# Patient Record
Sex: FEMALE | Race: BLACK OR AFRICAN AMERICAN | Employment: FULL TIME | URBAN - METROPOLITAN AREA
[De-identification: names, ages, dates, MRNs, and addresses within clinical notes are randomized per-mention and may not be internally consistent; named-entity substitution may affect disease eponyms.]

---

## 2017-01-03 ENCOUNTER — APPOINTMENT (OUTPATIENT)
Dept: MRI IMAGING | Age: 42
DRG: 059 | End: 2017-01-03
Attending: PSYCHIATRY & NEUROLOGY
Payer: MEDICARE

## 2017-01-03 ENCOUNTER — HOME HEALTH ADMISSION (OUTPATIENT)
Dept: HOME HEALTH SERVICES | Facility: HOME HEALTH | Age: 42
End: 2017-01-03
Payer: MEDICARE

## 2017-01-03 PROCEDURE — 72156 MRI NECK SPINE W/O & W/DYE: CPT

## 2017-01-05 ENCOUNTER — HOME CARE VISIT (OUTPATIENT)
Dept: HOME HEALTH SERVICES | Facility: HOME HEALTH | Age: 42
End: 2017-01-05

## 2017-01-06 ENCOUNTER — HOME CARE VISIT (OUTPATIENT)
Dept: SCHEDULING | Facility: HOME HEALTH | Age: 42
End: 2017-01-06
Payer: MEDICARE

## 2017-01-06 PROCEDURE — 400013 HH SOC

## 2017-01-06 PROCEDURE — G0151 HHCP-SERV OF PT,EA 15 MIN: HCPCS

## 2017-01-06 PROCEDURE — 3331090002 HH PPS REVENUE DEBIT

## 2017-01-06 PROCEDURE — 3331090001 HH PPS REVENUE CREDIT

## 2017-01-07 VITALS
DIASTOLIC BLOOD PRESSURE: 108 MMHG | OXYGEN SATURATION: 98 % | HEART RATE: 96 BPM | TEMPERATURE: 97.4 F | SYSTOLIC BLOOD PRESSURE: 164 MMHG

## 2017-01-07 PROCEDURE — 3331090001 HH PPS REVENUE CREDIT

## 2017-01-07 PROCEDURE — 3331090002 HH PPS REVENUE DEBIT

## 2017-01-08 PROCEDURE — 3331090001 HH PPS REVENUE CREDIT

## 2017-01-08 PROCEDURE — 3331090002 HH PPS REVENUE DEBIT

## 2017-01-09 PROCEDURE — 3331090002 HH PPS REVENUE DEBIT

## 2017-01-09 PROCEDURE — 3331090001 HH PPS REVENUE CREDIT

## 2017-01-10 ENCOUNTER — HOME CARE VISIT (OUTPATIENT)
Dept: HOME HEALTH SERVICES | Facility: HOME HEALTH | Age: 42
End: 2017-01-10
Payer: MEDICARE

## 2017-01-10 ENCOUNTER — HOME CARE VISIT (OUTPATIENT)
Dept: SCHEDULING | Facility: HOME HEALTH | Age: 42
End: 2017-01-10
Payer: MEDICARE

## 2017-01-10 PROCEDURE — 3331090002 HH PPS REVENUE DEBIT

## 2017-01-10 PROCEDURE — 3331090001 HH PPS REVENUE CREDIT

## 2017-01-10 PROCEDURE — G0157 HHC PT ASSISTANT EA 15: HCPCS

## 2017-01-11 ENCOUNTER — HOME CARE VISIT (OUTPATIENT)
Dept: SCHEDULING | Facility: HOME HEALTH | Age: 42
End: 2017-01-11
Payer: MEDICARE

## 2017-01-11 VITALS — HEART RATE: 113 BPM | SYSTOLIC BLOOD PRESSURE: 150 MMHG | DIASTOLIC BLOOD PRESSURE: 108 MMHG | OXYGEN SATURATION: 97 %

## 2017-01-11 VITALS — HEART RATE: 116 BPM | SYSTOLIC BLOOD PRESSURE: 148 MMHG | OXYGEN SATURATION: 98 % | DIASTOLIC BLOOD PRESSURE: 88 MMHG

## 2017-01-11 PROCEDURE — 3331090001 HH PPS REVENUE CREDIT

## 2017-01-11 PROCEDURE — 3331090002 HH PPS REVENUE DEBIT

## 2017-01-11 PROCEDURE — G0157 HHC PT ASSISTANT EA 15: HCPCS

## 2017-01-12 ENCOUNTER — HOME CARE VISIT (OUTPATIENT)
Dept: SCHEDULING | Facility: HOME HEALTH | Age: 42
End: 2017-01-12
Payer: MEDICARE

## 2017-01-12 VITALS — SYSTOLIC BLOOD PRESSURE: 148 MMHG | HEART RATE: 99 BPM | DIASTOLIC BLOOD PRESSURE: 110 MMHG | OXYGEN SATURATION: 98 %

## 2017-01-12 PROCEDURE — 3331090002 HH PPS REVENUE DEBIT

## 2017-01-12 PROCEDURE — G0157 HHC PT ASSISTANT EA 15: HCPCS

## 2017-01-12 PROCEDURE — 3331090001 HH PPS REVENUE CREDIT

## 2017-01-13 ENCOUNTER — HOSPITAL ENCOUNTER (EMERGENCY)
Age: 42
Discharge: HOME | End: 2017-01-13
Attending: EMERGENCY MEDICINE
Payer: MEDICARE

## 2017-01-13 VITALS
SYSTOLIC BLOOD PRESSURE: 136 MMHG | RESPIRATION RATE: 20 BRPM | OXYGEN SATURATION: 99 % | TEMPERATURE: 97.8 F | DIASTOLIC BLOOD PRESSURE: 101 MMHG | HEART RATE: 94 BPM

## 2017-01-13 DIAGNOSIS — R56.9 PSEUDOSEIZURES (HCC): ICD-10-CM

## 2017-01-13 DIAGNOSIS — R25.2 MUSCLE CRAMPS: Primary | ICD-10-CM

## 2017-01-13 LAB
ALBUMIN SERPL BCP-MCNC: 3.8 G/DL (ref 3.4–5)
ALBUMIN/GLOB SERPL: 1 {RATIO} (ref 0.8–1.7)
ALP SERPL-CCNC: 76 U/L (ref 45–117)
ALT SERPL-CCNC: 34 U/L (ref 13–56)
ANION GAP BLD CALC-SCNC: 10 MMOL/L (ref 3–18)
AST SERPL W P-5'-P-CCNC: 10 U/L (ref 15–37)
BASOPHILS # BLD AUTO: 0 K/UL (ref 0–0.06)
BASOPHILS # BLD: 0 % (ref 0–2)
BILIRUB DIRECT SERPL-MCNC: 0.1 MG/DL (ref 0–0.2)
BILIRUB SERPL-MCNC: 0.5 MG/DL (ref 0.2–1)
BUN SERPL-MCNC: 11 MG/DL (ref 7–18)
BUN/CREAT SERPL: 14 (ref 12–20)
CALCIUM SERPL-MCNC: 9.2 MG/DL (ref 8.5–10.1)
CHLORIDE SERPL-SCNC: 100 MMOL/L (ref 100–108)
CO2 SERPL-SCNC: 28 MMOL/L (ref 21–32)
CREAT SERPL-MCNC: 0.81 MG/DL (ref 0.6–1.3)
DIFFERENTIAL METHOD BLD: ABNORMAL
EOSINOPHIL # BLD: 0.1 K/UL (ref 0–0.4)
EOSINOPHIL NFR BLD: 1 % (ref 0–5)
ERYTHROCYTE [DISTWIDTH] IN BLOOD BY AUTOMATED COUNT: 13.1 % (ref 11.6–14.5)
GLOBULIN SER CALC-MCNC: 3.8 G/DL (ref 2–4)
GLUCOSE SERPL-MCNC: 133 MG/DL (ref 74–99)
HCT VFR BLD AUTO: 39.8 % (ref 35–45)
HGB BLD-MCNC: 13.7 G/DL (ref 12–16)
LACTATE BLD-SCNC: 1.6 MMOL/L (ref 0.4–2)
LYMPHOCYTES # BLD AUTO: 34 % (ref 21–52)
LYMPHOCYTES # BLD: 3.1 K/UL (ref 0.9–3.6)
MAGNESIUM SERPL-MCNC: 2 MG/DL (ref 1.8–2.4)
MCH RBC QN AUTO: 28.5 PG (ref 24–34)
MCHC RBC AUTO-ENTMCNC: 34.4 G/DL (ref 31–37)
MCV RBC AUTO: 82.7 FL (ref 74–97)
MONOCYTES # BLD: 0.6 K/UL (ref 0.05–1.2)
MONOCYTES NFR BLD AUTO: 7 % (ref 3–10)
NEUTS SEG # BLD: 5.1 K/UL (ref 1.8–8)
NEUTS SEG NFR BLD AUTO: 58 % (ref 40–73)
PLATELET # BLD AUTO: 475 K/UL (ref 135–420)
PMV BLD AUTO: 9.3 FL (ref 9.2–11.8)
POTASSIUM SERPL-SCNC: 4.2 MMOL/L (ref 3.5–5.5)
PROT SERPL-MCNC: 7.6 G/DL (ref 6.4–8.2)
RBC # BLD AUTO: 4.81 M/UL (ref 4.2–5.3)
SODIUM SERPL-SCNC: 138 MMOL/L (ref 136–145)
WBC # BLD AUTO: 8.9 K/UL (ref 4.6–13.2)

## 2017-01-13 PROCEDURE — 3331090002 HH PPS REVENUE DEBIT

## 2017-01-13 PROCEDURE — 80076 HEPATIC FUNCTION PANEL: CPT | Performed by: PHYSICIAN ASSISTANT

## 2017-01-13 PROCEDURE — 80048 BASIC METABOLIC PNL TOTAL CA: CPT | Performed by: PHYSICIAN ASSISTANT

## 2017-01-13 PROCEDURE — 83605 ASSAY OF LACTIC ACID: CPT

## 2017-01-13 PROCEDURE — 74011250636 HC RX REV CODE- 250/636: Performed by: PHYSICIAN ASSISTANT

## 2017-01-13 PROCEDURE — 83735 ASSAY OF MAGNESIUM: CPT | Performed by: PHYSICIAN ASSISTANT

## 2017-01-13 PROCEDURE — 96361 HYDRATE IV INFUSION ADD-ON: CPT

## 2017-01-13 PROCEDURE — 96374 THER/PROPH/DIAG INJ IV PUSH: CPT

## 2017-01-13 PROCEDURE — 85025 COMPLETE CBC W/AUTO DIFF WBC: CPT | Performed by: PHYSICIAN ASSISTANT

## 2017-01-13 PROCEDURE — 99284 EMERGENCY DEPT VISIT MOD MDM: CPT

## 2017-01-13 PROCEDURE — 3331090001 HH PPS REVENUE CREDIT

## 2017-01-13 PROCEDURE — 96375 TX/PRO/DX INJ NEW DRUG ADDON: CPT

## 2017-01-13 PROCEDURE — 77030011943

## 2017-01-13 RX ORDER — DIAZEPAM 10 MG/2ML
2 INJECTION INTRAMUSCULAR
Status: COMPLETED | OUTPATIENT
Start: 2017-01-13 | End: 2017-01-13

## 2017-01-13 RX ORDER — GLATIRAMER 40 MG/ML
40 INJECTION, SOLUTION SUBCUTANEOUS
COMMUNITY

## 2017-01-13 RX ORDER — METHOCARBAMOL 500 MG/1
500 TABLET, FILM COATED ORAL 4 TIMES DAILY
Qty: 10 TAB | Refills: 0 | Status: SHIPPED | OUTPATIENT
Start: 2017-01-13 | End: 2017-02-04 | Stop reason: CLARIF

## 2017-01-13 RX ORDER — MORPHINE SULFATE 4 MG/ML
4 INJECTION, SOLUTION INTRAMUSCULAR; INTRAVENOUS
Status: COMPLETED | OUTPATIENT
Start: 2017-01-13 | End: 2017-01-13

## 2017-01-13 RX ORDER — METOPROLOL TARTRATE 25 MG/1
25 TABLET, FILM COATED ORAL 3 TIMES DAILY
COMMUNITY
End: 2017-02-04 | Stop reason: CLARIF

## 2017-01-13 RX ORDER — LANOLIN ALCOHOL/MO/W.PET/CERES
1000 CREAM (GRAM) TOPICAL DAILY
COMMUNITY
End: 2018-04-09

## 2017-01-13 RX ADMIN — Medication 4 MG: at 11:01

## 2017-01-13 RX ADMIN — SODIUM CHLORIDE 1000 ML: 900 INJECTION, SOLUTION INTRAVENOUS at 11:53

## 2017-01-13 RX ADMIN — DIAZEPAM 2 MG: 5 INJECTION, SOLUTION INTRAMUSCULAR; INTRAVENOUS at 11:00

## 2017-01-13 NOTE — ED PROVIDER NOTES
HPI Comments: Grisel Thompson is a 39 y.o. female with a pertinent history of MS, migraines, depression, HLD, HTN, OA, seizures who presents via EMS to the emergency department for evaluation of bilateral leg tingling/cramping/weakness/pain since 2am this morning. States that all symptoms feel like her MS flares, but this time it feels more severe. Has a mild HA currently. Denies attempting any treatments pta. Recent admission here for same, during which time she was found to be hyponatremic. Had a MRI which did not demonstrate any new demyelinating lesions. Received 3 days of IV 1g solumedrol with subjective improvement in leg cramping/weakness/tingling. Was started on outpatient magnesium oxide. PCP started her on metoprolol for HTN. Pt denies any fevers or chills, dizziness or light headedness, ENT issues, CP or discomfort, SOB, cough, n/v/d/c, abd pain, back pain, diaphoresis, melena/hematochezia, dysuria, hematuria, frequency, or rash. Patient has no other complaints at this time. PCP: Mehnaz Valdez MD         Past Medical History:   Diagnosis Date    Arthritis     Arthropathy, unspecified, site unspecified     Depression     Hypercholesteremia     Hypertension      history of htn    Incontinence of urine     Insomnia     Migraine     MS (multiple sclerosis) ([address]     Neurogenic bladder     Other ill-defined conditions(799.89)      multiple Sclerosis    Seizures ([address]      6/2013    Wears glasses        Past Surgical History:   Procedure Laterality Date    Hx other surgical      Hx hysterectomy      Hx orthopaedic       1/2005 11/2008         Family History:   Problem Relation Age of Onset    Heart Disease Father     Diabetes Father     Diabetes Sister     Other Other        Social History     Social History    Marital status:      Spouse name: N/A    Number of children: N/A    Years of education: N/A     Occupational History    Not on file.      Social History Main Topics    Smoking status: Never Smoker    Smokeless tobacco: Never Used    Alcohol use No    Drug use: No    Sexual activity: Not on file      Comment: Hysterectomy     Other Topics Concern    Not on file     Social History Narrative         ALLERGIES: Oxycontin [oxycodone]    Review of Systems   Constitutional: Negative for chills and fever. HENT: Negative for congestion, rhinorrhea and sore throat. Respiratory: Negative for cough and shortness of breath. Cardiovascular: Negative for chest pain and leg swelling. Gastrointestinal: Negative for abdominal pain, blood in stool, constipation, diarrhea, nausea and vomiting. Genitourinary: Negative for dysuria, frequency and hematuria. Musculoskeletal: Positive for gait problem and myalgias. Negative for back pain. Skin: Negative for rash and wound. Neurological: Positive for weakness and headaches. Negative for dizziness and numbness. Vitals:    01/13/17 1011   BP: (!) 136/101   Pulse: 94   Resp: 20   Temp: 97.8 °F (36.6 °C)   SpO2: 99%            Physical Exam   Constitutional: She is oriented to person, place, and time. She appears well-developed and well-nourished. No distress. Pt is obese,    HENT:   Head: Normocephalic and atraumatic. Nose: Nose normal.   Mouth/Throat: Oropharynx is clear and moist. No oropharyngeal exudate. Eyes: Conjunctivae and EOM are normal. Right eye exhibits no discharge. Left eye exhibits no discharge. Neck: Normal range of motion. Neck supple. No thyromegaly present. Cardiovascular: Normal rate and intact distal pulses. Pulmonary/Chest: Effort normal and breath sounds normal. No respiratory distress. She has no wheezes. She has no rales. She exhibits no tenderness. Abdominal: Soft. Bowel sounds are normal. She exhibits no distension. There is no tenderness. Musculoskeletal: She exhibits tenderness. Bilateral feet TTP. No lower extremity edema. Intact distal pulses and sensation. Voluntarily moving BUE and BLE with 4/5 equal bilateral strength and full ROM. Lymphadenopathy:     She has no cervical adenopathy. Neurological: She is alert and oriented to person, place, and time. Skin: Skin is warm and dry. No rash noted. She is not diaphoretic. Psychiatric: She has a normal mood and affect. Her behavior is normal.   Nursing note and vitals reviewed. 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 Cuong Moreno - teleneurology)    Risk of Complications, Morbidity, and/or Mortality  Presenting problems: moderate  Diagnostic procedures: moderate  Management options: moderate    Patient Progress  Patient progress: improved    ED Course       Procedures      -------------------------------------------------------------------------------------------------------------------  Orders:  Orders Placed This Encounter    CBC WITH AUTOMATED DIFF     Standing Status:   Standing     Number of Occurrences:   1    HEPATIC FUNCTION PANEL     Standing Status:   Standing     Number of Occurrences:   1    METABOLIC PANEL, BASIC     Standing Status:   Standing     Number of Occurrences:   1    MAGNESIUM     Standing Status:   Standing     Number of Occurrences:   1    POC LACTIC ACID     Standing Status:   Standing     Number of Occurrences:   1    IP CONSULT TO NEUROLOGY     Standing Status:   Standing     Number of Occurrences:   1     Order Specific Question:   Reason for Consult:      Answer:   BLE cramping/tingling/pain consistent with prior MS exacerbations. Also, \"seizure-like\" activity noted here in ED     Order Specific Question:   Did you call or speak to the consulting provider? Answer:   No     Order Specific Question:   Consult To     Answer:   Claudean Najjar, PA-C     Order Specific Question:   Schedule When? Answer:   TODAY    POC LACTIC ACID     Standing Status:   Standing     Number of Occurrences:   1    SALINE LOCK IV ONE TIME STAT     Standing Status:   Standing     Number of Occurrences:   1    metoprolol tartrate (LOPRESSOR) 25 mg tablet     Sig: Take 25 mg by mouth three (3) times daily.  glatiramer (COPAXONE) 40 mg/mL injection     Si mg by SubCUTAneous route every Monday, Wednesday, Friday.  cyanocobalamin 1,000 mcg tablet     Sig: Take 1,000 mcg by mouth daily.  morphine injection 4 mg    diazePAM (VALIUM) injection 2 mg    sodium chloride 0.9 % bolus infusion 1,000 mL    methocarbamol (ROBAXIN) 500 mg tablet     Sig: Take 1 Tab by mouth four (4) times daily. Dispense:  10 Tab     Refill:  0        Lab Results:   Recent Results (from the past 12 hour(s))   CBC WITH AUTOMATED DIFF    Collection Time: 17 10:53 AM   Result Value Ref Range    WBC 8.9 4.6 - 13.2 K/uL    RBC 4.81 4.20 - 5.30 M/uL    HGB 13.7 12.0 - 16.0 g/dL    HCT 39.8 35.0 - 45.0 %    MCV 82.7 74.0 - 97.0 FL    MCH 28.5 24.0 - 34.0 PG    MCHC 34.4 31.0 - 37.0 g/dL    RDW 13.1 11.6 - 14.5 %    PLATELET 451 (H) 528 - 420 K/uL    MPV 9.3 9.2 - 11.8 FL    NEUTROPHILS 58 40 - 73 %    LYMPHOCYTES 34 21 - 52 %    MONOCYTES 7 3 - 10 %    EOSINOPHILS 1 0 - 5 %    BASOPHILS 0 0 - 2 %    ABS. NEUTROPHILS 5.1 1.8 - 8.0 K/UL    ABS. LYMPHOCYTES 3.1 0.9 - 3.6 K/UL    ABS. MONOCYTES 0.6 0.05 - 1.2 K/UL    ABS. EOSINOPHILS 0.1 0.0 - 0.4 K/UL    ABS.  BASOPHILS 0.0 0.0 - 0.06 K/UL    DF AUTOMATED     HEPATIC FUNCTION PANEL    Collection Time: 17 10:53 AM   Result Value Ref Range    Protein, total 7.6 6.4 - 8.2 g/dL    Albumin 3.8 3.4 - 5.0 g/dL Globulin 3.8 2.0 - 4.0 g/dL    A-G Ratio 1.0 0.8 - 1.7      Bilirubin, total 0.5 0.2 - 1.0 MG/DL    Bilirubin, direct 0.1 0.0 - 0.2 MG/DL    Alk. phosphatase 76 45 - 117 U/L    AST 10 (L) 15 - 37 U/L    ALT 34 13 - 56 U/L   METABOLIC PANEL, BASIC    Collection Time: 01/13/17 10:53 AM   Result Value Ref Range    Sodium 138 136 - 145 mmol/L    Potassium 4.2 3.5 - 5.5 mmol/L    Chloride 100 100 - 108 mmol/L    CO2 28 21 - 32 mmol/L    Anion gap 10 3.0 - 18 mmol/L    Glucose 133 (H) 74 - 99 mg/dL    BUN 11 7.0 - 18 MG/DL    Creatinine 0.81 0.6 - 1.3 MG/DL    BUN/Creatinine ratio 14 12 - 20      GFR est AA >60 >60 ml/min/1.73m2    GFR est non-AA >60 >60 ml/min/1.73m2    Calcium 9.2 8.5 - 10.1 MG/DL   MAGNESIUM    Collection Time: 01/13/17 10:53 AM   Result Value Ref Range    Magnesium 2.0 1.8 - 2.4 mg/dL   POC LACTIC ACID    Collection Time: 01/13/17 11:09 AM   Result Value Ref Range    Lactic Acid (POC) 1.6 0.4 - 2.0 mmol/L       Progress Notes:  10:47 AM:  Bee Harris PA-C was at the pt's bedside, assessed the pt and answered the pt's questions regarding treatment. 1058 AM:  Hayden Welsh [facility], has notified me that pt may have just had a seizure. Checked on patient. No seizure activity or post-ictal state noted. Pt speaking and responding to commands. Pt has already received morphine and valium. Will continue to monitor. Bee Harris PA-C    1104 AM:  Pt is reportedly having a seizure again per [facility]. Checked on patient. She appears to be having tonic-clonic like jerking of BUE and BLE and eyes noted to be rolling back. However, exhibits control/awareness of her extremities and self-preservation when her arm is lifted directly above her head and released. She voluntarily held her hand out in front of her face and then set it down next to her side. No post-ictal state noted. Pt once again verbal and responding to commands. Activity is not consistent with seizures. Discussed with Dr. Vibha Higgins.   Will continue to monitor. Blair Cummings PA-C    1110 AM:  Lactic acid wnl. Blair Cummings PA-C    10:47 AM: Labs wnl. Discussed case with neurology, Dr. Karen Lang. He will be in to evaluate patient shortly. Blair Cummings PA-C    7246 AM:  Per discussion with Dr. Rodney Ortiz, his exam is not consistent with seizures or MS flare. Especially given recent normal MRI brain and MRI cspine, and recent treatment with steroids. Labs today are unremarkable. No additional work-up or admission is warranted at this time. Will DC home with robaxin for muscle cramps and PCP follow-up.  -------------------------------------------------------------------------------------------------------------------    Disposition:  Diagnosis:   1. Muscle cramps    2. Pseudoseizures ([address]    3. Elevated blood pressure        Disposition: DC Home    Follow-up Information     Follow up With Details Comments Contact Info    Ruthie Ahumada MD Go on 1/17/2017 at [address] EMERGENCY DEPT Go to As needed, If symptoms worsen [address] [place] [address]  [phone]          Patient's Medications   Start Taking    METHOCARBAMOL (ROBAXIN) 500 MG TABLET    Take 1 Tab by mouth four (4) times daily. Continue Taking    ACETAMINOPHEN (TYLENOL) 500 MG TABLET    Take 500 mg by mouth every six (6) hours as needed for Pain. CLOTRIMAZOLE (LOTRIMIN) 1 % TOPICAL CREAM    Apply  to affected area two (2) times a day. CYANOCOBALAMIN 1,000 MCG TABLET    Take 1,000 mcg by mouth daily. DULOXETINE (CYMBALTA) 30 MG CAPSULE    Take 3 Caps by mouth daily. Indications: CHRONIC MUSCULOSKELETAL PAIN, MAJOR DEPRESSIVE DISORDER    FERROUS SULFATE 325 MG (65 MG IRON) TABLET    Take 1 Tab by mouth two (2) times a day. GABAPENTIN (NEURONTIN) 300 MG CAPSULE    Take 300 mg by mouth nightly. GLATIRAMER (COPAXONE) 40 MG/ML INJECTION    40 mg by SubCUTAneous route every Monday, Wednesday, Friday. HYDROCORTISONE (HYCORT) 1 % OINTMENT    Apply  to affected area two (2) times a day. use thin layer    HYDROXYZINE (ATARAX) 25 MG TABLET    Take 1-2 tablets by mouth every 6 hours as needed. HYDROXYZINE PAMOATE (VISTARIL) 50 MG CAPSULE    Take 50 mg by mouth two (2) times a day. MAGNESIUM OXIDE (MAG-OX) 400 MG TABLET    Take 1 Tab by mouth daily as needed. Indications: for cramping    METOPROLOL TARTRATE (LOPRESSOR) 25 MG TABLET    Take 25 mg by mouth three (3) times daily. PROMETHAZINE (PHENERGAN) 25 MG TABLET    Take 25 mg by mouth every six (6) hours as needed for Nausea. QUETIAPINE (SEROQUEL) 200 MG TABLET    Take 200 mg by mouth daily. at bedtime    SUMATRIPTAN (IMITREX) 100 MG TABLET    Take 100 mg by mouth once as needed for Migraine.    These Medications have changed    No medications on file   Stop Taking    No medications on file

## 2017-01-13 NOTE — DISCHARGE INSTRUCTIONS
High Blood Pressure: Care Instructions  Your Care Instructions  If your blood pressure is usually above 140/90, you have high blood pressure, or hypertension. That means the top number is 140 or higher or the bottom number is 90 or higher, or both. Despite what a lot of people think, high blood pressure usually doesn't cause headaches or make you feel dizzy or lightheaded. It usually has no symptoms. But it does increase your risk for heart attack, stroke, and kidney or eye damage. The higher your blood pressure, the more your risk increases. Your doctor will give you a goal for your blood pressure. Your goal will be based on your health and your age. An example of a goal is to keep your blood pressure below 140/90. Lifestyle changes, such as eating healthy and being active, are always important to help lower blood pressure. You might also take medicine to reach your blood pressure goal.  Follow-up care is a key part of your treatment and safety. Be sure to make and go to all appointments, and call your doctor if you are having problems. It's also a good idea to know your test results and keep a list of the medicines you take. How can you care for yourself at home? Medical treatment  · If you stop taking your medicine, your blood pressure will go back up. You may take one or more types of medicine to lower your blood pressure. Be safe with medicines. Take your medicine exactly as prescribed. Call your doctor if you think you are having a problem with your medicine. · Talk to your doctor before you start taking aspirin every day. Aspirin can help certain people lower their risk of a heart attack or stroke. But taking aspirin isn't right for everyone, because it can cause serious bleeding. · See your doctor regularly. You may need to see the doctor more often at first or until your blood pressure comes down.   · If you are taking blood pressure medicine, talk to your doctor before you take decongestants or anti-inflammatory medicine, such as ibuprofen. Some of these medicines can raise blood pressure. · Learn how to check your blood pressure at home. Lifestyle changes  · Stay at a healthy weight. This is especially important if you put on weight around the waist. Losing even 10 pounds can help you lower your blood pressure. · If your doctor recommends it, get more exercise. Walking is a good choice. Bit by bit, increase the amount you walk every day. Try for at least 30 minutes on most days of the week. You also may want to swim, bike, or do other activities. · Avoid or limit alcohol. Talk to your doctor about whether you can drink any alcohol. · Try to limit how much sodium you eat to less than 2,300 milligrams (mg) a day. Your doctor may ask you to try to eat less than 1,500 mg a day. · Eat plenty of fruits (such as bananas and oranges), vegetables, legumes, whole grains, and low-fat dairy products. · Lower the amount of saturated fat in your diet. Saturated fat is found in animal products such as milk, cheese, and meat. Limiting these foods may help you lose weight and also lower your risk for heart disease. · Do not smoke. Smoking increases your risk for heart attack and stroke. If you need help quitting, talk to your doctor about stop-smoking programs and medicines. These can increase your chances of quitting for good. When should you call for help? Call 911 anytime you think you may need emergency care. This may mean having symptoms that suggest that your blood pressure is causing a serious heart or blood vessel problem. Your blood pressure may be over 180/110. For example, call 911 if:  · You have symptoms of a heart attack. These may include:  ¨ Chest pain or pressure, or a strange feeling in the chest.  ¨ Sweating. ¨ Shortness of breath. ¨ Nausea or vomiting. ¨ Pain, pressure, or a strange feeling in the back, neck, jaw, or upper belly or in one or both shoulders or arms.   ¨ Lightheadedness or sudden weakness. ¨ A fast or irregular heartbeat. · You have symptoms of a stroke. These may include:  ¨ Sudden numbness, tingling, weakness, or loss of movement in your face, arm, or leg, especially on only one side of your body. ¨ Sudden vision changes. ¨ Sudden trouble speaking. ¨ Sudden confusion or trouble understanding simple statements. ¨ Sudden problems with walking or balance. ¨ A sudden, severe headache that is different from past headaches. · You have severe back or belly pain. Do not wait until your blood pressure comes down on its own. Get help right away. Call your doctor now or seek immediate care if:  · Your blood pressure is much higher than normal (such as 180/110 or higher), but you don't have symptoms. · You think high blood pressure is causing symptoms, such as:  ¨ Severe headache. ¨ Blurry vision. Watch closely for changes in your health, and be sure to contact your doctor if:  · Your blood pressure measures 140/90 or higher at least 2 times. That means the top number is 140 or higher or the bottom number is 90 or higher, or both. · You think you may be having side effects from your blood pressure medicine. · Your blood pressure is usually normal, but it goes above normal at least 2 times. Where can you learn more? Go to http://annel-sadie.info/. Enter V452 in the search box to learn more about \"High Blood Pressure: Care Instructions. \"  Current as of: August 8, 2016  Content Version: 11.1  © 8259-1744 [employer]. Care instructions adapted under license by [employer] ([employer]) (which disclaims liability or warranty for this information). If you have questions about a medical condition or this instruction, always ask your healthcare professional. [place] [address] any warranty or liability for your use of this information.          DASH Diet: Care Instructions  Your Care Instructions  The DASH diet is an eating plan that can help lower your blood pressure. DASH stands for Dietary Approaches to Stop Hypertension. Hypertension is high blood pressure. The DASH diet focuses on eating foods that are high in calcium, potassium, and magnesium. These nutrients can lower blood pressure. The foods that are highest in these nutrients are fruits, vegetables, low-fat dairy products, nuts, seeds, and legumes. But taking calcium, potassium, and magnesium supplements instead of eating foods that are high in those nutrients does not have the same effect. The DASH diet also includes whole grains, fish, and poultry. The DASH diet is one of several lifestyle changes your doctor may recommend to lower your high blood pressure. Your doctor may also want you to decrease the amount of sodium in your diet. Lowering sodium while following the DASH diet can lower blood pressure even further than just the DASH diet alone. Follow-up care is a key part of your treatment and safety. Be sure to make and go to all appointments, and call your doctor if you are having problems. It's also a good idea to know your test results and keep a list of the medicines you take. How can you care for yourself at home? Following the DASH diet  · Eat 4 to 5 servings of fruit each day. A serving is 1 medium-sized piece of fruit, ½ cup chopped or canned fruit, 1/4 cup dried fruit, or 4 ounces (½ cup) of fruit juice. Choose fruit more often than fruit juice. · Eat 4 to 5 servings of vegetables each day. A serving is 1 cup of lettuce or raw leafy vegetables, ½ cup of chopped or cooked vegetables, or 4 ounces (½ cup) of vegetable juice. Choose vegetables more often than vegetable juice. · Get 2 to 3 servings of low-fat and fat-free dairy each day. A serving is 8 ounces of milk, 1 cup of yogurt, or 1 ½ ounces of cheese. · Eat 6 to 8 servings of grains each day.  A serving is 1 slice of bread, 1 ounce of dry cereal, or ½ cup of cooked rice, pasta, or cooked cereal. Try to choose whole-grain products as much as possible. · Limit lean meat, poultry, and fish to 2 servings each day. A serving is 3 ounces, about the size of a deck of cards. · Eat 4 to 5 servings of nuts, seeds, and legumes (cooked dried beans, lentils, and split peas) each week. A serving is 1/3 cup of nuts, 2 tablespoons of seeds, or ½ cup of cooked beans or peas. · Limit fats and oils to 2 to 3 servings each day. A serving is 1 teaspoon of vegetable oil or 2 tablespoons of salad dressing. · Limit sweets and added sugars to 5 servings or less a week. A serving is 1 tablespoon jelly or jam, ½ cup sorbet, or 1 cup of lemonade. · Eat less than 2,300 milligrams (mg) of sodium a day. If you limit your sodium to 1,500 mg a day, you can lower your blood pressure even more. Tips for success  · Start small. Do not try to make dramatic changes to your diet all at once. You might feel that you are missing out on your favorite foods and then be more likely to not follow the plan. Make small changes, and stick with them. Once those changes become habit, add a few more changes. · Try some of the following:  ¨ Make it a goal to eat a fruit or vegetable at every meal and at snacks. This will make it easy to get the recommended amount of fruits and vegetables each day. ¨ Try yogurt topped with fruit and nuts for a snack or healthy dessert. ¨ Add lettuce, tomato, cucumber, and onion to sandwiches. ¨ Combine a ready-made pizza crust with low-fat mozzarella cheese and lots of vegetable toppings. Try using tomatoes, squash, spinach, broccoli, carrots, cauliflower, and onions. ¨ Have a variety of cut-up vegetables with a low-fat dip as an appetizer instead of chips and dip. ¨ Sprinkle sunflower seeds or chopped almonds over salads. Or try adding chopped walnuts or almonds to cooked vegetables. ¨ Try some vegetarian meals using beans and peas. Add garbanzo or kidney beans to salads.  Make burritos and tacos with mashed borrego beans or black beans. Where can you learn more? Go to http://annel-sadie.info/. Enter J265 in the search box to learn more about \"DASH Diet: Care Instructions. \"  Current as of: March 23, 2016  Content Version: 11.1  © 0644-0227 [employer]. Care instructions adapted under license by [employer] (which disclaims liability or warranty for this information). If you have questions about a medical condition or this instruction, always ask your healthcare professional. [address] any warranty or liability for your use of this information.

## 2017-01-13 NOTE — PROGRESS NOTES
Pt noted to have 4 ED visits within the last 6 months. Pt was last seen by her PCP on 1/11/17.  Pt has an appt on 1/17/17 @ [address] Garnett RN, BSN, [place]  ED Outcomes Manager  Thursdays & Fridays   [phone] (phone)  [phone] (pager)

## 2017-01-13 NOTE — ED TRIAGE NOTES
Pt started having an MS crisis this morning. Pt has cramps on both legs, twitching as well. Pt slid to the floor at home. No injuries reported.

## 2017-01-13 NOTE — ED NOTES
I have reviewed discharge instructions with the patient. The patient verbalized understanding. Patient armband removed and shredded. [facility].

## 2017-01-14 PROCEDURE — 3331090001 HH PPS REVENUE CREDIT

## 2017-01-14 PROCEDURE — 3331090002 HH PPS REVENUE DEBIT

## 2017-01-15 PROCEDURE — 3331090001 HH PPS REVENUE CREDIT

## 2017-01-15 PROCEDURE — 3331090002 HH PPS REVENUE DEBIT

## 2017-01-16 ENCOUNTER — HOME CARE VISIT (OUTPATIENT)
Dept: SCHEDULING | Facility: HOME HEALTH | Age: 42
End: 2017-01-16
Payer: MEDICARE

## 2017-01-16 PROCEDURE — 3331090001 HH PPS REVENUE CREDIT

## 2017-01-16 PROCEDURE — 3331090002 HH PPS REVENUE DEBIT

## 2017-01-16 PROCEDURE — G0157 HHC PT ASSISTANT EA 15: HCPCS

## 2017-01-17 VITALS — HEART RATE: 93 BPM | DIASTOLIC BLOOD PRESSURE: 97 MMHG | OXYGEN SATURATION: 97 % | SYSTOLIC BLOOD PRESSURE: 142 MMHG

## 2017-01-17 PROCEDURE — 3331090002 HH PPS REVENUE DEBIT

## 2017-01-17 PROCEDURE — 3331090001 HH PPS REVENUE CREDIT

## 2017-01-18 ENCOUNTER — HOME CARE VISIT (OUTPATIENT)
Dept: SCHEDULING | Facility: HOME HEALTH | Age: 42
End: 2017-01-18
Payer: MEDICARE

## 2017-01-18 VITALS — SYSTOLIC BLOOD PRESSURE: 118 MMHG | DIASTOLIC BLOOD PRESSURE: 82 MMHG | OXYGEN SATURATION: 98 % | HEART RATE: 92 BPM

## 2017-01-18 PROCEDURE — 3331090002 HH PPS REVENUE DEBIT

## 2017-01-18 PROCEDURE — 3331090001 HH PPS REVENUE CREDIT

## 2017-01-18 PROCEDURE — G0157 HHC PT ASSISTANT EA 15: HCPCS

## 2017-01-19 ENCOUNTER — HOME CARE VISIT (OUTPATIENT)
Dept: SCHEDULING | Facility: HOME HEALTH | Age: 42
End: 2017-01-19
Payer: MEDICARE

## 2017-01-19 VITALS — SYSTOLIC BLOOD PRESSURE: 158 MMHG | HEART RATE: 92 BPM | OXYGEN SATURATION: 98 % | DIASTOLIC BLOOD PRESSURE: 94 MMHG

## 2017-01-19 PROCEDURE — 3331090001 HH PPS REVENUE CREDIT

## 2017-01-19 PROCEDURE — 3331090002 HH PPS REVENUE DEBIT

## 2017-01-19 PROCEDURE — G0151 HHCP-SERV OF PT,EA 15 MIN: HCPCS

## 2017-01-20 PROCEDURE — 3331090001 HH PPS REVENUE CREDIT

## 2017-01-20 PROCEDURE — 3331090002 HH PPS REVENUE DEBIT

## 2017-01-21 PROCEDURE — 3331090001 HH PPS REVENUE CREDIT

## 2017-01-21 PROCEDURE — 3331090002 HH PPS REVENUE DEBIT

## 2017-01-22 PROCEDURE — 3331090002 HH PPS REVENUE DEBIT

## 2017-01-22 PROCEDURE — 3331090001 HH PPS REVENUE CREDIT

## 2017-01-23 ENCOUNTER — HOME CARE VISIT (OUTPATIENT)
Dept: SCHEDULING | Facility: HOME HEALTH | Age: 42
End: 2017-01-23
Payer: MEDICARE

## 2017-01-23 PROCEDURE — 3331090002 HH PPS REVENUE DEBIT

## 2017-01-23 PROCEDURE — G0151 HHCP-SERV OF PT,EA 15 MIN: HCPCS

## 2017-01-23 PROCEDURE — 3331090001 HH PPS REVENUE CREDIT

## 2017-01-24 VITALS — DIASTOLIC BLOOD PRESSURE: 90 MMHG | SYSTOLIC BLOOD PRESSURE: 146 MMHG | OXYGEN SATURATION: 98 % | HEART RATE: 104 BPM

## 2017-01-24 PROCEDURE — 3331090002 HH PPS REVENUE DEBIT

## 2017-01-24 PROCEDURE — 3331090001 HH PPS REVENUE CREDIT

## 2017-01-25 PROCEDURE — 3331090002 HH PPS REVENUE DEBIT

## 2017-01-25 PROCEDURE — 3331090001 HH PPS REVENUE CREDIT

## 2017-01-26 ENCOUNTER — HOME CARE VISIT (OUTPATIENT)
Dept: SCHEDULING | Facility: HOME HEALTH | Age: 42
End: 2017-01-26
Payer: MEDICARE

## 2017-01-26 PROCEDURE — 3331090002 HH PPS REVENUE DEBIT

## 2017-01-26 PROCEDURE — 3331090001 HH PPS REVENUE CREDIT

## 2017-01-26 PROCEDURE — G0157 HHC PT ASSISTANT EA 15: HCPCS

## 2017-01-27 VITALS — HEART RATE: 86 BPM | OXYGEN SATURATION: 98 % | SYSTOLIC BLOOD PRESSURE: 142 MMHG | DIASTOLIC BLOOD PRESSURE: 104 MMHG

## 2017-01-27 PROCEDURE — 3331090002 HH PPS REVENUE DEBIT

## 2017-01-27 PROCEDURE — 3331090001 HH PPS REVENUE CREDIT

## 2017-01-28 PROCEDURE — 3331090001 HH PPS REVENUE CREDIT

## 2017-01-28 PROCEDURE — 3331090002 HH PPS REVENUE DEBIT

## 2017-01-29 PROCEDURE — 3331090001 HH PPS REVENUE CREDIT

## 2017-01-29 PROCEDURE — 3331090002 HH PPS REVENUE DEBIT

## 2017-01-30 PROCEDURE — 3331090001 HH PPS REVENUE CREDIT

## 2017-01-30 PROCEDURE — 3331090002 HH PPS REVENUE DEBIT

## 2017-01-31 ENCOUNTER — HOME CARE VISIT (OUTPATIENT)
Dept: SCHEDULING | Facility: HOME HEALTH | Age: 42
End: 2017-01-31
Payer: MEDICARE

## 2017-01-31 VITALS — SYSTOLIC BLOOD PRESSURE: 154 MMHG | HEART RATE: 108 BPM | DIASTOLIC BLOOD PRESSURE: 108 MMHG | OXYGEN SATURATION: 95 %

## 2017-01-31 PROCEDURE — 3331090001 HH PPS REVENUE CREDIT

## 2017-01-31 PROCEDURE — 3331090002 HH PPS REVENUE DEBIT

## 2017-01-31 PROCEDURE — G0157 HHC PT ASSISTANT EA 15: HCPCS

## 2017-02-01 ENCOUNTER — HOME CARE VISIT (OUTPATIENT)
Dept: SCHEDULING | Facility: HOME HEALTH | Age: 42
End: 2017-02-01
Payer: MEDICARE

## 2017-02-01 ENCOUNTER — HOME CARE VISIT (OUTPATIENT)
Dept: HOME HEALTH SERVICES | Facility: HOME HEALTH | Age: 42
End: 2017-02-01
Payer: MEDICARE

## 2017-02-01 VITALS
HEART RATE: 88 BPM | OXYGEN SATURATION: 98 % | DIASTOLIC BLOOD PRESSURE: 116 MMHG | TEMPERATURE: 97.8 F | SYSTOLIC BLOOD PRESSURE: 182 MMHG

## 2017-02-01 PROCEDURE — 3331090003 HH PPS REVENUE ADJ

## 2017-02-01 PROCEDURE — G0151 HHCP-SERV OF PT,EA 15 MIN: HCPCS

## 2017-02-01 PROCEDURE — 3331090001 HH PPS REVENUE CREDIT

## 2017-02-01 PROCEDURE — 3331090002 HH PPS REVENUE DEBIT

## 2017-02-02 PROCEDURE — 3331090001 HH PPS REVENUE CREDIT

## 2017-02-02 PROCEDURE — 3331090002 HH PPS REVENUE DEBIT

## 2017-02-04 ENCOUNTER — HOSPITAL ENCOUNTER (EMERGENCY)
Age: 42
Discharge: HOME | End: 2017-02-04
Attending: EMERGENCY MEDICINE
Payer: MEDICARE

## 2017-02-04 VITALS
DIASTOLIC BLOOD PRESSURE: 82 MMHG | WEIGHT: 260 LBS | HEART RATE: 93 BPM | HEIGHT: 63 IN | SYSTOLIC BLOOD PRESSURE: 133 MMHG | OXYGEN SATURATION: 100 % | RESPIRATION RATE: 20 BRPM | BODY MASS INDEX: 46.07 KG/M2 | TEMPERATURE: 97.6 F

## 2017-02-04 DIAGNOSIS — G43.909 MIGRAINE WITHOUT STATUS MIGRAINOSUS, NOT INTRACTABLE, UNSPECIFIED MIGRAINE TYPE: Primary | ICD-10-CM

## 2017-02-04 DIAGNOSIS — I10 CHRONIC HYPERTENSION: ICD-10-CM

## 2017-02-04 PROCEDURE — 96375 TX/PRO/DX INJ NEW DRUG ADDON: CPT

## 2017-02-04 PROCEDURE — 96374 THER/PROPH/DIAG INJ IV PUSH: CPT

## 2017-02-04 PROCEDURE — 99283 EMERGENCY DEPT VISIT LOW MDM: CPT

## 2017-02-04 PROCEDURE — 74011250636 HC RX REV CODE- 250/636: Performed by: NURSE PRACTITIONER

## 2017-02-04 RX ORDER — DIPHENHYDRAMINE HYDROCHLORIDE 50 MG/ML
50 INJECTION, SOLUTION INTRAMUSCULAR; INTRAVENOUS ONCE
Status: COMPLETED | OUTPATIENT
Start: 2017-02-04 | End: 2017-02-04

## 2017-02-04 RX ORDER — DEXAMETHASONE SODIUM PHOSPHATE 4 MG/ML
10 INJECTION, SOLUTION INTRA-ARTICULAR; INTRALESIONAL; INTRAMUSCULAR; INTRAVENOUS; SOFT TISSUE
Status: COMPLETED | OUTPATIENT
Start: 2017-02-04 | End: 2017-02-04

## 2017-02-04 RX ORDER — PROCHLORPERAZINE EDISYLATE 5 MG/ML
10 INJECTION INTRAMUSCULAR; INTRAVENOUS
Status: COMPLETED | OUTPATIENT
Start: 2017-02-04 | End: 2017-02-04

## 2017-02-04 RX ADMIN — PROCHLORPERAZINE EDISYLATE 10 MG: 5 INJECTION INTRAMUSCULAR; INTRAVENOUS at 20:07

## 2017-02-04 RX ADMIN — DIPHENHYDRAMINE HYDROCHLORIDE 50 MG: 50 INJECTION, SOLUTION INTRAMUSCULAR; INTRAVENOUS at 20:06

## 2017-02-04 RX ADMIN — DEXAMETHASONE SODIUM PHOSPHATE 10 MG: 4 INJECTION, SOLUTION INTRAMUSCULAR; INTRAVENOUS at 20:07

## 2017-02-04 NOTE — ED PROVIDER NOTES
HPI Comments: 6:38 PM Ned Becker is a 39 y.o. female presenting to the ED with a migraine that begin this morning. She is also c/o nausea associated with her headache. Pt denies any falls/injuries, weakness, numbness, fever or visual changes. The states that this is similar to her recurrent migraines. The patient describes her pain as 10/10. She states that she did take her migraine medication and a nap, but has not gotten relief. There are no other concerns at this time. The history is provided by the patient. Past Medical History:   Diagnosis Date    Arthritis     Arthropathy, unspecified, site unspecified     Depression     Hypercholesteremia     Hypertension      history of htn    Incontinence of urine     Insomnia     Migraine     MS (multiple sclerosis) ([address]     Neurogenic bladder     Other ill-defined conditions(799.89)      multiple Sclerosis    Seizures ([facility] [address]      6/2013    Wears glasses        Past Surgical History:   Procedure Laterality Date    Hx other surgical      Hx hysterectomy      Hx orthopaedic       1/2005 11/2008         Family History:   Problem Relation Age of Onset    Heart Disease Father     Diabetes Father     Diabetes Sister     Other Other        Social History     Social History    Marital status:      Spouse name: N/A    Number of children: N/A    Years of education: N/A     Occupational History    Not on file. Social History Main Topics    Smoking status: Never Smoker    Smokeless tobacco: Never Used    Alcohol use No    Drug use: No    Sexual activity: Not on file      Comment: Hysterectomy     Other Topics Concern    Not on file     Social History Narrative         ALLERGIES: Oxycontin [oxycodone]    Review of Systems   Constitutional: Negative for chills and fever. HENT: Negative for congestion, ear pain, rhinorrhea, sinus pressure, trouble swallowing and voice change. Eyes: Negative for pain and visual disturbance. Respiratory: Negative for cough, chest tightness, shortness of breath, wheezing and stridor. Cardiovascular: Negative for chest pain and palpitations. Gastrointestinal: Positive for nausea. Negative for abdominal pain, diarrhea and vomiting. Genitourinary: Negative. Negative for difficulty urinating and dysuria. Musculoskeletal: Negative for neck pain and neck stiffness. Skin: Negative for rash. Neurological: Positive for headaches. Negative for dizziness, tremors, seizures, syncope, facial asymmetry, speech difficulty, weakness, light-headedness and numbness. Hematological: Does not bruise/bleed easily. All other systems reviewed and are negative. There were no vitals filed for this visit. Physical Exam   Constitutional: She is oriented to person, place, and time. She appears well-developed and well-nourished. No distress. HENT:   Head: Normocephalic and atraumatic. Right Ear: External ear normal.   Left Ear: External ear normal.   Nose: Nose normal.   Mouth/Throat: Oropharynx is clear and moist. No oropharyngeal exudate. Eyes: Conjunctivae and EOM are normal. Pupils are equal, round, and reactive to light. No scleral icterus. Neck: Normal range of motion. Neck supple. Cardiovascular: Normal rate, regular rhythm and normal heart sounds. Pulmonary/Chest: Effort normal and breath sounds normal. No respiratory distress. She has no wheezes. She has no rales. She exhibits no tenderness. Abdominal: Soft. There is no tenderness. Musculoskeletal: Normal range of motion. Neurological: She is alert and oriented to person, place, and time. She displays normal reflexes. No cranial nerve deficit. She exhibits normal muscle tone. Coordination normal.   Skin: Skin is warm and dry. No rash noted. Psychiatric: She has a normal mood and affect. Her behavior is normal.   Nursing note and vitals reviewed.        MDM  Number of Diagnoses or Management Options  Chronic hypertension: Migraine without status migrainosus, not intractable, unspecified migraine type:   Diagnosis management comments: Pt states that this is her baseline blood pressure and that her PCP just changed her medication to amlodipine 5 mg. Pt states that she is to f/u with him at the end of this month. I informed her of risks of end organ damage with prolonged elevation and that she is to f/u with PCP this week for recheck and possible medications adjustment. Pt verbalized understanding and agreed to plan. 8:18 PM  Pt states that her migraine is relieved after treatment. HR recheck is 93 and BP significantly improved to 384/61    Risk of Complications, Morbidity, and/or Mortality  Presenting problems: moderate  Diagnostic procedures: moderate  Management options: moderate    Patient Progress  Patient progress: improved    ED Course       Procedures                       Diagnosis:   1. Migraine without status migrainosus, not intractable, unspecified migraine type    2. Chronic hypertension          Disposition: home      Follow-up Information     Follow up With Details Comments Contact Info    Shalini Dominguez MD In 3 days  [address] [place] [address] EMERGENCY DEPT  If symptoms worsen [address]  [phone]          Patient's Medications   Start Taking    No medications on file   Continue Taking    CYANOCOBALAMIN 1,000 MCG TABLET    Take 1,000 mcg by mouth daily. DULOXETINE (CYMBALTA) 30 MG CAPSULE    Take 3 Caps by mouth daily. Indications: CHRONIC MUSCULOSKELETAL PAIN, MAJOR DEPRESSIVE DISORDER    GABAPENTIN (NEURONTIN) 300 MG CAPSULE    Take 300 mg by mouth nightly. GLATIRAMER (COPAXONE) 40 MG/ML INJECTION    40 mg by SubCUTAneous route every Monday, Wednesday, Friday. QUETIAPINE (SEROQUEL) 200 MG TABLET    Take 300 mg by mouth daily.  at bedtime   These Medications have changed    No medications on file   Stop Taking    No medications on file

## 2017-02-05 NOTE — DISCHARGE INSTRUCTIONS
High Blood Pressure: Care Instructions  Your Care Instructions  If your blood pressure is usually above 140/90, you have high blood pressure, or hypertension. That means the top number is 140 or higher or the bottom number is 90 or higher, or both. Despite what a lot of people think, high blood pressure usually doesn't cause headaches or make you feel dizzy or lightheaded. It usually has no symptoms. But it does increase your risk for heart attack, stroke, and kidney or eye damage. The higher your blood pressure, the more your risk increases. Your doctor will give you a goal for your blood pressure. Your goal will be based on your health and your age. An example of a goal is to keep your blood pressure below 140/90. Lifestyle changes, such as eating healthy and being active, are always important to help lower blood pressure. You might also take medicine to reach your blood pressure goal.  Follow-up care is a key part of your treatment and safety. Be sure to make and go to all appointments, and call your doctor if you are having problems. It's also a good idea to know your test results and keep a list of the medicines you take. How can you care for yourself at home? Medical treatment  · If you stop taking your medicine, your blood pressure will go back up. You may take one or more types of medicine to lower your blood pressure. Be safe with medicines. Take your medicine exactly as prescribed. Call your doctor if you think you are having a problem with your medicine. · Talk to your doctor before you start taking aspirin every day. Aspirin can help certain people lower their risk of a heart attack or stroke. But taking aspirin isn't right for everyone, because it can cause serious bleeding. · See your doctor regularly. You may need to see the doctor more often at first or until your blood pressure comes down.   · If you are taking blood pressure medicine, talk to your doctor before you take decongestants or anti-inflammatory medicine, such as ibuprofen. Some of these medicines can raise blood pressure. · Learn how to check your blood pressure at home. Lifestyle changes  · Stay at a healthy weight. This is especially important if you put on weight around the waist. Losing even 10 pounds can help you lower your blood pressure. · If your doctor recommends it, get more exercise. Walking is a good choice. Bit by bit, increase the amount you walk every day. Try for at least 30 minutes on most days of the week. You also may want to swim, bike, or do other activities. · Avoid or limit alcohol. Talk to your doctor about whether you can drink any alcohol. · Try to limit how much sodium you eat to less than 2,300 milligrams (mg) a day. Your doctor may ask you to try to eat less than 1,500 mg a day. · Eat plenty of fruits (such as bananas and oranges), vegetables, legumes, whole grains, and low-fat dairy products. · Lower the amount of saturated fat in your diet. Saturated fat is found in animal products such as milk, cheese, and meat. Limiting these foods may help you lose weight and also lower your risk for heart disease. · Do not smoke. Smoking increases your risk for heart attack and stroke. If you need help quitting, talk to your doctor about stop-smoking programs and medicines. These can increase your chances of quitting for good. When should you call for help? Call 911 anytime you think you may need emergency care. This may mean having symptoms that suggest that your blood pressure is causing a serious heart or blood vessel problem. Your blood pressure may be over 180/110. For example, call 911 if:  · You have symptoms of a heart attack. These may include:  ¨ Chest pain or pressure, or a strange feeling in the chest.  ¨ Sweating. ¨ Shortness of breath. ¨ Nausea or vomiting. ¨ Pain, pressure, or a strange feeling in the back, neck, jaw, or upper belly or in one or both shoulders or arms.   ¨ Lightheadedness or sudden weakness. ¨ A fast or irregular heartbeat. · You have symptoms of a stroke. These may include:  ¨ Sudden numbness, tingling, weakness, or loss of movement in your face, arm, or leg, especially on only one side of your body. ¨ Sudden vision changes. ¨ Sudden trouble speaking. ¨ Sudden confusion or trouble understanding simple statements. ¨ Sudden problems with walking or balance. ¨ A sudden, severe headache that is different from past headaches. · You have severe back or belly pain. Do not wait until your blood pressure comes down on its own. Get help right away. Call your doctor now or seek immediate care if:  · Your blood pressure is much higher than normal (such as 180/110 or higher), but you don't have symptoms. · You think high blood pressure is causing symptoms, such as:  ¨ Severe headache. ¨ Blurry vision. Watch closely for changes in your health, and be sure to contact your doctor if:  · Your blood pressure measures 140/90 or higher at least 2 times. That means the top number is 140 or higher or the bottom number is 90 or higher, or both. · You think you may be having side effects from your blood pressure medicine. · Your blood pressure is usually normal, but it goes above normal at least 2 times. Where can you learn more? Go to http://annel-sadie.info/. Enter K129 in the search box to learn more about \"High Blood Pressure: Care Instructions. \"  Current as of: August 8, 2016  Content Version: 11.1  © 9086-8099 [employer]. Care instructions adapted under license by [employer] (which disclaims liability or warranty for this information). If you have questions about a medical condition or this instruction, always ask your healthcare professional. [place] [address] any warranty or liability for your use of this information.        Migraine Headache: Care Instructions  Your Care Instructions  Migraines are painful, throbbing headaches that often start on one side of the head. They may cause nausea and vomiting and make you sensitive to light, sound, or smell. Without treatment, migraines can last from 4 hours to a few days. Medicines can help prevent migraines or stop them after they have started. Your doctor can help you find which ones work best for you. Follow-up care is a key part of your treatment and safety. Be sure to make and go to all appointments, and call your doctor if you are having problems. It's also a good idea to know your test results and keep a list of the medicines you take. How can you care for yourself at home? · Do not drive if you have taken a prescription pain medicine. · Rest in a quiet, dark room until your headache is gone. Close your eyes, and try to relax or go to sleep. Don't watch TV or read. · Put a cold, moist cloth or cold pack on the painful area for 10 to 20 minutes at a time. Put a thin cloth between the cold pack and your skin. · Use a warm, moist towel or a heating pad set on low to relax tight shoulder and neck muscles. · Have someone gently massage your neck and shoulders. · Take your medicines exactly as prescribed. Call your doctor if you think you are having a problem with your medicine. You will get more details on the specific medicines your doctor prescribes. · Be careful not to take pain medicine more often than the instructions allow. You could get worse or more frequent headaches when the medicine wears off. To prevent migraines  · Keep a headache diary so you can figure out what triggers your headaches. Avoiding triggers may help you prevent headaches. Record when each headache began, how long it lasted, and what the pain was like. (Was it throbbing, aching, stabbing, or dull?) Write down any other symptoms you had with the headache, such as nausea, flashing lights or dark spots, or sensitivity to bright light or loud noise. Note if the headache occurred near your period. List anything that might have triggered the headache. Triggers may include certain foods (chocolate, cheese, wine) or odors, smoke, bright light, stress, or lack of sleep. · If your doctor has prescribed medicine for your migraines, take it as directed. You may have medicine that you take only when you get a migraine and medicine that you take all the time to help prevent migraines. ¨ If your doctor has prescribed medicine for when you get a headache, take it at the first sign of a migraine, unless your doctor has given you other instructions. ¨ If your doctor has prescribed medicine to prevent migraines, take it exactly as prescribed. Call your doctor if you think you are having a problem with your medicine. · Find healthy ways to deal with stress. Migraines are most common during or right after stressful times. Take time to relax before and after you do something that has caused a migraine in the past.  · Try to keep your muscles relaxed by keeping good posture. Check your jaw, face, neck, and shoulder muscles for tension. Try to relax them. When you sit at a desk, change positions often. And make sure to stretch for 30 seconds each hour. · Get plenty of sleep and exercise. · Eat meals on a regular schedule. Avoid foods and drinks that often trigger migraines. These include chocolate, alcohol (especially red wine and port), aspartame, monosodium glutamate (MSG), and some additives found in foods (such as hot dogs, damon, cold cuts, aged cheeses, and pickled foods). · Limit caffeine. Don't drink too much coffee, tea, or soda. But don't quit caffeine suddenly. That can also give you migraines. · Do not smoke or allow others to smoke around you. If you need help quitting, talk to your doctor about stop-smoking programs and medicines. These can increase your chances of quitting for good.   · If you are taking birth control pills or hormone therapy, talk to your doctor about whether they are triggering your migraines. When should you call for help? Call 911 anytime you think you may need emergency care. For example, call if:  · You have signs of a stroke. These may include:  ¨ Sudden numbness, paralysis, or weakness in your face, arm, or leg, especially on only one side of your body. ¨ Sudden vision changes. ¨ Sudden trouble speaking. ¨ Sudden confusion or trouble understanding simple statements. ¨ Sudden problems with walking or balance. ¨ A sudden, severe headache that is different from past headaches. Call your doctor now or seek immediate medical care if:  · You have new or worse nausea and vomiting. · You have a new or higher fever. · Your headache gets much worse. Watch closely for changes in your health, and be sure to contact your doctor if:  · You are not getting better after 2 days (48 hours). Where can you learn more? Go to http://annel-sadie.info/. Enter R087 in the search box to learn more about \"Migraine Headache: Care Instructions. \"  Current as of: February 19, 2016  Content Version: 11.1  © 0338-3151 [employer]. Care instructions adapted under license by [employer] (which disclaims liability or warranty for this information). If you have questions about a medical condition or this instruction, always ask your healthcare professional. [address] any warranty or liability for your use of this information. [facility] Activation    Thank you for requesting access to [facility]. Please follow the instructions below to securely access and download your online medical record. [facility] allows you to send messages to your doctor, view your test results, renew your prescriptions, schedule appointments, and more. How Do I Sign Up? 1. In your internet browser, go to [email or web address]  2. Click on the First Time User? Click Here link in the Sign In box. You will be redirect to the New Member Sign Up page.   3. Enter your [employer] Access Code exactly as it appears below. You will not need to use this code after youve completed the sign-up process. If you do not sign up before the expiration date, you must request a new code. [facility] Access Code: [email or web address]  Expires: 4/3/2017  1:33 PM (This is the date your [employer] access code will )    4. Enter the last four digits of your Social Security Number (xxxx) and Date of Birth (mm/dd/yyyy) as indicated and click Submit. You will be taken to the next sign-up page. 5. Create a FindY[facility] ID. This will be your [employer] login ID and cannot be changed, so think of one that is secure and easy to remember. 6. Create a FindY[employer] password. You can change your password at any time. 7. Enter your Password Reset Question and Answer. This can be used at a later time if you forget your password. 8. Enter your e-mail address. You will receive e-mail notification when new information is available in [address]. 9. Click Sign Up. You can now view and download portions of your medical record. 10. Click the Download Summary menu link to download a portable copy of your medical information. Additional Information    If you have questions, please visit the Frequently Asked Questions section of the [employer] website at [email or web address] Remember, [employer] is NOT to be used for urgent needs. For medical emergencies, dial 911. I have reviewed discharge instructions with the patient. The patient verbalized understanding.

## 2017-03-03 ENCOUNTER — HOSPITAL ENCOUNTER (EMERGENCY)
Age: 42
Discharge: HOME | End: 2017-03-03
Attending: EMERGENCY MEDICINE
Payer: MEDICARE

## 2017-03-03 VITALS
HEART RATE: 82 BPM | OXYGEN SATURATION: 99 % | RESPIRATION RATE: 16 BRPM | SYSTOLIC BLOOD PRESSURE: 144 MMHG | HEIGHT: 63 IN | BODY MASS INDEX: 46.07 KG/M2 | DIASTOLIC BLOOD PRESSURE: 92 MMHG | WEIGHT: 260 LBS | TEMPERATURE: 97.3 F

## 2017-03-03 DIAGNOSIS — T80.1XXA IV INFILTRATION, INITIAL ENCOUNTER: Primary | ICD-10-CM

## 2017-03-03 PROCEDURE — 99284 EMERGENCY DEPT VISIT MOD MDM: CPT

## 2017-03-03 RX ORDER — METHYLPREDNISOLONE SODIUM SUCCINATE 1 G/16ML
1000 INJECTION, POWDER, LYOPHILIZED, FOR SOLUTION INTRAMUSCULAR; INTRAVENOUS DAILY
COMMUNITY
End: 2017-07-25

## 2017-03-04 NOTE — ED NOTES
Patient discharged home, A&Ox4, no apparent distress.  Patient verbalizes understanding of discharge instructions

## 2017-03-04 NOTE — ED NOTES
IV patient came in with removed and replaced. Patient discharged with IV in place for removal by home health.

## 2017-03-04 NOTE — ED PROVIDER NOTES
HPI Comments: 35yo F with h/o MS, HTN, seizures presents to ED due to infiltrated IV. She is due to receive 3 days of solumedrol 1000mg IV. Today her IV became infiltrated. She is here for a new IV. Home health performs her infusions. She was seen by neurologist 2 days ago with symptoms of left sided weakness, numbness, and left foot tremor. Her speech has become slurred. This has been reviewed by her neurologist.  She last had imaging in January. No other complaints. Patient is a 43 y.o. female presenting with other event. Other   Pertinent negatives include no chest pain, no abdominal pain and no shortness of breath. Past Medical History:   Diagnosis Date    Arthritis     Arthropathy, unspecified, site unspecified     Depression     Hypercholesteremia     Hypertension     history of htn    Incontinence of urine     Insomnia     Migraine     MS (multiple sclerosis) ([address]     Neurogenic bladder     Other ill-defined conditions(799.89)     multiple Sclerosis    Seizures ([address]     6/2013    Wears glasses        Past Surgical History:   Procedure Laterality Date    HX HYSTERECTOMY      HX ORTHOPAEDIC      1/2005 11/2008    HX OTHER SURGICAL           Family History:   Problem Relation Age of Onset    Heart Disease Father     Diabetes Father     Diabetes Sister     Other Other        Social History     Social History    Marital status:      Spouse name: N/A    Number of children: N/A    Years of education: N/A     Occupational History    Not on file. Social History Main Topics    Smoking status: Never Smoker    Smokeless tobacco: Never Used    Alcohol use No    Drug use: No    Sexual activity: Not on file      Comment: Hysterectomy     Other Topics Concern    Not on file     Social History Narrative         ALLERGIES: Oxycontin [oxycodone]    Review of Systems   Constitutional: Negative for fever. HENT: Negative for facial swelling.     Eyes: Negative for visual disturbance. Respiratory: Negative for shortness of breath. Cardiovascular: Negative for chest pain. Gastrointestinal: Negative for abdominal pain. Genitourinary: Negative for dysuria. Musculoskeletal: Negative for neck pain. Skin: Negative for rash. Neurological: Positive for tremors, weakness and numbness. Negative for dizziness. Psychiatric/Behavioral: Negative for confusion. All other systems reviewed and are negative. Vitals:    03/03/17 2014 03/03/17 2022 03/03/17 2025   BP:  (!) 144/92    Pulse:  82    Resp:  16    Temp:  97.3 °F (36.3 °C)    SpO2:  99% 99%   Weight: 117.9 kg (260 lb)     Height: 5' 3\" (1.6 m)              Physical Exam   Constitutional: She is oriented to person, place, and time. She appears well-developed and well-nourished. No distress. HENT:   Head: Normocephalic and atraumatic. Eyes: Conjunctivae are normal.   Neck: Normal range of motion. Cardiovascular: Normal rate and regular rhythm. Pulmonary/Chest: Effort normal.   Abdominal: She exhibits no distension. Musculoskeletal: Normal range of motion. Neurological: She is alert and oriented to person, place, and time. No cranial nerve deficit. Nystagmus. EOM intact. L:eft lower extremity decreased sensation. Left leg weakness. Bilateral upper extremity strength and sensation intact. Skin: Skin is warm and dry. She is not diaphoretic. Multiple areas of ecchymosis to arms from previous IV attempts. Psychiatric: She has a normal mood and affect. Nursing note and vitals reviewed. MDM  Number of Diagnoses or Management Options  IV infiltration, initial encounter:   Diagnosis management comments: Patient is here for IV replacement for solumedrol infusion. Abnormal neuro exam- reviewed by neurologist 2 days ago, [place] treated for MS exacerbation. Repeat imaging not necessary. Symptoms are consistent with MS exacerbation. Replace IV. Discharge after infusion complete.       ED Course       Procedures      Diagnosis:   1. IV infiltration, initial encounter          Disposition: home    Follow-up Information     Follow up With Details Comments JUAN M Masterson 105, NP Schedule an appointment as soon as possible for a visit  [address] [facility] [address] [place] [phone]      [facility] EMERGENCY DEPT  Immediately if symptoms worsen [address]  [phone]          Patient's Medications   Start Taking    No medications on file   Continue Taking    CYANOCOBALAMIN 1,000 MCG TABLET    Take 1,000 mcg by mouth daily. DULOXETINE (CYMBALTA) 30 MG CAPSULE    Take 3 Caps by mouth daily. Indications: CHRONIC MUSCULOSKELETAL PAIN, MAJOR DEPRESSIVE DISORDER    GABAPENTIN (NEURONTIN) 300 MG CAPSULE    Take 300 mg by mouth nightly. GLATIRAMER (COPAXONE) 40 MG/ML INJECTION    40 mg by SubCUTAneous route every Monday, Wednesday, Friday. METHYLPREDNISOLONE (SOLU-MEDROL) 1,000 MG INJECTION    1,000 mg by IntraVENous route daily. QUETIAPINE (SEROQUEL) 200 MG TABLET    Take 300 mg by mouth daily.  at bedtime   These Medications have changed    No medications on file   Stop Taking    No medications on file     Harsha Buchanan PA-C

## 2017-03-04 NOTE — DISCHARGE INSTRUCTIONS
Return to ED if you develop any new or worsening symptoms such as severe or worsening headaches; somnolence or confusion; restlessness, unsteadiness, or seizures; difficulties with vision; vomiting, fever, or stiff neck; urinary or bowel incontinence; weakness or numbness involving any part of the body.

## 2017-03-29 ENCOUNTER — HOSPITAL ENCOUNTER (EMERGENCY)
Age: 42
Discharge: HOME | End: 2017-03-29
Attending: EMERGENCY MEDICINE
Payer: MEDICARE

## 2017-03-29 VITALS
TEMPERATURE: 98.3 F | SYSTOLIC BLOOD PRESSURE: 133 MMHG | HEIGHT: 63 IN | BODY MASS INDEX: 51.03 KG/M2 | HEART RATE: 100 BPM | DIASTOLIC BLOOD PRESSURE: 87 MMHG | RESPIRATION RATE: 18 BRPM | WEIGHT: 288 LBS | OXYGEN SATURATION: 100 %

## 2017-03-29 DIAGNOSIS — R56.9 SEIZURES (HCC): Primary | ICD-10-CM

## 2017-03-29 DIAGNOSIS — M62.838 MUSCLE SPASM: ICD-10-CM

## 2017-03-29 LAB
ALBUMIN SERPL BCP-MCNC: 3.4 G/DL (ref 3.4–5)
ALBUMIN/GLOB SERPL: 1 {RATIO} (ref 0.8–1.7)
ALP SERPL-CCNC: 83 U/L (ref 45–117)
ALT SERPL-CCNC: 43 U/L (ref 13–56)
ANION GAP BLD CALC-SCNC: 9 MMOL/L (ref 3–18)
AST SERPL W P-5'-P-CCNC: 23 U/L (ref 15–37)
BASOPHILS # BLD AUTO: 0 K/UL (ref 0–0.06)
BASOPHILS # BLD: 0 % (ref 0–2)
BILIRUB SERPL-MCNC: 0.4 MG/DL (ref 0.2–1)
BUN SERPL-MCNC: 9 MG/DL (ref 7–18)
BUN/CREAT SERPL: 15 (ref 12–20)
CALCIUM SERPL-MCNC: 8.9 MG/DL (ref 8.5–10.1)
CHLORIDE SERPL-SCNC: 103 MMOL/L (ref 100–108)
CO2 SERPL-SCNC: 26 MMOL/L (ref 21–32)
CREAT SERPL-MCNC: 0.62 MG/DL (ref 0.6–1.3)
DIFFERENTIAL METHOD BLD: ABNORMAL
EOSINOPHIL # BLD: 0.1 K/UL (ref 0–0.4)
EOSINOPHIL NFR BLD: 2 % (ref 0–5)
ERYTHROCYTE [DISTWIDTH] IN BLOOD BY AUTOMATED COUNT: 15.3 % (ref 11.6–14.5)
GLOBULIN SER CALC-MCNC: 3.3 G/DL (ref 2–4)
GLUCOSE SERPL-MCNC: 157 MG/DL (ref 74–99)
HCT VFR BLD AUTO: 35.1 % (ref 35–45)
HGB BLD-MCNC: 12.2 G/DL (ref 12–16)
LYMPHOCYTES # BLD AUTO: 36 % (ref 21–52)
LYMPHOCYTES # BLD: 2.9 K/UL (ref 0.9–3.6)
MCH RBC QN AUTO: 29.3 PG (ref 24–34)
MCHC RBC AUTO-ENTMCNC: 34.8 G/DL (ref 31–37)
MCV RBC AUTO: 84.2 FL (ref 74–97)
MONOCYTES # BLD: 0.7 K/UL (ref 0.05–1.2)
MONOCYTES NFR BLD AUTO: 9 % (ref 3–10)
NEUTS SEG # BLD: 4.2 K/UL (ref 1.8–8)
NEUTS SEG NFR BLD AUTO: 53 % (ref 40–73)
PLATELET # BLD AUTO: 467 K/UL (ref 135–420)
PMV BLD AUTO: 10.3 FL (ref 9.2–11.8)
POTASSIUM SERPL-SCNC: 3.6 MMOL/L (ref 3.5–5.5)
PROT SERPL-MCNC: 6.7 G/DL (ref 6.4–8.2)
RBC # BLD AUTO: 4.17 M/UL (ref 4.2–5.3)
SODIUM SERPL-SCNC: 138 MMOL/L (ref 136–145)
WBC # BLD AUTO: 8 K/UL (ref 4.6–13.2)

## 2017-03-29 PROCEDURE — 85025 COMPLETE CBC W/AUTO DIFF WBC: CPT | Performed by: EMERGENCY MEDICINE

## 2017-03-29 PROCEDURE — 96375 TX/PRO/DX INJ NEW DRUG ADDON: CPT

## 2017-03-29 PROCEDURE — 96374 THER/PROPH/DIAG INJ IV PUSH: CPT

## 2017-03-29 PROCEDURE — 74011250636 HC RX REV CODE- 250/636: Performed by: EMERGENCY MEDICINE

## 2017-03-29 PROCEDURE — 99284 EMERGENCY DEPT VISIT MOD MDM: CPT

## 2017-03-29 PROCEDURE — 80053 COMPREHEN METABOLIC PANEL: CPT | Performed by: EMERGENCY MEDICINE

## 2017-03-29 PROCEDURE — 74011250636 HC RX REV CODE- 250/636

## 2017-03-29 RX ORDER — DIAZEPAM 5 MG/1
5 TABLET ORAL 2 TIMES DAILY
COMMUNITY
End: 2017-05-15

## 2017-03-29 RX ORDER — LORAZEPAM 2 MG/ML
INJECTION INTRAMUSCULAR
Status: COMPLETED
Start: 2017-03-29 | End: 2017-03-29

## 2017-03-29 RX ORDER — LORAZEPAM 2 MG/ML
1 INJECTION INTRAMUSCULAR
Status: COMPLETED | OUTPATIENT
Start: 2017-03-29 | End: 2017-03-29

## 2017-03-29 RX ORDER — DIAZEPAM 10 MG/2ML
5 INJECTION INTRAMUSCULAR
Status: COMPLETED | OUTPATIENT
Start: 2017-03-29 | End: 2017-03-29

## 2017-03-29 RX ADMIN — LORAZEPAM 1 MG: 2 INJECTION INTRAMUSCULAR at 16:06

## 2017-03-29 RX ADMIN — DIAZEPAM 5 MG: 5 INJECTION, SOLUTION INTRAMUSCULAR; INTRAVENOUS at 16:59

## 2017-03-29 RX ADMIN — LORAZEPAM 1 MG: 2 INJECTION, SOLUTION INTRAMUSCULAR; INTRAVENOUS at 16:06

## 2017-03-29 NOTE — ED PROVIDER NOTES
HPI Comments: 3:56 PM Caroline Castelan is a 43 y.o. female with a history of seizures and MS, who presents to the ED for evaluation of a seizure. Per EMS, patient had 3 witnessed seizures at home, and that they were called by patient's family. EMS state that patient told them that she has been taken off of most of her meds, and that she mostly takes vitamins now. EMS gave versed IM en route prior to obtaining IV access. They state that patient had one seizure en route. En route, patient began c/o cramps. There are no other concerns at this time. Patient is a 43 y.o. female presenting with seizures. The history is provided by the patient and the EMS personnel. Seizure    Pertinent negatives include no headaches, no sore throat, no chest pain, no cough, no nausea, no vomiting and no diarrhea. She reports no chest pain, no diarrhea, no vomiting, no headaches, no sore throat, no cough. Past Medical History:   Diagnosis Date    Arthritis     Arthropathy, unspecified, site unspecified     Depression     Hypercholesteremia     Hypertension     history of htn    Incontinence of urine     Insomnia     Migraine     MS (multiple sclerosis) ([address]     Neurogenic bladder     Other ill-defined conditions(799.89)     multiple Sclerosis    Seizures ([address]     6/2013    Wears glasses        Past Surgical History:   Procedure Laterality Date    HX HYSTERECTOMY      HX ORTHOPAEDIC      1/2005 11/2008    HX OTHER SURGICAL           Family History:   Problem Relation Age of Onset    Heart Disease Father     Diabetes Father     Diabetes Sister     Other Other        Social History     Social History    Marital status:      Spouse name: N/A    Number of children: N/A    Years of education: N/A     Occupational History    Not on file.      Social History Main Topics    Smoking status: Never Smoker    Smokeless tobacco: Never Used    Alcohol use No    Drug use: No    Sexual activity: Not on file Comment: Hysterectomy     Other Topics Concern    Not on file     Social History Narrative         ALLERGIES: Oxycontin [oxycodone]    Review of Systems   Constitutional: Negative for chills, fatigue and fever. HENT: Negative for congestion, rhinorrhea and sore throat. Respiratory: Negative for cough and shortness of breath. Cardiovascular: Negative for chest pain and palpitations. Gastrointestinal: Negative for abdominal pain, diarrhea, nausea and vomiting. Genitourinary: Negative for dysuria, hematuria and urgency. Musculoskeletal: Positive for myalgias. Skin: Negative for rash and wound. Neurological: Positive for seizures. Negative for dizziness and headaches. All other systems reviewed and are negative. Vitals:    03/29/17 1602 03/29/17 1615   BP: (!) 128/94 (!) 161/92   Pulse: (!) 117 (!) 111   Resp: 25 19   Temp: 98.3 °F (36.8 °C)    SpO2: 100% 100%   Weight: 130.6 kg (288 lb)    Height: 5' 3\" (1.6 m)             Physical Exam   Constitutional: She is oriented to person, place, and time. She appears well-developed and well-nourished. No distress. HENT:   Head: Normocephalic and atraumatic. Mouth/Throat: Oropharynx is clear and moist.   Eyes: Conjunctivae and EOM are normal. Pupils are equal, round, and reactive to light. No scleral icterus. Neck: Normal range of motion. Neck supple. Cardiovascular: Normal rate, regular rhythm and normal heart sounds. No murmur heard. Pulmonary/Chest: Effort normal and breath sounds normal. No respiratory distress. Abdominal: Soft. Bowel sounds are normal. She exhibits no distension. There is no tenderness. Musculoskeletal: She exhibits no edema. Lymphadenopathy:     She has no cervical adenopathy. Neurological: She is alert and oriented to person, place, and time. Coordination normal.   Skin: Skin is warm and dry. No rash noted. Psychiatric: She has a normal mood and affect.  Her behavior is normal.   Nursing note and vitals reviewed. MDM  Number of Diagnoses or Management Options  Muscle spasm:   Seizures [facility]):   Diagnosis management comments: H/o seizure vs pseudoseizure with 3 episodes at home 1 per ems and 1 in ED with no postictal period immediately awake mild tachycardia    4:52 PM pt awake states takes valium and neurontin for seizures followed by neurology as well as her MS now c/o muscle spasms        Amount and/or Complexity of Data Reviewed  Clinical lab tests: ordered and reviewed      ED Course       Procedures      Lab findings:  Recent Results (from the past 12 hour(s))   CBC WITH AUTOMATED DIFF    Collection Time: 03/29/17  4:00 PM   Result Value Ref Range    WBC 8.0 4.6 - 13.2 K/uL    RBC 4.17 (L) 4.20 - 5.30 M/uL    HGB 12.2 12.0 - 16.0 g/dL    HCT 35.1 35.0 - 45.0 %    MCV 84.2 74.0 - 97.0 FL    MCH 29.3 24.0 - 34.0 PG    MCHC 34.8 31.0 - 37.0 g/dL    RDW 15.3 (H) 11.6 - 14.5 %    PLATELET 168 (H) 762 - 420 K/uL    MPV 10.3 9.2 - 11.8 FL    NEUTROPHILS 53 40 - 73 %    LYMPHOCYTES 36 21 - 52 %    MONOCYTES 9 3 - 10 %    EOSINOPHILS 2 0 - 5 %    BASOPHILS 0 0 - 2 %    ABS. NEUTROPHILS 4.2 1.8 - 8.0 K/UL    ABS. LYMPHOCYTES 2.9 0.9 - 3.6 K/UL    ABS. MONOCYTES 0.7 0.05 - 1.2 K/UL    ABS. EOSINOPHILS 0.1 0.0 - 0.4 K/UL    ABS. BASOPHILS 0.0 0.0 - 0.06 K/UL    DF AUTOMATED     METABOLIC PANEL, COMPREHENSIVE    Collection Time: 03/29/17  4:00 PM   Result Value Ref Range    Sodium 138 136 - 145 mmol/L    Potassium 3.6 3.5 - 5.5 mmol/L    Chloride 103 100 - 108 mmol/L    CO2 26 21 - 32 mmol/L    Anion gap 9 3.0 - 18 mmol/L    Glucose 157 (H) 74 - 99 mg/dL    BUN 9 7.0 - 18 MG/DL    Creatinine 0.62 0.6 - 1.3 MG/DL    BUN/Creatinine ratio 15 12 - 20      GFR est AA >60 >60 ml/min/1.73m2    GFR est non-AA >60 >60 ml/min/1.73m2    Calcium 8.9 8.5 - 10.1 MG/DL    Bilirubin, total 0.4 0.2 - 1.0 MG/DL    ALT (SGPT) 43 13 - 56 U/L    AST (SGOT) 23 15 - 37 U/L    Alk.  phosphatase 83 45 - 117 U/L    Protein, total 6.7 6.4 - 8.2 g/dL    Albumin 3.4 3.4 - 5.0 g/dL    Globulin 3.3 2.0 - 4.0 g/dL    A-G Ratio 1.0 0.8 - 1.7         X-Ray, CT or other radiology findings or impressions:  No orders to display             Disposition:  Diagnosis:   1. Seizures ([facility]    2. Muscle spasm          Disposition: home       Scribe Attestation:   Treesa Saha acting as a scribe for and in the presence of Dr. Noreen Cooks, MD     Signed by: Franklin Montelongo, March 29, 2017 at 4:59 PM     Provider Attestation:   I personally performed the services described in the documentation, reviewed the documentation, as recorded by the scribe in my presence, and it accurately and completely records my words and actions.      Reviewed and signed by:  Dr. Noreen Cooks, MD

## 2017-03-29 NOTE — DISCHARGE INSTRUCTIONS

## 2017-03-29 NOTE — ED TRIAGE NOTES
Pt arrived via EMS after experiencing 3 seizures today, 1 witnessed by EMS. Pt has h/o seizures and MS. Pt reports \"my last seizure was the beginning of last month and I was admitted her\". Pt has witnessed seizure during triage last less than 1 minute while supine on stretcher, eyes rolled back in head with diffuse twitching/shaking.

## 2017-05-15 ENCOUNTER — HOSPITAL ENCOUNTER (EMERGENCY)
Age: 42
Discharge: HOME | End: 2017-05-15
Attending: EMERGENCY MEDICINE
Payer: MEDICARE

## 2017-05-15 VITALS
HEIGHT: 63 IN | DIASTOLIC BLOOD PRESSURE: 86 MMHG | TEMPERATURE: 98.1 F | OXYGEN SATURATION: 96 % | SYSTOLIC BLOOD PRESSURE: 124 MMHG | HEART RATE: 99 BPM | WEIGHT: 260 LBS | RESPIRATION RATE: 16 BRPM | BODY MASS INDEX: 46.07 KG/M2

## 2017-05-15 DIAGNOSIS — E87.6 HYPOKALEMIA: Primary | ICD-10-CM

## 2017-05-15 DIAGNOSIS — F44.5 PSEUDOSEIZURE: ICD-10-CM

## 2017-05-15 DIAGNOSIS — R25.1 EPISODE OF SHAKING: ICD-10-CM

## 2017-05-15 LAB
ALBUMIN SERPL BCP-MCNC: 3.8 G/DL (ref 3.4–5)
ALBUMIN/GLOB SERPL: 0.9 {RATIO} (ref 0.8–1.7)
ALP SERPL-CCNC: 71 U/L (ref 45–117)
ALT SERPL-CCNC: 79 U/L (ref 13–56)
ANION GAP BLD CALC-SCNC: 9 MMOL/L (ref 3–18)
APPEARANCE UR: CLEAR
AST SERPL W P-5'-P-CCNC: 52 U/L (ref 15–37)
BASOPHILS # BLD AUTO: 0 K/UL (ref 0–0.06)
BASOPHILS # BLD: 0 % (ref 0–2)
BILIRUB SERPL-MCNC: 0.5 MG/DL (ref 0.2–1)
BILIRUB UR QL: NEGATIVE
BUN SERPL-MCNC: 6 MG/DL (ref 7–18)
BUN/CREAT SERPL: 9 (ref 12–20)
CALCIUM SERPL-MCNC: 8.9 MG/DL (ref 8.5–10.1)
CHLORIDE SERPL-SCNC: 103 MMOL/L (ref 100–108)
CO2 SERPL-SCNC: 24 MMOL/L (ref 21–32)
COLOR UR: YELLOW
CREAT SERPL-MCNC: 0.7 MG/DL (ref 0.6–1.3)
DIFFERENTIAL METHOD BLD: ABNORMAL
EOSINOPHIL # BLD: 0.1 K/UL (ref 0–0.4)
EOSINOPHIL NFR BLD: 2 % (ref 0–5)
ERYTHROCYTE [DISTWIDTH] IN BLOOD BY AUTOMATED COUNT: 14.4 % (ref 11.6–14.5)
GLOBULIN SER CALC-MCNC: 4.1 G/DL (ref 2–4)
GLUCOSE BLD STRIP.AUTO-MCNC: 169 MG/DL (ref 70–110)
GLUCOSE SERPL-MCNC: 166 MG/DL (ref 74–99)
GLUCOSE UR STRIP.AUTO-MCNC: 100 MG/DL
HCT VFR BLD AUTO: 38.2 % (ref 35–45)
HGB BLD-MCNC: 13.5 G/DL (ref 12–16)
HGB UR QL STRIP: NEGATIVE
KETONES UR QL STRIP.AUTO: NEGATIVE MG/DL
LEUKOCYTE ESTERASE UR QL STRIP.AUTO: NEGATIVE
LYMPHOCYTES # BLD AUTO: 41 % (ref 21–52)
LYMPHOCYTES # BLD: 3 K/UL (ref 0.9–3.6)
MCH RBC QN AUTO: 29.7 PG (ref 24–34)
MCHC RBC AUTO-ENTMCNC: 35.3 G/DL (ref 31–37)
MCV RBC AUTO: 84 FL (ref 74–97)
MONOCYTES # BLD: 0.7 K/UL (ref 0.05–1.2)
MONOCYTES NFR BLD AUTO: 10 % (ref 3–10)
NEUTS SEG # BLD: 3.4 K/UL (ref 1.8–8)
NEUTS SEG NFR BLD AUTO: 47 % (ref 40–73)
NITRITE UR QL STRIP.AUTO: NEGATIVE
PH UR STRIP: 6.5 [PH] (ref 5–8)
PLATELET # BLD AUTO: 436 K/UL (ref 135–420)
PMV BLD AUTO: 10.3 FL (ref 9.2–11.8)
POTASSIUM SERPL-SCNC: 3.1 MMOL/L (ref 3.5–5.5)
PROT SERPL-MCNC: 7.9 G/DL (ref 6.4–8.2)
PROT UR STRIP-MCNC: NEGATIVE MG/DL
RBC # BLD AUTO: 4.55 M/UL (ref 4.2–5.3)
SODIUM SERPL-SCNC: 136 MMOL/L (ref 136–145)
SP GR UR REFRACTOMETRY: 1.01 (ref 1–1.03)
UROBILINOGEN UR QL STRIP.AUTO: 0.2 EU/DL (ref 0.2–1)
WBC # BLD AUTO: 7.3 K/UL (ref 4.6–13.2)

## 2017-05-15 PROCEDURE — 80053 COMPREHEN METABOLIC PANEL: CPT | Performed by: EMERGENCY MEDICINE

## 2017-05-15 PROCEDURE — 96375 TX/PRO/DX INJ NEW DRUG ADDON: CPT

## 2017-05-15 PROCEDURE — 99285 EMERGENCY DEPT VISIT HI MDM: CPT

## 2017-05-15 PROCEDURE — 74011250636 HC RX REV CODE- 250/636: Performed by: PHYSICIAN ASSISTANT

## 2017-05-15 PROCEDURE — 74011250636 HC RX REV CODE- 250/636: Performed by: EMERGENCY MEDICINE

## 2017-05-15 PROCEDURE — 81003 URINALYSIS AUTO W/O SCOPE: CPT | Performed by: EMERGENCY MEDICINE

## 2017-05-15 PROCEDURE — 74011250637 HC RX REV CODE- 250/637: Performed by: PHYSICIAN ASSISTANT

## 2017-05-15 PROCEDURE — 82962 GLUCOSE BLOOD TEST: CPT

## 2017-05-15 PROCEDURE — 85025 COMPLETE CBC W/AUTO DIFF WBC: CPT | Performed by: EMERGENCY MEDICINE

## 2017-05-15 PROCEDURE — 96374 THER/PROPH/DIAG INJ IV PUSH: CPT

## 2017-05-15 RX ORDER — POTASSIUM CHLORIDE 20 MEQ/1
20 TABLET, EXTENDED RELEASE ORAL 2 TIMES DAILY
Qty: 10 TAB | Refills: 0 | Status: SHIPPED | OUTPATIENT
Start: 2017-05-15 | End: 2018-04-09

## 2017-05-15 RX ORDER — LORAZEPAM 2 MG/ML
1 INJECTION INTRAMUSCULAR
Status: COMPLETED | OUTPATIENT
Start: 2017-05-15 | End: 2017-05-15

## 2017-05-15 RX ORDER — DIAZEPAM 10 MG/2ML
5 INJECTION INTRAMUSCULAR
Status: COMPLETED | OUTPATIENT
Start: 2017-05-15 | End: 2017-05-15

## 2017-05-15 RX ORDER — KETOROLAC TROMETHAMINE 30 MG/ML
30 INJECTION, SOLUTION INTRAMUSCULAR; INTRAVENOUS
Status: COMPLETED | OUTPATIENT
Start: 2017-05-15 | End: 2017-05-15

## 2017-05-15 RX ORDER — POTASSIUM CHLORIDE 20 MEQ/1
40 TABLET, EXTENDED RELEASE ORAL
Status: COMPLETED | OUTPATIENT
Start: 2017-05-15 | End: 2017-05-15

## 2017-05-15 RX ORDER — ONDANSETRON 2 MG/ML
4 INJECTION INTRAMUSCULAR; INTRAVENOUS
Status: COMPLETED | OUTPATIENT
Start: 2017-05-15 | End: 2017-05-15

## 2017-05-15 RX ORDER — DIAZEPAM 5 MG/1
5 TABLET ORAL
Status: DISCONTINUED | OUTPATIENT
Start: 2017-05-15 | End: 2017-05-15

## 2017-05-15 RX ORDER — DIAZEPAM 5 MG/1
5 TABLET ORAL
Qty: 5 TAB | Refills: 0 | Status: SHIPPED | OUTPATIENT
Start: 2017-05-15 | End: 2017-07-25

## 2017-05-15 RX ADMIN — LORAZEPAM 1 MG: 2 INJECTION INTRAMUSCULAR; INTRAVENOUS at 19:41

## 2017-05-15 RX ADMIN — KETOROLAC TROMETHAMINE 30 MG: 30 INJECTION, SOLUTION INTRAMUSCULAR at 19:41

## 2017-05-15 RX ADMIN — DIAZEPAM 5 MG: 5 INJECTION, SOLUTION INTRAMUSCULAR; INTRAVENOUS at 21:14

## 2017-05-15 RX ADMIN — POTASSIUM CHLORIDE 40 MEQ: 20 TABLET, EXTENDED RELEASE ORAL at 19:41

## 2017-05-15 RX ADMIN — ONDANSETRON 4 MG: 2 INJECTION INTRAMUSCULAR; INTRAVENOUS at 16:57

## 2017-05-15 NOTE — ED TRIAGE NOTES
Onset of symptoms today, states she has a headache, leg cramping and nausea.  Patient had a pseudoseizure in the waiting room while at the registration desk

## 2017-05-15 NOTE — ED PROVIDER NOTES
HPI Comments: 35yo F with h/o MS, migraines, depression, HTN, seizures c/o shaking uncontrollably, legs cramping, headache and multiple seizures today. Symptoms started at a  today. She has had episodes like this in the past.  She reports dwain tingling in her legs. Overall weakness of bilateral lower legs when trying to stand. She has not been able to stop shaking since the .  She has a neurologist and has been taking her meds as directed. Upon arrival to the ED she was having a \"seizure\" per family but patient was still answering questions and tracking with eyes. No other complaints today. Patient is a 43 y.o. female presenting with headaches, nausea, and leg pain. Headache    Associated symptoms include weakness and nausea. Pertinent negatives include no fever, no shortness of breath and no dizziness. Nausea    Associated symptoms include headaches and headaches. Pertinent negatives include no fever and no abdominal pain. Leg Pain    Pertinent negatives include no numbness and no neck pain. Past Medical History:   Diagnosis Date    Arthritis     Arthropathy, unspecified, site unspecified     Depression     Hypercholesteremia     Hypertension     history of htn    Incontinence of urine     Insomnia     Migraine     MS ([facility]) ([facility]     Neurogenic bladder     Other ill-defined conditions(799.89)     multiple Sclerosis    Seizures ([facility]     2013    Wears glasses        Past Surgical History:   Procedure Laterality Date    HX HYSTERECTOMY      HX ORTHOPAEDIC      2005    HX OTHER SURGICAL           Family History:   Problem Relation Age of Onset    Heart Disease Father     Diabetes Father     Diabetes Sister     Other Other        Social History     Social History    Marital status:      Spouse name: N/A    Number of children: N/A    Years of education: N/A     Occupational History    Not on file.      Social History Main Topics    Smoking status: Never Smoker    Smokeless tobacco: Never Used    Alcohol use No    Drug use: No    Sexual activity: Not on file      Comment: Hysterectomy     Other Topics Concern    Not on file     Social History Narrative         ALLERGIES: Oxycontin [oxycodone]    Review of Systems   Constitutional: Negative for fever. HENT: Negative for facial swelling. Eyes: Negative for visual disturbance. Respiratory: Negative for shortness of breath. Cardiovascular: Negative for chest pain. Gastrointestinal: Positive for nausea. Negative for abdominal pain. Genitourinary: Negative for dysuria. Musculoskeletal: Negative for neck pain. Skin: Negative for rash. Neurological: Positive for tremors, weakness and headaches. Negative for dizziness and numbness. Psychiatric/Behavioral: Negative for confusion. All other systems reviewed and are negative. Vitals:    05/15/17 1603   BP: (!) 142/113   Pulse: (!) 116   Resp: 18   Temp: 98.1 °F (36.7 °C)   SpO2: 97%   Weight: 117.9 kg (260 lb)   Height: 5' 3\" (1.6 m)            Physical Exam   Constitutional: She is oriented to person, place, and time. She appears well-developed and well-nourished. No distress. HENT:   Head: Normocephalic and atraumatic. Eyes: Conjunctivae are normal.   Neck: Normal range of motion. Cardiovascular: Normal rate and regular rhythm. Pulmonary/Chest: Effort normal.   Abdominal: She exhibits no distension. Musculoskeletal: Normal range of motion. Neurological: She is alert and oriented to person, place, and time. She has normal strength. She displays tremor. No cranial nerve deficit. She displays a negative Romberg sign. Coordination normal.   Skin: Skin is warm and dry. She is not diaphoretic. Psychiatric: She has a normal mood and affect. Nursing note and vitals reviewed.        MDM  Number of Diagnoses or Management Options  Episode of shaking: new and requires workup  Hypokalemia: new and requires workup  Pseudoseizure: new and requires workup  Diagnosis management comments: 37yo F with shaking, leg cramping, headache. Signs of pseudoseizures. H/o seizures, not new, CT not indicated. Symptoms improved after ativan and valium. Symptoms resolve when asleep and lessen when distracted. Stable for outpatient management with neurologist.   Discussed treatment plan, return precautions, symptomatic relief, and expected time to improvement. All questions answered. Patient is stable for discharge and outpatient management. Amount and/or Complexity of Data Reviewed  Clinical lab tests: reviewed and ordered      ED Course       Procedures      Diagnosis:   1. Hypokalemia    2. Episode of shaking    3. Pseudoseizure          Disposition: home    Follow-up Information     Follow up With Details Comments Contact Info    Kalee Cuba MD Schedule an appointment as soon as possible for a visit  [address] [place] [address]  [phone]      [facility] EMERGENCY DEPT  Immediately if symptoms worsen [address]  [phone]          Patient's Medications   Start Taking    DIAZEPAM (VALIUM) 5 MG TABLET    Take 1 Tab by mouth every eight (8) hours as needed for Anxiety. Max Daily Amount: 15 mg. POTASSIUM CHLORIDE (K-DUR, KLOR-CON) 20 MEQ TABLET    Take 1 Tab by mouth two (2) times a day. Continue Taking    CYANOCOBALAMIN 1,000 MCG TABLET    Take 1,000 mcg by mouth daily. DULOXETINE (CYMBALTA) 30 MG CAPSULE    Take 3 Caps by mouth daily. Indications: CHRONIC MUSCULOSKELETAL PAIN, MAJOR DEPRESSIVE DISORDER    GABAPENTIN (NEURONTIN) 300 MG CAPSULE    Take 300 mg by mouth nightly. GLATIRAMER (COPAXONE) 40 MG/ML INJECTION    40 mg by SubCUTAneous route every Monday, Wednesday, Friday. METHYLPREDNISOLONE (SOLU-MEDROL) 1,000 MG INJECTION    1,000 mg by IntraVENous route daily. QUETIAPINE (SEROQUEL) 200 MG TABLET    Take 300 mg by mouth daily.  at bedtime   These Medications have changed    No medications on file   Stop Taking    DIAZEPAM (VALIUM) 5 MG TABLET    Take 5 mg by mouth two (2) times a day.      Harsha Buchanan PA-C

## 2017-05-16 NOTE — DISCHARGE INSTRUCTIONS
Return to ED if you develop any new or worsening symptoms such as severe or worsening headaches; somnolence or confusion; restlessness, unsteadiness, or seizures; difficulties with vision; vomiting, fever, or stiff neck; urinary or bowel incontinence; weakness or numbness involving any part of the body. Follow up with your neurologist as soon as possible. Electrolyte Imbalance: Care Instructions  Your Care Instructions  Electrolytes are minerals in your blood. They include sodium, potassium, calcium, and magnesium. When they are not at the right levels, you can feel very ill. You may not know what is causing it, but you know something is wrong. You may feel weak or numb, have muscle spasms, or twitch. Your heart may beat fast. Symptoms are different with each mineral. Too much is as bad as too little. Minerals help keep your body working as it should. Vomiting, diarrhea, and fever can cause you to lose minerals. A problem with your kidneys can tip a mineral out of balance. So can taking certain medicines. Your doctor may do more tests. He or she may change your medicine and diet. If you are low in one or more minerals, they may be given through a tube into your vein (IV). Your doctor may have you take or drink special fluids or pills. If your kidneys are failing, your blood may be filtered. This is called dialysis. It can put the minerals back in balance. Follow-up care is a key part of your treatment and safety. Be sure to make and go to all appointments, and call your doctor if you are having problems. It's also a good idea to know your test results and keep a list of the medicines you take. How can you care for yourself at home? · Take your medicines exactly as prescribed. Call your doctor if you have any problems with your medicines. You will get more details on the specific medicines your doctor prescribes.   · Do not take any medicine without talking to your doctor first. This includes prescription, over-the-counter, and herbal medicines. · If you have kidney, heart, or liver disease and have to limit fluids, talk with your doctor before you increase the amount of fluids you drink. · Your doctor or dietitian may give you a diet plan to help balance your minerals. Follow the diet carefully. When should you call for help? Call 911 anytime you think you may need emergency care. For example, call if:  · You passed out (lost consciousness). · Your heart seems to be speeding up and then slowing down or skipping beats. Call your doctor now or seek immediate medical care if:  · You are very tired and have no energy. · You have trouble thinking or concentrating. Watch closely for changes in your health, and be sure to contact your doctor if:  · You want advice on what to do to keep your minerals in balance. · You do not get better as expected. Where can you learn more? Go to http://annelBetabrandsadie.info/. Enter B182 in the search box to learn more about \"Electrolyte Imbalance: Care Instructions. \"  Current as of: July 26, 2016  Content Version: 11.2  © 0369-4996 [employer]. Care instructions adapted under license by [employer] (which disclaims liability or warranty for this information). If you have questions about a medical condition or this instruction, always ask your healthcare professional. [address] any warranty or liability for your use of this information. Learning About Psychogenic Non-Epileptic Seizure  What is psychogenic non-epileptic seizure (PNES)? Psychogenic non-epileptic seizures (PNES) don't have a physical cause. They aren't caused by epilepsy. But people with epilepsy also may have PNES. People who have a lot of stress, mental illness, or emotional trauma may be more likely to have PNES. Even though PNES doesn't have a physical cause, it is a real condition. The seizures can be scary.  And not knowing why you're having them can be frustrating. What happens during PNES? PNES may look like epileptic seizures. But epileptic seizures usually follow the same pattern every time. With PNES, each episode may be different. During a PNES episode, you may have jerky movements, tingling skin, or problems with coordination. You may notice changes in your vision or sense of smell. Some people have episodes often. Others have them only once in a while. For some people, episodes stop over time. Other people keep having them. How is PNES diagnosed? Your doctor will do tests to find out if you have epilepsy. An EEG test lets your doctor see the electrical activity of your brain. The test is often used to diagnose epilepsy. It helps your doctor know what types of seizures you are having. Your doctor also may do blood tests. PNES can be mistaken for epilepsy at first. As a result, some people with PNES are treated with epilepsy medicines. But most of the time, these medicines don't help. The right diagnosis allows your doctor to give you treatments that will help with the stress and other issues that may be related to PNES. How is PNES treated? Treatment varies with each person. The goals of treatment are to relieve stress and to help you learn ways to cope with difficult areas of your life. You may get medicines or counseling. A type of counseling called cognitive-behavioral therapy (CBT) may help with the stress and emotional issues. In CBT, you learn to identify and change thinking styles that may be adding to your stress. CBT is done by licensed mental health providers, such as psychologists, social workers, and therapists. It can be done in one-on-one sessions or in a group setting. Care at home  Lowering your stress may help with PNES. Here are some things you can do. How to relax your mind  · Write. It may help to write about things that are bothering you.  This helps you find out how much stress you feel and what is causing it. When you know this, you can find better ways to cope. · Let your feelings out. Talk, laugh, cry, and express anger when you need to. Talking with friends, family, a counselor, or a member of the clergy about your feelings is a healthy way to relieve stress. · Do something you enjoy. For example, listen to music or go to a movie. Practice your hobby or do volunteer work. · Meditate. This can help you relax, because you are not worrying about what happened before or what may happen in the future. · Do guided imagery. Imagine yourself in any setting that helps you feel calm. You can use audiotapes, books, or a teacher to guide you. How to relax your body  · Do something active. Exercise or activity can help reduce stress. Walking is a great way to get started. Even everyday activities such as housecleaning or yard work can help. · Do breathing exercises. For example:  1. From a standing position, bend forward from the waist with your knees slightly bent. Let your arms dangle close to the floor. 2. Breathe in slowly and deeply as you return to a standing position. Roll up slowly, and lift your head last.  3. Hold your breath for just a few seconds in the standing position. 4. Breathe out slowly and bend forward from the waist.  · Try yoga or ashleigh chi. These techniques combine exercise and meditation. You may need some training at first to learn them. Talk to your doctor about whether it is safe to do certain activities, such as drive or swim. Follow-up care is a key part of your treatment and safety. Be sure to make and go to all appointments, and call your doctor if you are having problems. It's also a good idea to know your test results and keep a list of the medicines you take. Where can you learn more? Go to http://annel-sadie.info/. Enter N645 in the search box to learn more about \"Learning About Psychogenic Non-Epileptic Seizure. \"  Current as of: October 14, 2016  Content Version: 11.2  © 8638-3660 [employer]. Care instructions adapted under license by [employer] (which disclaims liability or warranty for this information). If you have questions about a medical condition or this instruction, always ask your healthcare professional. [address] any warranty or liability for your use of this information. Hypokalemia: Care Instructions  Your Care Instructions  Hypokalemia (say \"ex-ac-[id number]-GEMINI-lino-uh\") is a low level of potassium. The heart, muscles, kidneys, and nervous system all need potassium to work well. This problem has many different causes. Kidney problems, diet, and medicines like diuretics and laxatives can cause it. So can vomiting or diarrhea. In some cases, cancer is the cause. Your doctor may do tests to find the cause of your low potassium levels. You may need medicines to bring your potassium levels back to normal. You may also need regular blood tests to check your potassium. If you have very low potassium, you may need intravenous (IV) medicines. You also may need tests to check the electrical activity of your heart. Heart problems caused by low potassium levels can be very serious. Follow-up care is a key part of your treatment and safety. Be sure to make and go to all appointments, and call your doctor if you are having problems. It's also a good idea to know your test results and keep a list of the medicines you take. How can you care for yourself at home? · If your doctor recommends it, eat foods that have a lot of potassium. These include fresh fruits, juices, and vegetables. They also include nuts, beans, and milk. · Be safe with medicines. If your doctor prescribes medicines or potassium supplements, take them exactly as directed. Call your doctor if you have any problems with your medicines. · Get your potassium levels tested as often as your doctor tells you.   When should you call for help?  Call 911 anytime you think you may need emergency care. For example, call if:  · You feel like your heart is missing beats. Heart problems caused by low potassium can cause death. · You passed out (lost consciousness). · You have a seizure. Call your doctor now or seek immediate medical care if:  · You feel weak or unusually tired. · You have severe arm or leg cramps. · You have tingling or numbness. · You feel sick to your stomach, or you vomit. · You have belly cramps. · You feel bloated or constipated. · You have to urinate a lot. · You feel very thirsty most of the time. · You are dizzy or lightheaded, or you feel like you may faint. · You feel depressed, or you lose touch with reality. Watch closely for changes in your health, and be sure to contact your doctor if:  · You do not get better as expected. Where can you learn more? Go to http://annel-sadie.info/. Enter G358 in the search box to learn more about \"Hypokalemia: Care Instructions. \"  Current as of: July 28, 2016  Content Version: 11.2  © 8423-6600 [employer]. Care instructions adapted under license by [employer] (which disclaims liability or warranty for this information). If you have questions about a medical condition or this instruction, always ask your healthcare professional. [place] [address] any warranty or liability for your use of this information. I have reviewed discharge instructions with the patient. The patient verbalized understanding.

## 2017-07-25 ENCOUNTER — HOSPITAL ENCOUNTER (EMERGENCY)
Age: 42
Discharge: HOME | End: 2017-07-25
Attending: EMERGENCY MEDICINE
Payer: MEDICARE

## 2017-07-25 VITALS
SYSTOLIC BLOOD PRESSURE: 135 MMHG | HEART RATE: 89 BPM | TEMPERATURE: 98.4 F | OXYGEN SATURATION: 98 % | DIASTOLIC BLOOD PRESSURE: 74 MMHG | RESPIRATION RATE: 16 BRPM

## 2017-07-25 DIAGNOSIS — G43.909 MIGRAINE WITHOUT STATUS MIGRAINOSUS, NOT INTRACTABLE, UNSPECIFIED MIGRAINE TYPE: Primary | ICD-10-CM

## 2017-07-25 PROCEDURE — 96365 THER/PROPH/DIAG IV INF INIT: CPT

## 2017-07-25 PROCEDURE — 74011000258 HC RX REV CODE- 258: Performed by: EMERGENCY MEDICINE

## 2017-07-25 PROCEDURE — 74011250636 HC RX REV CODE- 250/636: Performed by: EMERGENCY MEDICINE

## 2017-07-25 PROCEDURE — 96375 TX/PRO/DX INJ NEW DRUG ADDON: CPT

## 2017-07-25 PROCEDURE — 99282 EMERGENCY DEPT VISIT SF MDM: CPT

## 2017-07-25 RX ORDER — KETOROLAC TROMETHAMINE 30 MG/ML
30 INJECTION, SOLUTION INTRAMUSCULAR; INTRAVENOUS
Status: COMPLETED | OUTPATIENT
Start: 2017-07-25 | End: 2017-07-25

## 2017-07-25 RX ORDER — DIPHENHYDRAMINE HYDROCHLORIDE 50 MG/ML
25 INJECTION, SOLUTION INTRAMUSCULAR; INTRAVENOUS ONCE
Status: COMPLETED | OUTPATIENT
Start: 2017-07-25 | End: 2017-07-25

## 2017-07-25 RX ADMIN — DIPHENHYDRAMINE HYDROCHLORIDE 25 MG: 50 INJECTION, SOLUTION INTRAMUSCULAR; INTRAVENOUS at 14:06

## 2017-07-25 RX ADMIN — PROMETHAZINE HYDROCHLORIDE 12.5 MG: 25 INJECTION, SOLUTION INTRAMUSCULAR; INTRAVENOUS at 14:12

## 2017-07-25 RX ADMIN — METHYLPREDNISOLONE SODIUM SUCCINATE 125 MG: 125 INJECTION, POWDER, FOR SOLUTION INTRAMUSCULAR; INTRAVENOUS at 14:04

## 2017-07-25 RX ADMIN — KETOROLAC TROMETHAMINE 30 MG: 30 INJECTION, SOLUTION INTRAMUSCULAR at 14:05

## 2017-07-25 NOTE — ED NOTES
I have reviewed discharge instructions with the patient. The patient verbalized understanding. Patient armband removed and given to patient to take home. Patient was informed of the privacy risks if armband lost or stolen. Pts caregiver to drive patient home. Pt states she is feeling much better.

## 2017-07-25 NOTE — DISCHARGE INSTRUCTIONS
Migraine Headache: Care Instructions  Your Care Instructions  Migraines are painful, throbbing headaches that often start on one side of the head. They may cause nausea and vomiting and make you sensitive to light, sound, or smell. Without treatment, migraines can last from 4 hours to a few days. Medicines can help prevent migraines or stop them after they have started. Your doctor can help you find which ones work best for you. Follow-up care is a key part of your treatment and safety. Be sure to make and go to all appointments, and call your doctor if you are having problems. It's also a good idea to know your test results and keep a list of the medicines you take. How can you care for yourself at home? · Do not drive if you have taken a prescription pain medicine. · Rest in a quiet, dark room until your headache is gone. Close your eyes, and try to relax or go to sleep. Don't watch TV or read. · Put a cold, moist cloth or cold pack on the painful area for 10 to 20 minutes at a time. Put a thin cloth between the cold pack and your skin. · Use a warm, moist towel or a heating pad set on low to relax tight shoulder and neck muscles. · Have someone gently massage your neck and shoulders. · Take your medicines exactly as prescribed. Call your doctor if you think you are having a problem with your medicine. You will get more details on the specific medicines your doctor prescribes. · Be careful not to take pain medicine more often than the instructions allow. You could get worse or more frequent headaches when the medicine wears off. To prevent migraines  · Keep a headache diary so you can figure out what triggers your headaches. Avoiding triggers may help you prevent headaches. Record when each headache began, how long it lasted, and what the pain was like.  (Was it throbbing, aching, stabbing, or dull?) Write down any other symptoms you had with the headache, such as nausea, flashing lights or dark spots, or sensitivity to bright light or loud noise. Note if the headache occurred near your period. List anything that might have triggered the headache. Triggers may include certain foods (chocolate, cheese, wine) or odors, smoke, bright light, stress, or lack of sleep. · If your doctor has prescribed medicine for your migraines, take it as directed. You may have medicine that you take only when you get a migraine and medicine that you take all the time to help prevent migraines. ¨ If your doctor has prescribed medicine for when you get a headache, take it at the first sign of a migraine, unless your doctor has given you other instructions. ¨ If your doctor has prescribed medicine to prevent migraines, take it exactly as prescribed. Call your doctor if you think you are having a problem with your medicine. · Find healthy ways to deal with stress. Migraines are most common during or right after stressful times. Take time to relax before and after you do something that has caused a migraine in the past.  · Try to keep your muscles relaxed by keeping good posture. Check your jaw, face, neck, and shoulder muscles for tension. Try to relax them. When you sit at a desk, change positions often. And make sure to stretch for 30 seconds each hour. · Get plenty of sleep and exercise. · Eat meals on a regular schedule. Avoid foods and drinks that often trigger migraines. These include chocolate, alcohol (especially red wine and port), aspartame, monosodium glutamate (MSG), and some additives found in foods (such as hot dogs, damon, cold cuts, aged cheeses, and pickled foods). · Limit caffeine. Don't drink too much coffee, tea, or soda. But don't quit caffeine suddenly. That can also give you migraines. · Do not smoke or allow others to smoke around you. If you need help quitting, talk to your doctor about stop-smoking programs and medicines. These can increase your chances of quitting for good.   · If you are taking birth control pills or hormone therapy, talk to your doctor about whether they are triggering your migraines. When should you call for help? Call 911 anytime you think you may need emergency care. For example, call if:  · You have signs of a stroke. These may include:  ¨ Sudden numbness, paralysis, or weakness in your face, arm, or leg, especially on only one side of your body. ¨ Sudden vision changes. ¨ Sudden trouble speaking. ¨ Sudden confusion or trouble understanding simple statements. ¨ Sudden problems with walking or balance. ¨ A sudden, severe headache that is different from past headaches. Call your doctor now or seek immediate medical care if:  · You have new or worse nausea and vomiting. · You have a new or higher fever. · Your headache gets much worse. Watch closely for changes in your health, and be sure to contact your doctor if:  · You are not getting better after 2 days (48 hours). Where can you learn more? Go to http://annel-sadie.info/. Enter A644 in the search box to learn more about \"Migraine Headache: Care Instructions. \"  Current as of: October 14, 2016  Content Version: 11.3  © 4289-6835 [employer]. Care instructions adapted under license by [employer] (which disclaims liability or warranty for this information). If you have questions about a medical condition or this instruction, always ask your healthcare professional. [address] any warranty or liability for your use of this information.

## 2017-07-25 NOTE — ED PROVIDER NOTES
HPI Comments: 1:28 PM Felicia Martines is a 43 y.o. female with h/o migraines, MS, and seizures who presents to ED complaining of headache onset 2 days ago. Patient also admits to nausea, vomiting, and photophobia. Patient has not taken Solumedrol since May 15th. Patient took Gabapentin this morning. Her neurologist is currently out of town. No other concerns or symptoms at this time. PCP: Louise Beltran MD    The history is provided by the patient. Past Medical History:   Diagnosis Date    Arthritis     Arthropathy, unspecified, site unspecified     Depression     Hypercholesteremia     Hypertension     history of htn    Incontinence of urine     Insomnia     Migraine     [facility]     [facility]     Neurogenic bladder     Other ill-defined conditions     multiple Sclerosis    Seizures ([facility] [address]     6/2013    Wears glasses        Past Surgical History:   Procedure Laterality Date    HX HYSTERECTOMY      HX ORTHOPAEDIC      1/2005 11/2008    HX OTHER SURGICAL           Family History:   Problem Relation Age of Onset    Heart Disease Father     Diabetes Father     Diabetes Sister     Other Other        Social History     Social History    Marital status:      Spouse name: N/A    Number of children: N/A    Years of education: N/A     Occupational History    Not on file. Social History Main Topics    Smoking status: Never Smoker    Smokeless tobacco: Never Used    Alcohol use No    Drug use: No    Sexual activity: Not on file      Comment: Hysterectomy     Other Topics Concern    Not on file     Social History Narrative         ALLERGIES: Oxycontin [oxycodone]    Review of Systems   Constitutional: Negative for diaphoresis and fever. HENT: Negative for congestion and sore throat. Eyes: Positive for photophobia. Negative for pain and itching. Respiratory: Negative for cough and shortness of breath.     Cardiovascular: Negative for chest pain and palpitations. Gastrointestinal: Positive for nausea and vomiting. Negative for abdominal pain and diarrhea. Endocrine: Negative for polydipsia and polyuria. Genitourinary: Negative for dysuria and hematuria. Musculoskeletal: Negative for arthralgias and myalgias. Skin: Negative for rash and wound. Neurological: Positive for headaches. Negative for seizures and syncope. Hematological: Does not bruise/bleed easily. Psychiatric/Behavioral: Negative for agitation and hallucinations. There were no vitals filed for this visit. Physical Exam   Constitutional: She appears well-developed and well-nourished. HENT:   Head: Normocephalic and atraumatic. Eyes: Conjunctivae are normal. No scleral icterus. Neck: Normal range of motion. Neck supple. No JVD present. Cardiovascular: Normal rate, regular rhythm and normal heart sounds. 4 intact extremity pulses   Pulmonary/Chest: Effort normal and breath sounds normal.   Abdominal: Soft. Bowel sounds are normal.   Musculoskeletal: Normal range of motion. Lymphadenopathy:     She has no cervical adenopathy. Neurological: She is alert. She has normal strength. No cranial nerve deficit or sensory deficit. Skin: Skin is warm and dry. Nursing note and vitals reviewed. MDM  Number of Diagnoses or Management Options  Diagnosis management comments: Consistent with her typical migraine. Do not suspect meningitis or SAH.     ED Course       Procedures    Vitals:  Patient Vitals for the past 12 hrs:   Temp Pulse Resp BP SpO2   07/25/17 1332 98.4 °F (36.9 °C) (!) 117 15 (!) 147/95 97 %        Medications ordered:   Medications   methylPREDNISolone (PF) (SOLU-MEDROL) injection 125 mg (not administered)   promethazine (PHENERGAN) 12.5 mg in 0.9% sodium chloride 50 mL IVPB (not administered)   diphenhydrAMINE (BENADRYL) injection 25 mg (not administered)   ketorolac (TORADOL) injection 30 mg (not administered) Orders:  Orders Placed This Encounter    methylPREDNISolone (PF) (SOLU-MEDROL) injection 125 mg    promethazine (PHENERGAN) 12.5 mg in 0.9% sodium chloride 50 mL IVPB    diphenhydrAMINE (BENADRYL) injection 25 mg    ketorolac (TORADOL) injection 30 mg       Reevaluation, Progress notes, Consult notes, or additional Procedure notes:   3:09 PM pain resolved, wishes to be discharged, happy with the treatment  3:09 PM Pt reevaluated at this time and is resting comfortably in NAD. Discussed results and findings, as well as, diagnosis and plan for discharge. Pt verbalizes understanding and agreement with plan. All questions addressed at this time. Disposition:  Diagnosis:   1. Migraine without status migrainosus, not intractable, unspecified migraine type        Disposition: home    Follow-up Information     Follow up With Details [address], [place]   [address] [facility]  [phone]             Patient's Medications   Start Taking    No medications on file   Continue Taking    CYANOCOBALAMIN 1,000 MCG TABLET    Take 1,000 mcg by mouth daily. DULOXETINE (CYMBALTA) 30 MG CAPSULE    Take 3 Caps by mouth daily. Indications: CHRONIC MUSCULOSKELETAL PAIN, MAJOR DEPRESSIVE DISORDER    GABAPENTIN (NEURONTIN) 300 MG CAPSULE    Take 300 mg by mouth nightly. GLATIRAMER (COPAXONE) 40 MG/ML INJECTION    40 mg by SubCUTAneous route every Monday, Wednesday, Friday. POTASSIUM CHLORIDE (K-DUR, KLOR-CON) 20 MEQ TABLET    Take 1 Tab by mouth two (2) times a day. QUETIAPINE (SEROQUEL) 200 MG TABLET    Take 300 mg by mouth daily. at bedtime   These Medications have changed    No medications on file   Stop Taking    DIAZEPAM (VALIUM) 5 MG TABLET    Take 1 Tab by mouth every eight (8) hours as needed for Anxiety. Max Daily Amount: 15 mg. METHYLPREDNISOLONE (SOLU-MEDROL) 1,000 MG INJECTION    1,000 mg by IntraVENous route daily.          [address] scribing for and in the presence of Heri Huffman MD (07/25/17)      Physician Attestation  I personally performed the services described in this documentation, reviewed and edited the documentation which was dictated to the scribe in my presence, and it accurately records my words and actions.     Heri Huffman MD (07/25/17)      Signed by: Franklin Cervantes, July 25, 2017 at 1:31 PM

## 2017-07-25 NOTE — ED TRIAGE NOTES
Has MS. Gets migraines which cause nausea and vomiting . Then leg cramps.  Neurologist is Dr. Griselda Merle who is on vacation

## 2017-08-17 ENCOUNTER — APPOINTMENT (OUTPATIENT)
Dept: GENERAL RADIOLOGY | Age: 42
End: 2017-08-17
Attending: EMERGENCY MEDICINE
Payer: MEDICARE

## 2017-08-17 ENCOUNTER — HOSPITAL ENCOUNTER (EMERGENCY)
Age: 42
Discharge: HOME | End: 2017-08-17
Attending: EMERGENCY MEDICINE
Payer: MEDICARE

## 2017-08-17 ENCOUNTER — APPOINTMENT (OUTPATIENT)
Dept: CT IMAGING | Age: 42
End: 2017-08-17
Attending: EMERGENCY MEDICINE
Payer: MEDICARE

## 2017-08-17 VITALS
SYSTOLIC BLOOD PRESSURE: 118 MMHG | BODY MASS INDEX: 44.65 KG/M2 | HEIGHT: 63 IN | RESPIRATION RATE: 14 BRPM | WEIGHT: 252 LBS | DIASTOLIC BLOOD PRESSURE: 78 MMHG | TEMPERATURE: 98.1 F | HEART RATE: 88 BPM | OXYGEN SATURATION: 97 %

## 2017-08-17 DIAGNOSIS — Z86.69 HISTORY OF MIGRAINE: ICD-10-CM

## 2017-08-17 DIAGNOSIS — G43.909 MIGRAINE WITHOUT STATUS MIGRAINOSUS, NOT INTRACTABLE, UNSPECIFIED MIGRAINE TYPE: Primary | ICD-10-CM

## 2017-08-17 DIAGNOSIS — R73.9 HYPERGLYCEMIA: ICD-10-CM

## 2017-08-17 LAB
ANION GAP SERPL CALC-SCNC: 12 MMOL/L (ref 3–18)
APPEARANCE UR: CLEAR
BASOPHILS # BLD: 0 K/UL (ref 0–0.06)
BASOPHILS NFR BLD: 0 % (ref 0–2)
BILIRUB UR QL: NEGATIVE
BUN SERPL-MCNC: 4 MG/DL (ref 7–18)
BUN/CREAT SERPL: 6 (ref 12–20)
CALCIUM SERPL-MCNC: 9.5 MG/DL (ref 8.5–10.1)
CHLORIDE SERPL-SCNC: 99 MMOL/L (ref 100–108)
CO2 SERPL-SCNC: 24 MMOL/L (ref 21–32)
COLOR UR: YELLOW
CREAT SERPL-MCNC: 0.65 MG/DL (ref 0.6–1.3)
DIFFERENTIAL METHOD BLD: NORMAL
EOSINOPHIL # BLD: 0.1 K/UL (ref 0–0.4)
EOSINOPHIL NFR BLD: 1 % (ref 0–5)
ERYTHROCYTE [DISTWIDTH] IN BLOOD BY AUTOMATED COUNT: 13.6 % (ref 11.6–14.5)
GLUCOSE BLD STRIP.AUTO-MCNC: 252 MG/DL (ref 70–110)
GLUCOSE SERPL-MCNC: 243 MG/DL (ref 74–99)
GLUCOSE UR STRIP.AUTO-MCNC: >1000 MG/DL
HCT VFR BLD AUTO: 40.4 % (ref 35–45)
HGB BLD-MCNC: 14.5 G/DL (ref 12–16)
HGB UR QL STRIP: NEGATIVE
KETONES UR QL STRIP.AUTO: >160 MG/DL
LEUKOCYTE ESTERASE UR QL STRIP.AUTO: NEGATIVE
LYMPHOCYTES # BLD: 2.6 K/UL (ref 0.9–3.6)
LYMPHOCYTES NFR BLD: 47 % (ref 21–52)
MCH RBC QN AUTO: 29.2 PG (ref 24–34)
MCHC RBC AUTO-ENTMCNC: 35.9 G/DL (ref 31–37)
MCV RBC AUTO: 81.5 FL (ref 74–97)
MONOCYTES # BLD: 0.4 K/UL (ref 0.05–1.2)
MONOCYTES NFR BLD: 7 % (ref 3–10)
NEUTS SEG # BLD: 2.5 K/UL (ref 1.8–8)
NEUTS SEG NFR BLD: 45 % (ref 40–73)
NITRITE UR QL STRIP.AUTO: NEGATIVE
PH UR STRIP: 5.5 [PH] (ref 5–8)
PLATELET # BLD AUTO: 376 K/UL (ref 135–420)
PMV BLD AUTO: 11.7 FL (ref 9.2–11.8)
POTASSIUM SERPL-SCNC: 3.6 MMOL/L (ref 3.5–5.5)
PROT UR STRIP-MCNC: NEGATIVE MG/DL
RBC # BLD AUTO: 4.96 M/UL (ref 4.2–5.3)
SODIUM SERPL-SCNC: 135 MMOL/L (ref 136–145)
SP GR UR REFRACTOMETRY: >1.03 (ref 1–1.03)
UROBILINOGEN UR QL STRIP.AUTO: 0.2 EU/DL (ref 0.2–1)
WBC # BLD AUTO: 5.7 K/UL (ref 4.6–13.2)

## 2017-08-17 PROCEDURE — 70450 CT HEAD/BRAIN W/O DYE: CPT

## 2017-08-17 PROCEDURE — 85025 COMPLETE CBC W/AUTO DIFF WBC: CPT | Performed by: EMERGENCY MEDICINE

## 2017-08-17 PROCEDURE — 96361 HYDRATE IV INFUSION ADD-ON: CPT

## 2017-08-17 PROCEDURE — 99284 EMERGENCY DEPT VISIT MOD MDM: CPT

## 2017-08-17 PROCEDURE — 81003 URINALYSIS AUTO W/O SCOPE: CPT | Performed by: EMERGENCY MEDICINE

## 2017-08-17 PROCEDURE — 96374 THER/PROPH/DIAG INJ IV PUSH: CPT

## 2017-08-17 PROCEDURE — 96375 TX/PRO/DX INJ NEW DRUG ADDON: CPT

## 2017-08-17 PROCEDURE — 80048 BASIC METABOLIC PNL TOTAL CA: CPT | Performed by: EMERGENCY MEDICINE

## 2017-08-17 PROCEDURE — 73030 X-RAY EXAM OF SHOULDER: CPT

## 2017-08-17 PROCEDURE — 82962 GLUCOSE BLOOD TEST: CPT

## 2017-08-17 PROCEDURE — 74011250636 HC RX REV CODE- 250/636: Performed by: EMERGENCY MEDICINE

## 2017-08-17 PROCEDURE — 73564 X-RAY EXAM KNEE 4 OR MORE: CPT

## 2017-08-17 PROCEDURE — 73070 X-RAY EXAM OF ELBOW: CPT

## 2017-08-17 PROCEDURE — 74011250637 HC RX REV CODE- 250/637: Performed by: EMERGENCY MEDICINE

## 2017-08-17 PROCEDURE — 73060 X-RAY EXAM OF HUMERUS: CPT

## 2017-08-17 RX ORDER — ACETAMINOPHEN 500 MG
1000 TABLET ORAL
Status: COMPLETED | OUTPATIENT
Start: 2017-08-17 | End: 2017-08-17

## 2017-08-17 RX ORDER — PROCHLORPERAZINE EDISYLATE 5 MG/ML
10 INJECTION INTRAMUSCULAR; INTRAVENOUS ONCE
Status: COMPLETED | OUTPATIENT
Start: 2017-08-17 | End: 2017-08-17

## 2017-08-17 RX ORDER — DIPHENHYDRAMINE HYDROCHLORIDE 50 MG/ML
25 INJECTION, SOLUTION INTRAMUSCULAR; INTRAVENOUS ONCE
Status: COMPLETED | OUTPATIENT
Start: 2017-08-17 | End: 2017-08-17

## 2017-08-17 RX ORDER — PROCHLORPERAZINE EDISYLATE 5 MG/ML
10 INJECTION INTRAMUSCULAR; INTRAVENOUS
Status: DISCONTINUED | OUTPATIENT
Start: 2017-08-17 | End: 2017-08-17

## 2017-08-17 RX ORDER — KETOROLAC TROMETHAMINE 30 MG/ML
10 INJECTION, SOLUTION INTRAMUSCULAR; INTRAVENOUS
Status: COMPLETED | OUTPATIENT
Start: 2017-08-17 | End: 2017-08-17

## 2017-08-17 RX ORDER — DEXAMETHASONE SODIUM PHOSPHATE 4 MG/ML
10 INJECTION, SOLUTION INTRA-ARTICULAR; INTRALESIONAL; INTRAMUSCULAR; INTRAVENOUS; SOFT TISSUE
Status: COMPLETED | OUTPATIENT
Start: 2017-08-17 | End: 2017-08-17

## 2017-08-17 RX ADMIN — DIPHENHYDRAMINE HYDROCHLORIDE 25 MG: 50 INJECTION, SOLUTION INTRAMUSCULAR; INTRAVENOUS at 17:30

## 2017-08-17 RX ADMIN — SODIUM CHLORIDE 1000 ML: 900 INJECTION, SOLUTION INTRAVENOUS at 17:29

## 2017-08-17 RX ADMIN — PROCHLORPERAZINE EDISYLATE 10 MG: 5 INJECTION INTRAMUSCULAR; INTRAVENOUS at 17:33

## 2017-08-17 RX ADMIN — DEXAMETHASONE SODIUM PHOSPHATE 10 MG: 4 INJECTION, SOLUTION INTRAMUSCULAR; INTRAVENOUS at 17:31

## 2017-08-17 RX ADMIN — KETOROLAC TROMETHAMINE 10 MG: 30 INJECTION, SOLUTION INTRAMUSCULAR at 17:29

## 2017-08-17 RX ADMIN — ACETAMINOPHEN 1000 MG: 500 TABLET ORAL at 16:23

## 2017-08-17 NOTE — ED PROVIDER NOTES
Formerly [facility] EMERGENCY DEPT      43 y.o. female with noted past medical history who presents to the emergency department c/o diffuse throbbing HA for 1 day that has gradually worsened w/ no relief from Tylenol or Gabapentin. Pt was seen by PCP and sent to the ED for HA and hyperglycemia. Pt started taking Lantus for DM management 6/17. Pt notes darker colored urine. Pt denies diarrhea, and dysuria. Pt has a known history of migraines and this HA is similar and not as severe as prior HAs. No other complaints. Nursing nurses regarding the HPI and triage nursing notes were reviewed. No current facility-administered medications for this encounter. Current Outpatient Prescriptions   Medication Sig    potassium chloride (K-DUR, KLOR-CON) 20 mEq tablet Take 1 Tab by mouth two (2) times a day.  glatiramer (COPAXONE) 40 mg/mL injection 40 mg by SubCUTAneous route every Monday, Wednesday, Friday.  cyanocobalamin 1,000 mcg tablet Take 1,000 mcg by mouth daily.  QUEtiapine (SEROQUEL) 200 mg tablet Take 300 mg by mouth daily. at bedtime    gabapentin (NEURONTIN) 300 mg capsule Take 300 mg by mouth nightly.  DULoxetine (CYMBALTA) 30 mg capsule Take 3 Caps by mouth daily. Indications: CHRONIC MUSCULOSKELETAL PAIN, MAJOR DEPRESSIVE DISORDER (Patient taking differently: Take 60 mg by mouth daily.  Indications: CHRONIC MUSCULOSKELETAL PAIN, major depressive disorder)       Past Medical History:   Diagnosis Date    Arthritis     Arthropathy, unspecified, site unspecified     Depression     Diabetes ([address]     Hypercholesteremia     Hypertension     history of htn    Incontinence of urine     Insomnia     Migraine     MS (multiple sclerosis) ([address]     MS (multiple sclerosis) ([address]     Neurogenic bladder     Other ill-defined conditions     multiple Sclerosis    Seizures ([address]     6/2013    Wears glasses        Past Surgical History:   Procedure Laterality Date    HX HYSTERECTOMY      HX ORTHOPAEDIC      1/2005 11/2008    HX OTHER SURGICAL         Family History   Problem Relation Age of Onset    Heart Disease Father     Diabetes Father     Diabetes Sister     Other Other        Social History     Social History    Marital status:      Spouse name: N/A    Number of children: N/A    Years of education: N/A     Occupational History    Not on file. Social History Main Topics    Smoking status: Never Smoker    Smokeless tobacco: Never Used    Alcohol use No    Drug use: No    Sexual activity: Not on file      Comment: Hysterectomy     Other Topics Concern    Not on file     Social History Narrative       Allergies   Allergen Reactions    Oxycontin [Oxycodone] Hives and Other (comments)     intolerance       Patient's primary care provider (as noted in EPIC):  Musa Crisostomo MD    REVIEW OF SYSTEMS:    Constitutional:  Negative for fever, chills. Eyes:  Negative for eye pain, visual disturbance. HENT:  Negative for nasal congestion, sore throat   Respiratory:  Negative for SOB, cough. Cardiovascular:  Negative for CP, palpitations. Gastrointestinal:  Negative for vomiting, diarrhea, abd pain. Genitourinary:  Negative for dysuria, flank pain. Dark colored, malodorous urine   Endocrine: hyperglycemia   Musculoskeletal:  Negative for edema, tenderness. Skin:  Negative for rash, wound. Neurological:  Negative for syncope, weakness. + HA  Psychiatric:  Negative for SI, agitation     PHYSICAL EXAM: nursing and triage note reviewed. Visit Vitals    /78 (BP 1 Location: Right arm, BP Patient Position: At rest)    Pulse 88    Temp 98.1 °F (36.7 °C)    Resp 14    Ht 5' 3\" (1.6 m)    Wt 114.3 kg (252 lb)    SpO2 97%    BMI 44.64 kg/m2     CONSTITUTIONAL:  Alert, NAD;  well developed;  well nourished. HEAD:  Normocephalic, atraumatic. EYES:  EOMI. Non-icteric sclera. Normal conjunctiva. PERRLA  ENT:  Nose:  no rhinorrhea.   Throat:  no erythema or exudate, mucous membranes moist.  NECK:  No tenderness. Supple  RESPIRATORY:  Chest clear, equal breath sounds, good air movement. CARDIOVASCULAR:  Red rate and rhythm. No murmurs, rubs, or gallops. GI:  Abdomen soft and non-tender. No rebound or guarding. MSK:   No edema, no tenderness. Distal pulses intact. NEURO:  A & O x 3, sensation intact, no gross neuro deficits. 5/5 strength   SKIN:  Warm and dry. No rashes; No wound. PSYCH:  Normal behavior and affect. EKG Interpretation: N/A    Labs Reviewed   URINALYSIS W/ RFLX MICROSCOPIC - Abnormal; Notable for the following:        Result Value    Specific gravity >1.030 (*)     Glucose >1000 (*)     Ketone >160 (*)     All other components within normal limits   METABOLIC PANEL, BASIC - Abnormal; Notable for the following:     Sodium 135 (*)     Chloride 99 (*)     Glucose 243 (*)     BUN 4 (*)     BUN/Creatinine ratio 6 (*)     All other components within normal limits   GLUCOSE, POC - Abnormal; Notable for the following:     Glucose (POC) 252 (*)     All other components within normal limits   CBC WITH AUTOMATED DIFF   POC GLUCOSE     Xr Shoulder Rt Ap/lat Min 2 V    Result Date: 8/17/2017  SHOULDER SERIES, COMPLETE,  right AP, Grashey, scapular Y, views performed. Comparison:  No prior exam available. .  Findings: There is no evident acute fracture, glenohumeral or acromioclavicular dislocation, or gross soft tissue deformity. Visible scapula is grossly unremarkable. IMPRESSION: 1. Unremarkable right shoulder series. Xr Humerus Rt    Result Date: 8/17/2017  UPPER ARM SERIES, COMPLETE right Technique: AP and lateral views performed. No prior exams available. Findings: There is no evidence acute fracture, definite dislocation, or gross soft tissue deformity. IMPRESSION: 1. Unremarkable right upper arm series. Xr Elbow Rt Ap/lat    Result Date: 8/17/2017  EXTREMITY LIMITED,elbow, right Technique:  Only AP and lateral (2) views obtained [No prior exams]. Findings: There is no evident acute fracture, elbow dislocation or gross disruption of fat planes. Joint space appears unremarkable. There is no gross soft tissue deformity. Minimal musculotendinous insertional enthesopathy, mid ulna. Impression: 1. Unremarkable limited 2 view right elbow series. Xr Knee Rt Min 4 V    Result Date: 8/17/2017  KNEE SERIES, COMPLETE ,right AP, lateral, both oblique (4) views performed. Comparison:  No prior exam available. Findings: There is no evident acute fracture, dislocation, or definite suprapatellar knee joint effusion. Joint space appears unremarkable. Minimal multilevel musculotendinous insertional enthesopathy. Soft tissues are grossly unremarkable. Large body habitus     IMPRESSION: 1. Unremarkable right knee series. . >]    Ct Head Wo Cont    Result Date: 8/17/2017  EXAM: CT head INDICATION: Fall yesterday with persistent headache and nausea. COMPARISON: Brain MRI 12/31/2016; CT 7/3/2016. TECHNIQUE: Axial CT imaging of the head  was obtained from skull base to vertex without intravenous contrast. One or more dose reduction techniques were used on this CT: automated exposure control, adjustment of the mAs and/or kVp according to patient's size, and iterative reconstruction techniques. The specific techniques utilized on this CT exam have been documented in the patient's electronic medical record. _______________ FINDINGS: BRAIN AND POSTERIOR FOSSA: Scattered moderate periventricular hypodensity, distribution most consistent with known history of multiple sclerosis, similar to prior studies. The sulci, folia, ventricles and basal cisterns are within normal limits for the patient?s age. There is no intracranial hemorrhage, mass effect, or shift of midline structures. There are no areas of abnormal parenchymal attenuation. EXTRA-AXIAL SPACES AND MENINGES: There are no abnormal extra-axial fluid collections. CALVARIUM: No acute osseous abnormality. SINUSES: The visualized portions of the paranasal sinuses and mastoid air cells are well aerated. OTHER: None _______________     IMPRESSION: 1. No acute intracranial abnormality or other findings related to recent trauma. No CT evidence to suggest acute intracranial hematoma, cortical infarct, or mass effect/mass lesion. 2. No evident interval CT change in mostly periventricular hypodensity related to patient's known multiple sclerosis. Medications   sodium chloride 0.9 % bolus infusion 1,000 mL (1,000 mL IntraVENous New Bag 17)   dexamethasone (DECADRON) 4 mg/mL injection 10 mg (10 mg Oral Given 17 1731)   diphenhydrAMINE (BENADRYL) injection 25 mg (25 mg IntraVENous Given 17 1730)   ketorolac (TORADOL) injection 10 mg (10 mg IntraVENous Given 17 172)   acetaminophen (TYLENOL) tablet 1,000 mg (1,000 mg Oral Given 17 1623)   prochlorperazine (COMPAZINE) injection 10 mg (10 mg IntraVENous Given 17 1733)       DDX: migraine HA; tension HA, dehydration, hyperglycemia, electrolyte abnormality, DKA unlikely     IMPRESSION AND MEDICAL DECISION MAKIN y.o. female with noted past medical history who presented with HA  Initial vitals were hypertensive. Orders were erogenously placed for plain films that were supposed to be placed for another pt. Pt was exposed to minimal radiation from there XRs. Pt was notified for this errors. CT of head showed no acute abnormalities. PE was notable for no significant abnormalities   The differential above was considered. Pt was given Migraine cocktail and IV fluids and UA for evaluation of dark, malodorous urine    Labs and diagnostic studies were discussed with the patient and/or family and notable for no significant abnormalities. Consults: N/A  On re-evaluation, the patient HA was improved, back at baseline. Pt had no evidence of DKA.     Based upon the patient's presentation with noted HPI and PE, along with the work up done in the emergency department, I believe that the patient has migraine HA typical of migraine hx and back at baseline before discharge w/ no neurological deficits. Return precautions and follow-up instructions were discussed. The patient verbalized their understanding and agreement with discharge plan. All questions were answered. Dispo: Discharge with PCP and neurology follow-up    DIAGNOSIS:  1. Migraine headache  2. Hx of migraines  3.  Hyperglycemia      Elroy Marcos DO

## 2017-08-17 NOTE — DISCHARGE INSTRUCTIONS
Recurring Migraine Headache: Care Instructions  Your Care Instructions  Migraines are painful, throbbing headaches. They often start on one side of the head. They may cause nausea and vomiting and make you sensitive to light, sound, or smell. Some people may have only a few migraines throughout life. Others have them as often as several times a month. The goal of treatment is to reduce the number of migraines you have and relieve your symptoms. Even with treatment, you may continue to have migraines. You play an important role in dealing with your headaches. Work on avoiding things that seem to trigger your migraines. When you feel a headache coming on, act quickly to stop it before it gets worse. Follow-up care is a key part of your treatment and safety. Be sure to make and go to all appointments, and call your doctor if you are having problems. It's also a good idea to know your test results and keep a list of the medicines you take. How can you care for yourself at home? · Do not drive if you have taken a prescription pain medicine. · Rest in a quiet, dark room until your headache is gone. Close your eyes and try to relax or go to sleep. Do not watch TV or read. · Put a cold, moist cloth or cold pack on the painful area for 10 to 20 minutes at a time. Put a thin cloth between the cold pack and your skin. · Have someone gently massage your neck and shoulders. · Take your medicines exactly as prescribed. Call your doctor if you think you are having a problem with your medicine. You will get more details on the specific medicines your doctor prescribes. To prevent migraines  · Keep a headache diary so you can figure out what triggers your headaches. Avoiding triggers may help you prevent headaches. Record when each headache began, how long it lasted, and what the pain was like. Use words like throbbing, aching, stabbing, or dull. Write down any other symptoms you had with the headache.  These may include nausea, flashing lights or dark spots, or sensitivity to bright light or loud noise. Note if the headache occurred near your period. List anything that might have triggered the headache. Triggers may include certain foods (chocolate, cheese, wine) or odors, smoke, bright light, stress, or lack of sleep. · If your doctor has prescribed medicine for your migraines, take it as directed. You may have medicine that you take only when you get a migraine and medicine that you take all the time to help prevent migraines. ¨ If your doctor has prescribed medicine for when you get a headache, take it at the first sign of a migraine, unless your doctor has given you other instructions. ¨ If your doctor has prescribed medicine to prevent migraines, take it exactly as prescribed. Call your doctor if you think you are having a problem with your medicine. · Find healthy ways to deal with stress. Migraines are most common during or right after stressful times. Take time to relax before and after you do something that has caused a migraine in the past.  · Try to keep your muscles relaxed by keeping good posture. Check your jaw, face, neck, and shoulder muscles for tension. Try to relax them. When sitting at a desk, change positions often. Stretch for 30 seconds each hour. · Get regular sleep and exercise. · Eat regular meals, and avoid foods and drinks that often trigger migraines. These include chocolate and alcohol, especially red wine and port. Chemicals used in food, such as aspartame and monosodium glutamate (MSG), also can trigger migraines. So can some food additives, such as those found in hot dogs, damon, cold cuts, aged cheeses, and pickled foods. · Limit caffeine by not drinking too much coffee, tea, or soda. Do not quit caffeine suddenly, because that can also give you migraines. · Do not smoke or allow others to smoke around you.  If you need help quitting, talk to your doctor about stop-smoking programs and medicines. These can increase your chances of quitting for good. · If you are taking birth control pills or hormone therapy, talk to your doctor about whether they are triggering your migraines. When should you call for help? Call 911 anytime you think you may need emergency care. For example, call if:  · You have symptoms of a stroke. These may include:  ¨ Sudden numbness, tingling, weakness, or loss of movement in your face, arm, or leg, especially on only one side of your body. ¨ Sudden vision changes. ¨ Sudden trouble speaking. ¨ Sudden confusion or trouble understanding simple statements. ¨ Sudden problems with walking or balance. ¨ A sudden, severe headache that is different from past headaches. Call your doctor now or seek immediate medical care if:  · You develop a fever and a stiff neck. · You have new nausea and vomiting, or you cannot keep down food or liquids. Watch closely for changes in your health, and be sure to contact your doctor if:  · You have a headache that does not get better within 1 or 2 days. · Your headaches get worse or happen more often. Where can you learn more? Go to http://annel-sadie.info/. Enter V975 in the search box to learn more about \"Recurring Migraine Headache: Care Instructions. \"  Current as of: October 14, 2016  Content Version: 11.3  © 7599-6063 [employer]. Care instructions adapted under license by [employer] (which disclaims liability or warranty for this information). If you have questions about a medical condition or this instruction, always ask your healthcare professional. [place] [address] any warranty or liability for your use of this information.

## 2017-08-22 ENCOUNTER — TELEPHONE (OUTPATIENT)
Dept: HEMATOLOGY | Age: 42
End: 2017-08-22

## 2017-09-15 ENCOUNTER — HOSPITAL ENCOUNTER (EMERGENCY)
Age: 42
Discharge: HOME | End: 2017-09-15
Attending: EMERGENCY MEDICINE
Payer: MEDICARE

## 2017-09-15 VITALS
DIASTOLIC BLOOD PRESSURE: 81 MMHG | WEIGHT: 248 LBS | BODY MASS INDEX: 43.94 KG/M2 | HEIGHT: 63 IN | HEART RATE: 104 BPM | TEMPERATURE: 97.3 F | SYSTOLIC BLOOD PRESSURE: 143 MMHG | RESPIRATION RATE: 19 BRPM | OXYGEN SATURATION: 95 %

## 2017-09-15 DIAGNOSIS — M62.838 MUSCLE SPASM: Primary | ICD-10-CM

## 2017-09-15 LAB
ALBUMIN SERPL-MCNC: 3.9 G/DL (ref 3.4–5)
ALBUMIN/GLOB SERPL: 1.1 {RATIO} (ref 0.8–1.7)
ALP SERPL-CCNC: 93 U/L (ref 45–117)
ALT SERPL-CCNC: 74 U/L (ref 13–56)
ANION GAP SERPL CALC-SCNC: 12 MMOL/L (ref 3–18)
AST SERPL-CCNC: 48 U/L (ref 15–37)
BASOPHILS # BLD: 0 K/UL (ref 0–0.06)
BASOPHILS NFR BLD: 0 % (ref 0–2)
BILIRUB SERPL-MCNC: 0.6 MG/DL (ref 0.2–1)
BUN SERPL-MCNC: 9 MG/DL (ref 7–18)
BUN/CREAT SERPL: 14 (ref 12–20)
CALCIUM SERPL-MCNC: 8.8 MG/DL (ref 8.5–10.1)
CHLORIDE SERPL-SCNC: 101 MMOL/L (ref 100–108)
CO2 SERPL-SCNC: 25 MMOL/L (ref 21–32)
CREAT SERPL-MCNC: 0.66 MG/DL (ref 0.6–1.3)
DIFFERENTIAL METHOD BLD: NORMAL
EOSINOPHIL # BLD: 0.2 K/UL (ref 0–0.4)
EOSINOPHIL NFR BLD: 2 % (ref 0–5)
ERYTHROCYTE [DISTWIDTH] IN BLOOD BY AUTOMATED COUNT: 13.8 % (ref 11.6–14.5)
GLOBULIN SER CALC-MCNC: 3.6 G/DL (ref 2–4)
GLUCOSE SERPL-MCNC: 160 MG/DL (ref 74–99)
HCT VFR BLD AUTO: 37.5 % (ref 35–45)
HGB BLD-MCNC: 13.3 G/DL (ref 12–16)
LACTATE BLD-SCNC: 0.9 MMOL/L (ref 0.4–2)
LYMPHOCYTES # BLD: 3 K/UL (ref 0.9–3.6)
LYMPHOCYTES NFR BLD: 40 % (ref 21–52)
MAGNESIUM SERPL-MCNC: 1.9 MG/DL (ref 1.6–2.6)
MCH RBC QN AUTO: 29.3 PG (ref 24–34)
MCHC RBC AUTO-ENTMCNC: 35.5 G/DL (ref 31–37)
MCV RBC AUTO: 82.6 FL (ref 74–97)
MONOCYTES # BLD: 0.4 K/UL (ref 0.05–1.2)
MONOCYTES NFR BLD: 6 % (ref 3–10)
NEUTS SEG # BLD: 3.8 K/UL (ref 1.8–8)
NEUTS SEG NFR BLD: 52 % (ref 40–73)
PLATELET # BLD AUTO: 380 K/UL (ref 135–420)
PMV BLD AUTO: 10.1 FL (ref 9.2–11.8)
POTASSIUM SERPL-SCNC: 3.5 MMOL/L (ref 3.5–5.5)
PROT SERPL-MCNC: 7.5 G/DL (ref 6.4–8.2)
RBC # BLD AUTO: 4.54 M/UL (ref 4.2–5.3)
SODIUM SERPL-SCNC: 138 MMOL/L (ref 136–145)
WBC # BLD AUTO: 7.4 K/UL (ref 4.6–13.2)

## 2017-09-15 PROCEDURE — 99285 EMERGENCY DEPT VISIT HI MDM: CPT

## 2017-09-15 PROCEDURE — 96361 HYDRATE IV INFUSION ADD-ON: CPT

## 2017-09-15 PROCEDURE — 83735 ASSAY OF MAGNESIUM: CPT | Performed by: PHYSICIAN ASSISTANT

## 2017-09-15 PROCEDURE — 80053 COMPREHEN METABOLIC PANEL: CPT | Performed by: PHYSICIAN ASSISTANT

## 2017-09-15 PROCEDURE — 96375 TX/PRO/DX INJ NEW DRUG ADDON: CPT

## 2017-09-15 PROCEDURE — 74011250636 HC RX REV CODE- 250/636

## 2017-09-15 PROCEDURE — 96374 THER/PROPH/DIAG INJ IV PUSH: CPT

## 2017-09-15 PROCEDURE — 83605 ASSAY OF LACTIC ACID: CPT

## 2017-09-15 PROCEDURE — 85025 COMPLETE CBC W/AUTO DIFF WBC: CPT | Performed by: PHYSICIAN ASSISTANT

## 2017-09-15 PROCEDURE — 74011250636 HC RX REV CODE- 250/636: Performed by: PHYSICIAN ASSISTANT

## 2017-09-15 RX ORDER — MORPHINE SULFATE 4 MG/ML
4 INJECTION, SOLUTION INTRAMUSCULAR; INTRAVENOUS
Status: COMPLETED | OUTPATIENT
Start: 2017-09-15 | End: 2017-09-15

## 2017-09-15 RX ORDER — LORAZEPAM 2 MG/ML
INJECTION INTRAMUSCULAR
Status: COMPLETED
Start: 2017-09-15 | End: 2017-09-15

## 2017-09-15 RX ORDER — DIAZEPAM 5 MG/1
5 TABLET ORAL
Qty: 6 TAB | Refills: 0 | Status: SHIPPED | OUTPATIENT
Start: 2017-09-15 | End: 2017-09-18

## 2017-09-15 RX ORDER — KETOROLAC TROMETHAMINE 30 MG/ML
30 INJECTION, SOLUTION INTRAMUSCULAR; INTRAVENOUS
Status: COMPLETED | OUTPATIENT
Start: 2017-09-15 | End: 2017-09-15

## 2017-09-15 RX ORDER — ONDANSETRON 2 MG/ML
4 INJECTION INTRAMUSCULAR; INTRAVENOUS
Status: COMPLETED | OUTPATIENT
Start: 2017-09-15 | End: 2017-09-15

## 2017-09-15 RX ORDER — HYDROCODONE BITARTRATE AND ACETAMINOPHEN 5; 325 MG/1; MG/1
1 TABLET ORAL
Qty: 6 TAB | Refills: 0 | Status: SHIPPED | OUTPATIENT
Start: 2017-09-15 | End: 2017-09-18

## 2017-09-15 RX ORDER — LORAZEPAM 2 MG/ML
2 INJECTION INTRAMUSCULAR ONCE
Status: COMPLETED | OUTPATIENT
Start: 2017-09-15 | End: 2017-09-15

## 2017-09-15 RX ADMIN — LORAZEPAM 2 MG: 2 INJECTION INTRAMUSCULAR; INTRAVENOUS at 12:38

## 2017-09-15 RX ADMIN — METHYLPREDNISOLONE SODIUM SUCCINATE 125 MG: 125 INJECTION, POWDER, FOR SOLUTION INTRAMUSCULAR; INTRAVENOUS at 13:11

## 2017-09-15 RX ADMIN — ONDANSETRON 4 MG: 2 INJECTION INTRAMUSCULAR; INTRAVENOUS at 12:38

## 2017-09-15 RX ADMIN — LORAZEPAM 2 MG: 2 INJECTION INTRAMUSCULAR at 12:38

## 2017-09-15 RX ADMIN — SODIUM CHLORIDE 1000 ML: 900 INJECTION, SOLUTION INTRAVENOUS at 12:14

## 2017-09-15 RX ADMIN — KETOROLAC TROMETHAMINE 30 MG: 30 INJECTION, SOLUTION INTRAMUSCULAR at 14:13

## 2017-09-15 RX ADMIN — Medication 4 MG: at 12:38

## 2017-09-15 NOTE — ED NOTES
Pt still awaiting medical transport home  assisted on/off bedpan  States she does not have keys but can call son to meet her at house who does have keys

## 2017-09-15 NOTE — ED NOTES
Seizure precautions implemented  Seizure pads applied to side rails  Pt on cardiac monitor  Medicated per written order, please see MAR

## 2017-09-15 NOTE — ED PROVIDER NOTES
HPI Comments: 12:35 PM  43 y.o. female with PMH of seizure, MS, DM, and HTN who presents to ED C/O diffuse body muscle spasms and tightness x 2 days. Notes nausea. Denies fever/chills, chest pain, dyspnea, abdominal pain, vomiting, diarrhea, dysuria, seizure-like activity. Ambulates at baseline, sometimes with a walker. Notes she takes Valium for her serizure  Pt denies any other sxs or complaints. Written by Griselda Pineda PA-C      Patient is a 43 y.o. female presenting with muscle spasms. The history is provided by the patient. Spasms    Pertinent negatives include no chest pain, no fever, no numbness, no headaches, no abdominal pain and no weakness. Past Medical History:   Diagnosis Date    Arthritis     Arthropathy, unspecified, site unspecified     Depression     Diabetes ([address]     Hypercholesteremia     Hypertension     history of htn    Incontinence of urine     Insomnia     Migraine     MS (multiple sclerosis) ([address]     MS (multiple sclerosis) ([address]     Neurogenic bladder     Other ill-defined conditions     multiple Sclerosis    Seizures ([address]     6/2013    Wears glasses        Past Surgical History:   Procedure Laterality Date    HX HYSTERECTOMY      HX ORTHOPAEDIC      1/2005 11/2008    HX OTHER SURGICAL           Family History:   Problem Relation Age of Onset    Heart Disease Father     Diabetes Father     Diabetes Sister     Other Other        Social History     Social History    Marital status:      Spouse name: N/A    Number of children: N/A    Years of education: N/A     Occupational History    Not on file.      Social History Main Topics    Smoking status: Never Smoker    Smokeless tobacco: Never Used    Alcohol use No    Drug use: No    Sexual activity: Not on file      Comment: Hysterectomy     Other Topics Concern    Not on file     Social History Narrative         ALLERGIES: Oxycontin [oxycodone]    Review of Systems   Constitutional: Negative for chills and fever. Respiratory: Negative for shortness of breath. Cardiovascular: Negative for chest pain. Gastrointestinal: Positive for nausea. Negative for abdominal pain, constipation, diarrhea and vomiting. Musculoskeletal: Positive for muscle spasms. Skin: Negative for rash. Neurological: Negative for weakness, numbness and headaches. All other systems reviewed and are negative. Vitals:    09/15/17 1256 09/15/17 1257 09/15/17 1258 09/15/17 1259   BP:       Pulse: (!) 104 (!) 104 (!) 105 (!) 107   Resp: 18 18 23 17   Temp:       SpO2: 97% 97% 97% 97%   Weight:       Height:                Physical Exam   Constitutional: She is oriented to person, place, and time. She appears well-developed and well-nourished. No distress. HENT:   Head: Normocephalic and atraumatic. Neck: Normal range of motion. Neck supple. Cardiovascular: Normal rate, regular rhythm and normal heart sounds. Pulmonary/Chest: Effort normal and breath sounds normal.   Abdominal: Soft. She exhibits no distension. There is no tenderness. There is no rebound and no guarding. Musculoskeletal: Normal range of motion. She exhibits no edema. Neurological: She is alert and oriented to person, place, and time. No cranial nerve deficit. Skin: Skin is warm and dry. She is not diaphoretic. Nursing note and vitals reviewed.        [facility]  ED Course       Procedures      RESULTS:    No orders to display       Labs Reviewed   METABOLIC PANEL, COMPREHENSIVE - Abnormal; Notable for the following:        Result Value    Glucose 160 (*)     ALT (SGPT) 74 (*)     AST (SGOT) 48 (*)     All other components within normal limits   CBC WITH AUTOMATED DIFF   MAGNESIUM   POC LACTIC ACID       Recent Results (from the past 12 hour(s))   CBC WITH AUTOMATED DIFF    Collection Time: 09/15/17 12:20 PM   Result Value Ref Range    WBC 7.4 4.6 - 13.2 K/uL    RBC 4.54 4.20 - 5.30 M/uL    HGB 13.3 12.0 - 16.0 g/dL    HCT 37.5 35.0 - 45.0 %    MCV 82.6 74.0 - 97.0 FL    MCH 29.3 24.0 - 34.0 PG    MCHC 35.5 31.0 - 37.0 g/dL    RDW 13.8 11.6 - 14.5 %    PLATELET 293 521 - 812 K/uL    MPV 10.1 9.2 - 11.8 FL    NEUTROPHILS 52 40 - 73 %    LYMPHOCYTES 40 21 - 52 %    MONOCYTES 6 3 - 10 %    EOSINOPHILS 2 0 - 5 %    BASOPHILS 0 0 - 2 %    ABS. NEUTROPHILS 3.8 1.8 - 8.0 K/UL    ABS. LYMPHOCYTES 3.0 0.9 - 3.6 K/UL    ABS. MONOCYTES 0.4 0.05 - 1.2 K/UL    ABS. EOSINOPHILS 0.2 0.0 - 0.4 K/UL    ABS. BASOPHILS 0.0 0.0 - 0.06 K/UL    DF AUTOMATED     METABOLIC PANEL, COMPREHENSIVE    Collection Time: 09/15/17 12:20 PM   Result Value Ref Range    Sodium 138 136 - 145 mmol/L    Potassium 3.5 3.5 - 5.5 mmol/L    Chloride 101 100 - 108 mmol/L    CO2 25 21 - 32 mmol/L    Anion gap 12 3.0 - 18 mmol/L    Glucose 160 (H) 74 - 99 mg/dL    BUN 9 7.0 - 18 MG/DL    Creatinine 0.66 0.6 - 1.3 MG/DL    BUN/Creatinine ratio 14 12 - 20      GFR est AA >60 >60 ml/min/1.73m2    GFR est non-AA >60 >60 ml/min/1.73m2    Calcium 8.8 8.5 - 10.1 MG/DL    Bilirubin, total 0.6 0.2 - 1.0 MG/DL    ALT (SGPT) 74 (H) 13 - 56 U/L    AST (SGOT) 48 (H) 15 - 37 U/L    Alk. phosphatase 93 45 - 117 U/L    Protein, total 7.5 6.4 - 8.2 g/dL    Albumin 3.9 3.4 - 5.0 g/dL    Globulin 3.6 2.0 - 4.0 g/dL    A-G Ratio 1.1 0.8 - 1.7     MAGNESIUM    Collection Time: 09/15/17 12:20 PM   Result Value Ref Range    Magnesium 1.9 1.6 - 2.6 mg/dL   POC LACTIC ACID    Collection Time: 09/15/17 12:56 PM   Result Value Ref Range    Lactic Acid (POC) 0.9 0.4 - 2.0 mmol/L     PROGRESS NOTE:   12:37 PM  Called in to room for seizure-like activity. Pt responsive and follows commands. Likely pseudoseizure. 1:04 PM  Reviewed results with patient. Has follow-up with PMD Monday. Resting comfortably with CNA. Tachycardia improved in ED. IMPRESSION AND MEDICAL DECISION MAKING:  Pt presents to the ED with diffuse muscle spasms and tightness. No signs of seizures, no electrolyte abnormality.  Symptoms improved with medication in ED. Stable for d/c with outpatient follow-up. CONDITION ON DISCHARGE:  stable    DISCHARGE NOTE:  2:17 PM  Gregorio Tyson  results have been reviewed with her. She has been counseled regarding her diagnosis, treatment, and plan. She verbally conveys understanding and agreement of the signs, symptoms, diagnosis, treatment and prognosis and additionally agrees to follow up as discussed. She also agrees with the care-plan and conveys that all of her questions have been answered. I have also provided discharge instructions for her that include: educational information regarding their diagnosis and treatment, and list of reasons why they would want to return to the ED prior to their follow-up appointment, should her condition change. CLINICAL IMPRESSION:    1. Muscle spasm        AFTER VISIT PLAN:    Current Discharge Medication List      START taking these medications    Details   diazePAM (VALIUM) 5 mg tablet Take 1 Tab by mouth every eight (8) hours as needed for Anxiety.  Max Daily Amount: 15 mg.  Qty: 6 Tab, Refills: 0              Follow-up Information     Follow up With Details Comments Contact Info    [facility] EMERGENCY DEPT  If symptoms worsen [address]    Rico Pickens MD In 2 days  [address] [place] [address] Rd  [phone]             Written by Kim Locke PA-C

## 2017-09-15 NOTE — ED NOTES
Pt having seizure like activity  Provider at bedside  Pt responding to commands  Looking at provider and moving self over on bed during tremor like activity  VO for 2 MG Ativan IM D/t lack of PIV access  Once at bedside with medication activity stopped, PIV established  Order changed to PIV  Medication administered through PIV

## 2017-09-15 NOTE — ED NOTES
Insurance company notified [employer] [facility] send a rig once one becomes available\"  Amadou Delgado, sister, was contacted via telephone.  Will ensure pt is met at door by somebody with keys to let pt into house  Please call angel ([phone]) at time pt leaves

## 2017-09-15 NOTE — ED NOTES
istat lactate drawn off existing line and running at bedside  Pt given ice chip per verbal ok provider

## 2017-09-18 ENCOUNTER — APPOINTMENT (OUTPATIENT)
Dept: CT IMAGING | Age: 42
DRG: 059 | End: 2017-09-18
Attending: EMERGENCY MEDICINE
Payer: MEDICARE

## 2017-09-18 ENCOUNTER — HOSPITAL ENCOUNTER (INPATIENT)
Age: 42
LOS: 1 days | Discharge: HOME HEALTH | DRG: 059 | End: 2017-09-21
Attending: EMERGENCY MEDICINE | Admitting: INTERNAL MEDICINE
Payer: MEDICARE

## 2017-09-18 DIAGNOSIS — G35 MULTIPLE SCLEROSIS EXACERBATION (HCC): ICD-10-CM

## 2017-09-18 DIAGNOSIS — R03.0 ELEVATED BLOOD PRESSURE READING: ICD-10-CM

## 2017-09-18 DIAGNOSIS — R51.9 NONINTRACTABLE HEADACHE, UNSPECIFIED CHRONICITY PATTERN, UNSPECIFIED HEADACHE TYPE: Primary | ICD-10-CM

## 2017-09-18 LAB
ANION GAP SERPL CALC-SCNC: 14 MMOL/L (ref 3–18)
APPEARANCE UR: ABNORMAL
BASOPHILS # BLD: 0 K/UL (ref 0–0.06)
BASOPHILS NFR BLD: 0 % (ref 0–2)
BILIRUB UR QL: NEGATIVE
BUN SERPL-MCNC: 9 MG/DL (ref 7–18)
BUN/CREAT SERPL: 15 (ref 12–20)
CALCIUM SERPL-MCNC: 8.6 MG/DL (ref 8.5–10.1)
CHLORIDE SERPL-SCNC: 100 MMOL/L (ref 100–108)
CO2 SERPL-SCNC: 22 MMOL/L (ref 21–32)
COLOR UR: YELLOW
CREAT SERPL-MCNC: 0.6 MG/DL (ref 0.6–1.3)
DIFFERENTIAL METHOD BLD: NORMAL
EOSINOPHIL # BLD: 0.2 K/UL (ref 0–0.4)
EOSINOPHIL NFR BLD: 2 % (ref 0–5)
ERYTHROCYTE [DISTWIDTH] IN BLOOD BY AUTOMATED COUNT: 13.9 % (ref 11.6–14.5)
GLUCOSE SERPL-MCNC: 127 MG/DL (ref 74–99)
GLUCOSE UR STRIP.AUTO-MCNC: >1000 MG/DL
HCT VFR BLD AUTO: 39.1 % (ref 35–45)
HGB BLD-MCNC: 13.9 G/DL (ref 12–16)
HGB UR QL STRIP: NEGATIVE
KETONES UR QL STRIP.AUTO: 40 MG/DL
LEUKOCYTE ESTERASE UR QL STRIP.AUTO: NEGATIVE
LYMPHOCYTES # BLD: 3.3 K/UL (ref 0.9–3.6)
LYMPHOCYTES NFR BLD: 32 % (ref 21–52)
MCH RBC QN AUTO: 29.4 PG (ref 24–34)
MCHC RBC AUTO-ENTMCNC: 35.5 G/DL (ref 31–37)
MCV RBC AUTO: 82.8 FL (ref 74–97)
MONOCYTES # BLD: 0.6 K/UL (ref 0.05–1.2)
MONOCYTES NFR BLD: 6 % (ref 3–10)
NEUTS SEG # BLD: 6.1 K/UL (ref 1.8–8)
NEUTS SEG NFR BLD: 60 % (ref 40–73)
NITRITE UR QL STRIP.AUTO: NEGATIVE
PH UR STRIP: 6.5 [PH] (ref 5–8)
PLATELET # BLD AUTO: 410 K/UL (ref 135–420)
PMV BLD AUTO: 11.2 FL (ref 9.2–11.8)
POTASSIUM SERPL-SCNC: 3.6 MMOL/L (ref 3.5–5.5)
PROT UR STRIP-MCNC: NEGATIVE MG/DL
RBC # BLD AUTO: 4.72 M/UL (ref 4.2–5.3)
SODIUM SERPL-SCNC: 136 MMOL/L (ref 136–145)
SP GR UR REFRACTOMETRY: 1.02 (ref 1–1.03)
UROBILINOGEN UR QL STRIP.AUTO: 1 EU/DL (ref 0.2–1)
WBC # BLD AUTO: 10.2 K/UL (ref 4.6–13.2)

## 2017-09-18 PROCEDURE — 74011250636 HC RX REV CODE- 250/636: Performed by: HOSPITALIST

## 2017-09-18 PROCEDURE — 96375 TX/PRO/DX INJ NEW DRUG ADDON: CPT

## 2017-09-18 PROCEDURE — 65270000029 HC RM PRIVATE

## 2017-09-18 PROCEDURE — 65390000012 HC CONDITION CODE 44 OBSERVATION

## 2017-09-18 PROCEDURE — 74011250637 HC RX REV CODE- 250/637: Performed by: EMERGENCY MEDICINE

## 2017-09-18 PROCEDURE — 80048 BASIC METABOLIC PNL TOTAL CA: CPT | Performed by: NURSE PRACTITIONER

## 2017-09-18 PROCEDURE — 70450 CT HEAD/BRAIN W/O DYE: CPT

## 2017-09-18 PROCEDURE — 81003 URINALYSIS AUTO W/O SCOPE: CPT | Performed by: EMERGENCY MEDICINE

## 2017-09-18 PROCEDURE — 74011250636 HC RX REV CODE- 250/636: Performed by: EMERGENCY MEDICINE

## 2017-09-18 PROCEDURE — 74011000258 HC RX REV CODE- 258: Performed by: EMERGENCY MEDICINE

## 2017-09-18 PROCEDURE — 74011000250 HC RX REV CODE- 250: Performed by: NURSE PRACTITIONER

## 2017-09-18 PROCEDURE — 96365 THER/PROPH/DIAG IV INF INIT: CPT

## 2017-09-18 PROCEDURE — 96374 THER/PROPH/DIAG INJ IV PUSH: CPT

## 2017-09-18 PROCEDURE — 99284 EMERGENCY DEPT VISIT MOD MDM: CPT

## 2017-09-18 PROCEDURE — 74011250636 HC RX REV CODE- 250/636: Performed by: NURSE PRACTITIONER

## 2017-09-18 PROCEDURE — 74011250637 HC RX REV CODE- 250/637: Performed by: HOSPITALIST

## 2017-09-18 PROCEDURE — 85025 COMPLETE CBC W/AUTO DIFF WBC: CPT | Performed by: NURSE PRACTITIONER

## 2017-09-18 RX ORDER — GLATIRAMER ACETATE 20 MG/ML
40 INJECTION, SOLUTION SUBCUTANEOUS
Status: DISCONTINUED | OUTPATIENT
Start: 2017-09-20 | End: 2017-09-21 | Stop reason: HOSPADM

## 2017-09-18 RX ORDER — DULOXETIN HYDROCHLORIDE 60 MG/1
60 CAPSULE, DELAYED RELEASE ORAL DAILY
COMMUNITY

## 2017-09-18 RX ORDER — LANOLIN ALCOHOL/MO/W.PET/CERES
1000 CREAM (GRAM) TOPICAL DAILY
Status: DISCONTINUED | OUTPATIENT
Start: 2017-09-19 | End: 2017-09-21 | Stop reason: HOSPADM

## 2017-09-18 RX ORDER — POTASSIUM CHLORIDE 20 MEQ/1
20 TABLET, EXTENDED RELEASE ORAL 2 TIMES DAILY
Status: DISCONTINUED | OUTPATIENT
Start: 2017-09-19 | End: 2017-09-21 | Stop reason: HOSPADM

## 2017-09-18 RX ORDER — MAGNESIUM SULFATE 100 %
16 CRYSTALS MISCELLANEOUS AS NEEDED
Status: DISCONTINUED | OUTPATIENT
Start: 2017-09-18 | End: 2017-09-21 | Stop reason: HOSPADM

## 2017-09-18 RX ORDER — QUETIAPINE FUMARATE 100 MG/1
300 TABLET, FILM COATED ORAL
Status: DISCONTINUED | OUTPATIENT
Start: 2017-09-18 | End: 2017-09-21 | Stop reason: HOSPADM

## 2017-09-18 RX ORDER — INSULIN LISPRO 100 [IU]/ML
INJECTION, SOLUTION INTRAVENOUS; SUBCUTANEOUS
Status: DISCONTINUED | OUTPATIENT
Start: 2017-09-19 | End: 2017-09-20

## 2017-09-18 RX ORDER — METOCLOPRAMIDE HYDROCHLORIDE 5 MG/ML
10 INJECTION INTRAMUSCULAR; INTRAVENOUS
Status: COMPLETED | OUTPATIENT
Start: 2017-09-18 | End: 2017-09-18

## 2017-09-18 RX ORDER — HEPARIN SODIUM 5000 [USP'U]/ML
5000 INJECTION, SOLUTION INTRAVENOUS; SUBCUTANEOUS EVERY 8 HOURS
Status: DISCONTINUED | OUTPATIENT
Start: 2017-09-18 | End: 2017-09-21 | Stop reason: HOSPADM

## 2017-09-18 RX ORDER — PROCHLORPERAZINE EDISYLATE 5 MG/ML
10 INJECTION INTRAMUSCULAR; INTRAVENOUS
Status: COMPLETED | OUTPATIENT
Start: 2017-09-18 | End: 2017-09-18

## 2017-09-18 RX ORDER — KETOROLAC TROMETHAMINE 30 MG/ML
30 INJECTION, SOLUTION INTRAMUSCULAR; INTRAVENOUS
Status: COMPLETED | OUTPATIENT
Start: 2017-09-18 | End: 2017-09-18

## 2017-09-18 RX ORDER — METFORMIN HYDROCHLORIDE 500 MG/1
500 TABLET ORAL 2 TIMES DAILY WITH MEALS
Status: ON HOLD | COMMUNITY
End: 2017-09-21

## 2017-09-18 RX ORDER — DULOXETIN HYDROCHLORIDE 60 MG/1
60 CAPSULE, DELAYED RELEASE ORAL DAILY
Status: DISCONTINUED | OUTPATIENT
Start: 2017-09-19 | End: 2017-09-21 | Stop reason: HOSPADM

## 2017-09-18 RX ORDER — FAMOTIDINE 10 MG/ML
20 INJECTION INTRAVENOUS
Status: COMPLETED | OUTPATIENT
Start: 2017-09-18 | End: 2017-09-18

## 2017-09-18 RX ORDER — SODIUM CHLORIDE 9 MG/ML
75 INJECTION, SOLUTION INTRAVENOUS CONTINUOUS
Status: DISCONTINUED | OUTPATIENT
Start: 2017-09-18 | End: 2017-09-21 | Stop reason: HOSPADM

## 2017-09-18 RX ORDER — QUETIAPINE FUMARATE 300 MG/1
300 TABLET, FILM COATED ORAL
COMMUNITY

## 2017-09-18 RX ORDER — METFORMIN HYDROCHLORIDE 500 MG/1
500 TABLET ORAL 2 TIMES DAILY WITH MEALS
Status: DISCONTINUED | OUTPATIENT
Start: 2017-09-19 | End: 2017-09-19

## 2017-09-18 RX ORDER — DIPHENHYDRAMINE HYDROCHLORIDE 50 MG/ML
50 INJECTION, SOLUTION INTRAMUSCULAR; INTRAVENOUS ONCE
Status: COMPLETED | OUTPATIENT
Start: 2017-09-18 | End: 2017-09-18

## 2017-09-18 RX ORDER — ACETAMINOPHEN 500 MG
1000 TABLET ORAL
Status: COMPLETED | OUTPATIENT
Start: 2017-09-18 | End: 2017-09-18

## 2017-09-18 RX ORDER — DIPHENHYDRAMINE HYDROCHLORIDE 50 MG/ML
25 INJECTION, SOLUTION INTRAMUSCULAR; INTRAVENOUS
Status: COMPLETED | OUTPATIENT
Start: 2017-09-18 | End: 2017-09-18

## 2017-09-18 RX ORDER — DEXTROSE 50 % IN WATER (D50W) INTRAVENOUS SYRINGE
25-50 AS NEEDED
Status: DISCONTINUED | OUTPATIENT
Start: 2017-09-18 | End: 2017-09-21 | Stop reason: HOSPADM

## 2017-09-18 RX ADMIN — HEPARIN SODIUM 5000 UNITS: 5000 INJECTION, SOLUTION INTRAVENOUS; SUBCUTANEOUS at 23:24

## 2017-09-18 RX ADMIN — KETOROLAC TROMETHAMINE 30 MG: 30 INJECTION, SOLUTION INTRAMUSCULAR; INTRAVENOUS at 17:10

## 2017-09-18 RX ADMIN — SODIUM CHLORIDE 875 MG: 900 INJECTION, SOLUTION INTRAVENOUS at 19:28

## 2017-09-18 RX ADMIN — METOCLOPRAMIDE 10 MG: 5 INJECTION, SOLUTION INTRAMUSCULAR; INTRAVENOUS at 20:11

## 2017-09-18 RX ADMIN — METHYLPREDNISOLONE SODIUM SUCCINATE 60 MG: 125 INJECTION, POWDER, FOR SOLUTION INTRAMUSCULAR; INTRAVENOUS at 23:24

## 2017-09-18 RX ADMIN — ACETAMINOPHEN 1000 MG: 500 TABLET ORAL at 20:12

## 2017-09-18 RX ADMIN — SODIUM CHLORIDE 75 ML/HR: 900 INJECTION, SOLUTION INTRAVENOUS at 23:24

## 2017-09-18 RX ADMIN — QUETIAPINE FUMARATE 300 MG: 100 TABLET, FILM COATED ORAL at 23:24

## 2017-09-18 RX ADMIN — DIPHENHYDRAMINE HYDROCHLORIDE 50 MG: 50 INJECTION, SOLUTION INTRAMUSCULAR; INTRAVENOUS at 20:11

## 2017-09-18 RX ADMIN — FAMOTIDINE 20 MG: 10 INJECTION, SOLUTION INTRAVENOUS at 17:46

## 2017-09-18 RX ADMIN — PROCHLORPERAZINE EDISYLATE 10 MG: 5 INJECTION INTRAMUSCULAR; INTRAVENOUS at 17:48

## 2017-09-18 RX ADMIN — METHYLPREDNISOLONE SODIUM SUCCINATE 125 MG: 125 INJECTION, POWDER, FOR SOLUTION INTRAMUSCULAR; INTRAVENOUS at 17:26

## 2017-09-18 RX ADMIN — DIPHENHYDRAMINE HYDROCHLORIDE 25 MG: 50 INJECTION, SOLUTION INTRAMUSCULAR; INTRAVENOUS at 17:51

## 2017-09-18 NOTE — IP AVS SNAPSHOT
Merle Plasencia 
 
 
 [address] [place] [address] 
[phone] Patient: Corey Alvarez MRN: [id number] CBA:4/9/0242 You are allergic to the following Allergen Reactions Oxycontin (Oxycodone) Hives Other (comments)  
 intolerance Recent Documentation Height Weight Breastfeeding? BMI OB Status Smoking Status 1.6 m 112.5 kg No 43.93 kg/m2 Hysterectomy Never Smoker Emergency Contacts Name Discharge Info Relation Home Work Mobile De Kwame Pastrana CAREGIVER [3] Son [22] [phone] Dax Alatorre  Son [22] Berkley Daniels  Sister [23]   [phone] About your hospitalization You were admitted on:  September 18, 2017 You last received care in the:  [address] [facility] You were discharged on:  September 21, 2017 Unit phone number:  [phone] Why you were hospitalized Your primary diagnosis was:  Multiple Sclerosis Exacerbation (Hcc) Your diagnoses also included: Major Depression, Obesity, Multiple Sclerosis (Hcc) Providers Seen During Your Hospitalizations Provider Role Specialty Primary office phone Jimi Champagne MD Attending Provider Emergency Medicine [phone] Cam Akbar MD Attending Provider Emergency Medicine [phone] Donna Snyder MD Attending Provider Internal Medicine [phone] Ganga Bui MD Attending Provider Internal Medicine [phone] Your Primary Care Physician (PCP) Primary Care Physician Office Phone Office Fax Dakotahkashmir Solis [phone] Follow-up Information Follow up With Details Comments Contact Info Fina Berry MD In 1 week  [facility] [id number] [phone] Isiah English MD In 1 week  [address] [facility] [address] [facility] [id number] [phone] Current Discharge Medication List  
  
 CONTINUE these medications which have CHANGED Dose & Instructions Dispensing Information Comments Morning Noon Evening Bedtime  
 metFORMIN 500 mg tablet Commonly known as:  GLUCOPHAGE What changed:  how much to take Your last dose was: Your next dose is:    
   
   
 Dose:  850 mg Take 1.5 Tabs by mouth two (2) times daily (with meals). Quantity:  60 Tab Refills:  0 CONTINUE these medications which have NOT CHANGED Dose & Instructions Dispensing Information Comments Morning Noon Evening Bedtime COPAXONE 40 mg/mL injection Generic drug:  glatiramer Your last dose was: Your next dose is:    
   
   
 Dose:  40 mg  
40 mg by SubCUTAneous route every Monday, Wednesday, Friday. Refills:  0  
     
   
   
   
  
 cyanocobalamin 1,000 mcg tablet Your last dose was: Your next dose is:    
   
   
 Dose:  1000 mcg Take 1,000 mcg by mouth daily. Refills:  0  
     
   
   
   
  
 CYMBALTA 60 mg capsule Generic drug:  DULoxetine Your last dose was: Your next dose is:    
   
   
 Dose:  60 mg Take 60 mg by mouth daily. Refills:  0  
     
   
   
   
  
 potassium chloride 20 mEq tablet Commonly known as:  K-DUR, KLOR-CON Your last dose was: Your next dose is:    
   
   
 Dose:  20 mEq Take 1 Tab by mouth two (2) times a day. Quantity:  10 Tab Refills:  0 SEROquel 300 mg tablet Generic drug:  QUEtiapine Your last dose was: Your next dose is:    
   
   
 Dose:  300 mg Take 300 mg by mouth nightly. Refills:  0 Where to Get Your Medications Information on where to get these meds will be given to you by the nurse or doctor. ! Ask your nurse or doctor about these medications  
  metFORMIN 500 mg tablet Discharge Instructions Patient armband removed and shredded DISCHARGE SUMMARY from Nurse The following personal items are in your possession at time of discharge: 
 
Dental Appliances: None Visual Aid: Glasses, With patient Home Medications: None Jewelry: Ring (white rings x 2) Clothing: Pants, Shirt, Socks, Undergarments Other Valuables: Cell Phone, Umang Arturo PATIENT INSTRUCTIONS: 
 
 
F-face looks uneven A-arms unable to move or move unevenly S-speech slurred or non-existent T-time-call 911 as soon as signs and symptoms begin-DO NOT go Back to bed or wait to see if you get better-TIME IS BRAIN. Warning Signs of HEART ATTACK Call 911 if you have these symptoms: 
? Chest discomfort. Most heart attacks involve discomfort in the center of the chest that lasts more than a few minutes, or that goes away and comes back. It can feel like uncomfortable pressure, squeezing, fullness, or pain. ? Discomfort in other areas of the upper body. Symptoms can include pain or discomfort in one or both arms, the back, neck, jaw, or stomach. ? Shortness of breath with or without chest discomfort. ? Other signs may include breaking out in a cold sweat, nausea, or lightheadedness. Don't wait more than five minutes to call [address]! Fast action can save your life. Calling 911 is almost always the fastest way to get lifesaving treatment. Emergency Medical Services staff can begin treatment when they arrive  up to an hour sooner than if someone gets to the hospital by car. The discharge information has been reviewed with the patient. The patient verbalized understanding.  
 
Discharge medications reviewed with the patient and appropriate educational materials and side effects teaching were provided. Cluster Headache: Care Instructions Your Care Instructions Cluster headaches are very painful. They happen on one side of the head and often start at night. They can last for 30 minutes to several hours. They usually occur in groups, or clusters, over weeks or months. You may have a stuffy nose and watery eyes during the headaches. The cause of cluster headaches is not known. Medicine may help prevent cluster headaches. You also can try to avoid things that trigger your headaches. Follow-up care is a key part of your treatment and safety. Be sure to make and go to all appointments, and call your doctor if you are having problems. It's also a good idea to know your test results and keep a list of the medicines you take. How can you care for yourself at home? · Watch for new symptoms with a headache. These include fever, weakness or numbness, vision changes, or confusion. They may be signs of a more serious problem. · Be safe with medicines. Take your medicines exactly as prescribed. Call your doctor if you think you are having a problem with your medicine. You will get more details on the specific medicines your doctor prescribes. · If your doctor recommends it, take an over-the-counter pain medicine, such as acetaminophen (Tylenol), ibuprofen (Advil, Motrin), or naproxen (Aleve). Read and follow all instructions on the label. · Do not take two or more pain medicines at the same time unless the doctor told you to. Many pain medicines have acetaminophen, which is Tylenol. Too much acetaminophen (Tylenol) can be harmful. · Carry medicine with you to quickly treat a headache. · Put ice or a cold pack on the area for 10 to 20 minutes at a time. Put a thin cloth between the ice and your skin. · If your doctor prescribed at-home oxygen therapy to stop a cluster headache, follow the directions for using it. To prevent cluster headaches · Keep a headache diary. Avoiding triggers may help you prevent headaches. Write down when a headache begins, how long it lasts, and what might have triggered it. This could include stress, alcohol, or certain foods. · Exercise daily to lower stress. · Limit caffeine by not drinking too much coffee, tea, or soda. But do not quit caffeine suddenly. This can also give you headaches. · Do not smoke or allow others to smoke around you. If you need help quitting, talk to your doctor about stop-smoking programs and medicines. These can increase your chances of quitting for good. · Tell your doctor if your headaches get worse and medicines do not help. You may need to try a different medicine. When should you call for help? Call 911 anytime you think you may need emergency care. For example, call if: 
· You have symptoms of a stroke. These may include: 
¨ Sudden numbness, tingling, weakness, or loss of movement in your face, arm, or leg, especially on only one side of your body. ¨ Sudden vision changes. ¨ Sudden trouble speaking. ¨ Sudden confusion or trouble understanding simple statements. ¨ Sudden problems with walking or balance. ¨ A sudden, severe headache that is different from past headaches. Call your doctor now or seek immediate medical care if: 
· You have a fever with a stiff neck or a severe headache. · You are sensitive to light or feel very sleepy or confused. · You have new nausea and vomiting and you cannot keep down food or liquids. Watch closely for changes in your health, and be sure to contact your doctor if: 
· You have a headache that does not get better within 1 or 2 days. · Your headaches get worse or happen more often. Where can you learn more? Go to http://annel-sadie.info/. Enter Y637 in the search box to learn more about \"Cluster Headache: Care Instructions. \" Current as of: October 14, 2016 Content Version: 11.3 © 0818-4572 Healthwise, Incorporated. Care instructions adapted under license by [employer] (which disclaims liability or warranty for this information). If you have questions about a medical condition or this instruction, always ask your healthcare professional. [address] any warranty or liability for your use of this information. Multiple Sclerosis (MS): Care Instructions Your Care Instructions Multiple sclerosis, also called MS, is a disease that can affect the brain, spinal cord, and nerves to the eyes. MS can cause problems with muscle control and strength, vision, balance, feeling, and thinking. Whatever your symptoms are, taking medicine correctly and following your doctor's advice for home care can help you maintain your quality of life. Follow-up care is a key part of your treatment and safety. Be sure to make and go to all appointments, and call your doctor if you are having problems. It's also a good idea to know your test results and keep a list of the medicines you take. How can you care for yourself at home? General care · Take your medicines exactly as prescribed. Call your doctor if you think you are having a problem with your medicine. · Use a cane, walker, or scooter if your doctor suggests it. · Keep doing your normal activities as much as you can. · If you have problems urinating, press or tap your bladder area to help start urine flow. If you have trouble controlling your urine, plan your fluid intake and activities so that a toilet will be available when you need it. · Spend time with family and friends. Join a support group for people with MS if you want extra help. · Depression is common with this condition. Tell your doctor if you have trouble sleeping, are eating too much or are not hungry, or feel sad or tearful all the time. Depression can be treated with medicine and counseling. Diet and exercise · Eat a balanced diet. · If you have problems swallowing, change how and what you eat: ¨ Try thick drinks, such as milk shakes. They are easier to swallow than other fluids. ¨ Do not eat foods that crumble easily. These can cause choking. ¨ Use a  to prepare food. Soft foods need less chewing. ¨ Eat small meals often so that you do not get tired from eating larger meals. · Get exercise on most days. Work with your doctor to set up a program of walking, swimming, or other exercise that you are able to do. A physical therapist can teach you exercises if you cannot walk but can move your limbs and trunk. Or you can do exercises to help with coordination and balance. You can help improve muscle stiffness by doing exercises while lying in certain positions. When should you call for help? Call your doctor now or seek immediate medical care if: 
· You have a change in symptoms. · You fall or have another injury. · You have symptoms of a urinary infection. For example: ¨ You have blood or pus in your urine. ¨ You have pain in your back just below your rib cage. This is called flank pain. ¨ You have a fever, chills, or body aches. ¨ It hurts to urinate. ¨ You have groin or belly pain. Watch closely for changes in your health, and be sure to contact your doctor if: 
· You want more information about MS or medicines. · You have questions about alternative treatments. Do not use any other treatments without talking to your doctor first. 
Where can you learn more? Go to http://annel-sadie.info/. Enter T877 in the search box to learn more about \"Multiple Sclerosis (MS): Care Instructions. \" Current as of: November 28, 2016 Content Version: 11.3 © 4514-2421 [employer]. Care instructions adapted under license by [employer] (which disclaims liability or warranty for this information).  If you have questions about a medical condition or this instruction, always ask your healthcare professional. [place] [address] any warranty or liability for your use of this information. Discharge Orders None HiringSolved Announcement We are excited to announce that we are making your provider's discharge notes available to you in HiringSolved. You will see these notes when they are completed and signed by the physician that discharged you from your recent hospital stay. If you have any questions or concerns about any information you see in Local Dirtt, please call the Health Information Department where you were seen or reach out to your Primary Care Provider for more information about your plan of care. Introducing [facility]! [facility] introduces HiringSolved patient portal. Now you can access parts of your medical record, email your doctor's office, and request medication refills online. 1. In your internet browser, go to [email or web address] Resourcest 2. Click on the First Time User? Click Here link in the Sign In box. You will see the New Member Sign Up page. 3. Enter your HiringSolved Access Code exactly as it appears below. You will not need to use this code after youve completed the sign-up process. If you do not sign up before the expiration date, you must request a new code. · HiringSolved Access Code: [email or web address] Expires: 11/15/2017  6:24 PM 
 
4. Enter the last four digits of your Social Security Number (xxxx) and Date of Birth (mm/dd/yyyy) as indicated and click Submit. You will be taken to the next sign-up page. 5. Create a Local Dirtt ID. This will be your HiringSolved login ID and cannot be changed, so think of one that is secure and easy to remember. 6. Create a HiringSolved password. You can change your password at any time. 7. Enter your Password Reset Question and Answer. This can be used at a later time if you forget your password. 8. Enter your e-mail address.  You will receive e-mail notification when new information is available in [employer] Japanhart. 9. Click Sign Up. You can now view and download portions of your medical record. 10. Click the Download Summary menu link to download a portable copy of your medical information. If you have questions, please visit the Frequently Asked Questions section of the Happy Cloud website. Remember, Happy Cloud is NOT to be used for urgent needs. For medical emergencies, dial 911. Now available from your iPhone and Android! General Information Please provide this summary of care documentation to your next provider. Patient Signature:  ____________________________________________________________ Date:  ____________________________________________________________  
  
Oz Kang Provider Signature:  ____________________________________________________________ Date:  ____________________________________________________________

## 2017-09-18 NOTE — IP AVS SNAPSHOT
[place] [address] 
[phone] Patient: Cheyenne Andrea MRN: [id number] QCI:0/3/1871 Current Discharge Medication List  
  
CONTINUE these medications which have CHANGED Dose & Instructions Dispensing Information Comments Morning Noon Evening Bedtime  
 metFORMIN 500 mg tablet Commonly known as:  GLUCOPHAGE What changed:  how much to take Your last dose was: Your next dose is:    
   
   
 Dose:  850 mg Take 1.5 Tabs by mouth two (2) times daily (with meals). Quantity:  60 Tab Refills:  0 CONTINUE these medications which have NOT CHANGED Dose & Instructions Dispensing Information Comments Morning Noon Evening Bedtime COPAXONE 40 mg/mL injection Generic drug:  glatiramer Your last dose was: Your next dose is:    
   
   
 Dose:  40 mg  
40 mg by SubCUTAneous route every Monday, Wednesday, Friday. Refills:  0  
     
   
   
   
  
 cyanocobalamin 1,000 mcg tablet Your last dose was: Your next dose is:    
   
   
 Dose:  1000 mcg Take 1,000 mcg by mouth daily. Refills:  0  
     
   
   
   
  
 CYMBALTA 60 mg capsule Generic drug:  DULoxetine Your last dose was: Your next dose is:    
   
   
 Dose:  60 mg Take 60 mg by mouth daily. Refills:  0  
     
   
   
   
  
 potassium chloride 20 mEq tablet Commonly known as:  K-DUR, KLOR-CON Your last dose was: Your next dose is:    
   
   
 Dose:  20 mEq Take 1 Tab by mouth two (2) times a day. Quantity:  10 Tab Refills:  0 SEROquel 300 mg tablet Generic drug:  QUEtiapine Your last dose was: Your next dose is:    
   
   
 Dose:  300 mg Take 300 mg by mouth nightly. Refills:  0 Where to Get Your Medications Information on where to get these meds will be given to you by the nurse or doctor. ! Ask your nurse or doctor about these medications  
  metFORMIN 500 mg tablet

## 2017-09-19 PROBLEM — E66.9 OBESITY: Status: ACTIVE | Noted: 2017-09-19

## 2017-09-19 LAB
ALBUMIN SERPL-MCNC: 3.5 G/DL (ref 3.4–5)
ALBUMIN/GLOB SERPL: 1.1 {RATIO} (ref 0.8–1.7)
ALP SERPL-CCNC: 89 U/L (ref 45–117)
ALT SERPL-CCNC: 77 U/L (ref 13–56)
ANION GAP SERPL CALC-SCNC: 14 MMOL/L (ref 3–18)
AST SERPL-CCNC: 40 U/L (ref 15–37)
BILIRUB SERPL-MCNC: 0.6 MG/DL (ref 0.2–1)
BUN SERPL-MCNC: 12 MG/DL (ref 7–18)
BUN/CREAT SERPL: 20 (ref 12–20)
CALCIUM SERPL-MCNC: 8.2 MG/DL (ref 8.5–10.1)
CHLORIDE SERPL-SCNC: 98 MMOL/L (ref 100–108)
CHOLEST SERPL-MCNC: 220 MG/DL
CO2 SERPL-SCNC: 24 MMOL/L (ref 21–32)
CREAT SERPL-MCNC: 0.6 MG/DL (ref 0.6–1.3)
ERYTHROCYTE [DISTWIDTH] IN BLOOD BY AUTOMATED COUNT: 13.7 % (ref 11.6–14.5)
EST. AVERAGE GLUCOSE BLD GHB EST-MCNC: 223 MG/DL
GLOBULIN SER CALC-MCNC: 3.3 G/DL (ref 2–4)
GLUCOSE BLD STRIP.AUTO-MCNC: 223 MG/DL (ref 70–110)
GLUCOSE BLD STRIP.AUTO-MCNC: 252 MG/DL (ref 70–110)
GLUCOSE BLD STRIP.AUTO-MCNC: 281 MG/DL (ref 70–110)
GLUCOSE BLD STRIP.AUTO-MCNC: 286 MG/DL (ref 70–110)
GLUCOSE SERPL-MCNC: 237 MG/DL (ref 74–99)
HBA1C MFR BLD: 9.4 % (ref 4.2–5.6)
HCT VFR BLD AUTO: 37.2 % (ref 35–45)
HDLC SERPL-MCNC: 56 MG/DL (ref 40–60)
HDLC SERPL: 3.9 {RATIO} (ref 0–5)
HGB BLD-MCNC: 13.1 G/DL (ref 12–16)
LDLC SERPL CALC-MCNC: 151.2 MG/DL (ref 0–100)
LIPID PROFILE,FLP: ABNORMAL
MCH RBC QN AUTO: 29.1 PG (ref 24–34)
MCHC RBC AUTO-ENTMCNC: 35.2 G/DL (ref 31–37)
MCV RBC AUTO: 82.7 FL (ref 74–97)
PLATELET # BLD AUTO: 420 K/UL (ref 135–420)
PMV BLD AUTO: 10.6 FL (ref 9.2–11.8)
POTASSIUM SERPL-SCNC: 3.9 MMOL/L (ref 3.5–5.5)
PROT SERPL-MCNC: 6.8 G/DL (ref 6.4–8.2)
RBC # BLD AUTO: 4.5 M/UL (ref 4.2–5.3)
SODIUM SERPL-SCNC: 136 MMOL/L (ref 136–145)
TRIGL SERPL-MCNC: 64 MG/DL (ref ?–150)
VLDLC SERPL CALC-MCNC: 12.8 MG/DL
WBC # BLD AUTO: 11.8 K/UL (ref 4.6–13.2)

## 2017-09-19 PROCEDURE — 65390000012 HC CONDITION CODE 44 OBSERVATION

## 2017-09-19 PROCEDURE — 74011250637 HC RX REV CODE- 250/637: Performed by: HOSPITALIST

## 2017-09-19 PROCEDURE — 99218 HC RM OBSERVATION: CPT

## 2017-09-19 PROCEDURE — 96374 THER/PROPH/DIAG INJ IV PUSH: CPT

## 2017-09-19 PROCEDURE — 74011636637 HC RX REV CODE- 636/637: Performed by: INTERNAL MEDICINE

## 2017-09-19 PROCEDURE — 80061 LIPID PANEL: CPT | Performed by: HOSPITALIST

## 2017-09-19 PROCEDURE — 36415 COLL VENOUS BLD VENIPUNCTURE: CPT | Performed by: HOSPITALIST

## 2017-09-19 PROCEDURE — 82962 GLUCOSE BLOOD TEST: CPT

## 2017-09-19 PROCEDURE — 74011250636 HC RX REV CODE- 250/636: Performed by: HOSPITALIST

## 2017-09-19 PROCEDURE — 83036 HEMOGLOBIN GLYCOSYLATED A1C: CPT | Performed by: INTERNAL MEDICINE

## 2017-09-19 PROCEDURE — 96361 HYDRATE IV INFUSION ADD-ON: CPT

## 2017-09-19 PROCEDURE — 74011250637 HC RX REV CODE- 250/637: Performed by: INTERNAL MEDICINE

## 2017-09-19 PROCEDURE — 74011636637 HC RX REV CODE- 636/637: Performed by: HOSPITALIST

## 2017-09-19 PROCEDURE — 77030020263 HC SOL INJ SOD CL0.9% LFCR 1000ML

## 2017-09-19 PROCEDURE — 80053 COMPREHEN METABOLIC PANEL: CPT | Performed by: HOSPITALIST

## 2017-09-19 PROCEDURE — 85027 COMPLETE CBC AUTOMATED: CPT | Performed by: HOSPITALIST

## 2017-09-19 RX ORDER — ACETAMINOPHEN 325 MG/1
650 TABLET ORAL
Status: DISCONTINUED | OUTPATIENT
Start: 2017-09-19 | End: 2017-09-21 | Stop reason: HOSPADM

## 2017-09-19 RX ORDER — ACETAMINOPHEN 325 MG/1
650 TABLET ORAL
Status: DISCONTINUED | OUTPATIENT
Start: 2017-09-19 | End: 2017-09-19

## 2017-09-19 RX ADMIN — HEPARIN SODIUM 5000 UNITS: 5000 INJECTION, SOLUTION INTRAVENOUS; SUBCUTANEOUS at 06:47

## 2017-09-19 RX ADMIN — ACETAMINOPHEN 650 MG: 325 TABLET, FILM COATED ORAL at 10:42

## 2017-09-19 RX ADMIN — METHYLPREDNISOLONE SODIUM SUCCINATE 60 MG: 125 INJECTION, POWDER, FOR SOLUTION INTRAMUSCULAR; INTRAVENOUS at 12:45

## 2017-09-19 RX ADMIN — ACETAMINOPHEN 650 MG: 325 TABLET, FILM COATED ORAL at 16:32

## 2017-09-19 RX ADMIN — INSULIN LISPRO 4 UNITS: 100 INJECTION, SOLUTION INTRAVENOUS; SUBCUTANEOUS at 09:20

## 2017-09-19 RX ADMIN — INSULIN DETEMIR 10 UNITS: 100 INJECTION, SOLUTION SUBCUTANEOUS at 22:24

## 2017-09-19 RX ADMIN — POTASSIUM CHLORIDE 20 MEQ: 20 TABLET, EXTENDED RELEASE ORAL at 17:56

## 2017-09-19 RX ADMIN — ACETAMINOPHEN 650 MG: 325 TABLET, FILM COATED ORAL at 06:47

## 2017-09-19 RX ADMIN — INSULIN LISPRO 9 UNITS: 100 INJECTION, SOLUTION INTRAVENOUS; SUBCUTANEOUS at 12:44

## 2017-09-19 RX ADMIN — CYANOCOBALAMIN TAB 1000 MCG 1000 MCG: 1000 TAB at 09:10

## 2017-09-19 RX ADMIN — METHYLPREDNISOLONE SODIUM SUCCINATE 60 MG: 125 INJECTION, POWDER, FOR SOLUTION INTRAMUSCULAR; INTRAVENOUS at 18:12

## 2017-09-19 RX ADMIN — POTASSIUM CHLORIDE 20 MEQ: 20 TABLET, EXTENDED RELEASE ORAL at 09:10

## 2017-09-19 RX ADMIN — ACETAMINOPHEN 650 MG: 325 TABLET, FILM COATED ORAL at 22:30

## 2017-09-19 RX ADMIN — HEPARIN SODIUM 5000 UNITS: 5000 INJECTION, SOLUTION INTRAVENOUS; SUBCUTANEOUS at 22:25

## 2017-09-19 RX ADMIN — SODIUM CHLORIDE 75 ML/HR: 900 INJECTION, SOLUTION INTRAVENOUS at 11:50

## 2017-09-19 RX ADMIN — HEPARIN SODIUM 5000 UNITS: 5000 INJECTION, SOLUTION INTRAVENOUS; SUBCUTANEOUS at 15:41

## 2017-09-19 RX ADMIN — METHYLPREDNISOLONE SODIUM SUCCINATE 60 MG: 125 INJECTION, POWDER, FOR SOLUTION INTRAMUSCULAR; INTRAVENOUS at 05:14

## 2017-09-19 RX ADMIN — QUETIAPINE FUMARATE 300 MG: 100 TABLET, FILM COATED ORAL at 22:25

## 2017-09-19 RX ADMIN — INSULIN LISPRO 9 UNITS: 100 INJECTION, SOLUTION INTRAVENOUS; SUBCUTANEOUS at 18:24

## 2017-09-19 RX ADMIN — DULOXETINE HYDROCHLORIDE 60 MG: 60 CAPSULE, DELAYED RELEASE ORAL at 09:10

## 2017-09-19 RX ADMIN — METFORMIN HYDROCHLORIDE 500 MG: 500 TABLET, FILM COATED ORAL at 09:10

## 2017-09-19 NOTE — PROGRESS NOTES
Patient has been changed from inpatient to observation and a Condition 44 has been completed based on Medicare criteria. The patient / or next of kin will be made aware of this change in status and given a copy of  the [facility] Outpatient Observation Information and Notification letter.

## 2017-09-19 NOTE — ANCILLARY DISCHARGE INSTRUCTIONS
Patient and/or next of kin has been given and has signed the [facility] Outpatient Observation  Notification letter and all questions answered. Copy of this notice given to patient and copy placed on chart. Patient and/or next of kin has been given the Outpatient Observation Information and Notification letter and all questions answered.

## 2017-09-19 NOTE — PROGRESS NOTES
Problem: Falls - Risk of  Goal: *Absence of Falls  Document Jordyn Fall Risk and appropriate interventions in the flowsheet.    Outcome: Progressing Towards Goal  Fall Risk Interventions:              Medication Interventions: Patient to call before getting OOB     Elimination Interventions: Call light in reach, Patient to call for help with toileting needs, Toileting schedule/hourly rounds, Toilet paper/wipes in reach     History of Falls Interventions: Bed/chair exit alarm, Investigate reason for fall, Room close to nurse's station

## 2017-09-19 NOTE — PROGRESS NOTES
Hospitalist Progress Note  Juan Carlos Melo MD  Internal medicine/ Hospitalist    Daily Progress Note: 9/19/2017 2:17 PM      Interval history / Subjective:   Lucy Marshall is a 43 y.o. female who came with c/o Left leg shaking & R leg is spastic - she mentioned that she has a h/o MS & is usually able to walk around & do her ADL's but the day she came to ER,her left leg started shaking & she was unable to walk or bear weight. Also mentioned her R leg is stiff. She has a H/o MS  takes copaxone & is well controlled with her meds. She f/u with neuro - Dr Bryon Meckel in . She was admitted for MS exacerbation and started on solumedrol iv. Today reporting that the shaking has not improved and does not feel any different. Current Facility-Administered Medications   Medication Dose Route Frequency    acetaminophen (TYLENOL) tablet 650 mg  650 mg Oral Q4H PRN    [START ON 9/20/2017] glatiramer (COPAXONE) injection 40 mg  40 mg SubCUTAneous Q MON, WED & FRI    cyanocobalamin tablet 1,000 mcg  1,000 mcg Oral DAILY    potassium chloride (K-DUR, KLOR-CON) SR tablet 20 mEq  20 mEq Oral BID    DULoxetine (CYMBALTA) capsule 60 mg  60 mg Oral DAILY    QUEtiapine (SEROquel) tablet 300 mg  300 mg Oral QHS    metFORMIN (GLUCOPHAGE) tablet 500 mg  500 mg Oral BID WITH MEALS    0.9% sodium chloride infusion  75 mL/hr IntraVENous CONTINUOUS    heparin (porcine) injection 5,000 Units  5,000 Units SubCUTAneous Q8H    methylPREDNISolone (PF) (SOLU-MEDROL) injection 60 mg  60 mg IntraVENous Q6H    insulin lispro (HUMALOG) injection   SubCUTAneous TIDAC    glucose chewable tablet 16 g  16 g Oral PRN    glucagon (GLUCAGEN) injection 1 mg  1 mg IntraMUSCular PRN    dextrose (D50W) injection syrg 12.5-25 g  25-50 mL IntraVENous PRN        Objective:     Visit Vitals    /77 (BP 1 Location: Right arm, BP Patient Position: Supine; At rest)    Pulse (!) 109    Temp 97.9 °F (36.6 °C)    Resp 20    Ht 5' 3\" (1.6 m)    [employer] 109.8 kg (242 lb)    SpO2 96%    Breastfeeding No    BMI 42.87 kg/m2      O2 Device: Room air    Temp (24hrs), Av °F (36.7 °C), Min:97.9 °F (36.6 °C), Max:98 °F (36.7 °C)      701 - 1900  In: 402.5 [P.O.:120; I.V.:282.5]  Out: 1250 [Urine:1250]  1901 -  0700  In: 686.3 [P.O.:120; I.V.:566.3]  Out: 200 [Urine:200]     P/E  Gen: In general, this is a well nourished female in no acute distress  HEENT: at/nc,mouth moist,anicteric. Neck: Supple with midline trachea. CV: RRR without murmur or rub appreciated. Resp: Lung fields bilaterally without wheezes or rhonchi and has good expansion. Abd: Soft, nontender, nondistended. Extrem: Extremities are warm, without cyanosis or clubbing. No pitting pretibial edema. Palpable distal pulses X 4.   Skin: Warm, no visible rashes. Neuro: Patient is alert, oriented, and cooperative. No obvious focal defects. Is unable to bend her knees - R leg spastic & left leg is shaking / tremors     Data Review    Recent Results (from the past 12 hour(s))   METABOLIC PANEL, COMPREHENSIVE    Collection Time: 17  3:45 AM   Result Value Ref Range    Sodium 136 136 - 145 mmol/L    Potassium 3.9 3.5 - 5.5 mmol/L    Chloride 98 (L) 100 - 108 mmol/L    CO2 24 21 - 32 mmol/L    Anion gap 14 3.0 - 18 mmol/L    Glucose 237 (H) 74 - 99 mg/dL    BUN 12 7.0 - 18 MG/DL    Creatinine 0.60 0.6 - 1.3 MG/DL    BUN/Creatinine ratio 20 12 - 20      GFR est AA >60 >60 ml/min/1.73m2    GFR est non-AA >60 >60 ml/min/1.73m2    Calcium 8.2 (L) 8.5 - 10.1 MG/DL    Bilirubin, total 0.6 0.2 - 1.0 MG/DL    ALT (SGPT) 77 (H) 13 - 56 U/L    AST (SGOT) 40 (H) 15 - 37 U/L    Alk.  phosphatase 89 45 - 117 U/L    Protein, total 6.8 6.4 - 8.2 g/dL    Albumin 3.5 3.4 - 5.0 g/dL    Globulin 3.3 2.0 - 4.0 g/dL    A-G Ratio 1.1 0.8 - 1.7     LIPID PANEL    Collection Time: 17  3:45 AM   Result Value Ref Range    LIPID PROFILE          Cholesterol, total 220 (H) <200 MG/DL Triglyceride 64 <150 MG/DL    HDL Cholesterol 56 40 - 60 MG/DL    LDL, calculated 151.2 (H) 0 - 100 MG/DL    VLDL, calculated 12.8 MG/DL    CHOL/HDL Ratio 3.9 0 - 5.0     CBC W/O DIFF    Collection Time: 09/19/17  3:45 AM   Result Value Ref Range    WBC 11.8 4.6 - 13.2 K/uL    RBC 4.50 4. 20 - 5.30 M/uL    HGB 13.1 12.0 - 16.0 g/dL    HCT 37.2 35.0 - 45.0 %    MCV 82.7 74.0 - 97.0 FL    MCH 29.1 24.0 - 34.0 PG    MCHC 35.2 31.0 - 37.0 g/dL    RDW 13.7 11.6 - 14.5 %    PLATELET 049 593 - 131 K/uL    MPV 10.6 9.2 - 11.8 FL   GLUCOSE, POC    Collection Time: 09/19/17  9:08 AM   Result Value Ref Range    Glucose (POC) 223 (H) 70 - 110 mg/dL   GLUCOSE, POC    Collection Time: 09/19/17 11:36 AM   Result Value Ref Range    Glucose (POC) 286 (H) 70 - 110 mg/dL         Assessment/Plan:     Principal Problem:    Multiple sclerosis exacerbation ([facility] [address] (3/16/2016)    Active Problems:    Major depression (1/20/2016)      Obesity (9/19/2017)      Care Plan   1.MS exac - IV Solumedrol, Tele Neuro consulted in ER ,continue copaxone. Neurology consulted. 2.T2DM - levemir 12 units bid , will add SS since she is currently on steroids , will elevate BS   3. HTN - continue home meds.   4.Depression - Continue Seroquel & Cymbalta   DVT Px - Heparin   Full code

## 2017-09-19 NOTE — PROGRESS NOTES
Patient received in bed awake. Patient A&Ox4, denies pain and discomfort. No distress noted. Frequently use items within reach. Bed locked in low position. Call bell within reach and Patient verbalized understanding of use for assistance and needs. [facility] (Pharmacist) called to inform this nurse that Patient's MS medication is not available in formulary and asked if Patient could bring med in home home. Patient made aware of this discussion said that she took the medication for MS yesterday, that the medication is scheduled for Monday, Wednesday and Friday, that she will have someone bring it in today. 08:01- Ava Burgess (Pharmacist) made aware. 2682- Patient c/o frontal HA 10/10, noted Tylenol 650 mg PO prn every 6 hours last documented given at 06:47 am to [facility] Dr. Madeline Donald was called made aware T.O. received to change frequency to prn every 4 hours (RBV). 1000- This nurse to room with prn Tylenol. Alert message showing that based on frequency too soon to give. Called Pharmacist suggested to wait 30 min's before giving, Patient made aware; voiced understanding. See Pain 1 for further actions. Call bell w/in reach. 1456- Patient sitting up in recliner c/o slightly swelling at [facility] IV. IVF was stopped. Unsuccessful attempt x2 at peripheral IV; Patient tolerated well. Called Kd ([facility] supervisor) for assistance said to call PICC nurse who said would send [place]. 0889- Patient had Copaxone 40 mg (6) syringes in prescription box brought to bedside from home. Son and friend at bedside. Copaxone sent to Pharmacy per protocol and to be returned to floor. Call bell w/in reach.

## 2017-09-19 NOTE — ROUTINE PROCESS
Bedside and Verbal shift change report given to Di, RN and [facility], RN (oncoming nurse) by Regina Rao RN, BSN (offgoing nurse). Report given with SBAR, Kardex, Intake/Output, MAR and Recent Results.

## 2017-09-19 NOTE — PROGRESS NOTES
[facility]   Discharge Planning/ Assessment    Reasons for Intervention:  Initial discharge planning interview. Face sheet data reviewed with pt. All information confirmed as correct. Pt with Medicare primary and Medicaid secondary. PCP is Dr. Mehreen Copeland. Pt's emergency contact is her son Antoinette Whitney whom, she has stated, CM can speak to regarding discharge planning. Pt states CM can also speak with her sister Christopher Toussaint: [phone], if needed. Pt lives alone, but has an LPN help with ADL's 7 days per week per pt. LPN is through [address], [address]: [phone]. Pt has wheeled walker at home although she states she does not usually need an assistive device to walk. Pt still works PT. Pt would be amenable to home health or rehab if it is recommended. Pt states she would be willing to go to a [facility] or [facility] facility for acute rehab. No PT/OT order placed at this time. Anticipated discharge plan is home with home health vs ARU.     High Risk Criteria  [] Yes  [x]No   Physician Referral  [] Yes  []No        Date    Nursing Referral  [] Yes  []No        Date    Patient/Family Request  [] Yes  []No        Date       Resources:    Medicare  [x] Yes  []No   Medicaid  [x] Yes  []No   No Resources  [] Yes  []No   Private Insurance  [] Yes  []No    Name/Phone Number    Other  [] Yes  []No        (i.e. Workman's Comp)         Prior Services:    Prior Services  [] Yes  []No   Home Health  [x] Yes  []No   [address]  [] Yes  []No        Number of [address]  [] Yes  []No       Meals on Wheels  [] Yes  []No   Office on Aging  [] Yes  []No   Transportation Services  [] Yes  []No   Nursing Home  [] Yes  []No        Nursing Home Name    [address]  [] Yes  []No        [address] Name    Other       Information Source:      Information obtained from  [x] Patient  [] Parent   [] Guardian  [] Child  [] Spouse   [] Significant Other/Partner   [] Friend      [] EMS    [] Nursing Home Chart          [] Other:   Chart Review  [x] Yes  []No     Family/Support System:    Patient lives with  [x] Alone    [] Spouse   [] Significant Other  [] Children  [] Caretaker   [] Parent  [] Sibling     [] Other       Other Support System:    Is the patient responsible for care of others  [] Yes  [x]No   Information of person caring for patient on  discharge    Managers financial affairs independently  [x] Yes  []No   If no, explain:      Status Prior to Admission:    Mental Status  [x] Awake  [x] Alert  [x] Oriented  [] Quiet/Calm [] Lethargic/Sedated   [] Disoriented  [] Restless/Anxious  [] Combative   Personal Care  [] Dependent  [] [address]  [x] Requires Assistance   Meal Preparation Ability  [] Independent   [x] Standby Assistance   [] Minimal Assistance   [] Moderate Assistance  [] Maximum Assistance     [] Total Assistance   Chores  [] Independent with Chores   [] N/A Nursing Home Resident   [x] Requires Assistance   Bowel/Bladder  [] Continent  [] Catheter  [] Incontinent  [] Ostomy Self-Care    [] Urine Diversion Self-Care  [] Maximum Assistance     [] Total Assistance   Number of Persons needed for assistance    DME at home  [] Karen Garcia Sumi  [] Kari Garcia   [] Commode    [] Bathroom/Grab Bars  [] Hospital Bed  [] Nebulizer  [] Oxygen           [] Raised Toilet Seat  [] Shower Chair  [] Side Rails for Bed   [] Tub Transfer Bench   [x] Margy Shi  [] Akua Ford      [] Other:   Vendor      Treatment Presently Receiving:    Current Treatments  [] Chemotherapy  [] Dialysis  [] Insulin  [] IVAB [] IVF   [] O2  [] PCA   [] PT   [] RT   [] Tube Feedings   [] Wound Care     Psychosocial Evaluation:    Verbalized Knowledge of Disease Process  [] Patient  []Family   Coping with Disease Process  [] Patient  []Family   Requires Further Counseling Coping with Disease Process  [] Patient  []Family     Identified Projected Needs:    Home Health Aid [] Yes  []No   Transportation  [] Yes  []No   Education  [] Yes  []No        Specific Education     Financial Counseling  [] Yes  []No   Inability to Care for Self/Will Require 24 hour care  [] Yes  []No   Pain Management  [] Yes  []No   Home Infusion Therapy  [] Yes  []No   Oxygen Therapy  [] Yes  []No   DME  [] Yes  []No   Long Term Care Placement  [] Yes  []No   Rehab  [x] Yes  []No   Physical Therapy  [x] Yes  []No   Needs Anticipated At This Time  [x] Yes  []No     Intra-Hospital Referral:    [address]  [] Yes  []No     [] Yes  []No   Patient Representative  [] Yes  []No   Staff for Teaching Needs  [] Yes  []No   Specialty Teaching Needs     Diabetic Educator  [] Yes  []No   Referral for Diabetic Educator Needed  [] Yes  []No  If Yes, place order for Nutritionist or Diabetic Consult     Tentative Discharge Plan:    Home with No Services  [] Yes  []No   Home with Home Health Follow-up  [x] Yes  []No        If Yes, specify type    [address]  [] Yes  []No        If Yes, specify type    Meals on Wheels  [] Yes  []No   Office of Aging  [] Yes  []No   NHP  [] Yes  []No   Return to the [address]  [] Yes  []No   Rehab Therapy  [x] Yes  []No   Acute Rehab  [x] Yes  []No   Subacute Rehab  [x] Yes  []No   Private Care  [] Yes  []No   Substance Abuse Referral  [] Yes  []No   Transportation  [] Yes  []No   Chore Service  [] Yes  []No   Inpatient Hospice  [] Yes  []No   OP RT  [] Yes  [] No   OP Hemo  [] Yes  [] No   OP PT  [] Yes  []No   Support Group  [] Yes  []No   Reach to Recovery  [] Yes  []No   OP Oncology Clinic  [] Yes  []No   Clinic Appointment  [] Yes  []No   DME  [] Yes  []No   Comments    Name of D/C Planner or  Given to Patient or [facility], JESSICA Mojica [address] Management  Ph: [phone]  Pager: [phone]   Phone Number         Extension    Date 9/19/17   Time    If you are discharged home, whom do you designate to participate in your discharge plan and receive any information needed?      Enter name of Suma        Phone # of designee [phone]        Address of designee         Updated         Patient refused to designate any           individual

## 2017-09-19 NOTE — PROGRESS NOTES
Patient has designated son to participate in his/her discharge plan and to receive any needed information.      Name: Bart Angulo  Address:  Phone number: [phone]

## 2017-09-19 NOTE — DIABETES MGMT
Diabetes Patient/Family Education Record    Factors That  May Influence Patients Ability  to Learn or  Comply With  Recommendations:    []   Language barrier    []   Cultural needs   []   Motivation    []   Cognitive limitation    []   Physical   [x]   Education    []   Physiological factors   []   Hearing/vision/speaking impairment   []   Sabianist beliefs    [x]   Financial factors-finally has coverage for meds   []  Other:   []  No factors identified at this time.      Person Instructed:   [x]   Patient   [x]   Family   []  Other     Preference for Learning:   [x]   Verbal   []   Written   []  Demonstration     Level of Comprehension & Competence:    [x]  Good                                      [] Fair                                     []  Poor                             [x]  Needs Reinforcement   [x]  Teachback completed    Education Component:   [x]  Medication management, including how to administer insulin (if appropriate) and potential medication interactions    [x]  Nutritional management including the role of carbohydrate intake   [x]  Exercise- limited with MS   [x]  Signs, symptoms, and treatment of hyperglycemia and hypoglycemia   [x] Treatment of hyperglycemia and hypoglycemia   [x]  Importance of blood glucose monitoring and how to obtain a blood glucose meter -has and uses   []  Instruction on use of blood glucose meter   [x]  Discuss the importance of HbA1C monitoring and provide patient with  results   [x]  Sick day guidelines   []  Proper use and disposal of lancets, needles, syringes or insulin pens (if appropriate)   [x]  Potential long-term complications (retinopathy, kidney disease, neuropathy, heart disease, stroke, vascular disease, foot care)   [] Provide emergency contact number and contact number for more information    [x]  Goal:  Patient/family will demonstrate understanding of Diabetes Self Management Skills by: (date) __9/29/17_____  Plan for post-discharge education or self-management support:    [x] Outpatient class schedule provided            [] Patient Declined    [] Scheduled for outpatient classes (date) _______

## 2017-09-19 NOTE — PROGRESS NOTES
conducted an initial consultation and Spiritual Assessment for Charly Alejandre, who is a 43 y.o.,female. Patients Primary Language is: [place]. According to the patients EMR Mormonism Affiliation is: No Presybeterian. 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 Explored issues of estiven, spirituality and/or Buddhist needs while hospitalized. Listened empathically. Provided chaplaincy education. Provided information about Spiritual Care Services. Offered prayer and assurance of continued prayers on patient's behalf. Chart reviewed. The following outcomes were achieved:  Patient shared some information about their medical narrative and spiritual journey/beliefs. Patient processed feeling about current hospitalization. Patient expressed gratitude for the 's visit. Assessment:  Patient did not indicate any spiritual or Buddhist issues which require Spiritual Care Services interventions at this time. Patient does not have any Buddhist/cultural needs that will affect patients preferences in health care. Plan:  Chaplains will continue to follow and will provide pastoral care on an as needed or requested basis.  recommends bedside caregivers page  on duty if patient shows signs of acute spiritual or emotional distress.     [facility]   Staff [facility]   [phone]

## 2017-09-19 NOTE — DIABETES MGMT
NUTRITIONAL ASSESSMENT GLYCEMIC CONTROL/ PLAN OF CARE     Ganga Inman           43 y.o.           9/18/2017                 1. Nonintractable headache, unspecified chronicity pattern, unspecified headache type    2. Multiple sclerosis exacerbation (HCC)    3. Elevated blood pressure reading    DM     INTERVENTIONS/PLAN:   Diabetes Self-Management Education prior to discharge. Recommend 10 units Levemir q 24 hrs. ASSESSMENT:   Nutritional Status:  Pt is overweight related to excess caloric intake as evidenced by 202% ideal weight and BMI= 44.9kg/m2. Pt meets criteria for orbid obesity. Pt with 23 lbs intentional wt loss from 265 lbs (12/16) to 242lbs following DM dx. Altered nutrition-related lab values   RT DX. Diabetes Mellitus Altered nutrition related lab values related to lack of adherence to nutrition and medication recommendations as evidenced by reported A1c of 10% at Dr office 2 weeks ago. Addn:A1c=9.4% equivalent  to ave Blood Glucose of 232 mg/dl for 2-3 months prior to admission    Diabetes Management:   Recent blood glucose:       Lab Results   Component Value Date/Time     (H) 09/19/2017 03:45 AM     (H) 09/18/2017 05:20 PM    GLUCPOC 286 (H) 09/19/2017 11:36 AM    GLUCPOC 223 (H) 09/19/2017 09:08 AM       Within target range (non-ICU: <140; ICU<180): [] Yes   [x]  No  Current Insulin regimen: corrective lispro  Home medication/insulin regimen: 20 units Lantus /24 hrs plus metformin 500mg/d  HbA1c:current pending  6.0% in 12/2016  Adequate glycemic control PTA:  [] Yes  [x] No     SUBJECTIVE/OBJECTIVE:   Information obtained from:Pt and son. Pt concerned that metformin is giving her diarrhea.   Diet: Diabetic    Patient Vitals for the past 100 hrs:   % Diet Eaten   09/19/17 1300 90 %     Medications: [x]                Reviewed     Most Recent POC Glucose: Recent Labs      09/19/17   0345  09/18/17   1720   GLU  237*  127*         Labs:  A1c 9.4 % 9/19/17  Lab Results   Component Value Date/Time    Hemoglobin A1c 5.8 08/24/2015 11:22 AM   Pt reports 10% 2 weeks ago  Lab Results   Component Value Date/Time    Sodium 136 09/19/2017 03:45 AM    Potassium 3.9 09/19/2017 03:45 AM    Chloride 98 09/19/2017 03:45 AM    CO2 24 09/19/2017 03:45 AM    Anion gap 14 09/19/2017 03:45 AM    Glucose 237 09/19/2017 03:45 AM    BUN 12 09/19/2017 03:45 AM    Creatinine 0.60 09/19/2017 03:45 AM    Calcium 8.2 09/19/2017 03:45 AM    Magnesium 1.9 09/15/2017 12:20 PM    Phosphorus 3.5 03/14/2013 02:45 PM    Albumin 3.5 09/19/2017 03:45 AM       Anthropometrics: IBW : 54.4 kg (120 lb), % IBW (Calculated): 201.67 %, BMI (calculated): 42.9  Wt Readings from Last 1 Encounters:   09/18/17 109.8 kg (242 lb)    Ht Readings from Last 1 Encounters:   09/18/17 5' 3\" (1.6 m)       Estimated Nutrition Needs:  1500 Kcals/day, Protein (g): 75 g Fluid (ml): 1800 ml  Based on:   []          Actual BW    [x]         IBW   []            Adjusted BW      Nutrition Diagnoses: Morbid obesity and uncontrolled DM As stated above. Nutrition Interventions: DMSE education  Goal:   Blood glucose will be within target range of  mg/dL by 9/22    Gradual weight loss post discharge 1 lb per week by 9/29. Pt. has lost 14 lbs since dx DM.      Nutrition Monitoring and Evaluation      []     Monitor po intake on meal rounds  [x]     Continue inpatient monitoring and intervention  []     Other:      Nutrition Risk:  []   High     []  Moderate    [x]  Minimal/Uncompromised    Latrice Jesus RD

## 2017-09-19 NOTE — H&P
[facility]  Hospitalist Division      History & Physical    Patient: Abby Barry MRN: [id number]  CSN: [id number]    YOB: 1975  Age: 43 y.o. Sex: female    DOA: 9/18/2017 LOS:  LOS: 1 day        DOA: 9/18/2017        Assessment/Plan     Principal Problem:    Multiple sclerosis exacerbation ([address] (3/16/2016)    Active Problems:    Major depression (1/20/2016)      Obesity (9/19/2017)        Plan:  1. MS exac - IV Solumedrol, Tele Neuro consulted in ER , Neuro consult in Am, continue copaxone   2. T2DM - continue metformin , will add SS since she is currently on steroids , will elevate BS   3. HTN - continue home meds   4. Depression - Continue Seroquel & Cymbalta   DVT Px - Heparin   Full code         HPI:     Abby Barry is a 43 y.o. female who comes today with c/o Left leg shaking & R leg is spastic - she mentions that she has a h/o MS & is usually able to walk around & do her ADL's but today her left leg started shaking & she is unable to walk or bear weight. Also mentions her R leg is stiff. She has a H/o MS  takes copaxone & is well controlled with her meds. She f/u with neuro - Dr Adis Bernal in . Pt evaluated in the ER - noted to have shaking or her L leg , spastic R leg - she cannot bend her knees - says she is usually able to do that.   Tele neuro consulted in the ER - advised 1gm Solumedrol & admission for further eval.       Past Medical History:   Diagnosis Date    Arthritis     Arthropathy, unspecified, site unspecified     Depression     Diabetes ([address]     Hypercholesteremia     Hypertension     history of htn    Incontinence of urine     Insomnia     Migraine     MS (multiple sclerosis) ([address]     MS (multiple sclerosis) ([address]     Neurogenic bladder     Other ill-defined conditions     multiple Sclerosis    Seizures ([address]     6/2013    Wears glasses        Past Surgical History:   Procedure Laterality Date    HX HYSTERECTOMY      HX ORTHOPAEDIC      1/2005 11/2008    HX OTHER SURGICAL         Family History   Problem Relation Age of Onset    Heart Disease Father     Diabetes Father     Diabetes Sister     Other Other        Social History     Social History    Marital status:      Spouse name: N/A    Number of children: N/A    Years of education: N/A     Social History Main Topics    Smoking status: Never Smoker    Smokeless tobacco: Never Used    Alcohol use No    Drug use: No    Sexual activity: Not Asked      Comment: Hysterectomy     Other Topics Concern    None     Social History Narrative       Prior to Admission medications    Medication Sig Start Date End Date Taking? Authorizing Provider   DULoxetine (CYMBALTA) 60 mg capsule Take 60 mg by mouth daily. Yes Padmini Sauceda MD   QUEtiapine (SEROQUEL) 300 mg tablet Take 300 mg by mouth nightly. Yes Padmini Sauceda MD   metFORMIN (GLUCOPHAGE) 500 mg tablet Take 500 mg by mouth two (2) times daily (with meals). Yes Padmini Sauceda MD   potassium chloride (K-DUR, KLOR-CON) 20 mEq tablet Take 1 Tab by mouth two (2) times a day. 5/15/17  Yes Harsha Buchanan PA-C   glatiramer (COPAXONE) 40 mg/mL injection 40 mg by SubCUTAneous route every Monday, Wednesday, Friday. Yes Padmini Sauceda MD   cyanocobalamin 1,000 mcg tablet Take 1,000 mcg by mouth daily. Yes Padmini Sauceda MD       Allergies   Allergen Reactions    Oxycontin [Oxycodone] Hives and Other (comments)     intolerance       Review of Systems  A comprehensive review of systems was negative except for that written in the History of Present Illness. Physical Exam:      Visit Vitals    /84    Pulse 89    Temp 98 °F (36.7 °C)    Resp 18    Ht 5' 3\" (1.6 m)    Wt 109.8 kg (242 lb)    SpO2 98%    Breastfeeding No    BMI 42.87 kg/m2       Physical Exam:    Gen: In general, this is a well nourished female in no acute distress  HEENT: Sclerae nonicteric.  Oral mucous membranes moist. Dentition WNL  Neck: Supple with midline trachea. CV: RRR without murmur or rub appreciated. Resp:Respirations are unlabored without use of accessory muscles. Lung fields bilaterally without wheezes or rhonchi. Abd: Soft, nontender, nondistended. Extrem: Extremities are warm, without cyanosis or clubbing. No pitting pretibial edema. Palpable distal pulses X 4.   Skin: Warm, no visible rashes. Neuro: Patient is alert, oriented, and cooperative. No obvious focal defects. Is unable to bend her knees - R leg spastic & left leg is shaking / tremors     Labs Reviewed:    Recent Results (from the past 24 hour(s))   CBC WITH AUTOMATED DIFF    Collection Time: 09/18/17  5:20 PM   Result Value Ref Range    WBC 10.2 4.6 - 13.2 K/uL    RBC 4.72 4.20 - 5.30 M/uL    HGB 13.9 12.0 - 16.0 g/dL    HCT 39.1 35.0 - 45.0 %    MCV 82.8 74.0 - 97.0 FL    MCH 29.4 24.0 - 34.0 PG    MCHC 35.5 31.0 - 37.0 g/dL    RDW 13.9 11.6 - 14.5 %    PLATELET 952 740 - 695 K/uL    MPV 11.2 9.2 - 11.8 FL    NEUTROPHILS 60 40 - 73 %    LYMPHOCYTES 32 21 - 52 %    MONOCYTES 6 3 - 10 %    EOSINOPHILS 2 0 - 5 %    BASOPHILS 0 0 - 2 %    ABS. NEUTROPHILS 6.1 1.8 - 8.0 K/UL    ABS. LYMPHOCYTES 3.3 0.9 - 3.6 K/UL    ABS. MONOCYTES 0.6 0.05 - 1.2 K/UL    ABS. EOSINOPHILS 0.2 0.0 - 0.4 K/UL    ABS.  BASOPHILS 0.0 0.0 - 0.06 K/UL    DF AUTOMATED     METABOLIC PANEL, BASIC    Collection Time: 09/18/17  5:20 PM   Result Value Ref Range    Sodium 136 136 - 145 mmol/L    Potassium 3.6 3.5 - 5.5 mmol/L    Chloride 100 100 - 108 mmol/L    CO2 22 21 - 32 mmol/L    Anion gap 14 3.0 - 18 mmol/L    Glucose 127 (H) 74 - 99 mg/dL    BUN 9 7.0 - 18 MG/DL    Creatinine 0.60 0.6 - 1.3 MG/DL    BUN/Creatinine ratio 15 12 - 20      GFR est AA >60 >60 ml/min/1.73m2    GFR est non-AA >60 >60 ml/min/1.73m2    Calcium 8.6 8.5 - 10.1 MG/DL   URINALYSIS W/ RFLX MICROSCOPIC    Collection Time: 09/18/17 11:35 PM   Result Value Ref Range    Color YELLOW      Appearance CLOUDY      Specific gravity 1.018 1.005 - 1.030      pH (UA) 6.5 5.0 - 8.0      Protein NEGATIVE  NEG mg/dL    Glucose >1000 (A) NEG mg/dL    Ketone 40 (A) NEG mg/dL    Bilirubin NEGATIVE  NEG      Blood NEGATIVE  NEG      Urobilinogen 1.0 0.2 - 1.0 EU/dL    Nitrites NEGATIVE  NEG      Leukocyte Esterase NEGATIVE  NEG     METABOLIC PANEL, COMPREHENSIVE    Collection Time: 09/19/17  3:45 AM   Result Value Ref Range    Sodium 136 136 - 145 mmol/L    Potassium 3.9 3.5 - 5.5 mmol/L    Chloride 98 (L) 100 - 108 mmol/L    CO2 24 21 - 32 mmol/L    Anion gap 14 3.0 - 18 mmol/L    Glucose 237 (H) 74 - 99 mg/dL    BUN 12 7.0 - 18 MG/DL    Creatinine 0.60 0.6 - 1.3 MG/DL    BUN/Creatinine ratio 20 12 - 20      GFR est AA >60 >60 ml/min/1.73m2    GFR est non-AA >60 >60 ml/min/1.73m2    Calcium 8.2 (L) 8.5 - 10.1 MG/DL    Bilirubin, total 0.6 0.2 - 1.0 MG/DL    ALT (SGPT) 77 (H) 13 - 56 U/L    AST (SGOT) 40 (H) 15 - 37 U/L    Alk. phosphatase 89 45 - 117 U/L    Protein, total 6.8 6.4 - 8.2 g/dL    Albumin 3.5 3.4 - 5.0 g/dL    Globulin 3.3 2.0 - 4.0 g/dL    A-G Ratio 1.1 0.8 - 1.7     LIPID PANEL    Collection Time: 09/19/17  3:45 AM   Result Value Ref Range    LIPID PROFILE          Cholesterol, total 220 (H) <200 MG/DL    Triglyceride 64 <150 MG/DL    HDL Cholesterol 56 40 - 60 MG/DL    LDL, calculated 151.2 (H) 0 - 100 MG/DL    VLDL, calculated 12.8 MG/DL    CHOL/HDL Ratio 3.9 0 - 5.0     CBC W/O DIFF    Collection Time: 09/19/17  3:45 AM   Result Value Ref Range    WBC 11.8 4.6 - 13.2 K/uL    RBC 4.50 4. 20 - 5.30 M/uL    HGB 13.1 12.0 - 16.0 g/dL    HCT 37.2 35.0 - 45.0 %    MCV 82.7 74.0 - 97.0 FL    MCH 29.1 24.0 - 34.0 PG    MCHC 35.2 31.0 - 37.0 g/dL    RDW 13.7 11.6 - 14.5 %    PLATELET 192 598 - 630 K/uL    MPV 10.6 9.2 - 11.8 FL       Imaging Reviewed:    CT Head - Reviewed           Radha Garcia MD  9/19/2017, 8:57 PM

## 2017-09-19 NOTE — PROGRESS NOTES
2205: Admitted from ED,  AOX4, on room air, with regular, nonlabored breathing, non ambulatory but able to body transfer from stretcher to bed; no apparent distress noted; oriented to unit, room, tv, callbell use; admission history and assessment done; IVF NS initiated; noted to have \" shaky\" left leg, \"stiff\" right leg which started prior to coming to the ED; no swallowing/breathing problems noted; needs at bedside    2335: assisted with bedpan use; pericare given; urine specimen sent to ED for UA    0100: quietly sleeping; IVF infusing well; no apparent distress noted; callbell within reach    0200: assisted with bedpan use; had 200 ml of clear yellow urine    0400: quietly sleeping; no apparent distress noted    0647: Tylenol 650 mg given po as requested for generalized pain- see flowsheet    0710: verbalized feeling better         Bedside and Verbal shift change report given to Alex Moyer RN (oncoming nurse) by Jem Moyer RN (offgoing nurse). Report included the following information SBAR, Kardex, Intake/Output and Recent Results.

## 2017-09-19 NOTE — PHYSICIAN ADVISORY
Letter of Status Determination:    Recommend Changing patient status from   INPATIENT to OBSERVATION     Pt Name:  Giana Rojas   MR#  [id number]   [facility]#   [id number]   [address] [place] [address]  2108/01  @ Jessica [address]   Hospitalization date  9/18/2017  4:46 PM   Current Attending Physician  Gideon Rangel MD   Principal diagnosis  Multiple sclerosis exacerbation [facility])      Clinicals  43 y.o. y.o  female hospitalized with MS and possible exacerbation. Started on IV glucocorticoids q6hr. CT without acute change. Awaiting neurology and MRI evaluations.        [place] MCG criteria   Does  NOT apply    STATUS DETERMINATION  On the basis of review of available clinical data, documentation in the chart  It is our recommendation that the patient's status is changed from INPATIENT to OBSERVATION         The final decision of the patient's hospitalization status depends on the attending physician's judgment      Additional comments     Insurance  Payor: Rafal Prajapati / Plan: Bipin Buchanan / Product Type: Medicare /             Dr. Kate Hawk MD, MPH, [facility]  Physician Advisor, Heriberto Arias, [address] [place] [address], [address]  [phone]  [email or web address]

## 2017-09-19 NOTE — PROGRESS NOTES
Problem: Discharge Planning  Goal: *Discharge to safe environment  Outcome: Progressing Towards Goal  Home with home health vs rehab

## 2017-09-20 ENCOUNTER — APPOINTMENT (OUTPATIENT)
Dept: MRI IMAGING | Age: 42
DRG: 059 | End: 2017-09-20
Attending: PSYCHIATRY & NEUROLOGY
Payer: MEDICARE

## 2017-09-20 LAB
ERYTHROCYTE [SEDIMENTATION RATE] IN BLOOD: 14 MM/HR (ref 0–20)
GLUCOSE BLD STRIP.AUTO-MCNC: 181 MG/DL (ref 70–110)
GLUCOSE BLD STRIP.AUTO-MCNC: 221 MG/DL (ref 70–110)
GLUCOSE BLD STRIP.AUTO-MCNC: 230 MG/DL (ref 70–110)
GLUCOSE BLD STRIP.AUTO-MCNC: 236 MG/DL (ref 70–110)
GLUCOSE BLD STRIP.AUTO-MCNC: 243 MG/DL (ref 70–110)
T4 FREE SERPL-MCNC: 1 NG/DL (ref 0.7–1.5)
TSH SERPL DL<=0.05 MIU/L-ACNC: 0.11 UIU/ML (ref 0.36–3.74)

## 2017-09-20 PROCEDURE — 96361 HYDRATE IV INFUSION ADD-ON: CPT

## 2017-09-20 PROCEDURE — 74011250636 HC RX REV CODE- 250/636: Performed by: HOSPITALIST

## 2017-09-20 PROCEDURE — 65270000029 HC RM PRIVATE

## 2017-09-20 PROCEDURE — 72157 MRI CHEST SPINE W/O & W/DYE: CPT

## 2017-09-20 PROCEDURE — 77030020263 HC SOL INJ SOD CL0.9% LFCR 1000ML

## 2017-09-20 PROCEDURE — 74011636637 HC RX REV CODE- 636/637: Performed by: INTERNAL MEDICINE

## 2017-09-20 PROCEDURE — 74011250636 HC RX REV CODE- 250/636: Performed by: INTERNAL MEDICINE

## 2017-09-20 PROCEDURE — 85652 RBC SED RATE AUTOMATED: CPT | Performed by: PSYCHIATRY & NEUROLOGY

## 2017-09-20 PROCEDURE — 70553 MRI BRAIN STEM W/O & W/DYE: CPT

## 2017-09-20 PROCEDURE — 86038 ANTINUCLEAR ANTIBODIES: CPT | Performed by: PSYCHIATRY & NEUROLOGY

## 2017-09-20 PROCEDURE — 96376 TX/PRO/DX INJ SAME DRUG ADON: CPT

## 2017-09-20 PROCEDURE — 82962 GLUCOSE BLOOD TEST: CPT

## 2017-09-20 PROCEDURE — 74011250637 HC RX REV CODE- 250/637: Performed by: HOSPITALIST

## 2017-09-20 PROCEDURE — 84443 ASSAY THYROID STIM HORMONE: CPT | Performed by: PSYCHIATRY & NEUROLOGY

## 2017-09-20 PROCEDURE — A9577 INJ MULTIHANCE: HCPCS | Performed by: INTERNAL MEDICINE

## 2017-09-20 PROCEDURE — 36415 COLL VENOUS BLD VENIPUNCTURE: CPT | Performed by: PSYCHIATRY & NEUROLOGY

## 2017-09-20 PROCEDURE — 74011250637 HC RX REV CODE- 250/637: Performed by: INTERNAL MEDICINE

## 2017-09-20 PROCEDURE — 84439 ASSAY OF FREE THYROXINE: CPT | Performed by: PSYCHIATRY & NEUROLOGY

## 2017-09-20 PROCEDURE — 99218 HC RM OBSERVATION: CPT

## 2017-09-20 PROCEDURE — 72156 MRI NECK SPINE W/O & W/DYE: CPT

## 2017-09-20 RX ORDER — INSULIN LISPRO 100 [IU]/ML
INJECTION, SOLUTION INTRAVENOUS; SUBCUTANEOUS
Status: DISCONTINUED | OUTPATIENT
Start: 2017-09-20 | End: 2017-09-21 | Stop reason: HOSPADM

## 2017-09-20 RX ORDER — HYDRALAZINE HYDROCHLORIDE 20 MG/ML
10 INJECTION INTRAMUSCULAR; INTRAVENOUS
Status: DISCONTINUED | OUTPATIENT
Start: 2017-09-20 | End: 2017-09-21 | Stop reason: HOSPADM

## 2017-09-20 RX ADMIN — INSULIN LISPRO 3 UNITS: 100 INJECTION, SOLUTION INTRAVENOUS; SUBCUTANEOUS at 17:27

## 2017-09-20 RX ADMIN — DULOXETINE HYDROCHLORIDE 60 MG: 60 CAPSULE, DELAYED RELEASE ORAL at 09:23

## 2017-09-20 RX ADMIN — HEPARIN SODIUM 5000 UNITS: 5000 INJECTION, SOLUTION INTRAVENOUS; SUBCUTANEOUS at 14:49

## 2017-09-20 RX ADMIN — ACETAMINOPHEN 650 MG: 325 TABLET, FILM COATED ORAL at 14:53

## 2017-09-20 RX ADMIN — INSULIN DETEMIR 12 UNITS: 100 INJECTION, SOLUTION SUBCUTANEOUS at 13:33

## 2017-09-20 RX ADMIN — INSULIN LISPRO 6 UNITS: 100 INJECTION, SOLUTION INTRAVENOUS; SUBCUTANEOUS at 09:23

## 2017-09-20 RX ADMIN — CYANOCOBALAMIN TAB 1000 MCG 1000 MCG: 1000 TAB at 09:22

## 2017-09-20 RX ADMIN — GLATIRAMER ACETATE 40 MG: 20 INJECTION, SOLUTION SUBCUTANEOUS at 18:05

## 2017-09-20 RX ADMIN — METHYLPREDNISOLONE SODIUM SUCCINATE 60 MG: 125 INJECTION, POWDER, FOR SOLUTION INTRAMUSCULAR; INTRAVENOUS at 00:07

## 2017-09-20 RX ADMIN — INSULIN LISPRO 6 UNITS: 100 INJECTION, SOLUTION INTRAVENOUS; SUBCUTANEOUS at 21:38

## 2017-09-20 RX ADMIN — SODIUM CHLORIDE 75 ML/HR: 900 INJECTION, SOLUTION INTRAVENOUS at 00:43

## 2017-09-20 RX ADMIN — POTASSIUM CHLORIDE 20 MEQ: 20 TABLET, EXTENDED RELEASE ORAL at 09:22

## 2017-09-20 RX ADMIN — GADOBENATE DIMEGLUMINE 20 ML: 529 INJECTION, SOLUTION INTRAVENOUS at 11:41

## 2017-09-20 RX ADMIN — METHYLPREDNISOLONE SODIUM SUCCINATE 60 MG: 125 INJECTION, POWDER, FOR SOLUTION INTRAMUSCULAR; INTRAVENOUS at 06:50

## 2017-09-20 RX ADMIN — INSULIN LISPRO 6 UNITS: 100 INJECTION, SOLUTION INTRAVENOUS; SUBCUTANEOUS at 13:33

## 2017-09-20 RX ADMIN — QUETIAPINE FUMARATE 300 MG: 100 TABLET, FILM COATED ORAL at 21:38

## 2017-09-20 RX ADMIN — HEPARIN SODIUM 5000 UNITS: 5000 INJECTION, SOLUTION INTRAVENOUS; SUBCUTANEOUS at 06:50

## 2017-09-20 RX ADMIN — POTASSIUM CHLORIDE 20 MEQ: 20 TABLET, EXTENDED RELEASE ORAL at 17:26

## 2017-09-20 RX ADMIN — SODIUM CHLORIDE 75 ML/HR: 900 INJECTION, SOLUTION INTRAVENOUS at 18:11

## 2017-09-20 RX ADMIN — INSULIN DETEMIR 12 UNITS: 100 INJECTION, SOLUTION SUBCUTANEOUS at 21:39

## 2017-09-20 RX ADMIN — ACETAMINOPHEN 650 MG: 325 TABLET, FILM COATED ORAL at 21:37

## 2017-09-20 RX ADMIN — METHYLPREDNISOLONE SODIUM SUCCINATE 60 MG: 125 INJECTION, POWDER, FOR SOLUTION INTRAMUSCULAR; INTRAVENOUS at 13:33

## 2017-09-20 NOTE — PROGRESS NOTES
1925: Assumed patient care. [facility] (offgoing nurse). Report included SBAR, Kardex, and MAR. Patient in no signs of distress, reports no pain. Resting in bed, bed in lowest position. Call light and possessions within reach. 0720: Bedside and Verbal shift change report given to JESSICA Chapman (oncoming nurse) by Addison Charles RN (offgoing nurse). Report included the following information SBAR, Kardex and MAR.

## 2017-09-20 NOTE — PHYSICIAN ADVISORY
Letter of Determination: Upgrade from Observation to Inpatient Status    This patient was originally hospitalized as observation status on 9/18/2017 for Multiple Sclerosis exaccerbation. This patient now meets for Inpatient Admission in accordance with CMS regulation Section 43 .3. Specifically, patient's stay is now over Two Midnights and was medically necessary. The patient's stay was medically necessary because receiving IV solumedrol 4X/day for next 5 days. Consistent with CMS guidelines, patient meets for inpatient status. It is our recommendation that this patient's hospitalization status should be upgraded from OBSERVATION to INPATIENT status.      The final decision of the patient's hospitalization status depends on the attending physician's decision.      Dr. Kip Arnold MD, MPH, [facility]  Physician Advisor, Kia Mejia, [address] [place] [address], [address]  [phone]  [email or web address]

## 2017-09-20 NOTE — PROGRESS NOTES
Hospitalist Progress Note  Annabel Moseley MD  Internal medicine/ Hospitalist    Daily Progress Note: 9/20/2017 2:17 PM      Interval history / Subjective:   Rodolfo Sanabria is a 43 y.o. female who came with c/o Left leg shaking & R leg is spastic - she mentioned that she has a h/o MS & is usually able to walk around & do her ADL's but the day she came to ER,her left leg started shaking & she was unable to walk or bear weight. Also mentioned her R leg is stiff. She has a H/o MS  takes copaxone & is well controlled with her meds. She f/u with neuro - Dr Teddy Cordova in . She was admitted for MS exacerbation and started on solumedrol iv. Today reporting that she is feeling better today. Neurology has d/eric the solumedrol.     Current Facility-Administered Medications   Medication Dose Route Frequency    insulin lispro (HUMALOG) injection   SubCUTAneous AC&HS    insulin detemir (LEVEMIR) injection 12 Units  12 Units SubCUTAneous Q12H    acetaminophen (TYLENOL) tablet 650 mg  650 mg Oral Q4H PRN    glatiramer (COPAXONE) injection 40 mg (Patient's Own Medication)  40 mg SubCUTAneous Q MON, WED & FRI    cyanocobalamin tablet 1,000 mcg  1,000 mcg Oral DAILY    potassium chloride (K-DUR, KLOR-CON) SR tablet 20 mEq  20 mEq Oral BID    DULoxetine (CYMBALTA) capsule 60 mg  60 mg Oral DAILY    QUEtiapine (SEROquel) tablet 300 mg  300 mg Oral QHS    0.9% sodium chloride infusion  75 mL/hr IntraVENous CONTINUOUS    heparin (porcine) injection 5,000 Units  5,000 Units SubCUTAneous Q8H    glucose chewable tablet 16 g  16 g Oral PRN    glucagon (GLUCAGEN) injection 1 mg  1 mg IntraMUSCular PRN    dextrose (D50W) injection syrg 12.5-25 g  25-50 mL IntraVENous PRN        Objective:     Visit Vitals    BP (!) 146/92 (BP 1 Location: Left arm, BP Patient Position: At rest)    Pulse 82    Temp 97.7 °F (36.5 °C)    Resp 15    Ht 5' 3\" (1.6 m)    Wt 112.5 kg (248 lb)    SpO2 95%    Breastfeeding No    BMI 43.93 kg/m2 O2 Device: Room air    Temp (24hrs), Av.9 °F (36.6 °C), Min:97.7 °F (36.5 °C), Max:98.1 °F (36.7 °C)      701 - 1900  In: 462 [P.O.:720]  Out: -   1901 -  0700  In: 2381.3 [P.O.:240; I.V.:2141.3]  Out: 1750 [Urine:1750]     P/E  Gen: In general, this is a well nourished female in no acute distress  HEENT: at/nc,mouth moist,anicteric. Neck: Supple with midline trachea. CV: RRR without murmur or rub appreciated. Resp: Lung fields bilaterally without wheezes or rhonchi and has good expansion. Abd: Soft, nontender, nondistended. Extrem: Extremities are warm, without cyanosis or clubbing. No pitting pretibial edema. Palpable distal pulses X 4.   Skin: Warm, no visible rashes. Neuro: Patient is alert, oriented, and cooperative. No obvious focal defects. Data Review    Recent Results (from the past 12 hour(s))   GLUCOSE, POC    Collection Time: 17  6:19 AM   Result Value Ref Range    Glucose (POC) 236 (H) 70 - 110 mg/dL   GLUCOSE, POC    Collection Time: 17  8:03 AM   Result Value Ref Range    Glucose (POC) 230 (H) 70 - 110 mg/dL   GLUCOSE, POC    Collection Time: 17 12:32 PM   Result Value Ref Range    Glucose (POC) 243 (H) 70 - 110 mg/dL   GLUCOSE, POC    Collection Time: 17  4:55 PM   Result Value Ref Range    Glucose (POC) 181 (H) 70 - 110 mg/dL         Assessment/Plan:     Principal Problem:    Multiple sclerosis exacerbation ([facility] (3/16/2016)    Active Problems:    Multiple sclerosis ([facility] (2012)      Major depression (2016)      Obesity (2017)      Care Plan   1.MS exac -Dc IV Solumedrol as per neurology;continue copaxone. Neurology consulted. 2.T2DM - levemir 12 units bid , will add SS since she is currently on steroids , will elevate BS   3. HTN - continue home meds.   4.Depression - Continue Seroquel & Cymbalta   DVT Px - Heparin   Full code

## 2017-09-20 NOTE — PROGRESS NOTES
Neurology Progress Note    Patient ID:  Jono March  [id number]  43 y.o.  1975    Subjective:     Leg Shaking subsided late last night so she got some sleep. Still having leg weakness and walks with a walker today.       Her MRI was consistent with her history of MS but no new lesion since her last MRI in 2016. The cervical and thoracic MRI no cord lesion.  Reviewed her MRI imaging today.           Past Medical History:   Diagnosis Date    Arthritis      Arthropathy, unspecified, site unspecified      Depression      Diabetes ([facility]      Hypercholesteremia      Hypertension       history of htn    Incontinence of urine      Insomnia      Migraine      MS (multiple sclerosis) (HCC)      MS (multiple sclerosis) (HCC)      Neurogenic bladder      Other ill-defined conditions       multiple Sclerosis    Seizures (HCC)       6/2013    Wears glasses                 Past Surgical History:   Procedure Laterality Date    HX HYSTERECTOMY        HX ORTHOPAEDIC         1/2005 11/2008    HX OTHER SURGICAL                   Family History   Problem Relation Age of Onset    Heart Disease Father      Diabetes Father      Diabetes Sister      Other Other            Social History                Social History    Marital status:        Spouse name: N/A    Number of children: N/A    Years of education: N/A             Social History Main Topics    Smoking status: Never Smoker    Smokeless tobacco: Never Used    Alcohol use No    Drug use: No    Sexual activity: Not Asked         Comment: Hysterectomy           Other Topics Concern    None      Social History Narrative             Current Facility-Administered Medications   Medication Dose Route Frequency    insulin lispro (HUMALOG) injection   SubCUTAneous AC&HS    insulin detemir (LEVEMIR) injection 12 Units  12 Units SubCUTAneous Q12H    acetaminophen (TYLENOL) tablet 650 mg  650 mg Oral Q4H PRN    glatiramer (COPAXONE) injection 40 mg (Patient's Own Medication)  40 mg SubCUTAneous Q MON, WED & FRI    cyanocobalamin tablet 1,000 mcg  1,000 mcg Oral DAILY    potassium chloride (K-DUR, KLOR-CON) SR tablet 20 mEq  20 mEq Oral BID    DULoxetine (CYMBALTA) capsule 60 mg  60 mg Oral DAILY    QUEtiapine (SEROquel) tablet 300 mg  300 mg Oral QHS    0.9% sodium chloride infusion  75 mL/hr IntraVENous CONTINUOUS    heparin (porcine) injection 5,000 Units  5,000 Units SubCUTAneous Q8H    glucose chewable tablet 16 g  16 g Oral PRN    glucagon (GLUCAGEN) injection 1 mg  1 mg IntraMUSCular PRN    dextrose (D50W) injection syrg 12.5-25 g  25-50 mL IntraVENous PRN            Objective:     Patient Vitals for the past 8 hrs:   BP Temp Pulse Resp SpO2   09/20/17 1358 (!) 156/96 97.7 °F (36.5 °C) (!) 106 15 96 %   09/20/17 1234 (!) 144/93 97.8 °F (36.6 °C) (!) 110 16 95 %         NEUROLOGICAL EXAM:    Appearance: The patient is well developed, sitting in chair and in no acute distress. Mental Status: Oriented to time, place and person. Cranial Nerves:   Intact visual fields. AURELIA, EOM's full, no nystagmus, no ptosis. Facial sensation is normal.  Facial movement is symmetric. Hearing is normal bilaterally. Palate is midline. Tongue is midline. Motor:  5/5 strength in arms. Weakness in the both legs but able to lift against gravity.     Sensory:   Normal to touch,    Gait:   Needs walker to walk   Tremor:   No tremors noted today   Cerebellar:  FTN intact bilaterally     Lab Review   Recent Results (from the past 24 hour(s))   GLUCOSE, POC    Collection Time: 09/19/17  6:11 PM   Result Value Ref Range    Glucose (POC) 281 (H) 70 - 110 mg/dL   GLUCOSE, POC    Collection Time: 09/19/17 10:32 PM   Result Value Ref Range    Glucose (POC) 252 (H) 70 - 110 mg/dL   GLUCOSE, POC    Collection Time: 09/20/17  6:19 AM   Result Value Ref Range    Glucose (POC) 236 (H) 70 - 110 mg/dL   GLUCOSE, POC    Collection Time: 09/20/17  8:03 AM   Result Value Ref Range    Glucose (POC) 230 (H) 70 - 110 mg/dL   GLUCOSE, POC    Collection Time: 09/20/17 12:32 PM   Result Value Ref Range    Glucose (POC) 243 (H) 70 - 110 mg/dL     Imaging:   MRI Results (most recent):    Results from [place] encounter on 09/18/17   MRI [facility] SPINE W WO CONT   Narrative MRI of the thoracic spine without and with contrast 9/20/2017. History: Multiple sclerosis. Lower extremity weakness and spasticity. Technique: Multiplanar, multisequential images of the thoracic spine were  obtained before and after the administration of 20 cc of IV MultiHance. Comparison: 3/14/2013. Findings:  Mild multilevel degenerative changes are present. However, vertebral  body and disc space heights are fairly well maintained. No compression  fractures are identified. There is no evidence of spondylolisthesis. The spinal canal and foramina are well maintained throughout the thoracic  region. Specifically, there is no evidence of disc protrusion or extruded disc  material.  No intradural or extradural masses are identified. There is no abnormal signal or enhancement identified within the thoracic cord. No significant areas of abnormal enhancement are identified. Trace bilateral pleural effusions are present. Impression Impression:    1. Minimal degenerative changes. 2.  Trace bilateral pleural effusions. 3.  Otherwise, unremarkable evaluation. Specifically, there are no imaging  findings to suggest the sequela of previous or active demyelination in the  thoracic cord. CT Results (most recent): Additional comments:reviewed above        Assessment:     Principal Problem:    Multiple sclerosis exacerbation ([address] (3/16/2016)    Active Problems:    Multiple sclerosis ([address] (6/6/2012)      Major depression (1/20/2016)      Obesity (9/19/2017)        Plan:     1. MS: No acute lesion in the repeat MRIs. So will discontinue IV Solumedrol.  Continue copaxone for prevention. PT for her Leg weakness. TSH to eval her leg weakness  2. DM - not well controlled with metformin. defer to IM team to manage  3. HTN - continue home meds   4.  Depression - on Seroquel & Cymbalta     Keep  DVT  prophylaxis.

## 2017-09-20 NOTE — CONSULTS
Neurology Consult  Note    Patient ID:  Emilee Yu  [id number]  7 y.o.  1975    Subjective:     Shaking and weakness of her legs for 4 days         HPI:      Emilee Yu is a 43 y.o. female who was admitted yesterday because of shaking  And weakness of her legs. It started on last Friday and she noted both legs gave out so she came to ED and received 1 dose of Solumedrol for possible MS exacerbation. After she was discharged to home on Saturday, she developed involuntary sharking of the left leg. The sharking of her left leg persisted over the weekend. On Monday, she noted right leg became spastic, therefore returned to ED. She has had MS for about 20 years and denies significant deficit except for urinary urgency. She takes copaxone for years which has generally controlled her MS. She had no problems walking or performing her ADL's. Currently she had left leg shaking and weakness and she is unable to walk or bear weight. She last saw her neurologist Ms Reema Reyes NP in [place] 1 month ago. Her MRI was done 2 years ago.    Tele neuro was consulted in the ER and  advised 1gm Solumedrol & admission for further eval.              Past Medical History:   Diagnosis Date    Arthritis      Arthropathy, unspecified, site unspecified      Depression      Diabetes ([address]      Hypercholesteremia      Hypertension       history of htn    Incontinence of urine      Insomnia      Migraine      MS (multiple sclerosis) (HCC)      MS (multiple sclerosis) ([address]      Neurogenic bladder      Other ill-defined conditions       multiple Sclerosis    Seizures ([address]       6/2013    Wears glasses                 Past Surgical History:   Procedure Laterality Date    HX HYSTERECTOMY        HX ORTHOPAEDIC         1/2005 11/2008    HX OTHER SURGICAL                   Family History   Problem Relation Age of Onset    Heart Disease Father      Diabetes Father      Diabetes Sister      Other Other          Social History                Social History    Marital status:        Spouse name: N/A    Number of children: N/A    Years of education: N/A             Social History Main Topics    Smoking status: Never Smoker    Smokeless tobacco: Never Used    Alcohol use No    Drug use: No    Sexual activity: Not Asked         Comment: Hysterectomy           Other Topics Concern    None      Social History Narrative             Current Facility-Administered Medications   Medication Dose Route Frequency    acetaminophen (TYLENOL) tablet 650 mg  650 mg Oral Q4H PRN    insulin detemir (LEVEMIR) injection 10 Units  10 Units SubCUTAneous QHS    [START ON 9/20/2017] glatiramer (COPAXONE) injection 40 mg (Patient's Own Medication)  40 mg SubCUTAneous Q MON, WED & FRI    cyanocobalamin tablet 1,000 mcg  1,000 mcg Oral DAILY    potassium chloride (K-DUR, KLOR-CON) SR tablet 20 mEq  20 mEq Oral BID    DULoxetine (CYMBALTA) capsule 60 mg  60 mg Oral DAILY    QUEtiapine (SEROquel) tablet 300 mg  300 mg Oral QHS    0.9% sodium chloride infusion  75 mL/hr IntraVENous CONTINUOUS    heparin (porcine) injection 5,000 Units  5,000 Units SubCUTAneous Q8H    methylPREDNISolone (PF) (SOLU-MEDROL) injection 60 mg  60 mg IntraVENous Q6H    insulin lispro (HUMALOG) injection   SubCUTAneous TIDAC    glucose chewable tablet 16 g  16 g Oral PRN    glucagon (GLUCAGEN) injection 1 mg  1 mg IntraMUSCular PRN    dextrose (D50W) injection syrg 12.5-25 g  25-50 mL IntraVENous PRN            Objective:     Patient Vitals for the past 8 hrs:   BP Temp Pulse Resp SpO2 Height Weight   09/19/17 2200 - - - - - 5' 3\" (1.6 m) 112.5 kg (248 lb)   09/19/17 2158 130/69 97.7 °F (36.5 °C) 97 18 93 % - -   09/19/17 1850 132/83 98.1 °F (36.7 °C) 99 17 95 % - -         NEUROLOGICAL EXAM:    Appearance: The patient is well developed, provides a coherent history and is in no acute distress. Mental Status: Oriented to time, place and person. Mood and affect appropriate. Cranial Nerves:   Intact visual fields. AURELIA, EOM's full, no nystagmus, no ptosis. Facial sensation is normal.  Facial movement is symmetric. Hearing is normal bilaterally. Palate is midline. Tongue is midline. Motor:  5/5 strength in arms. Weakness in the both legs but able to lift against gravity. No fasciculations. Normal bulk and tone. Reflexes:   Deep tendon reflexes 2+/4 symmetrically except for left knee jerk at 3 +. Plantar down going bilaterally. No clonus. Sensory:   Normal to touch, pinprick and vibration. Gait:   gait deferred   Tremor:   Intermittent tremors noted in the both legs, worse on the left. Her tremors appeared to be reduced by distraction. Cerebellar:  FTN intact bilaterally   Neurovascular:  Normal heart sounds and regular rhythm, peripheral pulses intact, and no carotid bruits.          Lab Review   Recent Results (from the past 24 hour(s))   URINALYSIS W/ RFLX MICROSCOPIC    Collection Time: 09/18/17 11:35 PM   Result Value Ref Range    Color YELLOW      Appearance CLOUDY      Specific gravity 1.018 1.005 - 1.030      pH (UA) 6.5 5.0 - 8.0      Protein NEGATIVE  NEG mg/dL    Glucose >1000 (A) NEG mg/dL    Ketone 40 (A) NEG mg/dL    Bilirubin NEGATIVE  NEG      Blood NEGATIVE  NEG      Urobilinogen 1.0 0.2 - 1.0 EU/dL    Nitrites NEGATIVE  NEG      Leukocyte Esterase NEGATIVE  NEG     METABOLIC PANEL, COMPREHENSIVE    Collection Time: 09/19/17  3:45 AM   Result Value Ref Range    Sodium 136 136 - 145 mmol/L    Potassium 3.9 3.5 - 5.5 mmol/L    Chloride 98 (L) 100 - 108 mmol/L    CO2 24 21 - 32 mmol/L    Anion gap 14 3.0 - 18 mmol/L    Glucose 237 (H) 74 - 99 mg/dL    BUN 12 7.0 - 18 MG/DL    Creatinine 0.60 0.6 - 1.3 MG/DL    BUN/Creatinine ratio 20 12 - 20      GFR est AA >60 >60 ml/min/1.73m2    GFR est non-AA >60 >60 ml/min/1.73m2    Calcium 8.2 (L) 8.5 - 10.1 MG/DL    Bilirubin, total 0.6 0.2 - 1.0 MG/DL    ALT (SGPT) 77 (H) 13 - 56 U/L AST (SGOT) 40 (H) 15 - 37 U/L    Alk. phosphatase 89 45 - 117 U/L    Protein, total 6.8 6.4 - 8.2 g/dL    Albumin 3.5 3.4 - 5.0 g/dL    Globulin 3.3 2.0 - 4.0 g/dL    A-G Ratio 1.1 0.8 - 1.7     LIPID PANEL    Collection Time: 09/19/17  3:45 AM   Result Value Ref Range    LIPID PROFILE          Cholesterol, total 220 (H) <200 MG/DL    Triglyceride 64 <150 MG/DL    HDL Cholesterol 56 40 - 60 MG/DL    LDL, calculated 151.2 (H) 0 - 100 MG/DL    VLDL, calculated 12.8 MG/DL    CHOL/HDL Ratio 3.9 0 - 5.0     CBC W/O DIFF    Collection Time: 09/19/17  3:45 AM   Result Value Ref Range    WBC 11.8 4.6 - 13.2 K/uL    RBC 4.50 4. 20 - 5.30 M/uL    HGB 13.1 12.0 - 16.0 g/dL    HCT 37.2 35.0 - 45.0 %    MCV 82.7 74.0 - 97.0 FL    MCH 29.1 24.0 - 34.0 PG    MCHC 35.2 31.0 - 37.0 g/dL    RDW 13.7 11.6 - 14.5 %    PLATELET 305 449 - 651 K/uL    MPV 10.6 9.2 - 11.8 FL   HEMOGLOBIN A1C WITH EAG    Collection Time: 09/19/17  3:45 AM   Result Value Ref Range    Hemoglobin A1c 9.4 (H) 4.2 - 5.6 %    Est. average glucose 223 mg/dL   GLUCOSE, POC    Collection Time: 09/19/17  9:08 AM   Result Value Ref Range    Glucose (POC) 223 (H) 70 - 110 mg/dL   GLUCOSE, POC    Collection Time: 09/19/17 11:36 AM   Result Value Ref Range    Glucose (POC) 286 (H) 70 - 110 mg/dL   GLUCOSE, POC    Collection Time: 09/19/17  6:11 PM   Result Value Ref Range    Glucose (POC) 281 (H) 70 - 110 mg/dL   GLUCOSE, POC    Collection Time: 09/19/17 10:32 PM   Result Value Ref Range    Glucose (POC) 252 (H) 70 - 110 mg/dL     Imaging:   MRI Results (most recent):    Results from Hospital Encounter encounter on 12/31/16   MRI CERV SPINE W WO CONT   Narrative MRI cervical spine     History: paresthesias hands and legs with history of MS    Comparison: MR cervical spine 3/14/2013    Technique: Multiplanar multi sequence MR imaging of the cervical spine was  performed utilizing sagittal T1, T2, STIR, proton density, and axial T2, T1, and  T2-weighted gradient echo sequences. Additional sagittal and axial post contrast  T1 sequences were also obtained after administration of gadolinium. Findings:     Straightening of the normal cervical lordosis is again seen without evidence of  subluxation. Osseous marrow signal is within normal limits. The  cervicomedullary junction is unremarkable. No abnormal signal is seen in the  cervical spinal cord. No abnormal cord enhancement is identified. C2/3 level: No central or foraminal stenosis is seen. C3/4 level: Minimal disc bulge is present. No central or foraminal stenosis is  seen. C4/5 level: Disc bulge with central disc protrusion is present which deforms the  cord without abnormal cord signal. Mild central stenosis is present with  estimated midline AP diameter of thecal sac measuring 9 mm. No foraminal  stenosis is seen. C5/6 level: Disc bulge is present. Ventral cord is contacted and subtly effaced. No abnormal cord signal seen. Borderline central stenosis is present with  estimated midline AP diameter of thecal sac measuring 10 mm. No foraminal  stenosis is seen. C6/7 level: Minimal disc bulge is present. There is no central or foraminal  stenosis. C7/T1 level: There is no central or foraminal stenosis. The visualized intracranial contents are unremarkable. Impression IMPRESSION:    1. No convincing abnormal cord signal or enhancement to suggest demyelinating  lesion. 2. Stable C4-C5 central disc protrusion with mild cord deformity and mild  central stenosis without abnormal cord signal.  3. Stable borderline C5-C6 central stenosis due to disc bulge. 4. Very mild degenerative changes at other levels , as described in details in  Findings section.        CT Results (most recent):    Results from Hospital Encounter encounter on 09/18/17   CT HEAD WO CONT   Narrative CT Head    History: Headache, multiple sclerosis    Comparison: CT head 8/17/2017    Technique: Multiple axial CT images of the head were obtained extending from the  skull base to the vertex. Additional coronal and sagittal reformations were  performed. One or more dose reduction techniques were used on this CT: automated  exposure control, adjustment of the mAs and/or kVp according to patient's size,  and iterative reconstruction techniques. The specific techniques utilized on  this CT exam have been documented in the patient's electronic medical record. Findings:     The ventricles and sulci are normal in their size and configuration. Several  scattered hypodensities are seen within the subcortical and periventricular  white matter bilaterally. These show no definite interval change. There is no  evidence of acute intracranial hemorrhage, mass effect, midline shift, or  herniation. No definite CT evidence of acute cortical infarct is seen. The  gray-white matter differentiation is otherwise within normal limits. The  visualized orbits are unremarkable. The visualized paranasal sinuses and  mastoid air cells are clear. No fracture is seen. Impression IMPRESSION:    1. No acute intracranial hemorrhage, mass effect, midline shift, or herniation. No definite CT evidence of acute cortical infarct is seen. Please note that  noncontrast head CT may be normal in early acute infarct. 2. Stable moderate amount of patchy parenchymal hypodensities in both cerebral  hemispheres, nonspecific but most likely representing patient's known multiple  sclerosis. Preliminary report provided by on-call radiology resident. Additional comments:reviewed above        Assessment:     Principal Problem:    Multiple sclerosis exacerbation ([address] (3/16/2016)    Active Problems:    Multiple sclerosis ([address] (6/6/2012)      Major depression (1/20/2016)      Obesity (9/19/2017)        Plan:     1. MS exacerbation? Keep  IV Solumedrol for 5 days and will repeat MRI of brain and spinal cord. If new lesions in the MRI, may consider oral medication. Continue copaxone for now. 2. DM - not well controlled with metformin , will add SS since she is currently on steroids. 3. HTN - continue home meds   4.  Depression - Continue Seroquel & Cymbalta      DVT  And Gi prophylaxis.              Signed:  Abelino Carrillo MD  9/19/2017  11:04 PM

## 2017-09-20 NOTE — PROGRESS NOTES
1930 Report received from [facility] including sbar,mar,kardex,i and o and results. 2130 resting comfortably  2230 medicated for a headache. MRI screening form completed. 2330 Resting comfortably. 0130 Sleeping  0330 resting  0530 MRI form completed  0730 resting comfortably.  Report given to [facility] including jie,mar,kardex

## 2017-09-20 NOTE — PROGRESS NOTES
Problem: Falls - Risk of  Goal: *Absence of Falls  Document Jordyn Fall Risk and appropriate interventions in the flowsheet.    Outcome: Progressing Towards Goal  Fall Risk Interventions:              Medication Interventions: Teach patient to arise slowly     Elimination Interventions: Call light in reach, Patient to call for help with toileting needs, Toileting schedule/hourly rounds     History of Falls Interventions: Door open when patient unattended

## 2017-09-20 NOTE — PROGRESS NOTES
Problem: Falls - Risk of  Goal: *Absence of Falls  Document Jordyn Fall Risk and appropriate interventions in the flowsheet.    Outcome: Progressing Towards Goal  Fall Risk Interventions:              Medication Interventions: Patient to call before getting OOB, Teach patient to arise slowly     Elimination Interventions: Call light in reach, Patient to call for help with toileting needs     History of Falls Interventions: Door open when patient unattended

## 2017-09-20 NOTE — PROGRESS NOTES
Patient received at  from [facility], A&Ox4, in bed, awake. Pt does not appear to be in distress and denies any pain and/or discomfort. Safety measures take include bed in lowest locked position, call bell within reach, and personal items at bedside within reach. Pt verbalizes understanding of use of call bell and the need to call for assistance to ensure safety. 1925: Bedside and Verbal shift change report given to [id number] and Siena LOPEZ (oncoming nurse) by Viridiana Menon Nurse extrern (offgoing nurse). Report included the following information SBAR, Kardex, Intake/Output, MAR and Recent Results.

## 2017-09-20 NOTE — ROUTINE PROCESS
7683 assumed care of pt after bedside verbal report was given by off going nurse, pt resting in bed awake, normal saline infusing at 75 ml/hr, no acute distress noted, pt denies c/o pain, will monitor     0931 off unit for MRI    1230 pt back from MRI, no distress noted, pt sitting up in the chair    1259 pt sitting up in the chair eating lunch, no acute distress noted, will mnitor     1548 Bedside and Verbal shift change report given to Jordi Smith (oncoming nurse) by JESSICA Cruz (offgoing nurse). Report included the following information SBAR, Kardex and MAR.

## 2017-09-21 ENCOUNTER — HOME HEALTH ADMISSION (OUTPATIENT)
Dept: HOME HEALTH SERVICES | Facility: HOME HEALTH | Age: 42
End: 2017-09-21
Payer: MEDICARE

## 2017-09-21 VITALS
TEMPERATURE: 97.9 F | OXYGEN SATURATION: 94 % | HEART RATE: 82 BPM | DIASTOLIC BLOOD PRESSURE: 83 MMHG | WEIGHT: 248 LBS | BODY MASS INDEX: 43.94 KG/M2 | SYSTOLIC BLOOD PRESSURE: 145 MMHG | HEIGHT: 63 IN | RESPIRATION RATE: 17 BRPM

## 2017-09-21 LAB
ANA TITR SER IF: NEGATIVE {TITER}
ANION GAP SERPL CALC-SCNC: 11 MMOL/L (ref 3–18)
BASOPHILS # BLD: 0 K/UL (ref 0–0.06)
BASOPHILS NFR BLD: 0 % (ref 0–2)
BUN SERPL-MCNC: 13 MG/DL (ref 7–18)
BUN/CREAT SERPL: 25 (ref 12–20)
CALCIUM SERPL-MCNC: 8.2 MG/DL (ref 8.5–10.1)
CHLORIDE SERPL-SCNC: 104 MMOL/L (ref 100–108)
CO2 SERPL-SCNC: 24 MMOL/L (ref 21–32)
CREAT SERPL-MCNC: 0.51 MG/DL (ref 0.6–1.3)
DIFFERENTIAL METHOD BLD: ABNORMAL
EOSINOPHIL # BLD: 0 K/UL (ref 0–0.4)
EOSINOPHIL NFR BLD: 0 % (ref 0–5)
ERYTHROCYTE [DISTWIDTH] IN BLOOD BY AUTOMATED COUNT: 13.9 % (ref 11.6–14.5)
GLUCOSE BLD STRIP.AUTO-MCNC: 135 MG/DL (ref 70–110)
GLUCOSE BLD STRIP.AUTO-MCNC: 152 MG/DL (ref 70–110)
GLUCOSE SERPL-MCNC: 178 MG/DL (ref 74–99)
HCT VFR BLD AUTO: 34.4 % (ref 35–45)
HGB BLD-MCNC: 11.8 G/DL (ref 12–16)
LYMPHOCYTES # BLD: 2.7 K/UL (ref 0.9–3.6)
LYMPHOCYTES NFR BLD: 18 % (ref 21–52)
MCH RBC QN AUTO: 28.6 PG (ref 24–34)
MCHC RBC AUTO-ENTMCNC: 34.3 G/DL (ref 31–37)
MCV RBC AUTO: 83.3 FL (ref 74–97)
MONOCYTES # BLD: 1.1 K/UL (ref 0.05–1.2)
MONOCYTES NFR BLD: 7 % (ref 3–10)
NEUTS SEG # BLD: 11.4 K/UL (ref 1.8–8)
NEUTS SEG NFR BLD: 75 % (ref 40–73)
PLATELET # BLD AUTO: 402 K/UL (ref 135–420)
PMV BLD AUTO: 11.1 FL (ref 9.2–11.8)
POTASSIUM SERPL-SCNC: 3.8 MMOL/L (ref 3.5–5.5)
RBC # BLD AUTO: 4.13 M/UL (ref 4.2–5.3)
SODIUM SERPL-SCNC: 139 MMOL/L (ref 136–145)
WBC # BLD AUTO: 15.1 K/UL (ref 4.6–13.2)

## 2017-09-21 PROCEDURE — 74011636637 HC RX REV CODE- 636/637: Performed by: INTERNAL MEDICINE

## 2017-09-21 PROCEDURE — 36415 COLL VENOUS BLD VENIPUNCTURE: CPT | Performed by: INTERNAL MEDICINE

## 2017-09-21 PROCEDURE — 74011250636 HC RX REV CODE- 250/636: Performed by: HOSPITALIST

## 2017-09-21 PROCEDURE — 85025 COMPLETE CBC W/AUTO DIFF WBC: CPT | Performed by: INTERNAL MEDICINE

## 2017-09-21 PROCEDURE — 82962 GLUCOSE BLOOD TEST: CPT

## 2017-09-21 PROCEDURE — 80048 BASIC METABOLIC PNL TOTAL CA: CPT | Performed by: INTERNAL MEDICINE

## 2017-09-21 PROCEDURE — 97162 PT EVAL MOD COMPLEX 30 MIN: CPT

## 2017-09-21 PROCEDURE — 74011250637 HC RX REV CODE- 250/637: Performed by: INTERNAL MEDICINE

## 2017-09-21 PROCEDURE — 97530 THERAPEUTIC ACTIVITIES: CPT

## 2017-09-21 PROCEDURE — 74011250637 HC RX REV CODE- 250/637: Performed by: HOSPITALIST

## 2017-09-21 PROCEDURE — 77030020263 HC SOL INJ SOD CL0.9% LFCR 1000ML

## 2017-09-21 RX ORDER — METFORMIN HYDROCHLORIDE 500 MG/1
850 TABLET ORAL 2 TIMES DAILY WITH MEALS
Qty: 60 TAB | Refills: 0 | Status: SHIPPED | OUTPATIENT
Start: 2017-09-21

## 2017-09-21 RX ADMIN — INSULIN DETEMIR 12 UNITS: 100 INJECTION, SOLUTION SUBCUTANEOUS at 09:11

## 2017-09-21 RX ADMIN — HEPARIN SODIUM 5000 UNITS: 5000 INJECTION, SOLUTION INTRAVENOUS; SUBCUTANEOUS at 06:38

## 2017-09-21 RX ADMIN — HEPARIN SODIUM 5000 UNITS: 5000 INJECTION, SOLUTION INTRAVENOUS; SUBCUTANEOUS at 14:06

## 2017-09-21 RX ADMIN — INSULIN LISPRO 3 UNITS: 100 INJECTION, SOLUTION INTRAVENOUS; SUBCUTANEOUS at 09:11

## 2017-09-21 RX ADMIN — CYANOCOBALAMIN TAB 1000 MCG 1000 MCG: 1000 TAB at 09:11

## 2017-09-21 RX ADMIN — POTASSIUM CHLORIDE 20 MEQ: 20 TABLET, EXTENDED RELEASE ORAL at 09:11

## 2017-09-21 RX ADMIN — DULOXETINE HYDROCHLORIDE 60 MG: 60 CAPSULE, DELAYED RELEASE ORAL at 09:11

## 2017-09-21 RX ADMIN — SODIUM CHLORIDE 75 ML/HR: 900 INJECTION, SOLUTION INTRAVENOUS at 08:42

## 2017-09-21 RX ADMIN — HEPARIN SODIUM 5000 UNITS: 5000 INJECTION, SOLUTION INTRAVENOUS; SUBCUTANEOUS at 00:05

## 2017-09-21 RX ADMIN — ACETAMINOPHEN 650 MG: 325 TABLET, FILM COATED ORAL at 14:06

## 2017-09-21 NOTE — PROGRESS NOTES
Patient received at beginning of shift from [facility] W TOO Lee&Karrie4, in bed, awake. Pt does not appear to be in distress and denies any pain and/or discomfort. Safety measures take include bed in lowest locked position, call bell within reach, and personal items at bedside within reach. Pt verbalizes understanding of use of call bell and the need to call for assistance to ensure safety. 1450: Pt d/c home via car. Taken down to car via wheelchair and CNA.    3243: Patient left with out her Galatramer injections that she brought from home. I called her and she said that her home health nurse Jaelyn Seo would come by tomorrow (9/22/17) to pick them up. I placed them in the patient specific med cabinet with a patient label as well a  Yellow post-it note containing this information.

## 2017-09-21 NOTE — DISCHARGE INSTRUCTIONS
Patient armband removed and shredded    DISCHARGE SUMMARY from Nurse    The following personal items are in your possession at time of discharge:    Dental Appliances: None  Visual Aid: Glasses, With patient     Home Medications: None  Jewelry: Ring (white rings x 2)  Clothing: Pants, Shirt, Socks, Undergarments  Other Valuables: Cell Phone, Purse, Wallet             PATIENT INSTRUCTIONS:    After general anesthesia or intravenous sedation, for 24 hours or while taking prescription Narcotics:  · Limit your activities  · Do not drive and operate hazardous machinery  · Do not make important personal or business decisions  · Do  not drink alcoholic beverages  · If you have not urinated within 8 hours after discharge, please contact your surgeon on call. Report the following to your surgeon:  · Excessive pain, swelling, redness or odor of or around the surgical area  · Temperature over 100.5  · Nausea and vomiting lasting longer than 4 hours or if unable to take medications  · Any signs of decreased circulation or nerve impairment to extremity: change in color, persistent  numbness, tingling, coldness or increase pain  · Any questions        What to do at Home:  Recommended activity: Activity as tolerated. If you experience any of the following symptoms listed under \"When to call for help\", please follow up with your PCP or call 911. *  Please give a list of your current medications to your Primary Care Provider. *  Please update this list whenever your medications are discontinued, doses are      changed, or new medications (including over-the-counter products) are added. *  Please carry medication information at all times in case of emergency situations. These are general instructions for a healthy lifestyle:    No smoking/ No tobacco products/ Avoid exposure to second hand smoke    Surgeon General's Warning:  Quitting smoking now greatly reduces serious risk to your health.     Obesity, smoking, and sedentary lifestyle greatly increases your risk for illness    A healthy diet, regular physical exercise & weight monitoring are important for maintaining a healthy lifestyle    You may be retaining fluid if you have a history of heart failure or if you experience any of the following symptoms:  Weight gain of 3 pounds or more overnight or 5 pounds in a week, increased swelling in our hands or feet or shortness of breath while lying flat in bed. Please call your doctor as soon as you notice any of these symptoms; do not wait until your next office visit. Recognize signs and symptoms of STROKE:    F-face looks uneven    A-arms unable to move or move unevenly    S-speech slurred or non-existent    T-time-call 911 as soon as signs and symptoms begin-DO NOT go       Back to bed or wait to see if you get better-TIME IS BRAIN. Warning Signs of HEART ATTACK     Call 911 if you have these symptoms:   Chest discomfort. Most heart attacks involve discomfort in the center of the chest that lasts more than a few minutes, or that goes away and comes back. It can feel like uncomfortable pressure, squeezing, fullness, or pain.  Discomfort in other areas of the upper body. Symptoms can include pain or discomfort in one or both arms, the back, neck, jaw, or stomach.  Shortness of breath with or without chest discomfort.  Other signs may include breaking out in a cold sweat, nausea, or lightheadedness. Don't wait more than five minutes to call 911 - MINUTES MATTER! Fast action can save your life. Calling 911 is almost always the fastest way to get lifesaving treatment. Emergency Medical Services staff can begin treatment when they arrive -- up to an hour sooner than if someone gets to the hospital by car. The discharge information has been reviewed with the patient. The patient verbalized understanding.     Discharge medications reviewed with the patient and appropriate educational materials and side effects teaching were provided. Cluster Headache: Care Instructions  Your Care Instructions  Cluster headaches are very painful. They happen on one side of the head and often start at night. They can last for 30 minutes to several hours. They usually occur in groups, or clusters, over weeks or months. You may have a stuffy nose and watery eyes during the headaches. The cause of cluster headaches is not known. Medicine may help prevent cluster headaches. You also can try to avoid things that trigger your headaches. Follow-up care is a key part of your treatment and safety. Be sure to make and go to all appointments, and call your doctor if you are having problems. It's also a good idea to know your test results and keep a list of the medicines you take. How can you care for yourself at home? · Watch for new symptoms with a headache. These include fever, weakness or numbness, vision changes, or confusion. They may be signs of a more serious problem. · Be safe with medicines. Take your medicines exactly as prescribed. Call your doctor if you think you are having a problem with your medicine. You will get more details on the specific medicines your doctor prescribes. · If your doctor recommends it, take an over-the-counter pain medicine, such as acetaminophen (Tylenol), ibuprofen (Advil, Motrin), or naproxen (Aleve). Read and follow all instructions on the label. · Do not take two or more pain medicines at the same time unless the doctor told you to. Many pain medicines have acetaminophen, which is Tylenol. Too much acetaminophen (Tylenol) can be harmful. · Carry medicine with you to quickly treat a headache. · Put ice or a cold pack on the area for 10 to 20 minutes at a time. Put a thin cloth between the ice and your skin. · If your doctor prescribed at-home oxygen therapy to stop a cluster headache, follow the directions for using it. To prevent cluster headaches  · Keep a headache diary.  Avoiding triggers may help you prevent headaches. Write down when a headache begins, how long it lasts, and what might have triggered it. This could include stress, alcohol, or certain foods. · Exercise daily to lower stress. · Limit caffeine by not drinking too much coffee, tea, or soda. But do not quit caffeine suddenly. This can also give you headaches. · Do not smoke or allow others to smoke around you. If you need help quitting, talk to your doctor about stop-smoking programs and medicines. These can increase your chances of quitting for good. · Tell your doctor if your headaches get worse and medicines do not help. You may need to try a different medicine. When should you call for help? Call 911 anytime you think you may need emergency care. For example, call if:  · You have symptoms of a stroke. These may include:  ¨ Sudden numbness, tingling, weakness, or loss of movement in your face, arm, or leg, especially on only one side of your body. ¨ Sudden vision changes. ¨ Sudden trouble speaking. ¨ Sudden confusion or trouble understanding simple statements. ¨ Sudden problems with walking or balance. ¨ A sudden, severe headache that is different from past headaches. Call your doctor now or seek immediate medical care if:  · You have a fever with a stiff neck or a severe headache. · You are sensitive to light or feel very sleepy or confused. · You have new nausea and vomiting and you cannot keep down food or liquids. Watch closely for changes in your health, and be sure to contact your doctor if:  · You have a headache that does not get better within 1 or 2 days. · Your headaches get worse or happen more often. Where can you learn more? Go to http://annel-sadie.info/. Enter L098 in the search box to learn more about \"Cluster Headache: Care Instructions. \"  Current as of: October 14, 2016  Content Version: 11.3  © 5301-0083 [employer].  Care instructions adapted under license by [address] (which disclaims liability or warranty for this information). If you have questions about a medical condition or this instruction, always ask your healthcare professional. [address] any warranty or liability for your use of this information. Multiple Sclerosis (MS): Care Instructions  Your Care Instructions  Multiple sclerosis, also called MS, is a disease that can affect the brain, spinal cord, and nerves to the eyes. MS can cause problems with muscle control and strength, vision, balance, feeling, and thinking. Whatever your symptoms are, taking medicine correctly and following your doctor's advice for home care can help you maintain your quality of life. Follow-up care is a key part of your treatment and safety. Be sure to make and go to all appointments, and call your doctor if you are having problems. It's also a good idea to know your test results and keep a list of the medicines you take. How can you care for yourself at home? General care  · Take your medicines exactly as prescribed. Call your doctor if you think you are having a problem with your medicine. · Use a cane, walker, or scooter if your doctor suggests it. · Keep doing your normal activities as much as you can. · If you have problems urinating, press or tap your bladder area to help start urine flow. If you have trouble controlling your urine, plan your fluid intake and activities so that a toilet will be available when you need it. · Spend time with family and friends. Join a support group for people with MS if you want extra help. · Depression is common with this condition. Tell your doctor if you have trouble sleeping, are eating too much or are not hungry, or feel sad or tearful all the time. Depression can be treated with medicine and counseling. Diet and exercise  · Eat a balanced diet.   · If you have problems swallowing, change how and what you eat:  ¨ Try thick drinks, such as milk shakes. They are easier to swallow than other fluids. ¨ Do not eat foods that crumble easily. These can cause choking. ¨ Use a  to prepare food. Soft foods need less chewing. ¨ Eat small meals often so that you do not get tired from eating larger meals. · Get exercise on most days. Work with your doctor to set up a program of walking, swimming, or other exercise that you are able to do. A physical therapist can teach you exercises if you cannot walk but can move your limbs and trunk. Or you can do exercises to help with coordination and balance. You can help improve muscle stiffness by doing exercises while lying in certain positions. When should you call for help? Call your doctor now or seek immediate medical care if:  · You have a change in symptoms. · You fall or have another injury. · You have symptoms of a urinary infection. For example:  ¨ You have blood or pus in your urine. ¨ You have pain in your back just below your rib cage. This is called flank pain. ¨ You have a fever, chills, or body aches. ¨ It hurts to urinate. ¨ You have groin or belly pain. Watch closely for changes in your health, and be sure to contact your doctor if:  · You want more information about MS or medicines. · You have questions about alternative treatments. Do not use any other treatments without talking to your doctor first.  Where can you learn more? Go to http://annel-sadie.info/. Enter T159 in the search box to learn more about \"Multiple Sclerosis (MS): Care Instructions. \"  Current as of: November 28, 2016  Content Version: 11.3  © 9187-9945 [employer]. Care instructions adapted under license by [employer] (which disclaims liability or warranty for this information).  If you have questions about a medical condition or this instruction, always ask your healthcare professional. [place] [address] any warranty or liability for your use of this information.

## 2017-09-21 NOTE — PROGRESS NOTES
Offered the pt FOC for home care, she chose [facility]. Pt verified the contact information on the face sheet is correct. Referral sent to intake via [facility] and called to the [address] [place] [address] for f/u.

## 2017-09-21 NOTE — DISCHARGE SUMMARY
Discharge Summary    Patient: Felecia Mejia               Sex: female          DOA: 9/18/2017         YOB: 1975      Age:  43 y.o.        LOS:  LOS: 2 days                Admit Date: 9/18/2017    Discharge Date: 9/21/2017    Admission Diagnoses: Multiple sclerosis exacerbation ([facility]  Multiple sclerosis ([facility]  Multiple sclerosis ([facility]    Discharge Diagnoses:    Multiple sclerosis exacerbation  Obesity  Depression  Low back pain.     Problem List as of 9/21/2017  Date Reviewed: 9/30/2014          Codes Class Noted - Resolved    Obesity ICD-10-CM: E66.9  ICD-9-CM: 278.00  9/19/2017 - Present        Neurogenic bladder (Chronic) ICD-10-CM: N31.9  ICD-9-CM: 596.54  3/17/2016 - Present        Hyperlipidemia (Chronic) ICD-10-CM: E78.5  ICD-9-CM: 272.4  3/17/2016 - Present        * (Principal)Multiple sclerosis exacerbation ([facility] ICD-10-CM: G35  ICD-9-CM: 732  3/16/2016 - Present        Major depression (Chronic) ICD-10-CM: F32.9  ICD-9-CM: 296.20  1/20/2016 - Present        Chronic low back pain (Chronic) ICD-10-CM: M54.5, G89.29  ICD-9-CM: 724.2, 338.29  9/13/2012 - Present        Spastic gait (Chronic) ICD-10-CM: R26.1  ICD-9-CM: 781.2  9/13/2012 - Present        Central pain syndrome (Chronic) ICD-10-CM: G89.0  ICD-9-CM: 338.0  9/13/2012 - Present        Multiple sclerosis (HCC) (Chronic) ICD-10-CM: G35  ICD-9-CM: 355  6/6/2012 - Present        RESOLVED: Seizure ([facility] ICD-10-CM: R56.9  ICD-9-CM: 780.39  8/23/2015 - 3/17/2016        RESOLVED: Elevated blood pressure ICD-10-CM: [id number]  ICD-9-CM: Franco Raring  12/3/2014 - 3/17/2016        RESOLVED: Anxiety state, unspecified ICD-10-CM: F41.1  ICD-9-CM: 300.00  4/7/2013 - 3/17/2016        RESOLVED: Pleurisy ICD-10-CM: R09.1  ICD-9-CM: 511.0  4/7/2013 - 8/23/2015        RESOLVED: Degeneration of lumbar or lumbosacral intervertebral disc ICD-10-CM: M51.37  ICD-9-CM: 722.52  9/13/2012 - 3/17/2016        RESOLVED: Sacroiliitis, not elsewhere classified ICD-10-CM: M46.1  ICD-9-CM: 720.2  9/13/2012 - 3/17/2016        RESOLVED: Arthritis of knee, degenerative ICD-10-CM: M17.10  ICD-9-CM: 715.36  9/13/2012 - 3/17/2016        RESOLVED: Migraine headache ICD-10-CM: T21.190  ICD-9-CM: 346.90  9/13/2012 - 3/17/2016        RESOLVED: Encounter for long-term (current) use of high-risk medication ICD-10-CM: Z79.899  ICD-9-CM: V58.69  9/13/2012 - 3/17/2016        RESOLVED: Ankle pain ICD-10-CM: M25.579  ICD-9-CM: 719.47  9/13/2012 - 3/17/2016        RESOLVED: Cognitive complaints ICD-10-CM: R41.9  ICD-9-CM: 799.59  9/13/2012 - 3/17/2016        RESOLVED: Neurogenic bladder, NOS (Chronic) ICD-10-CM: N31.9  ICD-9-CM: 596.54  6/6/2012 - 3/17/2016        RESOLVED: Intracranial arachnoid cyst ICD-10-CM: G93.0  ICD-9-CM: 348.0  6/6/2012 - 3/17/2016              Discharge Medications:     Current Discharge Medication List      CONTINUE these medications which have CHANGED    Details   metFORMIN (GLUCOPHAGE) 500 mg tablet Take 1.5 Tabs by mouth two (2) times daily (with meals). Qty: 60 Tab, Refills: 0         CONTINUE these medications which have NOT CHANGED    Details   DULoxetine (CYMBALTA) 60 mg capsule Take 60 mg by mouth daily. QUEtiapine (SEROQUEL) 300 mg tablet Take 300 mg by mouth nightly. potassium chloride (K-DUR, KLOR-CON) 20 mEq tablet Take 1 Tab by mouth two (2) times a day. Qty: 10 Tab, Refills: 0      glatiramer (COPAXONE) 40 mg/mL injection 40 mg by SubCUTAneous route every Monday, Wednesday, Friday. cyanocobalamin 1,000 mcg tablet Take 1,000 mcg by mouth daily.              Follow-up:pcp    Discharge Condition:good    Activity:as tolerated    Diet:diabetic    Labs:  Labs: Results:       Chemistry Recent Labs      09/21/17   0424  09/19/17   0345  09/18/17   1720   GLU  178*  237*  127*   NA  139  136  136   K  3.8  3.9  3.6   CL  104  98*  100   CO2  24  24  22   BUN  13  12  9   CREA  0.51*  0.60  0.60   CA  8.2*  8.2*  8.6   AGAP  11  14  14   BUCR  25*  20  15 AP   --   89   --    TP   --   6.8   --    ALB   --   3.5   --    GLOB   --   3.3   --    AGRAT   --   1.1   --       CBC w/Diff Recent Labs      09/21/17   0424  09/19/17   0345  09/18/17   1720   WBC  15.1*  11.8  10.2   RBC  4.13*  4.50  4.72   HGB  11.8*  13.1  13.9   HCT  34.4*  37.2  39.1   PLT  402  420  410   GRANS  75*   --   60   LYMPH  18*   --   32   EOS  0   --   2      Cardiac Enzymes No results for input(s): CPK, CKND1, ZACHERY in the last 72 hours. No lab exists for component: CKRMB, TROIP   Coagulation No results for input(s): PTP, INR, APTT in the last 72 hours. No lab exists for component: INREXT    Lipid Panel Lab Results   Component Value Date/Time    Cholesterol, total 220 09/19/2017 03:45 AM    HDL Cholesterol 56 09/19/2017 03:45 AM    LDL, calculated 151.2 09/19/2017 03:45 AM    VLDL, calculated 12.8 09/19/2017 03:45 AM    Triglyceride 64 09/19/2017 03:45 AM    CHOL/HDL Ratio 3.9 09/19/2017 03:45 AM      BNP No results for input(s): BNPP in the last 72 hours. Liver Enzymes Recent Labs      09/19/17   0345   TP  6.8   ALB  3.5   AP  89   SGOT  40*      Thyroid Studies Lab Results   Component Value Date/Time    TSH 0.11 09/20/2017 06:20 PM          Imaging:  CT head 9/18  1. No acute intracranial hemorrhage, mass effect, midline shift, or herniation. No definite CT evidence of acute cortical infarct is seen. Please note that  noncontrast head CT may be normal in early acute infarct.      2. Stable moderate amount of patchy parenchymal hypodensities in both cerebral  hemispheres, nonspecific but most likely representing patient's known multiple  Sclerosis. MRI of c-spine  1. Straightening of the cervical lordosis with mild multilevel degenerative  changes, as described above at each level.     2.  Otherwise, unremarkable evaluation.   Specifically, there is no evidence of  abnormal cord signal or enhancement to suggest the sequela of previous or active  Demyelination    MRI t-spine  1. Minimal degenerative changes.     2. Trace bilateral pleural effusions.     3.  Otherwise, unremarkable evaluation. Specifically, there are no imaging  findings to suggest the sequela of previous or active demyelination in the  thoracic cord. Consults:   Neurology    Treatment Team: Treatment Team: Attending Provider: Michelle Goodson MD; Consulting Provider: Dev Patricio MD; Consulting Provider: Jennifer Saenz MD; Consulting Provider: Rah Marie MD; Utilization Review: Sultana Bowling; Physical Therapist: Martita Landis PT; Care Manager: Taylor Alvarado RN    Significant Diagnostic Studies:see recent lab results    HPI:  Shanta Khan is a 43 y.o. female who comes today with c/o Left leg shaking & R leg is spastic - she mentions that she has a h/o MS & is usually able to walk around & do her ADL's but today her left leg started shaking & she is unable to walk or bear weight. Also mentions her R leg is stiff. She has a H/o MS  takes copaxone & is well controlled with her meds. She f/u with neuro - Dr Va Wiseman in . Pt evaluated in the ER - noted to have shaking or her L leg , spastic R leg - she cannot bend her knees - says she is usually able to do that. Tele neuro consulted in the ER - advised 1gm Solumedrol & admission for further eval.      Hospital Course: Sy Fitzpatrick a 43 y. o. female who came with c/o Left leg shaking & R leg is spastic - she mentioned that she has a h/o MS & is usually able to walk around & do her ADL's but the day she came to ER,her left leg started shaking & she was unable to walk or bear weight. Also mentioned her R leg is stiff. She has a H/o MS  takes copaxone & is well controlled with her meds. She f/u with neuro - Dr Va Wiseman in . She was admitted for MS exacerbation and started on solumedrol iv. Reported feeling better on 9/20. MRI head/neck and t-spine w/o acute process. Neuro saw patient and recommended to discontinue solumedrol on 9/20.She recommended that patient undergo physical therapy,this will be done by home health services. Patient clinically stable for discharge. Time for discharge:35 minutes.     Almas Saldana MD  September 21, 2017

## 2017-09-21 NOTE — PROGRESS NOTES
Care Management Interventions  PCP Verified by CM: Yes  Palliative Care Consult (Criteria: CHF and RRAT>21): No  Reason for No Palliative Care Consult:  Other (see comment) (n/a)  Transition of Care Consult (CM Consult): [address]: Yes  Discharge Durable Medical Equipment: No  Physical Therapy Consult: Yes  Occupational Therapy Consult: Yes  Speech Therapy Consult: No  Plan discussed with Pt/Family/Caregiver: Yes  Freedom of Choice Offered: Yes  Discharge Location  Discharge Placement: Home with home health

## 2017-09-21 NOTE — PROGRESS NOTES
Orders received and chart reviewed evaluation completed and chart reviewed patient to benefit from home health  Formal note to follow.

## 2017-09-21 NOTE — PROGRESS NOTES
Problem: Mobility Impaired (Adult and Pediatric)  Goal: *Acute Goals and Plan of Care (Insert Text)  Physical Therapy Goals  Initiated 9/21/2017 and to be accomplished within 7 day(s)  1. Patient will move from supine to sit and sit to supine , scoot up and down and roll side to side in bed with modified independence. 2. Patient will transfer from bed to chair and chair to bed with modified independence using the least restrictive device. 3. Patient will perform sit to stand with modified independence. 4. Patient will ambulate with modified independence for 150 feet with the least restrictive device. 5. Patient will ascend/descend 1 stairs with no handrail(s) with modified independence ie curb. .  Outcome: Progressing Towards Goal  PHYSICAL THERAPY EVALUATION     Patient: Ganga Inman (21 y.o. female)  Date: 9/21/2017  Primary Diagnosis: Multiple sclerosis exacerbation (HCC)  Multiple sclerosis ([facility] [address]  Multiple sclerosis ([facility] [address]        Precautions:   Fall      PROBLEM LIST:  Patient presents with the following problems:   Bed Mobility, Transfers, Gait, Strength, Balance, Home Exercise Program, Stairs and Precautions  ASSESSMENT :   Patient requires between minimal assistance/contact guard assist and supervision/set-up for bed mobility, transfers and ambulation. Patient moves slowly  With left knee hyper extending with stance and completing step to pattern using rolling walker. Patient returned to bed no pain reported pre or post. She reports not walking like prior to coming to the hospital .  education on exercises to do in chair and bed as well as plan of care. .    Patient will benefit from skilled intervention to address the above impairments.   Patients rehabilitation potential is considered to be Fair  Factors which may influence rehabilitation potential include:   [ ]         None noted  [ ]         Mental ability/status  [X]         Medical condition  [ ]         Home/family situation and support systems  [ ]         Safety awareness  [ ]         Pain tolerance/management  [ ]         Other:        PLAN :  Recommendations and Planned Interventions:  [X]           Bed Mobility Training             [X]    Neuromuscular Re-Education  [X]           Transfer Training                   [ ]    Orthotic/Prosthetic Training  [X]           Gait Training                          [ ]    Modalities  [X]           Therapeutic Exercises          [ ]    Edema Management/Control  [X]           Therapeutic Activities            [X]    Patient and Family Training/Education  [ ]           Other (comment):     Frequency/Duration: Patient will be followed by physical therapy 1-2 times per day/4-7 days per week to address goals. Discharge Recommendations: Home Health  Further Equipment Recommendations for Discharge: N/A has rolling walker at home  Needs transfer bench for bathroom       SUBJECTIVE:   Patient stated  I stay in bed in the morning then sit in chair during the afternoon. Kisha Landis      OBJECTIVE DATA SUMMARY:       Past Medical History:   Diagnosis Date    Arthritis      Arthropathy, unspecified, site unspecified      Depression      Diabetes ([facility] [address]      Hypercholesteremia      Hypertension       history of htn    Incontinence of urine      Insomnia      Migraine      MS (multiple sclerosis) ([facility] [address]      MS (multiple sclerosis) ([facility] [address]      Neurogenic bladder      Other ill-defined conditions       multiple Sclerosis    Seizures ([facility] [address]       2013    Wears glasses       Past Surgical History:   Procedure Laterality Date    HX HYSTERECTOMY        HX ORTHOPAEDIC         2005    HX OTHER SURGICAL         Barriers to Learning/Limitations: yes;  physical  Compensate with: visual, verbal, tactile, kinesthetic cues/model     G CODES:Mobility  Current  CJ= 20-39%   Goal  CI= 1-19%.   The severity rating is based on the Other BARTHEL65/100   BARTHEL65/100  Bathin  Bladder: 5  Bowels: 10  Dressin  Feeding: 10  Groomin  Mobility: 10  Stairs: 0  Toilet Use: 10  Transfer (Bed to Chair and Back): 10  Toilet Use: 10  Transfer (Bed to Chair and Back): 10  Total: 65        Eval Complexity: History: HIGH Complexity :3+ comorbidities / personal factors will impact the outcome/ POC Exam:MEDIUM Complexity : 3 Standardized tests and measures addressing body structure, function, activity limitation and / or participation in recreation  Presentation: MEDIUM Complexity : Evolving with changing characteristics  Clinical Decision Making:Medium Complexity BARTHEL65/100 Overall Complexity:MEDIUM     Prior Level of Function/Home Situation: lives alone has home health aid 9-3 and was not using assistive device for ambulation. Home Situation  Home Environment: Apartment  # Steps to Enter: 0  One/Two Story Residence: Two story, live on 1st floor  Living Alone: Yes  Support Systems: Home care staff, Family member(s)  Patient Expects to be Discharged to[de-identified] Apartment  Current DME Used/Available at Home: Glucometer, Blood pressure cuff, Walker, rolling  Tub or Shower Type: Tub/Shower combination  Critical Behavior:  Neurologic State: Alert  Orientation Level: Oriented X4  Cognition: Follows commands; Appropriate for age attention/concentration; Appropriate safety awareness  Safety/Judgement: Fall prevention; Awareness of environment  Psychosocial  Patient Behaviors: Calm; Cooperative  Needs Expressed: Emotional;Educational  Purposeful Interaction: Yes  Pt Identified Daily Priority: Clinical issues (comment)  Caritas Process: Enable estiven/hope;Nurture loving kindness; Teaching/learning; Attend basic human needs  Caring Interventions: Therapeutic modalities  Reassure: Caring rounds  Therapeutic Modalities: Intentional therapeutic touch  Skin Condition/Temp: Dry;Warm  Skin Integumentary  Skin Condition/Temp: Dry;Warm  Strength:    Strength:  (both legs 3+/5, left< right )  Tone & Sensation:   Tone: Normal  Sensation: Impaired (both feet )  Range Of Motion:  AROM: Generally decreased, functional  PROM: Within functional limits  Functional Mobility:  Bed Mobility:  Rolling: Stand-by asssistance  Supine to Sit: Contact guard assistance;Stand-by asssistance  Sit to Supine: Contact guard assistance;Stand-by asssistance  Transfers:  Sit to Stand: Contact guard assistance; Additional time;Assist x1  Stand to Sit: Contact guard assistance; Additional time;Assist x1  Balance:   Sitting: Impaired  Sitting - Static: Good (unsupported)  Sitting - Dynamic: Fair (occasional)  Standing: Impaired  Standing - Static: Fair  Standing - Dynamic : Fair  Ambulation/Gait Training:  Distance (ft): 35 Feet (ft) (x2)  Assistive Device: Walker, rolling  Ambulation - Level of Assistance: Contact guard assistance;Minimal assistance; Additional time;Assist x1  Gait Description (WDL): Exceptions to WDL  Gait Abnormalities: Decreased step clearance; Step to gait  Base of Support: Center of gravity altered  Speed/Inez: Delayed;Pace decreased (<100 feet/min)  Step Length: Left shortened;Right shortened  Interventions: Safety awareness training;Verbal cues; Visual/Demos  Therapeutic Exercises:   hip abd/add, ankle pumps/alphabet, glut sets quad sets laq's in chair   Pain:  Pre treatment pain level:0  Post treatment pain level:0  Pain Scale 1: Numeric (0 - 10)  Pain Intensity 1: 0  Activity Tolerance:   Fair   Please refer to the flowsheet for vital signs taken during this treatment. After treatment:   [ ]         Patient left in no apparent distress sitting up in chair  [X]         Patient left in no apparent distress in bed  [X]         Call bell left within reach  [X]         Nursing notified  [ ]         Caregiver present  [ ]         Bed alarm activated      COMMUNICATION/EDUCATION:   [X]         Fall prevention education was provided and the patient/caregiver indicated understanding.   [X]         Patient/family have participated as able in goal setting and plan of care. [X]         Patient/family agree to work toward stated goals and plan of care. [ ]         Patient understands intent and goals of therapy, but is neutral about his/her participation. [ ]         Patient is unable to participate in goal setting and plan of care. Patient educated on the role of physical therapy during the acute stay  and the importance of mobility. VU. Needs reinforcement.        Thank you for this referral.  Merlyn Chapman, PT   Time Calculation: 25 mins

## 2017-09-21 NOTE — PROGRESS NOTES
Problem: Falls - Risk of  Goal: *Absence of Falls  Document Jordyn Fall Risk and appropriate interventions in the flowsheet.    Outcome: Progressing Towards Goal  Fall Risk Interventions:              Medication Interventions: Patient to call before getting OOB, Teach patient to arise slowly     Elimination Interventions: Call light in reach     History of Falls Interventions: Door open when patient unattended

## 2017-09-22 ENCOUNTER — HOME CARE VISIT (OUTPATIENT)
Dept: SCHEDULING | Facility: HOME HEALTH | Age: 42
End: 2017-09-22
Payer: MEDICARE

## 2017-09-22 ENCOUNTER — HOME CARE VISIT (OUTPATIENT)
Dept: HOME HEALTH SERVICES | Facility: HOME HEALTH | Age: 42
End: 2017-09-22

## 2017-09-22 PROCEDURE — 3331090001 HH PPS REVENUE CREDIT

## 2017-09-22 PROCEDURE — G0151 HHCP-SERV OF PT,EA 15 MIN: HCPCS

## 2017-09-22 PROCEDURE — 400013 HH SOC

## 2017-09-22 PROCEDURE — 3331090002 HH PPS REVENUE DEBIT

## 2017-09-23 PROCEDURE — 3331090002 HH PPS REVENUE DEBIT

## 2017-09-23 PROCEDURE — 3331090001 HH PPS REVENUE CREDIT

## 2017-09-24 VITALS
TEMPERATURE: 97.4 F | DIASTOLIC BLOOD PRESSURE: 105 MMHG | OXYGEN SATURATION: 98 % | HEART RATE: 82 BPM | SYSTOLIC BLOOD PRESSURE: 150 MMHG

## 2017-09-24 PROCEDURE — 3331090001 HH PPS REVENUE CREDIT

## 2017-09-24 PROCEDURE — 3331090002 HH PPS REVENUE DEBIT

## 2017-09-25 ENCOUNTER — HOME CARE VISIT (OUTPATIENT)
Dept: SCHEDULING | Facility: HOME HEALTH | Age: 42
End: 2017-09-25
Payer: MEDICARE

## 2017-09-25 ENCOUNTER — HOME CARE VISIT (OUTPATIENT)
Dept: HOME HEALTH SERVICES | Facility: HOME HEALTH | Age: 42
End: 2017-09-25
Payer: MEDICARE

## 2017-09-25 VITALS — SYSTOLIC BLOOD PRESSURE: 130 MMHG | DIASTOLIC BLOOD PRESSURE: 84 MMHG | OXYGEN SATURATION: 98 % | HEART RATE: 90 BPM

## 2017-09-25 PROCEDURE — 3331090002 HH PPS REVENUE DEBIT

## 2017-09-25 PROCEDURE — 3331090001 HH PPS REVENUE CREDIT

## 2017-09-25 PROCEDURE — G0152 HHCP-SERV OF OT,EA 15 MIN: HCPCS

## 2017-09-25 PROCEDURE — G0157 HHC PT ASSISTANT EA 15: HCPCS

## 2017-09-26 ENCOUNTER — HOME CARE VISIT (OUTPATIENT)
Dept: SCHEDULING | Facility: HOME HEALTH | Age: 42
End: 2017-09-26
Payer: MEDICARE

## 2017-09-26 VITALS — HEART RATE: 103 BPM | SYSTOLIC BLOOD PRESSURE: 132 MMHG | OXYGEN SATURATION: 98 % | DIASTOLIC BLOOD PRESSURE: 88 MMHG

## 2017-09-26 VITALS — SYSTOLIC BLOOD PRESSURE: 120 MMHG | HEART RATE: 106 BPM | DIASTOLIC BLOOD PRESSURE: 96 MMHG | OXYGEN SATURATION: 98 %

## 2017-09-26 PROCEDURE — 3331090001 HH PPS REVENUE CREDIT

## 2017-09-26 PROCEDURE — G0157 HHC PT ASSISTANT EA 15: HCPCS

## 2017-09-26 PROCEDURE — 3331090002 HH PPS REVENUE DEBIT

## 2017-09-27 PROCEDURE — 3331090002 HH PPS REVENUE DEBIT

## 2017-09-27 PROCEDURE — 3331090001 HH PPS REVENUE CREDIT

## 2017-09-28 ENCOUNTER — HOME CARE VISIT (OUTPATIENT)
Dept: SCHEDULING | Facility: HOME HEALTH | Age: 42
End: 2017-09-28
Payer: MEDICARE

## 2017-09-28 PROCEDURE — 3331090002 HH PPS REVENUE DEBIT

## 2017-09-28 PROCEDURE — 3331090001 HH PPS REVENUE CREDIT

## 2017-09-28 PROCEDURE — G0152 HHCP-SERV OF OT,EA 15 MIN: HCPCS

## 2017-09-29 ENCOUNTER — HOME CARE VISIT (OUTPATIENT)
Dept: SCHEDULING | Facility: HOME HEALTH | Age: 42
End: 2017-09-29
Payer: MEDICARE

## 2017-09-29 VITALS — HEART RATE: 99 BPM | SYSTOLIC BLOOD PRESSURE: 128 MMHG | DIASTOLIC BLOOD PRESSURE: 85 MMHG | OXYGEN SATURATION: 96 %

## 2017-09-29 PROCEDURE — 3331090002 HH PPS REVENUE DEBIT

## 2017-09-29 PROCEDURE — G0157 HHC PT ASSISTANT EA 15: HCPCS

## 2017-09-29 PROCEDURE — 3331090001 HH PPS REVENUE CREDIT

## 2017-09-30 VITALS — SYSTOLIC BLOOD PRESSURE: 134 MMHG | HEART RATE: 106 BPM | OXYGEN SATURATION: 98 % | DIASTOLIC BLOOD PRESSURE: 90 MMHG

## 2017-09-30 PROCEDURE — 3331090001 HH PPS REVENUE CREDIT

## 2017-09-30 PROCEDURE — 3331090002 HH PPS REVENUE DEBIT

## 2017-10-01 PROCEDURE — 3331090001 HH PPS REVENUE CREDIT

## 2017-10-01 PROCEDURE — 3331090002 HH PPS REVENUE DEBIT

## 2017-10-02 ENCOUNTER — HOME CARE VISIT (OUTPATIENT)
Dept: SCHEDULING | Facility: HOME HEALTH | Age: 42
End: 2017-10-02
Payer: MEDICARE

## 2017-10-02 PROCEDURE — G0152 HHCP-SERV OF OT,EA 15 MIN: HCPCS

## 2017-10-02 PROCEDURE — G0157 HHC PT ASSISTANT EA 15: HCPCS

## 2017-10-02 PROCEDURE — 3331090001 HH PPS REVENUE CREDIT

## 2017-10-02 PROCEDURE — 3331090002 HH PPS REVENUE DEBIT

## 2017-10-03 VITALS — OXYGEN SATURATION: 99 % | DIASTOLIC BLOOD PRESSURE: 62 MMHG | HEART RATE: 116 BPM | SYSTOLIC BLOOD PRESSURE: 110 MMHG

## 2017-10-03 VITALS — OXYGEN SATURATION: 97 % | SYSTOLIC BLOOD PRESSURE: 128 MMHG | HEART RATE: 98 BPM | DIASTOLIC BLOOD PRESSURE: 87 MMHG

## 2017-10-03 PROCEDURE — 3331090002 HH PPS REVENUE DEBIT

## 2017-10-03 PROCEDURE — 3331090001 HH PPS REVENUE CREDIT

## 2017-10-04 ENCOUNTER — HOME CARE VISIT (OUTPATIENT)
Dept: SCHEDULING | Facility: HOME HEALTH | Age: 42
End: 2017-10-04
Payer: MEDICARE

## 2017-10-04 VITALS — OXYGEN SATURATION: 98 % | HEART RATE: 101 BPM | SYSTOLIC BLOOD PRESSURE: 132 MMHG | DIASTOLIC BLOOD PRESSURE: 88 MMHG

## 2017-10-04 PROCEDURE — G0157 HHC PT ASSISTANT EA 15: HCPCS

## 2017-10-04 PROCEDURE — 3331090001 HH PPS REVENUE CREDIT

## 2017-10-04 PROCEDURE — 3331090002 HH PPS REVENUE DEBIT

## 2017-10-05 ENCOUNTER — HOME CARE VISIT (OUTPATIENT)
Dept: SCHEDULING | Facility: HOME HEALTH | Age: 42
End: 2017-10-05
Payer: MEDICARE

## 2017-10-05 VITALS — DIASTOLIC BLOOD PRESSURE: 86 MMHG | HEART RATE: 99 BPM | SYSTOLIC BLOOD PRESSURE: 127 MMHG | OXYGEN SATURATION: 97 %

## 2017-10-05 PROCEDURE — 3331090002 HH PPS REVENUE DEBIT

## 2017-10-05 PROCEDURE — 3331090001 HH PPS REVENUE CREDIT

## 2017-10-05 PROCEDURE — G0152 HHCP-SERV OF OT,EA 15 MIN: HCPCS

## 2017-10-06 PROCEDURE — 3331090002 HH PPS REVENUE DEBIT

## 2017-10-06 PROCEDURE — 3331090001 HH PPS REVENUE CREDIT

## 2017-10-07 PROCEDURE — 3331090002 HH PPS REVENUE DEBIT

## 2017-10-07 PROCEDURE — 3331090001 HH PPS REVENUE CREDIT

## 2017-10-08 PROCEDURE — 3331090002 HH PPS REVENUE DEBIT

## 2017-10-08 PROCEDURE — 3331090001 HH PPS REVENUE CREDIT

## 2017-10-09 ENCOUNTER — HOME CARE VISIT (OUTPATIENT)
Dept: SCHEDULING | Facility: HOME HEALTH | Age: 42
End: 2017-10-09
Payer: MEDICARE

## 2017-10-09 VITALS — SYSTOLIC BLOOD PRESSURE: 135 MMHG | DIASTOLIC BLOOD PRESSURE: 88 MMHG

## 2017-10-09 PROCEDURE — 3331090001 HH PPS REVENUE CREDIT

## 2017-10-09 PROCEDURE — 3331090002 HH PPS REVENUE DEBIT

## 2017-10-09 PROCEDURE — G0151 HHCP-SERV OF PT,EA 15 MIN: HCPCS

## 2017-10-10 PROCEDURE — 3331090001 HH PPS REVENUE CREDIT

## 2017-10-10 PROCEDURE — 3331090002 HH PPS REVENUE DEBIT

## 2017-10-17 ENCOUNTER — HOSPITAL ENCOUNTER (EMERGENCY)
Age: 42
Discharge: HOME | End: 2017-10-17
Attending: EMERGENCY MEDICINE | Admitting: EMERGENCY MEDICINE
Payer: MEDICARE

## 2017-10-17 ENCOUNTER — APPOINTMENT (OUTPATIENT)
Dept: GENERAL RADIOLOGY | Age: 42
End: 2017-10-17
Attending: EMERGENCY MEDICINE
Payer: MEDICARE

## 2017-10-17 VITALS
TEMPERATURE: 98.7 F | HEART RATE: 102 BPM | OXYGEN SATURATION: 99 % | RESPIRATION RATE: 16 BRPM | SYSTOLIC BLOOD PRESSURE: 132 MMHG | BODY MASS INDEX: 44.3 KG/M2 | HEIGHT: 63 IN | WEIGHT: 250 LBS | DIASTOLIC BLOOD PRESSURE: 92 MMHG

## 2017-10-17 DIAGNOSIS — F80.81 IDIOPATHIC STUTTERING: ICD-10-CM

## 2017-10-17 DIAGNOSIS — R25.1 TREMOR: Primary | ICD-10-CM

## 2017-10-17 LAB
ALBUMIN SERPL-MCNC: 4.4 G/DL (ref 3.4–5)
ALBUMIN/GLOB SERPL: 1.4 {RATIO} (ref 0.8–1.7)
ALP SERPL-CCNC: 89 U/L (ref 45–117)
ALT SERPL-CCNC: 84 U/L (ref 13–56)
ANION GAP SERPL CALC-SCNC: 11 MMOL/L (ref 3–18)
APPEARANCE UR: CLEAR
AST SERPL-CCNC: 41 U/L (ref 15–37)
BASOPHILS # BLD: 0 K/UL (ref 0–0.06)
BASOPHILS NFR BLD: 0 % (ref 0–2)
BILIRUB SERPL-MCNC: 0.6 MG/DL (ref 0.2–1)
BILIRUB UR QL: NEGATIVE
BUN SERPL-MCNC: 8 MG/DL (ref 7–18)
BUN/CREAT SERPL: 11 (ref 12–20)
CALCIUM SERPL-MCNC: 8.9 MG/DL (ref 8.5–10.1)
CHLORIDE SERPL-SCNC: 105 MMOL/L (ref 100–108)
CO2 SERPL-SCNC: 24 MMOL/L (ref 21–32)
COLOR UR: YELLOW
CREAT SERPL-MCNC: 0.74 MG/DL (ref 0.6–1.3)
DIFFERENTIAL METHOD BLD: ABNORMAL
EOSINOPHIL # BLD: 0.1 K/UL (ref 0–0.4)
EOSINOPHIL NFR BLD: 2 % (ref 0–5)
ERYTHROCYTE [DISTWIDTH] IN BLOOD BY AUTOMATED COUNT: 13.6 % (ref 11.6–14.5)
GLOBULIN SER CALC-MCNC: 3.1 G/DL (ref 2–4)
GLUCOSE SERPL-MCNC: 121 MG/DL (ref 74–99)
GLUCOSE UR STRIP.AUTO-MCNC: NEGATIVE MG/DL
HCT VFR BLD AUTO: 37.4 % (ref 35–45)
HGB BLD-MCNC: 13.1 G/DL (ref 12–16)
HGB UR QL STRIP: NEGATIVE
KETONES UR QL STRIP.AUTO: NEGATIVE MG/DL
LEUKOCYTE ESTERASE UR QL STRIP.AUTO: NEGATIVE
LYMPHOCYTES # BLD: 3 K/UL (ref 0.9–3.6)
LYMPHOCYTES NFR BLD: 35 % (ref 21–52)
MCH RBC QN AUTO: 29.1 PG (ref 24–34)
MCHC RBC AUTO-ENTMCNC: 35 G/DL (ref 31–37)
MCV RBC AUTO: 83.1 FL (ref 74–97)
MONOCYTES # BLD: 0.7 K/UL (ref 0.05–1.2)
MONOCYTES NFR BLD: 8 % (ref 3–10)
NEUTS SEG # BLD: 4.7 K/UL (ref 1.8–8)
NEUTS SEG NFR BLD: 55 % (ref 40–73)
NITRITE UR QL STRIP.AUTO: NEGATIVE
PH UR STRIP: 5.5 [PH] (ref 5–8)
PLATELET # BLD AUTO: 448 K/UL (ref 135–420)
PMV BLD AUTO: 10.4 FL (ref 9.2–11.8)
POTASSIUM SERPL-SCNC: 3.4 MMOL/L (ref 3.5–5.5)
PROT SERPL-MCNC: 7.5 G/DL (ref 6.4–8.2)
PROT UR STRIP-MCNC: NEGATIVE MG/DL
RBC # BLD AUTO: 4.5 M/UL (ref 4.2–5.3)
SODIUM SERPL-SCNC: 140 MMOL/L (ref 136–145)
SP GR UR REFRACTOMETRY: 1 (ref 1–1.03)
UROBILINOGEN UR QL STRIP.AUTO: 0.2 EU/DL (ref 0.2–1)
WBC # BLD AUTO: 8.5 K/UL (ref 4.6–13.2)

## 2017-10-17 PROCEDURE — 96374 THER/PROPH/DIAG INJ IV PUSH: CPT

## 2017-10-17 PROCEDURE — 81003 URINALYSIS AUTO W/O SCOPE: CPT

## 2017-10-17 PROCEDURE — 99284 EMERGENCY DEPT VISIT MOD MDM: CPT

## 2017-10-17 PROCEDURE — 85025 COMPLETE CBC W/AUTO DIFF WBC: CPT

## 2017-10-17 PROCEDURE — 74011250636 HC RX REV CODE- 250/636: Performed by: PHYSICIAN ASSISTANT

## 2017-10-17 PROCEDURE — 96361 HYDRATE IV INFUSION ADD-ON: CPT

## 2017-10-17 PROCEDURE — 74011250636 HC RX REV CODE- 250/636: Performed by: EMERGENCY MEDICINE

## 2017-10-17 PROCEDURE — 80053 COMPREHEN METABOLIC PANEL: CPT

## 2017-10-17 PROCEDURE — 71010 XR CHEST PORT: CPT

## 2017-10-17 RX ORDER — LORAZEPAM 2 MG/ML
1 INJECTION INTRAMUSCULAR
Status: COMPLETED | OUTPATIENT
Start: 2017-10-17 | End: 2017-10-17

## 2017-10-17 RX ORDER — DIAZEPAM 2 MG/1
2 TABLET ORAL
Qty: 20 TAB | Refills: 0 | Status: SHIPPED | OUTPATIENT
Start: 2017-10-17 | End: 2018-04-06

## 2017-10-17 RX ORDER — METHYLPREDNISOLONE 4 MG/1
TABLET ORAL
Qty: 1 DOSE PACK | Refills: 0 | Status: SHIPPED | OUTPATIENT
Start: 2017-10-17 | End: 2018-03-25

## 2017-10-17 RX ADMIN — SODIUM CHLORIDE 1000 ML: 900 INJECTION, SOLUTION INTRAVENOUS at 15:18

## 2017-10-17 RX ADMIN — LORAZEPAM 1 MG: 2 INJECTION INTRAMUSCULAR; INTRAVENOUS at 16:17

## 2017-10-17 NOTE — ED PROVIDER NOTES
HPI Comments: Octaviano Kline is a 43 y.o. female that presents tot he ED with a complaint of a MS exacerbation. PT states that during her exacerbations she has increased tremors and stuttering. Pt states that she had stuttering one time in the past which is when she was diagnosed with MS. Pt states that she called Katie Dejesus NP and was told to come to the ED for faster access to steroid prescriptions. No other complaints at this time       Past Medical History:   Diagnosis Date    Arthritis     Arthropathy, unspecified, site unspecified     Depression     Diabetes ([address]     Hypercholesteremia     Hypertension     history of htn    Incontinence of urine     Insomnia     Migraine     [facility]) [facility] [address]     MS (multiple sclerosis) ([facility] [address]     Neurogenic bladder     Other ill-defined conditions(799.89)     multiple Sclerosis    Seizures ([facility] [address]     6/2013    Wears glasses        Past Surgical History:   Procedure Laterality Date    HX HYSTERECTOMY      HX ORTHOPAEDIC      1/2005 11/2008    HX OTHER SURGICAL           Family History:   Problem Relation Age of Onset    Heart Disease Father     Diabetes Father     Diabetes Sister     Other Other        Social History     Social History    Marital status:      Spouse name: N/A    Number of children: N/A    Years of education: N/A     Occupational History    Not on file. Social History Main Topics    Smoking status: Never Smoker    Smokeless tobacco: Never Used    Alcohol use No    Drug use: No    Sexual activity: Not on file      Comment: Hysterectomy     Other Topics Concern    Not on file     Social History Narrative         ALLERGIES: Oxycontin [oxycodone]    Review of Systems   Constitutional: Negative for fatigue and fever. Respiratory: Negative for cough and shortness of breath. Cardiovascular: Negative for chest pain. Gastrointestinal: Negative for diarrhea, nausea and vomiting.    Genitourinary: Negative for difficulty urinating and dysuria. Musculoskeletal: Negative for back pain. Neurological: Positive for tremors and speech difficulty. Negative for dizziness and headaches. All other systems reviewed and are negative. Vitals:    10/17/17 1459   BP: (!) 151/96   Pulse: (!) 114   Resp: 16   Temp: 98.7 °F (37.1 °C)   SpO2: 99%   Weight: 113.4 kg (250 lb)   Height: 5' 3\" (1.6 m)            Physical Exam   Constitutional: She is oriented to person, place, and time. She appears well-developed and well-nourished. She appears distressed. HENT:   Head: Normocephalic and atraumatic. Nose: Nose normal.   Eyes: Conjunctivae are normal. Pupils are equal, round, and reactive to light. Neck: Normal range of motion. Cardiovascular: Normal rate, regular rhythm and normal heart sounds. Pulmonary/Chest: Effort normal and breath sounds normal. No respiratory distress. She has no wheezes. Musculoskeletal: Normal range of motion. Tremors noted in all extremities     Neurological: She is alert and oriented to person, place, and time. Skin: Skin is warm and dry. Psychiatric: She has a normal mood and affect. Her behavior is normal.   Vitals reviewed. MDM  Number of Diagnoses or Management Options  Diagnosis management comments: Labs Reviewed  CBC WITH AUTOMATED DIFF - Abnormal; Notable for the following:      PLATELET                      448 (*)             All other components within normal limits  METABOLIC PANEL, COMPREHENSIVE - Abnormal; Notable for the following:      Potassium                     3.4 (*)                Glucose                       121 (*)                BUN/Creatinine ratio          11 (*)                 ALT (SGPT)                    84 (*)                 AST (SGOT)                    41 (*)              All other components within normal limits  URINALYSIS W/ RFLX MICROSCOPIC    CXR: no change form previous as read by self.     3:57 PM Consulted with pts Neurologist Vasu Burnett Bishop BILL. She states based on pts history and current sx this seems more anxiety driven than a exacerbation of MS. Recommends continued work up, antianxiety medicine, prescription ofr prednisone taper and follow up with her in office next week. Impression: MS exacerbation    Plan: discharge home  Prednisone taper  FOllow up with Bishop BILL next week. Amount and/or Complexity of Data Reviewed  Clinical lab tests: ordered and reviewed  Tests in the radiology section of CPT®: ordered and reviewed    Risk of Complications, Morbidity, and/or Mortality  Presenting problems: low  Diagnostic procedures: low  Management options: low    Patient Progress  Patient progress: stable    ED Course       Procedures           Vitals:  Patient Vitals for the past 12 hrs:   Temp Pulse Resp BP SpO2   10/17/17 1459 98.7 °F (37.1 °C) (!) 114 16 (!) 151/96 99 %         Medications ordered:   Medications   sodium chloride 0.9 % bolus infusion 1,000 mL (1,000 mL IntraVENous New Bag 10/17/17 1518)   LORazepam (ATIVAN) injection 1 mg (not administered)         Lab findings:  Recent Results (from the past 12 hour(s))   CBC WITH AUTOMATED DIFF    Collection Time: 10/17/17  3:18 PM   Result Value Ref Range    WBC 8.5 4.6 - 13.2 K/uL    RBC 4.50 4. 20 - 5.30 M/uL    HGB 13.1 12.0 - 16.0 g/dL    HCT 37.4 35.0 - 45.0 %    MCV 83.1 74.0 - 97.0 FL    MCH 29.1 24.0 - 34.0 PG    MCHC 35.0 31.0 - 37.0 g/dL    RDW 13.6 11.6 - 14.5 %    PLATELET 314 (H) 188 - 420 K/uL    MPV 10.4 9.2 - 11.8 FL    NEUTROPHILS 55 40 - 73 %    LYMPHOCYTES 35 21 - 52 %    MONOCYTES 8 3 - 10 %    EOSINOPHILS 2 0 - 5 %    BASOPHILS 0 0 - 2 %    ABS. NEUTROPHILS 4.7 1.8 - 8.0 K/UL    ABS. LYMPHOCYTES 3.0 0.9 - 3.6 K/UL    ABS. MONOCYTES 0.7 0.05 - 1.2 K/UL    ABS. EOSINOPHILS 0.1 0.0 - 0.4 K/UL    ABS.  BASOPHILS 0.0 0.0 - 0.06 K/UL    DF AUTOMATED     METABOLIC PANEL, COMPREHENSIVE    Collection Time: 10/17/17  3:18 PM   Result Value Ref Range    Sodium 140 136 - 145 mmol/L    Potassium 3.4 (L) 3.5 - 5.5 mmol/L    Chloride 105 100 - 108 mmol/L    CO2 24 21 - 32 mmol/L    Anion gap 11 3.0 - 18 mmol/L    Glucose 121 (H) 74 - 99 mg/dL    BUN 8 7.0 - 18 MG/DL    Creatinine 0.74 0.6 - 1.3 MG/DL    BUN/Creatinine ratio 11 (L) 12 - 20      GFR est AA >60 >60 ml/min/1.73m2    GFR est non-AA >60 >60 ml/min/1.73m2    Calcium 8.9 8.5 - 10.1 MG/DL    Bilirubin, total 0.6 0.2 - 1.0 MG/DL    ALT (SGPT) 84 (H) 13 - 56 U/L    AST (SGOT) 41 (H) 15 - 37 U/L    Alk. phosphatase 89 45 - 117 U/L    Protein, total 7.5 6.4 - 8.2 g/dL    Albumin 4.4 3.4 - 5.0 g/dL    Globulin 3.1 2.0 - 4.0 g/dL    A-G Ratio 1.4 0.8 - 1.7             X-Ray, CT or other radiology findings or impressions:  XR CHEST PORT    (Results Pending)       Progress notes, Consult notes or additional Procedure notes:       Disposition:  Diagnosis: No diagnosis found. Disposition:  discharge    Follow-up Information     None           Patient's Medications   Start Taking    No medications on file   Continue Taking    AMLODIPINE (NORVASC) 10 MG TABLET    Take 10 mg by mouth daily. CYANOCOBALAMIN 1,000 MCG TABLET    Take 1,000 mcg by mouth daily. DIAZEPAM (VALIUM) 5 MG TABLET    Take 5 mg by mouth every eight (8) hours as needed for Anxiety. DULOXETINE (CYMBALTA) 60 MG CAPSULE    Take 60 mg by mouth daily. GLATIRAMER (COPAXONE) 40 MG/ML INJECTION    40 mg by SubCUTAneous route every Monday, Wednesday, Friday. HYDROCODONE-ACETAMINOPHEN (NORCO) 5-325 MG PER TABLET    Take 1 Tab by mouth every four (4) hours as needed for Pain. METFORMIN (GLUCOPHAGE) 500 MG TABLET    Take 1.5 Tabs by mouth two (2) times daily (with meals). NAPROXEN (NAPROSYN) 500 MG TABLET    Take 500 mg by mouth two (2) times daily (with meals). NAPROXEN SOD/DIPHENHYDRAMINE (ALEVE PM PO)    Take 2 Tabs by mouth nightly as needed (sleep). POTASSIUM CHLORIDE (K-DUR, KLOR-CON) 20 MEQ TABLET    Take 1 Tab by mouth two (2) times a day. PROMETHAZINE (PHENERGAN) 25 MG TABLET    Take 25 mg by mouth every six (6) hours as needed for Nausea. QUETIAPINE (SEROQUEL) 300 MG TABLET    Take 300 mg by mouth nightly.    These Medications have changed    No medications on file   Stop Taking    No medications on file

## 2017-12-27 ENCOUNTER — HOSPITAL ENCOUNTER (EMERGENCY)
Age: 42
Discharge: HOME | End: 2017-12-27
Attending: EMERGENCY MEDICINE
Payer: MEDICARE

## 2017-12-27 ENCOUNTER — APPOINTMENT (OUTPATIENT)
Dept: GENERAL RADIOLOGY | Age: 42
End: 2017-12-27
Attending: EMERGENCY MEDICINE
Payer: MEDICARE

## 2017-12-27 ENCOUNTER — APPOINTMENT (OUTPATIENT)
Dept: CT IMAGING | Age: 42
End: 2017-12-27
Attending: EMERGENCY MEDICINE
Payer: MEDICARE

## 2017-12-27 VITALS
TEMPERATURE: 97.9 F | SYSTOLIC BLOOD PRESSURE: 126 MMHG | HEART RATE: 91 BPM | HEIGHT: 63 IN | DIASTOLIC BLOOD PRESSURE: 83 MMHG | OXYGEN SATURATION: 98 % | RESPIRATION RATE: 20 BRPM | WEIGHT: 250 LBS | BODY MASS INDEX: 44.3 KG/M2

## 2017-12-27 DIAGNOSIS — F44.9 CONVERSION DISORDER: Primary | ICD-10-CM

## 2017-12-27 LAB
ALBUMIN SERPL-MCNC: 3.6 G/DL (ref 3.4–5)
ALBUMIN/GLOB SERPL: 0.9 {RATIO} (ref 0.8–1.7)
ALP SERPL-CCNC: 83 U/L (ref 45–117)
ALT SERPL-CCNC: 74 U/L (ref 13–56)
ANION GAP SERPL CALC-SCNC: 7 MMOL/L (ref 3–18)
ASPIRIN TEST, ASPIRN: 589 ARU (ref 620–672)
AST SERPL-CCNC: 42 U/L (ref 15–37)
BILIRUB SERPL-MCNC: 0.4 MG/DL (ref 0.2–1)
BUN SERPL-MCNC: 6 MG/DL (ref 7–18)
BUN/CREAT SERPL: 10 (ref 12–20)
CALCIUM SERPL-MCNC: 8.2 MG/DL (ref 8.5–10.1)
CHLORIDE SERPL-SCNC: 105 MMOL/L (ref 100–108)
CO2 SERPL-SCNC: 27 MMOL/L (ref 21–32)
CREAT SERPL-MCNC: 0.63 MG/DL (ref 0.6–1.3)
ERYTHROCYTE [DISTWIDTH] IN BLOOD BY AUTOMATED COUNT: 13.3 % (ref 11.6–14.5)
FIBRINOGEN PPP-MCNC: 400 MG/DL (ref 210–451)
GLOBULIN SER CALC-MCNC: 3.8 G/DL (ref 2–4)
GLUCOSE SERPL-MCNC: 171 MG/DL (ref 74–99)
HCT VFR BLD AUTO: 37.3 % (ref 35–45)
HGB BLD-MCNC: 13.1 G/DL (ref 12–16)
INR PPP: 1 (ref 0.8–1.2)
MCH RBC QN AUTO: 29.2 PG (ref 24–34)
MCHC RBC AUTO-ENTMCNC: 35.1 G/DL (ref 31–37)
MCV RBC AUTO: 83.3 FL (ref 74–97)
PLATELET # BLD AUTO: 389 K/UL (ref 135–420)
PMV BLD AUTO: 10 FL (ref 9.2–11.8)
POTASSIUM SERPL-SCNC: 4.1 MMOL/L (ref 3.5–5.5)
PROT SERPL-MCNC: 7.4 G/DL (ref 6.4–8.2)
PROTHROMBIN TIME: 12.9 SEC (ref 11.5–15.2)
RBC # BLD AUTO: 4.48 M/UL (ref 4.2–5.3)
SODIUM SERPL-SCNC: 139 MMOL/L (ref 136–145)
THROMBIN TIME: 16.3 SECS (ref 13.8–18.2)
WBC # BLD AUTO: 6.1 K/UL (ref 4.6–13.2)

## 2017-12-27 PROCEDURE — 85576 BLOOD PLATELET AGGREGATION: CPT | Performed by: EMERGENCY MEDICINE

## 2017-12-27 PROCEDURE — 85384 FIBRINOGEN ACTIVITY: CPT | Performed by: EMERGENCY MEDICINE

## 2017-12-27 PROCEDURE — 93005 ELECTROCARDIOGRAM TRACING: CPT

## 2017-12-27 PROCEDURE — 71010 XR CHEST PORT: CPT

## 2017-12-27 PROCEDURE — 85610 PROTHROMBIN TIME: CPT | Performed by: EMERGENCY MEDICINE

## 2017-12-27 PROCEDURE — 74011250637 HC RX REV CODE- 250/637: Performed by: EMERGENCY MEDICINE

## 2017-12-27 PROCEDURE — 94762 N-INVAS EAR/PLS OXIMTRY CONT: CPT

## 2017-12-27 PROCEDURE — 96360 HYDRATION IV INFUSION INIT: CPT

## 2017-12-27 PROCEDURE — 85670 THROMBIN TIME PLASMA: CPT | Performed by: EMERGENCY MEDICINE

## 2017-12-27 PROCEDURE — 80053 COMPREHEN METABOLIC PANEL: CPT | Performed by: EMERGENCY MEDICINE

## 2017-12-27 PROCEDURE — 85027 COMPLETE CBC AUTOMATED: CPT | Performed by: EMERGENCY MEDICINE

## 2017-12-27 PROCEDURE — 74011250636 HC RX REV CODE- 250/636: Performed by: EMERGENCY MEDICINE

## 2017-12-27 PROCEDURE — 99285 EMERGENCY DEPT VISIT HI MDM: CPT

## 2017-12-27 PROCEDURE — 70450 CT HEAD/BRAIN W/O DYE: CPT

## 2017-12-27 RX ORDER — HYDROXYZINE PAMOATE 25 MG/1
25 CAPSULE ORAL
Status: COMPLETED | OUTPATIENT
Start: 2017-12-27 | End: 2017-12-27

## 2017-12-27 RX ADMIN — HYDROXYZINE PAMOATE 25 MG: 25 CAPSULE ORAL at 14:26

## 2017-12-27 RX ADMIN — SODIUM CHLORIDE 500 ML: 900 INJECTION, SOLUTION INTRAVENOUS at 12:08

## 2017-12-27 NOTE — DISCHARGE INSTRUCTIONS
RETURN TO THE EMERGENCY DEPARTMENT FOR ANY ALARMING SYMPTOMS. YOUR SYMPTOMS DO NOT SUGGEST STROKE OR MS EXACERBATION. THIS MAY BE DEHYDRATION, FATIGUE, OR ANXIETY. DISCUSS WITH YOUR PRIMARY DOCTOR.

## 2017-12-27 NOTE — ED NOTES
No tremors noted during sleep. When patient is awakened, legs noted to begin moving back and forth and pt states \"I am not getting up until the Dr comes and talks to me\". Provider notified.

## 2017-12-27 NOTE — ED PROVIDER NOTES
EMERGENCY DEPARTMENT HISTORY AND PHYSICAL EXAM    11:39 AM      Date: 12/27/2017  Patient Name: Severiano Whitman    History of Presenting Illness     Chief Complaint   Patient presents with    Extremity Weakness         History Provided By: EMS    Chief Complaint: Extremity weakness  Duration:  30 mins PTA  Timing:  Acute  Location: RUE  Quality: N/A  Severity: N/A  Modifying Factors: none reported  Associated Symptoms: R arm tremors. Additional History (Context): Severiano Whitman is a 43 y.o. female presents per EMS with extremity weakness starting 30 mins PTA at 11am. EMS note R sided weakness, RUE tremors. Pt denies persistent weakness from her MS. Hx HTN, migraines, DM. PCP: Bayron Begum MD    Current Outpatient Prescriptions   Medication Sig Dispense Refill    methylPREDNISolone (MEDROL, BOGDAN,) 4 mg tablet Take as instructed on package 1 Dose Pack 0    diazePAM (VALIUM) 2 mg tablet Take 1 Tab by mouth every eight (8) hours as needed for Anxiety. Max Daily Amount: 6 mg. 20 Tab 0    promethazine (PHENERGAN) 25 mg tablet Take 25 mg by mouth every six (6) hours as needed for Nausea.  amLODIPine (NORVASC) 10 mg tablet Take 10 mg by mouth daily.  naproxen (NAPROSYN) 500 mg tablet Take 500 mg by mouth two (2) times daily (with meals).  HYDROcodone-acetaminophen (NORCO) 5-325 mg per tablet Take 1 Tab by mouth every four (4) hours as needed for Pain.  NAPROXEN SOD/DIPHENHYDRAMINE (ALEVE PM PO) Take 2 Tabs by mouth nightly as needed (sleep).  metFORMIN (GLUCOPHAGE) 500 mg tablet Take 1.5 Tabs by mouth two (2) times daily (with meals). 60 Tab 0    DULoxetine (CYMBALTA) 60 mg capsule Take 60 mg by mouth daily.  QUEtiapine (SEROQUEL) 300 mg tablet Take 300 mg by mouth nightly.  potassium chloride (K-DUR, KLOR-CON) 20 mEq tablet Take 1 Tab by mouth two (2) times a day. 10 Tab 0    glatiramer (COPAXONE) 40 mg/mL injection 40 mg by SubCUTAneous route every Monday, Wednesday, Friday.  cyanocobalamin 1,000 mcg tablet Take 1,000 mcg by mouth daily. Past History     Past Medical History:  Past Medical History:   Diagnosis Date    Arthritis     Arthropathy, unspecified, site unspecified     Depression     Diabetes ([address]     Hypercholesteremia     Hypertension     history of htn    Incontinence of urine     Insomnia     Migraine     MS (multiple sclerosis) ([address]     [facility]     Neurogenic bladder     Other ill-defined conditions(799.89)     multiple Sclerosis    Seizures ([address]     6/2013    Wears glasses        Past Surgical History:  Past Surgical History:   Procedure Laterality Date    HX HYSTERECTOMY      HX ORTHOPAEDIC      1/2005 11/2008    HX OTHER SURGICAL         Family History:  Family History   Problem Relation Age of Onset    Heart Disease Father     Diabetes Father     Diabetes Sister     Other Other        Social History:  Social History   Substance Use Topics    Smoking status: Never Smoker    Smokeless tobacco: Never Used    Alcohol use No       Allergies: Allergies   Allergen Reactions    Oxycontin [Oxycodone] Hives and Other (comments)     intolerance         Review of Systems     Review of Systems   Constitutional: Negative for diaphoresis and fever. HENT: Negative for congestion and sore throat. Eyes: Negative for pain and itching. Respiratory: Negative for cough and shortness of breath. Cardiovascular: Negative for chest pain and palpitations. Gastrointestinal: Negative for abdominal pain and diarrhea. Endocrine: Negative for polydipsia and polyuria. Genitourinary: Negative for dysuria and hematuria. Musculoskeletal: Negative for arthralgias and myalgias. Skin: Negative for rash and wound. Neurological: Positive for tremors (RUE) and weakness (RUE). Negative for seizures and syncope. Hematological: Does not bruise/bleed easily. Psychiatric/Behavioral: Negative for agitation and hallucinations. Physical Exam     Visit Vitals    /83    Pulse 91    Temp 97.9 °F (36.6 °C)    Resp 20    Ht 5' 3\" (1.6 m)    Wt 113.4 kg (250 lb)    SpO2 98%    BMI 44.29 kg/m2       Physical Exam   Constitutional: She appears well-developed and well-nourished. HENT:   Head: Normocephalic and atraumatic. Eyes: Conjunctivae and EOM are normal. No scleral icterus. Neck: Normal range of motion. Neck supple. No JVD present. Cardiovascular: Normal rate, regular rhythm and normal heart sounds. 4 intact extremity pulses   Pulmonary/Chest: Effort normal and breath sounds normal.   Abdominal: Soft. She exhibits no mass. There is no tenderness. Musculoskeletal: Normal range of motion. Lymphadenopathy:     She has no cervical adenopathy. Neurological: She is alert. R arm weakness, bilateral tonic plantar flexion, eyes fluttering, EOMs intact, speech clear, no obvious cranial nerve deficit, sensation intact. Skin: Skin is warm and dry. Nursing note and vitals reviewed.         Diagnostic Study Results     Labs -  Recent Results (from the past 12 hour(s))   CBC W/O DIFF    Collection Time: 12/27/17 11:55 AM   Result Value Ref Range    WBC 6.1 4.6 - 13.2 K/uL    RBC 4.48 4.20 - 5.30 M/uL    HGB 13.1 12.0 - 16.0 g/dL    HCT 37.3 35.0 - 45.0 %    MCV 83.3 74.0 - 97.0 FL    MCH 29.2 24.0 - 34.0 PG    MCHC 35.1 31.0 - 37.0 g/dL    RDW 13.3 11.6 - 14.5 %    PLATELET 699 889 - 296 K/uL    MPV 10.0 9.2 - 59.8 FL   METABOLIC PANEL, COMPREHENSIVE    Collection Time: 12/27/17 11:55 AM   Result Value Ref Range    Sodium 139 136 - 145 mmol/L    Potassium 4.1 3.5 - 5.5 mmol/L    Chloride 105 100 - 108 mmol/L    CO2 27 21 - 32 mmol/L    Anion gap 7 3.0 - 18 mmol/L    Glucose 171 (H) 74 - 99 mg/dL    BUN 6 (L) 7.0 - 18 MG/DL    Creatinine 0.63 0.6 - 1.3 MG/DL    BUN/Creatinine ratio 10 (L) 12 - 20      GFR est AA >60 >60 ml/min/1.73m2    GFR est non-AA >60 >60 ml/min/1.73m2    Calcium 8.2 (L) 8.5 - 10.1 MG/DL Bilirubin, total 0.4 0.2 - 1.0 MG/DL    ALT (SGPT) 74 (H) 13 - 56 U/L    AST (SGOT) 42 (H) 15 - 37 U/L    Alk. phosphatase 83 45 - 117 U/L    Protein, total 7.4 6.4 - 8.2 g/dL    Albumin 3.6 3.4 - 5.0 g/dL    Globulin 3.8 2.0 - 4.0 g/dL    A-G Ratio 0.9 0.8 - 1.7     PROTHROMBIN TIME + INR    Collection Time: 12/27/17 11:55 AM   Result Value Ref Range    Prothrombin time 12.9 11.5 - 15.2 sec    INR 1.0 0.8 - 1.2     THROMBIN TIME    Collection Time: 12/27/17 11:55 AM   Result Value Ref Range    Thrombin time 16.3 13.8 - 18.2 SECS   FIBRINOGEN    Collection Time: 12/27/17 11:55 AM   Result Value Ref Range    Fibrinogen 400 210 - 451 mg/dL   ASPIRIN TEST    Collection Time: 12/27/17 11:55 AM   Result Value Ref Range    Aspirin test 589 (L) 620 - 672 ARU   EKG, 12 LEAD, INITIAL    Collection Time: 12/27/17  1:59 PM   Result Value Ref Range    Ventricular Rate 93 BPM    Atrial Rate 93 BPM    P-R Interval 174 ms    QRS Duration 74 ms    Q-T Interval 366 ms    QTC Calculation (Bezet) 455 ms    Calculated P Axis 46 degrees    Calculated R Axis 18 degrees    Calculated T Axis 18 degrees    Diagnosis       Normal sinus rhythm  Nonspecific T wave abnormality  Abnormal ECG  When compared with ECG of 19-JAN-2016 18:28,  QT has lengthened         Radiologic Studies -   XR CHEST PORT   Final Result      CT HEAD WO CONT   Final Result        Ct Head Wo Cont    Result Date: 12/27/2017  CT Head History: Right-sided weakness, tremors, history of multiple sclerosis Comparison: CT head 9/18/2017, MR brain 9/20/2017 Technique: Multiple axial CT images of the head were obtained extending from the skull base to the vertex. Additional coronal and sagittal reformations were performed. One or more dose reduction techniques were used on this CT: automated exposure control, adjustment of the mAs and/or kVp according to patient's size, and iterative reconstruction techniques.  The specific techniques utilized on this CT exam have been documented in the patient's electronic medical record. Findings: The ventricles and sulci are normal in their size and configuration. Prominent CSF is seen along the patella menopause the region, unchanged. A moderate amount of hypodense white matter lesions are seen within the periventricular and subcortical white matter without definite interval change. There is no evidence of acute intracranial hemorrhage, mass effect, midline shift, or herniation. No definite CT evidence of acute cortical infarct is seen. The gray-white matter differentiation is within normal limits. The bilateral lenses are absent, likely due to prior cataract surgery. The visualized paranasal sinuses and mastoid air cells are clear. No fracture is seen. IMPRESSION: 1. No acute intracranial hemorrhage, mass effect, midline shift, or herniation. 2. Stable, moderate nonspecific white matter disease likely but correlating with patient's known history of multiple sclerosis. 3. No definite CT evidence of acute cortical infarct is seen. Please note that noncontrast head CT may be normal in early acute infarct. These critical results on this CODE S patient were directly communicated to Dr. Negrita Seymour at 11:50 AM on 12/27/2017. Read back was performed. Xr Chest Port    Result Date: 12/27/2017  EXAM:Chest X-Ray  History: Suspected stroke, right ocular twitching, facial pain, history of diabetes and multiple sclerosis Technique:  Portable Frontal View Comparison: none _______________ FINDINGS: The trachea is midline. The cardiomediastinal silhouette is midline and, within normal limits. Small linear opacity in the infrahilar right midlung. No Focal consolidation, effusion, or pneumothorax. Intact osseous structures. _______________     IMPRESSION: 1. Right midlung linear interstitial opacity, favoring atelectasis or potentially scarring. Otherwise, no pulmonary consolidation or edema.        Medical Decision Making   Initial Medical Decision Making and DDx:    Significant RUE weakness, code S level 2 initiated, rest of sx are not really consistent with CVA syndrome. DDx includes MS, psychosomatic disorder, electrolyte disorder. ED Course: Progress Notes, Reevaluation, and Consults:      11:43 Discussed with Dr. Makayla Rosales, he will see and evaluate pt.    11:50 Radiology called, CT is unchanged. 11:51 Consult:  Discussed care with  (Teleneurology). Standard discussion; including history of patients chief complaint, available diagnostic results, and treatment course. Feels it is consistent with conversion disorder, recommends supportive care and discharge when pt is better. Not consistent with stroke. 13:21 Discussed results with pt, not consistent with stroke or MS exacerbation. Discussed possible dehydration fatigue or anxiety. She is moving all four extremities at this point. 3:04 PM NO ACUTE EVENTS, SHE'S BEEN UP AND AMBULATORY, ASKED FOR SOMETHING FOR ANXIETY AND ORDERED VISTARIL. [facility] PCP     I am the first provider for this patient. I reviewed the vital signs, available nursing notes, past medical history, past surgical history, family history and social history. Vital Signs-Reviewed the patient's vital signs. Pulse Oximetry Analysis - 96% on RA, normal    Cardiac Monitor:  Rate/Rhythm:  97    EKG: Interpreted by the EP. Time Interpreted: 1428   Rate: 93   Rhythm: sr   Interpretation: no ascute process   Comparison:     Records Reviewed: Nursing Notes (Time of Review: 11:39 AM)        Diagnosis     Clinical Impression:   1. Conversion disorder        Disposition: Discharge    Follow-up Information     Follow up With Details Comments Black Dr Corrie Villanueva MD In 2 days  [address] [place] [address] Rd  [phone]             Patient's Medications   Start Taking    No medications on file   Continue Taking    AMLODIPINE (NORVASC) 10 MG TABLET    Take 10 mg by mouth daily. CYANOCOBALAMIN 1,000 MCG TABLET    Take 1,000 mcg by mouth daily. DIAZEPAM (VALIUM) 2 MG TABLET    Take 1 Tab by mouth every eight (8) hours as needed for Anxiety. Max Daily Amount: 6 mg. DULOXETINE (CYMBALTA) 60 MG CAPSULE    Take 60 mg by mouth daily. GLATIRAMER (COPAXONE) 40 MG/ML INJECTION    40 mg by SubCUTAneous route every Monday, Wednesday, Friday. HYDROCODONE-ACETAMINOPHEN (NORCO) 5-325 MG PER TABLET    Take 1 Tab by mouth every four (4) hours as needed for Pain. METFORMIN (GLUCOPHAGE) 500 MG TABLET    Take 1.5 Tabs by mouth two (2) times daily (with meals). METHYLPREDNISOLONE (MEDROL, BOGDAN,) 4 MG TABLET    Take as instructed on package    NAPROXEN (NAPROSYN) 500 MG TABLET    Take 500 mg by mouth two (2) times daily (with meals). NAPROXEN SOD/DIPHENHYDRAMINE (ALEVE PM PO)    Take 2 Tabs by mouth nightly as needed (sleep). POTASSIUM CHLORIDE (K-DUR, KLOR-CON) 20 MEQ TABLET    Take 1 Tab by mouth two (2) times a day. PROMETHAZINE (PHENERGAN) 25 MG TABLET    Take 25 mg by mouth every six (6) hours as needed for Nausea. QUETIAPINE (SEROQUEL) 300 MG TABLET    Take 300 mg by mouth nightly. These Medications have changed    No medications on file   Stop Taking    No medications on file     _______________________________    Attestations:  [address] [place] [address] acting as a scribe for and in the presence of Deidre Colon MD      December 27, 2017 at 3:06 PM       Provider Attestation:      I personally performed the services described in the documentation, reviewed the documentation, as recorded by the scribe in my presence, and it accurately and completely records my words and actions.  December 27, 2017 at 3:06 PM - Deidre Colon MD    _______________________________

## 2017-12-27 NOTE — ED TRIAGE NOTES
1131- Pt arrived via rescue with c/o right sided weakness, bilateral leg tremors, erratic eye movement/twitching, and slurred speech. Pt states she woke up at 1100 this morning with s/s. MD, RN, and ER tech at stretcher side upon pt arrival.    1134- Code S level 2 upgrade. 1135- Pt transported to CT on stretcher. 1137- While on bed for CT scan pt was noted to have absence of tremor like activity or eye twitching. 1140- CT complete. 1142- Pt back from CT and in room 3 in main treatment area of ER, connected to cardiac monitor. [address] neurology at bedside. Tremor like activity and erratic eye twitching noted to have resumed. [facility] Radiologist called and spoke to Dr. Elis Josue. 1155- Code S level 2 cancelled.

## 2017-12-28 LAB
ATRIAL RATE: 93 BPM
CALCULATED P AXIS, ECG09: 46 DEGREES
CALCULATED R AXIS, ECG10: 18 DEGREES
CALCULATED T AXIS, ECG11: 18 DEGREES
DIAGNOSIS, 93000: NORMAL
P-R INTERVAL, ECG05: 174 MS
Q-T INTERVAL, ECG07: 366 MS
QRS DURATION, ECG06: 74 MS
QTC CALCULATION (BEZET), ECG08: 455 MS
VENTRICULAR RATE, ECG03: 93 BPM

## 2018-02-08 ENCOUNTER — HOSPITAL ENCOUNTER (EMERGENCY)
Age: 43
Discharge: HOME | End: 2018-02-08
Attending: EMERGENCY MEDICINE | Admitting: EMERGENCY MEDICINE
Payer: MEDICARE

## 2018-02-08 VITALS
SYSTOLIC BLOOD PRESSURE: 136 MMHG | HEART RATE: 90 BPM | OXYGEN SATURATION: 98 % | RESPIRATION RATE: 19 BRPM | DIASTOLIC BLOOD PRESSURE: 87 MMHG

## 2018-02-08 DIAGNOSIS — R73.9 HYPERGLYCEMIA: Primary | ICD-10-CM

## 2018-02-08 DIAGNOSIS — R25.1 SHAKING: ICD-10-CM

## 2018-02-08 LAB
ALBUMIN SERPL-MCNC: 3.8 G/DL (ref 3.4–5)
ALBUMIN/GLOB SERPL: 0.8 {RATIO} (ref 0.8–1.7)
ALP SERPL-CCNC: 102 U/L (ref 45–117)
ALT SERPL-CCNC: 88 U/L (ref 13–56)
ANION GAP SERPL CALC-SCNC: 13 MMOL/L (ref 3–18)
APPEARANCE UR: CLEAR
AST SERPL-CCNC: 46 U/L (ref 15–37)
BASOPHILS # BLD: 0 K/UL (ref 0–0.06)
BASOPHILS NFR BLD: 0 % (ref 0–2)
BILIRUB SERPL-MCNC: 0.6 MG/DL (ref 0.2–1)
BILIRUB UR QL: NEGATIVE
BUN SERPL-MCNC: 7 MG/DL (ref 7–18)
BUN/CREAT SERPL: 8 (ref 12–20)
CALCIUM SERPL-MCNC: 8.8 MG/DL (ref 8.5–10.1)
CHLORIDE SERPL-SCNC: 94 MMOL/L (ref 100–108)
CO2 SERPL-SCNC: 24 MMOL/L (ref 21–32)
COLOR UR: YELLOW
CREAT SERPL-MCNC: 0.93 MG/DL (ref 0.6–1.3)
DIFFERENTIAL METHOD BLD: ABNORMAL
EOSINOPHIL # BLD: 0.2 K/UL (ref 0–0.4)
EOSINOPHIL NFR BLD: 2 % (ref 0–5)
ERYTHROCYTE [DISTWIDTH] IN BLOOD BY AUTOMATED COUNT: 12.8 % (ref 11.6–14.5)
GLOBULIN SER CALC-MCNC: 4.5 G/DL (ref 2–4)
GLUCOSE BLD STRIP.AUTO-MCNC: 266 MG/DL (ref 70–110)
GLUCOSE SERPL-MCNC: 443 MG/DL (ref 74–99)
GLUCOSE UR STRIP.AUTO-MCNC: >1000 MG/DL
HCT VFR BLD AUTO: 37.8 % (ref 35–45)
HGB BLD-MCNC: 13.6 G/DL (ref 12–16)
HGB UR QL STRIP: NEGATIVE
KETONES UR QL STRIP.AUTO: NEGATIVE MG/DL
LEUKOCYTE ESTERASE UR QL STRIP.AUTO: NEGATIVE
LYMPHOCYTES # BLD: 4.2 K/UL (ref 0.9–3.6)
LYMPHOCYTES NFR BLD: 46 % (ref 21–52)
MCH RBC QN AUTO: 29.1 PG (ref 24–34)
MCHC RBC AUTO-ENTMCNC: 36 G/DL (ref 31–37)
MCV RBC AUTO: 80.9 FL (ref 74–97)
MONOCYTES # BLD: 0.5 K/UL (ref 0.05–1.2)
MONOCYTES NFR BLD: 5 % (ref 3–10)
NEUTS SEG # BLD: 4.2 K/UL (ref 1.8–8)
NEUTS SEG NFR BLD: 47 % (ref 40–73)
NITRITE UR QL STRIP.AUTO: NEGATIVE
PH UR STRIP: 5.5 [PH] (ref 5–8)
PLATELET # BLD AUTO: 476 K/UL (ref 135–420)
PMV BLD AUTO: 11.2 FL (ref 9.2–11.8)
POTASSIUM SERPL-SCNC: 3.5 MMOL/L (ref 3.5–5.5)
PROT SERPL-MCNC: 8.3 G/DL (ref 6.4–8.2)
PROT UR STRIP-MCNC: NEGATIVE MG/DL
RBC # BLD AUTO: 4.67 M/UL (ref 4.2–5.3)
SODIUM SERPL-SCNC: 131 MMOL/L (ref 136–145)
SP GR UR REFRACTOMETRY: >1.03 (ref 1–1.03)
UROBILINOGEN UR QL STRIP.AUTO: 0.2 EU/DL (ref 0.2–1)
WBC # BLD AUTO: 9 K/UL (ref 4.6–13.2)

## 2018-02-08 PROCEDURE — 96374 THER/PROPH/DIAG INJ IV PUSH: CPT

## 2018-02-08 PROCEDURE — 96375 TX/PRO/DX INJ NEW DRUG ADDON: CPT

## 2018-02-08 PROCEDURE — 74011250637 HC RX REV CODE- 250/637: Performed by: EMERGENCY MEDICINE

## 2018-02-08 PROCEDURE — 80053 COMPREHEN METABOLIC PANEL: CPT | Performed by: EMERGENCY MEDICINE

## 2018-02-08 PROCEDURE — 74011250636 HC RX REV CODE- 250/636: Performed by: EMERGENCY MEDICINE

## 2018-02-08 PROCEDURE — 99284 EMERGENCY DEPT VISIT MOD MDM: CPT

## 2018-02-08 PROCEDURE — 96361 HYDRATE IV INFUSION ADD-ON: CPT

## 2018-02-08 PROCEDURE — 85025 COMPLETE CBC W/AUTO DIFF WBC: CPT | Performed by: EMERGENCY MEDICINE

## 2018-02-08 PROCEDURE — 82962 GLUCOSE BLOOD TEST: CPT

## 2018-02-08 PROCEDURE — 81003 URINALYSIS AUTO W/O SCOPE: CPT | Performed by: EMERGENCY MEDICINE

## 2018-02-08 PROCEDURE — 74011636637 HC RX REV CODE- 636/637: Performed by: EMERGENCY MEDICINE

## 2018-02-08 RX ORDER — ONDANSETRON 2 MG/ML
4 INJECTION INTRAMUSCULAR; INTRAVENOUS
Status: COMPLETED | OUTPATIENT
Start: 2018-02-08 | End: 2018-02-08

## 2018-02-08 RX ORDER — SODIUM CHLORIDE 9 MG/ML
1000 INJECTION, SOLUTION INTRAVENOUS ONCE
Status: COMPLETED | OUTPATIENT
Start: 2018-02-08 | End: 2018-02-08

## 2018-02-08 RX ORDER — ACETAMINOPHEN 500 MG
1000 TABLET ORAL
Status: COMPLETED | OUTPATIENT
Start: 2018-02-08 | End: 2018-02-08

## 2018-02-08 RX ORDER — BACLOFEN 10 MG/1
5 TABLET ORAL ONCE
Status: COMPLETED | OUTPATIENT
Start: 2018-02-08 | End: 2018-02-08

## 2018-02-08 RX ORDER — SODIUM CHLORIDE 9 MG/ML
1000 INJECTION, SOLUTION INTRAVENOUS ONCE
Status: DISCONTINUED | OUTPATIENT
Start: 2018-02-08 | End: 2018-02-08

## 2018-02-08 RX ADMIN — BACLOFEN 5 MG: 10 TABLET ORAL at 20:33

## 2018-02-08 RX ADMIN — SODIUM CHLORIDE 1000 ML: 900 INJECTION, SOLUTION INTRAVENOUS at 18:24

## 2018-02-08 RX ADMIN — ACETAMINOPHEN 1000 MG: 500 TABLET ORAL at 21:17

## 2018-02-08 RX ADMIN — INSULIN HUMAN 10 UNITS: 100 INJECTION, SOLUTION PARENTERAL at 19:35

## 2018-02-08 RX ADMIN — ONDANSETRON 4 MG: 2 INJECTION INTRAMUSCULAR; INTRAVENOUS at 20:35

## 2018-02-08 NOTE — ED PROVIDER NOTES
EMERGENCY DEPARTMENT HISTORY AND PHYSICAL EXAM    5:59 PM      Date: 2/8/2018  Patient Name: Marium Porter    History of Presenting Illness     Chief Complaint   Patient presents with    Seizure         History Provided By: Patient    Chief Complaint: Sz  Duration:  Minutes  Timing:  Acute  Severity: Mild  Modifying Factors: Pt's blood sugar was over 500 when she arrived. She says she took her metformin last night and this morning. Associated Symptoms: Back pain, Ha, dysuria, fatigued, nausea, sob, and diarrhea. Denies fever, and cp. Additional History (Context): Marium Porter is a 37 y.o. female with hypertension, osteoarthritis and MS who presents with acute mild sz episodes onset minutes pta. . Associated sx are Back pain, Ha, dysuria, fatigued, nausea, sob, and diarrhea. Denies fever and CP. Pt's blood sugar was over 500 when she arrived. She says she took her metformin last night and this morning. Pt has no shx of tobacco and alcohol use. EMS gave 5mg versed IN and 2mg ativan IV. No post-ictal period. PCP: Larisa Schuler MD    Current Facility-Administered Medications   Medication Dose Route Frequency Provider Last Rate Last Dose    0.9% sodium chloride infusion 1,000 mL  1,000 mL IntraVENous ONCE Caden Edouard, DO         Current Outpatient Prescriptions   Medication Sig Dispense Refill    methylPREDNISolone (MEDROL, BOGDAN,) 4 mg tablet Take as instructed on package 1 Dose Pack 0    diazePAM (VALIUM) 2 mg tablet Take 1 Tab by mouth every eight (8) hours as needed for Anxiety. Max Daily Amount: 6 mg. 20 Tab 0    promethazine (PHENERGAN) 25 mg tablet Take 25 mg by mouth every six (6) hours as needed for Nausea.  amLODIPine (NORVASC) 10 mg tablet Take 10 mg by mouth daily.  naproxen (NAPROSYN) 500 mg tablet Take 500 mg by mouth two (2) times daily (with meals).  HYDROcodone-acetaminophen (NORCO) 5-325 mg per tablet Take 1 Tab by mouth every four (4) hours as needed for Pain.       NAPROXEN SOD/DIPHENHYDRAMINE (ALEVE PM PO) Take 2 Tabs by mouth nightly as needed (sleep).  metFORMIN (GLUCOPHAGE) 500 mg tablet Take 1.5 Tabs by mouth two (2) times daily (with meals). 60 Tab 0    DULoxetine (CYMBALTA) 60 mg capsule Take 60 mg by mouth daily.  QUEtiapine (SEROQUEL) 300 mg tablet Take 300 mg by mouth nightly.  potassium chloride (K-DUR, KLOR-CON) 20 mEq tablet Take 1 Tab by mouth two (2) times a day. 10 Tab 0    glatiramer (COPAXONE) 40 mg/mL injection 40 mg by SubCUTAneous route every Monday, Wednesday, Friday.  cyanocobalamin 1,000 mcg tablet Take 1,000 mcg by mouth daily. Past History     Past Medical History:  Past Medical History:   Diagnosis Date    Arthritis     Arthropathy, unspecified, site unspecified     Depression     Diabetes ([address]     Hypercholesteremia     Hypertension     history of htn    Incontinence of urine     Insomnia     Migraine     MS (multiple sclerosis) ([address]     MS (multiple sclerosis) ([address]     Neurogenic bladder     Other ill-defined conditions(799.89)     multiple Sclerosis    Seizures ([address]     6/2013    Wears glasses        Past Surgical History:  Past Surgical History:   Procedure Laterality Date    HX HYSTERECTOMY      HX ORTHOPAEDIC      1/2005 11/2008    HX OTHER SURGICAL         Family History:  Family History   Problem Relation Age of Onset    Heart Disease Father     Diabetes Father     Diabetes Sister     Other Other        Social History:  Social History   Substance Use Topics    Smoking status: Never Smoker    Smokeless tobacco: Never Used    Alcohol use No       Allergies: Allergies   Allergen Reactions    Oxycontin [Oxycodone] Hives and Other (comments)     intolerance         Review of Systems     Review of Systems   Constitutional: Positive for fatigue. Negative for fever. Respiratory: Positive for shortness of breath. Cardiovascular: Negative for chest pain.    Gastrointestinal: Positive for diarrhea and nausea. Endocrine: Positive for heat intolerance. Genitourinary: Positive for dysuria. Musculoskeletal: Positive for back pain. Neurological: Positive for seizures and headaches. All other systems reviewed and are negative. Physical Exam     Visit Vitals    BP (!) 140/94    Pulse (!) 109    Temp (P) 98.6 °F (37 °C)    Resp 22    SpO2 99%       Physical Exam   Constitutional: She is oriented to person, place, and time. She appears well-developed and well-nourished. HENT:   Head: Normocephalic and atraumatic. Neck: Neck supple. No JVD present. Cardiovascular: Normal rate and regular rhythm. Pulmonary/Chest: Effort normal and breath sounds normal. No respiratory distress. Abdominal: Soft. She exhibits no distension. There is no tenderness. There is no rebound and no guarding. Musculoskeletal: She exhibits no edema. DP 2+ BL  Cap refill 1 sec  Moving all extremities  Gross sensation intact x4   Neurological: She is alert and oriented to person, place, and time. No cranial nerve deficit. Skin: Skin is warm and dry. No erythema. Psychiatric: Judgment normal.         Diagnostic Study Results     Labs -  Recent Results (from the past 12 hour(s))   CBC WITH AUTOMATED DIFF    Collection Time: 02/08/18  5:50 PM   Result Value Ref Range    WBC 9.0 4.6 - 13.2 K/uL    RBC 4.67 4.20 - 5.30 M/uL    HGB 13.6 12.0 - 16.0 g/dL    HCT 37.8 35.0 - 45.0 %    MCV 80.9 74.0 - 97.0 FL    MCH 29.1 24.0 - 34.0 PG    MCHC 36.0 31.0 - 37.0 g/dL    RDW 12.8 11.6 - 14.5 %    PLATELET 924 (H) 087 - 420 K/uL    MPV 11.2 9.2 - 11.8 FL    NEUTROPHILS 47 40 - 73 %    LYMPHOCYTES 46 21 - 52 %    MONOCYTES 5 3 - 10 %    EOSINOPHILS 2 0 - 5 %    BASOPHILS 0 0 - 2 %    ABS. NEUTROPHILS 4.2 1.8 - 8.0 K/UL    ABS. LYMPHOCYTES 4.2 (H) 0.9 - 3.6 K/UL    ABS. MONOCYTES 0.5 0.05 - 1.2 K/UL    ABS. EOSINOPHILS 0.2 0.0 - 0.4 K/UL    ABS.  BASOPHILS 0.0 0.0 - 0.06 K/UL    DF AUTOMATED     METABOLIC PANEL, COMPREHENSIVE    Collection Time: 02/08/18  5:50 PM   Result Value Ref Range    Sodium 131 (L) 136 - 145 mmol/L    Potassium 3.5 3.5 - 5.5 mmol/L    Chloride 94 (L) 100 - 108 mmol/L    CO2 24 21 - 32 mmol/L    Anion gap 13 3.0 - 18 mmol/L    Glucose 443 (HH) 74 - 99 mg/dL    BUN 7 7.0 - 18 MG/DL    Creatinine 0.93 0.6 - 1.3 MG/DL    BUN/Creatinine ratio 8 (L) 12 - 20      GFR est AA >60 >60 ml/min/1.73m2    GFR est non-AA >60 >60 ml/min/1.73m2    Calcium 8.8 8.5 - 10.1 MG/DL    Bilirubin, total 0.6 0.2 - 1.0 MG/DL    ALT (SGPT) 88 (H) 13 - 56 U/L    AST (SGOT) 46 (H) 15 - 37 U/L    Alk. phosphatase 102 45 - 117 U/L    Protein, total 8.3 (H) 6.4 - 8.2 g/dL    Albumin 3.8 3.4 - 5.0 g/dL    Globulin 4.5 (H) 2.0 - 4.0 g/dL    A-G Ratio 0.8 0.8 - 1.7         Radiologic Studies -   No orders to display         Medical Decision Making   I am the first provider for this patient. I reviewed the vital signs, available nursing notes, past medical history, past surgical history, family history and social history. Vital Signs-Reviewed the patient's vital signs. Records Reviewed: Nursing Notes (Time of Review: 5:59 PM)    ED Course: Progress Notes, Reevaluation, and Consults:  8 PM : Pt care transferred to Dr. Mckay Gannon  ,ED provider. History of patient complaint(s), available diagnostic reports and current treatment plan has been discussed thoroughly. Bedside rounding on patient occured : yes . Intended disposition of patient : TBD  Pending diagnostics reports and/or labs (please list): Awaiting UA results. Plan for discharge home. Provider Notes (Medical Decision Making):   Upon my arrival into room, as ED tech was drawing blood, pt began to start shaking, bobbing head, eyes in upward gaze. Uncoordinated extremity tremors. Pt able to hold her arm up when I lifted it into air. I was speaking to pt and then she stopped shaking and immediately answered all questions appropriately.  Reviewed chart, no diagnosed epilepsy. Check basic labs, eval for uti. Pt well appearing, afebrile, no sign of pyelo. No trauma. Diagnosis     Clinical Impression:   1. Hyperglycemia        Disposition:         Attestations:  [address] acting as a scribe for and in the presence of Caden Edouard DO      February 08, 2018 at 5:59 PM       Provider Attestation:      I personally performed the services described in the documentation, reviewed the documentation, as recorded by the scribe in my presence, and it accurately and completely records my words and actions.  February 08, 2018 at 5:59 PM - Caden Edouard DO    _______________________________

## 2018-02-09 NOTE — DISCHARGE INSTRUCTIONS
Learning About High Blood Sugar  What is high blood sugar? Your body turns the food you eat into glucose (sugar), which it uses for energy. But if your body isn't able to use the sugar right away, it can build up in your blood and lead to high blood sugar. When the amount of sugar in your blood stays too high for too much of the time, you may have diabetes. Diabetes is a disease that can cause serious health problems. The good news is that lifestyle changes may help you get your blood sugar back to normal and avoid or delay diabetes. What causes high blood sugar? Sugar (glucose) can build up in your blood if you:  · Are overweight. · Have a family history of diabetes. · Take certain medicines, such as steroids. What are the symptoms? Having high blood sugar may not cause any symptoms at all. Or it may make you feel very thirsty or very hungry. You may also urinate more often than usual, have blurry vision, or lose weight without trying. How is high blood sugar treated? You can take steps to lower your blood sugar level if you understand what makes it get higher. Your doctor may want you to learn how to test your blood sugar level at home. Then you can see how illness, stress, or different kinds of food or medicine raise or lower your blood sugar level. Other tests may be needed to see if you have diabetes. How can you prevent high blood sugar? · Watch your weight. If you're overweight, losing just a small amount of weight may help. Reducing fat around your waist is most important. · Limit the amount of calories, sweets, and unhealthy fat you eat. Ask your doctor if a dietitian can help you. A registered dietitian can help you create meal plans that fit your lifestyle. · Get at least 30 minutes of exercise on most days of the week. Exercise helps control your blood sugar. It also helps you maintain a healthy weight. Walking is a good choice.  You also may want to do other activities, such as running, swimming, cycling, or playing tennis or team sports. · If your doctor prescribed medicines, take them exactly as prescribed. Call your doctor if you think you are having a problem with your medicine. You will get more details on the specific medicines your doctor prescribes. Follow-up care is a key part of your treatment and safety. Be sure to make and go to all appointments, and call your doctor if you are having problems. It's also a good idea to know your test results and keep a list of the medicines you take. Where can you learn more? Go to http://annel-sadie.info/. Enter O108 in the search box to learn more about \"Learning About High Blood Sugar. \"  Current as of: March 13, 2017  Content Version: 11.4  © 2602-8611 [employer]. Care instructions adapted under license by [employer] (which disclaims liability or warranty for this information). If you have questions about a medical condition or this instruction, always ask your healthcare professional. [address] any warranty or liability for your use of this information.

## 2018-02-09 NOTE — DIABETES MGMT
Patient contacted for follow-up home diabetes management. Patient was in the ER yesterday with hyperglycemia. Patient states she is feeling much better and her blood glucose this morning is 217 mg/dl. Takes Metformin twice daily along with Lantus insulin 40 units every bedtime. Patient states she does not miss doses. States her most recent A1C was 10..3%. It was 9.4% in September 2017. Has a follow-up appointment with her PCP on Feb. 16.  Encouraged patient to keep a record of her blood glucose reading to bring with her to her appointment. Patient had no questions or concerns at this time.

## 2018-02-09 NOTE — ED NOTES
I have reviewed discharge instructions with the patient. The patient verbalized understanding. Discharge medications reviewed with patient and appropriate educational materials and side effects teaching were provided. Patient armband removed and shredded. Pt escorted to lobby in wheelchair.

## 2018-02-10 NOTE — ED NOTES
1900: I assumed care of this patient from Dr. Rafal Freeman. Patient presented with hyperglycemia pending labs and reevaluation labs review myself hyperglycemia without acidosis and CO2 24 gap 13 potassium 3.5. Urinalysis unremarkable she is given fluids and insulin glucose 266. Per nursing staff patient was having high blood sugar episodes for which she was taking her glucose tablets. She was referred back to diabetic educator and brief diabetic education was given here. Patient's presentation, history, physical exam and laboratory evaluations were reviewed. At this time patient was felt to be stable for outpatient management and follow with primary care/specialist.  Patient was instructed to return to the emergency department with any concerns. Disposition:    Discharged home      Portions of this chart were created with Dragon medical speech to text program.   Unrecognized errors may be present.

## 2018-03-23 ENCOUNTER — HOSPITAL ENCOUNTER (INPATIENT)
Age: 43
LOS: 2 days | Discharge: HOME | DRG: 059 | End: 2018-03-25
Attending: EMERGENCY MEDICINE | Admitting: HOSPITALIST
Payer: MEDICARE

## 2018-03-23 DIAGNOSIS — G35 MULTIPLE SCLEROSIS EXACERBATION (HCC): Primary | ICD-10-CM

## 2018-03-23 LAB
ANION GAP SERPL CALC-SCNC: 10 MMOL/L (ref 3–18)
BASOPHILS # BLD: 0 K/UL (ref 0–0.06)
BASOPHILS NFR BLD: 0 % (ref 0–2)
BUN SERPL-MCNC: 7 MG/DL (ref 7–18)
BUN/CREAT SERPL: 10 (ref 12–20)
CALCIUM SERPL-MCNC: 9.2 MG/DL (ref 8.5–10.1)
CHLORIDE SERPL-SCNC: 99 MMOL/L (ref 100–108)
CO2 SERPL-SCNC: 25 MMOL/L (ref 21–32)
CREAT SERPL-MCNC: 0.67 MG/DL (ref 0.6–1.3)
DIFFERENTIAL METHOD BLD: ABNORMAL
EOSINOPHIL # BLD: 0.2 K/UL (ref 0–0.4)
EOSINOPHIL NFR BLD: 1 % (ref 0–5)
ERYTHROCYTE [DISTWIDTH] IN BLOOD BY AUTOMATED COUNT: 12.9 % (ref 11.6–14.5)
GLUCOSE SERPL-MCNC: 225 MG/DL (ref 74–99)
HCT VFR BLD AUTO: 40.4 % (ref 35–45)
HGB BLD-MCNC: 14.5 G/DL (ref 12–16)
LYMPHOCYTES # BLD: 4.9 K/UL (ref 0.9–3.6)
LYMPHOCYTES NFR BLD: 45 % (ref 21–52)
MAGNESIUM SERPL-MCNC: 1.7 MG/DL (ref 1.6–2.6)
MCH RBC QN AUTO: 29.5 PG (ref 24–34)
MCHC RBC AUTO-ENTMCNC: 35.9 G/DL (ref 31–37)
MCV RBC AUTO: 82.1 FL (ref 74–97)
MONOCYTES # BLD: 0.7 K/UL (ref 0.05–1.2)
MONOCYTES NFR BLD: 7 % (ref 3–10)
NEUTS SEG # BLD: 5.1 K/UL (ref 1.8–8)
NEUTS SEG NFR BLD: 47 % (ref 40–73)
PLATELET # BLD AUTO: 488 K/UL (ref 135–420)
PMV BLD AUTO: 11 FL (ref 9.2–11.8)
POTASSIUM SERPL-SCNC: 3.5 MMOL/L (ref 3.5–5.5)
RBC # BLD AUTO: 4.92 M/UL (ref 4.2–5.3)
SODIUM SERPL-SCNC: 134 MMOL/L (ref 136–145)
WBC # BLD AUTO: 10.9 K/UL (ref 4.6–13.2)

## 2018-03-23 PROCEDURE — 85025 COMPLETE CBC W/AUTO DIFF WBC: CPT | Performed by: EMERGENCY MEDICINE

## 2018-03-23 PROCEDURE — 80048 BASIC METABOLIC PNL TOTAL CA: CPT | Performed by: EMERGENCY MEDICINE

## 2018-03-23 PROCEDURE — 83735 ASSAY OF MAGNESIUM: CPT | Performed by: EMERGENCY MEDICINE

## 2018-03-23 PROCEDURE — 83036 HEMOGLOBIN GLYCOSYLATED A1C: CPT | Performed by: HOSPITALIST

## 2018-03-23 PROCEDURE — 96374 THER/PROPH/DIAG INJ IV PUSH: CPT

## 2018-03-23 PROCEDURE — 65270000029 HC RM PRIVATE

## 2018-03-23 PROCEDURE — 74011250636 HC RX REV CODE- 250/636: Performed by: EMERGENCY MEDICINE

## 2018-03-23 PROCEDURE — 96375 TX/PRO/DX INJ NEW DRUG ADDON: CPT

## 2018-03-23 PROCEDURE — 99285 EMERGENCY DEPT VISIT HI MDM: CPT

## 2018-03-23 RX ORDER — MORPHINE SULFATE 2 MG/ML
4 INJECTION, SOLUTION INTRAMUSCULAR; INTRAVENOUS
Status: COMPLETED | OUTPATIENT
Start: 2018-03-23 | End: 2018-03-23

## 2018-03-23 RX ADMIN — METHYLPREDNISOLONE SODIUM SUCCINATE 1000 MG: 125 INJECTION, POWDER, FOR SOLUTION INTRAMUSCULAR; INTRAVENOUS at 23:27

## 2018-03-23 RX ADMIN — MORPHINE SULFATE 4 MG: 2 INJECTION, SOLUTION INTRAMUSCULAR; INTRAVENOUS at 22:17

## 2018-03-23 NOTE — IP AVS SNAPSHOT
[address] [address] 
[phone] Patient: Anruag Manjarrez MRN: [id number] ZTF:[birth year] A check ninfa indicates which time of day the medication should be taken. My Medications START taking these medications Instructions Each Dose to Equal  
 Morning Noon Evening Bedtime  
 nitrofurantoin 50 mg capsule Commonly known as:  MACRODANTIN Your last dose was: Your next dose is: Take 1 Cap by mouth four (4) times daily for 3 days. 50 mg CONTINUE taking these medications Instructions Each Dose to Equal  
 Morning Noon Evening Bedtime ALEVE PM PO Your last dose was: Your next dose is: Take 2 Tabs by mouth nightly as needed (sleep). 2 Tab  
    
   
   
   
  
 amLODIPine 10 mg tablet Commonly known as:  Jillyn Boast Your last dose was: Your next dose is: Take 10 mg by mouth daily. 10 mg  
    
   
   
   
  
 COPAXONE 40 mg/mL injection Generic drug:  glatiramer Your last dose was: Your next dose is:    
   
   
 40 mg by SubCUTAneous route every Monday, Wednesday, Friday. 40 mg  
    
   
   
   
  
 cyanocobalamin 1,000 mcg tablet Your last dose was: Your next dose is: Take 1,000 mcg by mouth daily. 1000 mcg CYMBALTA 60 mg capsule Generic drug:  DULoxetine Your last dose was: Your next dose is: Take 60 mg by mouth daily. 60 mg  
    
   
   
   
  
 diazePAM 2 mg tablet Commonly known as:  VALIUM Your last dose was: Your next dose is: Take 1 Tab by mouth every eight (8) hours as needed for Anxiety. Max Daily Amount: 6 mg.  
 2 mg  
    
   
   
   
  
 gabapentin 300 mg capsule Commonly known as:  NEURONTIN Your last dose was: Your next dose is: Take 300 mg by mouth nightly. 1-2 tabs 300 mg HYDROcodone-acetaminophen 5-325 mg per tablet Commonly known as:  Ferne Arrow Your last dose was: Your next dose is: Take 1 Tab by mouth every four (4) hours as needed for Pain. 1 Tab  
    
   
   
   
  
 metFORMIN 500 mg tablet Commonly known as:  GLUCOPHAGE Your last dose was: Your next dose is: Take 1.5 Tabs by mouth two (2) times daily (with meals). 850 mg  
    
   
   
   
  
 naproxen 500 mg tablet Commonly known as:  NAPROSYN Your last dose was: Your next dose is: Take 500 mg by mouth two (2) times daily (with meals). 500 mg  
    
   
   
   
  
 potassium chloride 20 mEq tablet Commonly known as:  K-DUR, KLOR-CON Your last dose was: Your next dose is: Take 1 Tab by mouth two (2) times a day. 20 mEq  
    
   
   
   
  
 promethazine 25 mg tablet Commonly known as:  PHENERGAN Your last dose was: Your next dose is: Take 25 mg by mouth every six (6) hours as needed for Nausea. 25 mg SEROquel 300 mg tablet Generic drug:  QUEtiapine Your last dose was: Your next dose is: Take 300 mg by mouth nightly. 300 mg  
    
   
   
   
  
  
STOP taking these medications   
 methylPREDNISolone 4 mg tablet Commonly known as:  MEDROL (BOGDAN) Where to Get Your Medications These medications were sent to [address] [facility], [address] [place] [address]  [address] [facility], [address] [facility] [address] Hours:  24-hours Phone:  [phone]  
  nitrofurantoin 50 mg capsule

## 2018-03-23 NOTE — IP AVS SNAPSHOT
Jerry Bates 
 
 
 [address] [place] [id number] 
[phone] Patient: Rhett Costa MRN: [id number] HMT:[id number]/[id number] About your hospitalization You were admitted on:  March 23, 2018 You last received care in the:  [address] [facility] You were discharged on:  March 25, 2018 Why you were hospitalized Your primary diagnosis was:  Multiple Sclerosis Exacerbation (Hcc) Your diagnoses also included:  Ms (Multiple Sclerosis) (Hcc), Obesity, Hyperlipidemia, Chronic Low Back Pain, Central Pain Syndrome, Type Ii Diabetes Mellitus (Hcc) Follow-up Information Follow up With Details Comments Contact Info Roz Melara MD Schedule an appointment as soon as possible for a visit in 3 days Call to make a follow-up appointment for 3-5 days. [address] [id number] 
[phone] Jesús Lazaro NP Schedule an appointment as soon as possible for a visit in 1 week Call to make a follow-up neurology appointment for 1-2 weeks. [address] [place] [id number] 
[phone] Your Scheduled Appointments Thursday March 29, 2018 11:30 AM EDT New Patient with Doni Trujillo MD  
[facility]. [address] ([facility]) [address] [place] [id number]  
[phone] Discharge Orders None A check ninfa indicates which time of day the medication should be taken. My Medications START taking these medications Instructions Each Dose to Equal  
 Morning Noon Evening Bedtime  
 nitrofurantoin 50 mg capsule Commonly known as:  MACRODANTIN Your last dose was: Your next dose is: Take 1 Cap by mouth four (4) times daily for 3 days. 50 mg CONTINUE taking these medications Instructions Each Dose to Equal  
 Morning Noon Evening Bedtime ALEVE PM PO Your last dose was: Your next dose is: Take 2 Tabs by mouth nightly as needed (sleep). 2 Tab  
    
   
   
   
  
 amLODIPine 10 mg tablet Commonly known as:  Jasmin Valencia Your last dose was: Your next dose is: Take 10 mg by mouth daily. 10 mg  
    
   
   
   
  
 COPAXONE 40 mg/mL injection Generic drug:  glatiramer Your last dose was: Your next dose is:    
   
   
 40 mg by SubCUTAneous route every Monday, Wednesday, Friday. 40 mg  
    
   
   
   
  
 cyanocobalamin 1,000 mcg tablet Your last dose was: Your next dose is: Take 1,000 mcg by mouth daily. 1000 mcg CYMBALTA 60 mg capsule Generic drug:  DULoxetine Your last dose was: Your next dose is: Take 60 mg by mouth daily. 60 mg  
    
   
   
   
  
 diazePAM 2 mg tablet Commonly known as:  VALIUM Your last dose was: Your next dose is: Take 1 Tab by mouth every eight (8) hours as needed for Anxiety. Max Daily Amount: 6 mg.  
 2 mg  
    
   
   
   
  
 gabapentin 300 mg capsule Commonly known as:  NEURONTIN Your last dose was: Your next dose is: Take 300 mg by mouth nightly. 1-2 tabs 300 mg HYDROcodone-acetaminophen 5-325 mg per tablet Commonly known as:  Katelyn Punt Your last dose was: Your next dose is: Take 1 Tab by mouth every four (4) hours as needed for Pain. 1 Tab  
    
   
   
   
  
 metFORMIN 500 mg tablet Commonly known as:  GLUCOPHAGE Your last dose was: Your next dose is: Take 1.5 Tabs by mouth two (2) times daily (with meals). 850 mg  
    
   
   
   
  
 naproxen 500 mg tablet Commonly known as:  NAPROSYN Your last dose was: Your next dose is: Take 500 mg by mouth two (2) times daily (with meals).   
 500 mg  
    
   
   
   
  
 potassium chloride 20 mEq tablet Commonly known as:  K-DUR, KLOR-CON Your last dose was: Your next dose is: Take 1 Tab by mouth two (2) times a day. 20 mEq  
    
   
   
   
  
 promethazine 25 mg tablet Commonly known as:  PHENERGAN Your last dose was: Your next dose is: Take 25 mg by mouth every six (6) hours as needed for Nausea. 25 mg SEROquel 300 mg tablet Generic drug:  QUEtiapine Your last dose was: Your next dose is: Take 300 mg by mouth nightly. 300 mg  
    
   
   
   
  
  
STOP taking these medications   
 methylPREDNISolone 4 mg tablet Commonly known as:  MEDROL (BOGDAN) Where to Get Your Medications These medications were sent to [facility], [address] [facility]  [address] [facility], [address] [facility] [address] Hours:  24-hours Phone:  [phone]  
  nitrofurantoin 50 mg capsule Discharge Instructions Multiple Sclerosis (MS): Care Instructions Your Care Instructions Multiple sclerosis, also called MS, is a disease that can affect the brain, spinal cord, and nerves to the eyes. MS can cause problems with muscle control and strength, vision, balance, feeling, and thinking. Whatever your symptoms are, taking medicine correctly and following your doctor's advice for home care can help you maintain your quality of life. Follow-up care is a key part of your treatment and safety. Be sure to make and go to all appointments, and call your doctor if you are having problems. It's also a good idea to know your test results and keep a list of the medicines you take. How can you care for yourself at home? General care · Take your medicines exactly as prescribed. Call your doctor if you think you are having a problem with your medicine. · Use a cane, walker, or scooter if your doctor suggests it. · Keep doing your normal activities as much as you can. · If you have problems urinating, press or tap your bladder area to help start urine flow. If you have trouble controlling your urine, plan your fluid intake and activities so that a toilet will be available when you need it. · Spend time with family and friends. Join a support group for people with MS if you want extra help. · Depression is common with this condition. Tell your doctor if you have trouble sleeping, are eating too much or are not hungry, or feel sad or tearful all the time. Depression can be treated with medicine and counseling. Diet and exercise · Eat a balanced diet. · If you have problems swallowing, change how and what you eat: ¨ Try thick drinks, such as milk shakes. They are easier to swallow than other fluids. ¨ Do not eat foods that crumble easily. These can cause choking. ¨ Use a  to prepare food. Soft foods need less chewing. ¨ Eat small meals often so that you do not get tired from eating larger meals. · Get exercise on most days. Work with your doctor to set up a program of walking, swimming, or other exercise that you are able to do. A physical therapist can teach you exercises if you cannot walk but can move your limbs and trunk. Or you can do exercises to help with coordination and balance. You can help improve muscle stiffness by doing exercises while lying in certain positions. When should you call for help? Call your doctor now or seek immediate medical care if: 
? · You have a change in symptoms. ? · You fall or have another injury. ? · You have symptoms of a urinary infection. For example: ¨ You have blood or pus in your urine. ¨ You have pain in your back just below your rib cage. This is called flank pain. ¨ You have a fever, chills, or body aches. ¨ It hurts to urinate. ¨ You have groin or belly pain. ? Watch closely for changes in your health, and be sure to contact your doctor if: ? · You want more information about MS or medicines. ? · You have questions about alternative treatments. Do not use any other treatments without talking to your doctor first.  
Where can you learn more? Go to http://annel-sadie.info/. Enter U221 in the search box to learn more about \"Multiple Sclerosis (MS): Care Instructions. \" Current as of: October 14, 2016 Content Version: 11.4 © 9457-3438 [employer]. Care instructions adapted under license by [employer] (which disclaims liability or warranty for this information). If you have questions about a medical condition or this instruction, always ask your healthcare professional. [address] any warranty or liability for your use of this information. DISCHARGE SUMMARY from Nurse PATIENT INSTRUCTIONS: 
 
 
F-face looks uneven A-arms unable to move or move unevenly S-speech slurred or non-existent T-time-call 911 as soon as signs and symptoms begin-DO NOT go Back to bed or wait to see if you get better-TIME IS BRAIN. Warning Signs of HEART ATTACK Call 911 if you have these symptoms: 
? Chest discomfort. Most heart attacks involve discomfort in the center of the chest that lasts more than a few minutes, or that goes away and comes back. It can feel like uncomfortable pressure, squeezing, fullness, or pain. ? Discomfort in other areas of the upper body. Symptoms can include pain or discomfort in one or both arms, the back, neck, jaw, or stomach. ? Shortness of breath with or without chest discomfort. ? Other signs may include breaking out in a cold sweat, nausea, or lightheadedness. Don't wait more than five minutes to call [address]! Fast action can save your life. Calling 911 is almost always the fastest way to get lifesaving treatment. Emergency Medical Services staff can begin treatment when they arrive  up to an hour sooner than if someone gets to the hospital by car. The discharge information has been reviewed with the patient. The patient verbalized understanding. Discharge medications reviewed with the patient and appropriate educational materials and side effects teaching were provided. ___________________________________________________________________________________________________________________________________ Patient armband removed and shredded MyChart Announcement We are excited to announce that we are making your provider's discharge notes available to you in ImageSpike. You will see these notes when they are completed and signed by the physician that discharged you from your recent hospital stay. If you have any questions or concerns about any information you see in [employer], please call the Health Information Department where you were seen or reach out to your Primary Care Provider for more information about your plan of care. Introducing [facility]! Milena Ahuja introduces ImageSpike patient portal. Now you can access parts of your medical record, email your doctor's office, and request medication refills online. 1. In your internet browser, go to [email or web address] 2. Click on the First Time User? Click Here link in the Sign In box. You will see the New Member Sign Up page. 3. Enter your ImageSpike Access Code exactly as it appears below. You will not need to use this code after youve completed the sign-up process. If you do not sign up before the expiration date, you must request a new code. · [employer] Access Code: [email or web address] Expires: 3/27/2018  4:03 PM 
 
4. Enter the last four digits of your Social Security Number (xxxx) and Date of Birth (mm/dd/yyyy) as indicated and click Submit. You will be taken to the next sign-up page. 5. Create a Postdeckt ID. This will be your Rev[employer] login ID and cannot be changed, so think of one that is secure and easy to remember. 6. Create a [employer] password. You can change your password at any time. 7. Enter your Password Reset Question and Answer. This can be used at a later time if you forget your password. 8. Enter your e-mail address. You will receive e-mail notification when new information is available in [address] 9. Click Sign Up. You can now view and download portions of your medical record. 10. Click the Download Summary menu link to download a portable copy of your medical information. If you have questions, please visit the Frequently Asked Questions section of the [employer] website. Remember, [employer] is NOT to be used for urgent needs. For medical emergencies, dial 911. Now available from your iPhone and Android! Providers Seen During Your Hospitalization Provider Specialty Primary office phone Kevin So DO Emergency Medicine [phone] Elizabeth Segura MD Family Practice [phone] Ngozi Rizvi DO Internal Medicine [phone] Your Primary Care Physician (PCP) Primary Care Physician Office Phone Office Fax Blaire Mendoza [phone] You are allergic to the following Allergen Reactions Oxycontin (Oxycodone) Hives Other (comments)  
 intolerance Recent Documentation Height Weight BMI OB Status Smoking Status 1.6 m 106.6 kg 41.63 kg/m2 Hysterectomy Never Smoker Emergency Contacts Name Discharge Info Relation Home Work Mobile De Kwame Zeina CAREGIVER [3] Son [22] [phone] Dax Alatorre  Son [22] Berkley Daniels  Sister [23]   [phone] Erna Harvey  Child [2] [phone] Patient Belongings The following personal items are in your possession at time of discharge: 
  Dental Appliances: None  Visual Aid: Glasses, With patient      Home Medications: Sent to pharmacy   Jewelry: Ring (3)  Clothing: Shorts, Shirt, Sweater, Socks, Undergarments    Other Valuables: Art Ben, Cell Phone, Eyeglasses, With patient (2 cell phones) Discharge Instructions Attachments/References NITROFURANTOIN (BY MOUTH) (ENGLISH) Patient Handouts Nitrofurantoin (By mouth) Nitrofurantoin (kaq-krmg-drda-AN-toyn) Treats or prevents urinary tract infections. Brand Name(s): Furadantin There may be other brand names for this medicine. When This Medicine Should Not Be Used: You should not use this medicine if you have had an allergic reaction to nitrofurantoin or if you are in your last few weeks of pregnancy (week 38 or later). You should not use this medicine if you have severe kidney disease, a decreased amount of urine, or are unable to urinate. You should not use this medicine if you had liver problems after using it before. Do not give this medicine to infants younger than 1 month of age. How to Use This Medicine:  
Liquid, Tablet · Your doctor will tell you how much medicine to use. Do not use more than directed. · It is best to take this medicine with food or milk. · Measure the oral liquid medicine with a marked measuring spoon, oral syringe, or medicine cup. · Take all of the medicine in your prescription to clear up your infection, even if you feel better after the first few doses. If a dose is missed: · Take a dose as soon as you remember. If it is almost time for your next dose, wait until then and take a regular dose. Do not take extra medicine to make up for a missed dose. How to Store and Dispose of This Medicine: · Store the medicine in a closed container at room temperature, away from heat, moisture, and direct light. · Ask your pharmacist, doctor, or health caregiver about the best way to dispose of any outdated medicine or medicine no longer needed. · Keep all medicine out of the reach of children. Never share your medicine with anyone. Drugs and Foods to Avoid: Ask your doctor or pharmacist before using any other medicine, including over-the-counter medicines, vitamins, and herbal products. · Make sure your doctor knows if you are also using probenecid (Benemid®) or sulfinpyrazone (Anturane®). · It is best not to use antacids containing magnesium trisilicate (such as Genaton®) while you are using nitrofurantoin. Warnings While Using This Medicine: · Make sure your doctor knows if you are pregnant or breastfeeding, or if you have kidney disease, liver disease, lung disease, anemia, diabetes, a mineral imbalance in the blood, vitamin B deficiency, or a blood condition called [id number]. · This medicine may cause your urine to become brown-colored. This is normal and will not affect how the medicine works. · This medicine can cause diarrhea. Call your doctor if the diarrhea becomes severe, does not stop, or is bloody. Do not take any medicine to stop diarrhea until you have talked to your doctor. Diarrhea can occur 2 months or more after you stop taking this medicine. · Use this medicine only to treat the infection you now have. · Call your doctor if your symptoms do not improve or if they get worse. · Your doctor will do lab tests at regular visits to check on the effects of this medicine. Keep all appointments. Possible Side Effects While Using This Medicine:  
Call your doctor right away if you notice any of these side effects: · Allergic reaction: Itching or hives, swelling in your face or hands, swelling or tingling in your mouth or throat, chest tightness, trouble breathing · Blistering, peeling, or red skin rash. · Cough, fever, chills, weakness, shortness of breath, or chest pain. · Dark-colored urine or pale stools. · Nausea, vomiting, loss of appetite, or pain in your upper stomach. · Numbness, tingling, or burning pain in your hands, arms, legs, or feet. · Severe and watery diarrhea that may contain blood. · Yellowing of your skin or the whites of your eyes. If you notice these less serious side effects, talk with your doctor: · Dizziness, headache, or blurred vision. · Mild nausea, vomiting, stomach pain, or loss of appetite. · Temporary hair loss. · Vaginal itching or discharge. If you notice other side effects that you think are caused by this medicine, tell your doctor. Call your doctor for medical advice about side effects. You may report side effects to FDA at 6-106-FDA-3564 © 2017 [address] Information is for End User's use only and may not be sold, redistributed or otherwise used for commercial purposes. The above information is an  only. It is not intended as medical advice for individual conditions or treatments. Talk to your doctor, nurse or pharmacist before following any medical regimen to see if it is safe and effective for you. Please provide this summary of care documentation to your next provider. Signatures-by signing, you are acknowledging that this After Visit Summary has been reviewed with you and you have received a copy. Patient Signature:  ____________________________________________________________ Date:  ____________________________________________________________  
  
Reshma Malcolm Provider Signature:  ____________________________________________________________ Date:  ____________________________________________________________

## 2018-03-24 ENCOUNTER — APPOINTMENT (OUTPATIENT)
Dept: MRI IMAGING | Age: 43
DRG: 059 | End: 2018-03-24
Attending: NURSE PRACTITIONER
Payer: MEDICARE

## 2018-03-24 ENCOUNTER — APPOINTMENT (OUTPATIENT)
Dept: GENERAL RADIOLOGY | Age: 43
DRG: 059 | End: 2018-03-24
Attending: NURSE PRACTITIONER
Payer: MEDICARE

## 2018-03-24 PROBLEM — E11.9 TYPE II DIABETES MELLITUS (HCC): Status: ACTIVE | Noted: 2018-03-24

## 2018-03-24 PROBLEM — G35 MS (MULTIPLE SCLEROSIS) (HCC): Status: RESOLVED | Noted: 2018-03-23 | Resolved: 2018-03-24

## 2018-03-24 LAB
ANION GAP SERPL CALC-SCNC: 10 MMOL/L (ref 3–18)
APPEARANCE UR: CLEAR
BACTERIA URNS QL MICRO: ABNORMAL /HPF
BASOPHILS # BLD: 0 K/UL (ref 0–0.06)
BASOPHILS NFR BLD: 0 % (ref 0–2)
BILIRUB UR QL: NEGATIVE
BUN SERPL-MCNC: 9 MG/DL (ref 7–18)
BUN/CREAT SERPL: 12 (ref 12–20)
CALCIUM SERPL-MCNC: 9.3 MG/DL (ref 8.5–10.1)
CHLORIDE SERPL-SCNC: 97 MMOL/L (ref 100–108)
CO2 SERPL-SCNC: 23 MMOL/L (ref 21–32)
COLOR UR: YELLOW
CREAT SERPL-MCNC: 0.78 MG/DL (ref 0.6–1.3)
DIFFERENTIAL METHOD BLD: ABNORMAL
EOSINOPHIL # BLD: 0 K/UL (ref 0–0.4)
EOSINOPHIL NFR BLD: 0 % (ref 0–5)
EPITH CASTS URNS QL MICRO: ABNORMAL /LPF (ref 0–5)
ERYTHROCYTE [DISTWIDTH] IN BLOOD BY AUTOMATED COUNT: 13 % (ref 11.6–14.5)
EST. AVERAGE GLUCOSE BLD GHB EST-MCNC: 200 MG/DL
GLUCOSE BLD STRIP.AUTO-MCNC: 231 MG/DL (ref 70–110)
GLUCOSE BLD STRIP.AUTO-MCNC: 311 MG/DL (ref 70–110)
GLUCOSE BLD STRIP.AUTO-MCNC: 322 MG/DL (ref 70–110)
GLUCOSE BLD STRIP.AUTO-MCNC: 338 MG/DL (ref 70–110)
GLUCOSE BLD STRIP.AUTO-MCNC: 371 MG/DL (ref 70–110)
GLUCOSE SERPL-MCNC: 387 MG/DL (ref 74–99)
GLUCOSE UR STRIP.AUTO-MCNC: >1000 MG/DL
HBA1C MFR BLD: 8.6 % (ref 4.2–5.6)
HCT VFR BLD AUTO: 39.2 % (ref 35–45)
HGB BLD-MCNC: 14 G/DL (ref 12–16)
HGB UR QL STRIP: NEGATIVE
KETONES UR QL STRIP.AUTO: 40 MG/DL
LEUKOCYTE ESTERASE UR QL STRIP.AUTO: NEGATIVE
LYMPHOCYTES # BLD: 1.4 K/UL (ref 0.9–3.6)
LYMPHOCYTES NFR BLD: 17 % (ref 21–52)
MCH RBC QN AUTO: 29.7 PG (ref 24–34)
MCHC RBC AUTO-ENTMCNC: 35.7 G/DL (ref 31–37)
MCV RBC AUTO: 83.1 FL (ref 74–97)
MONOCYTES # BLD: 0.1 K/UL (ref 0.05–1.2)
MONOCYTES NFR BLD: 1 % (ref 3–10)
NEUTS SEG # BLD: 7.1 K/UL (ref 1.8–8)
NEUTS SEG NFR BLD: 82 % (ref 40–73)
NITRITE UR QL STRIP.AUTO: POSITIVE
PH UR STRIP: 5 [PH] (ref 5–8)
PLATELET # BLD AUTO: 470 K/UL (ref 135–420)
PMV BLD AUTO: 11.1 FL (ref 9.2–11.8)
POTASSIUM SERPL-SCNC: 4.3 MMOL/L (ref 3.5–5.5)
PROT UR STRIP-MCNC: 30 MG/DL
RBC # BLD AUTO: 4.72 M/UL (ref 4.2–5.3)
RBC #/AREA URNS HPF: 0 /HPF (ref 0–5)
SODIUM SERPL-SCNC: 130 MMOL/L (ref 136–145)
SP GR UR REFRACTOMETRY: >1.03 (ref 1–1.03)
UROBILINOGEN UR QL STRIP.AUTO: 0.2 EU/DL (ref 0.2–1)
WBC # BLD AUTO: 8.6 K/UL (ref 4.6–13.2)
WBC URNS QL MICRO: ABNORMAL /HPF (ref 0–4)

## 2018-03-24 PROCEDURE — 82962 GLUCOSE BLOOD TEST: CPT

## 2018-03-24 PROCEDURE — 85025 COMPLETE CBC W/AUTO DIFF WBC: CPT | Performed by: HOSPITALIST

## 2018-03-24 PROCEDURE — 97161 PT EVAL LOW COMPLEX 20 MIN: CPT

## 2018-03-24 PROCEDURE — 74011250637 HC RX REV CODE- 250/637: Performed by: NURSE PRACTITIONER

## 2018-03-24 PROCEDURE — 36415 COLL VENOUS BLD VENIPUNCTURE: CPT | Performed by: HOSPITALIST

## 2018-03-24 PROCEDURE — 70553 MRI BRAIN STEM W/O & W/DYE: CPT

## 2018-03-24 PROCEDURE — 74011250637 HC RX REV CODE- 250/637: Performed by: HOSPITALIST

## 2018-03-24 PROCEDURE — 81001 URINALYSIS AUTO W/SCOPE: CPT | Performed by: NURSE PRACTITIONER

## 2018-03-24 PROCEDURE — 74011636637 HC RX REV CODE- 636/637: Performed by: HOSPITALIST

## 2018-03-24 PROCEDURE — 74011250636 HC RX REV CODE- 250/636: Performed by: HOSPITALIST

## 2018-03-24 PROCEDURE — 72156 MRI NECK SPINE W/O & W/DYE: CPT

## 2018-03-24 PROCEDURE — 72157 MRI CHEST SPINE W/O & W/DYE: CPT

## 2018-03-24 PROCEDURE — 97162 PT EVAL MOD COMPLEX 30 MIN: CPT

## 2018-03-24 PROCEDURE — 80048 BASIC METABOLIC PNL TOTAL CA: CPT | Performed by: HOSPITALIST

## 2018-03-24 PROCEDURE — A9575 INJ GADOTERATE MEGLUMI 0.1ML: HCPCS | Performed by: HOSPITALIST

## 2018-03-24 PROCEDURE — 97530 THERAPEUTIC ACTIVITIES: CPT

## 2018-03-24 PROCEDURE — 71045 X-RAY EXAM CHEST 1 VIEW: CPT

## 2018-03-24 PROCEDURE — 65270000029 HC RM PRIVATE

## 2018-03-24 RX ORDER — AMLODIPINE BESYLATE 10 MG/1
10 TABLET ORAL DAILY
Status: DISCONTINUED | OUTPATIENT
Start: 2018-03-24 | End: 2018-03-25 | Stop reason: HOSPADM

## 2018-03-24 RX ORDER — NAPROXEN 250 MG/1
500 TABLET ORAL 2 TIMES DAILY WITH MEALS
Status: DISCONTINUED | OUTPATIENT
Start: 2018-03-24 | End: 2018-03-25 | Stop reason: HOSPADM

## 2018-03-24 RX ORDER — MAGNESIUM SULFATE 100 %
4 CRYSTALS MISCELLANEOUS AS NEEDED
Status: DISCONTINUED | OUTPATIENT
Start: 2018-03-24 | End: 2018-03-25 | Stop reason: HOSPADM

## 2018-03-24 RX ORDER — HYDROCODONE BITARTRATE AND ACETAMINOPHEN 5; 325 MG/1; MG/1
TABLET ORAL
Status: DISPENSED
Start: 2018-03-24 | End: 2018-03-24

## 2018-03-24 RX ORDER — ENOXAPARIN SODIUM 100 MG/ML
40 INJECTION SUBCUTANEOUS EVERY 24 HOURS
Status: DISCONTINUED | OUTPATIENT
Start: 2018-03-24 | End: 2018-03-25 | Stop reason: HOSPADM

## 2018-03-24 RX ORDER — PROMETHAZINE HYDROCHLORIDE 25 MG/1
25 TABLET ORAL
Status: DISCONTINUED | OUTPATIENT
Start: 2018-03-24 | End: 2018-03-25 | Stop reason: HOSPADM

## 2018-03-24 RX ORDER — GABAPENTIN 300 MG/1
300 CAPSULE ORAL
COMMUNITY

## 2018-03-24 RX ORDER — DEXTROSE MONOHYDRATE 25 G/50ML
25-50 INJECTION, SOLUTION INTRAVENOUS AS NEEDED
Status: DISCONTINUED | OUTPATIENT
Start: 2018-03-24 | End: 2018-03-25 | Stop reason: HOSPADM

## 2018-03-24 RX ORDER — POTASSIUM CHLORIDE 20 MEQ/1
20 TABLET, EXTENDED RELEASE ORAL 2 TIMES DAILY
Status: DISCONTINUED | OUTPATIENT
Start: 2018-03-24 | End: 2018-03-25 | Stop reason: HOSPADM

## 2018-03-24 RX ORDER — GABAPENTIN 300 MG/1
300 CAPSULE ORAL
Status: DISCONTINUED | OUTPATIENT
Start: 2018-03-24 | End: 2018-03-25 | Stop reason: HOSPADM

## 2018-03-24 RX ORDER — GADOTERATE MEGLUMINE 376.9 MG/ML
20 INJECTION INTRAVENOUS
Status: COMPLETED | OUTPATIENT
Start: 2018-03-24 | End: 2018-03-24

## 2018-03-24 RX ORDER — INSULIN GLARGINE 100 [IU]/ML
10 INJECTION, SOLUTION SUBCUTANEOUS EVERY 12 HOURS
Status: DISCONTINUED | OUTPATIENT
Start: 2018-03-24 | End: 2018-03-25 | Stop reason: HOSPADM

## 2018-03-24 RX ORDER — HYDROCODONE BITARTRATE AND ACETAMINOPHEN 5; 325 MG/1; MG/1
1 TABLET ORAL
Status: DISCONTINUED | OUTPATIENT
Start: 2018-03-24 | End: 2018-03-25 | Stop reason: HOSPADM

## 2018-03-24 RX ORDER — DULOXETIN HYDROCHLORIDE 60 MG/1
60 CAPSULE, DELAYED RELEASE ORAL DAILY
Status: DISCONTINUED | OUTPATIENT
Start: 2018-03-24 | End: 2018-03-25 | Stop reason: HOSPADM

## 2018-03-24 RX ORDER — DIAZEPAM 2 MG/1
2 TABLET ORAL
Status: DISCONTINUED | OUTPATIENT
Start: 2018-03-24 | End: 2018-03-25 | Stop reason: HOSPADM

## 2018-03-24 RX ORDER — INSULIN LISPRO 100 [IU]/ML
INJECTION, SOLUTION INTRAVENOUS; SUBCUTANEOUS
Status: DISCONTINUED | OUTPATIENT
Start: 2018-03-24 | End: 2018-03-25 | Stop reason: HOSPADM

## 2018-03-24 RX ORDER — GLATIRAMER 40 MG/ML
40 INJECTION, SOLUTION SUBCUTANEOUS
Status: DISCONTINUED | OUTPATIENT
Start: 2018-03-26 | End: 2018-03-25 | Stop reason: HOSPADM

## 2018-03-24 RX ORDER — INSULIN GLARGINE 100 [IU]/ML
10 INJECTION, SOLUTION SUBCUTANEOUS
Status: DISCONTINUED | OUTPATIENT
Start: 2018-03-24 | End: 2018-03-24

## 2018-03-24 RX ADMIN — INSULIN GLARGINE 10 UNITS: 100 INJECTION, SOLUTION SUBCUTANEOUS at 02:50

## 2018-03-24 RX ADMIN — HYDROCODONE BITARTRATE AND ACETAMINOPHEN 1 TABLET: 5; 325 TABLET ORAL at 23:12

## 2018-03-24 RX ADMIN — GADOTERATE MEGLUMINE 20 ML: 376.9 INJECTION INTRAVENOUS at 16:43

## 2018-03-24 RX ADMIN — NAPROXEN 500 MG: 250 TABLET ORAL at 10:04

## 2018-03-24 RX ADMIN — HYDROCODONE BITARTRATE AND ACETAMINOPHEN 1 TABLET: 5; 325 TABLET ORAL at 01:31

## 2018-03-24 RX ADMIN — INSULIN LISPRO 12 UNITS: 100 INJECTION, SOLUTION INTRAVENOUS; SUBCUTANEOUS at 13:47

## 2018-03-24 RX ADMIN — INSULIN LISPRO 12 UNITS: 100 INJECTION, SOLUTION INTRAVENOUS; SUBCUTANEOUS at 18:49

## 2018-03-24 RX ADMIN — QUETIAPINE FUMARATE 300 MG: 100 TABLET ORAL at 23:05

## 2018-03-24 RX ADMIN — INSULIN GLARGINE 10 UNITS: 100 INJECTION, SOLUTION SUBCUTANEOUS at 23:06

## 2018-03-24 RX ADMIN — INSULIN LISPRO 10 UNITS: 100 INJECTION, SOLUTION INTRAVENOUS; SUBCUTANEOUS at 10:00

## 2018-03-24 RX ADMIN — PROMETHAZINE HYDROCHLORIDE 25 MG: 25 TABLET ORAL at 09:59

## 2018-03-24 RX ADMIN — GABAPENTIN 300 MG: 300 CAPSULE ORAL at 23:05

## 2018-03-24 RX ADMIN — POTASSIUM CHLORIDE 20 MEQ: 1500 TABLET, FILM COATED, EXTENDED RELEASE ORAL at 18:51

## 2018-03-24 RX ADMIN — AMLODIPINE BESYLATE 10 MG: 10 TABLET ORAL at 10:03

## 2018-03-24 RX ADMIN — HYDROCODONE BITARTRATE AND ACETAMINOPHEN 1 TABLET: 5; 325 TABLET ORAL at 18:50

## 2018-03-24 RX ADMIN — INSULIN LISPRO 15 UNITS: 100 INJECTION, SOLUTION INTRAVENOUS; SUBCUTANEOUS at 23:05

## 2018-03-24 RX ADMIN — PROMETHAZINE HYDROCHLORIDE 25 MG: 25 TABLET ORAL at 23:12

## 2018-03-24 RX ADMIN — POTASSIUM CHLORIDE 20 MEQ: 1500 TABLET, FILM COATED, EXTENDED RELEASE ORAL at 10:03

## 2018-03-24 RX ADMIN — ENOXAPARIN SODIUM 40 MG: 40 INJECTION SUBCUTANEOUS at 02:49

## 2018-03-24 RX ADMIN — HYDROCODONE BITARTRATE AND ACETAMINOPHEN 1 TABLET: 5; 325 TABLET ORAL at 13:42

## 2018-03-24 RX ADMIN — HYDROCODONE BITARTRATE AND ACETAMINOPHEN 1 TABLET: 5; 325 TABLET ORAL at 09:59

## 2018-03-24 RX ADMIN — NAPROXEN 500 MG: 250 TABLET ORAL at 18:51

## 2018-03-24 RX ADMIN — DULOXETINE HYDROCHLORIDE 60 MG: 60 CAPSULE, DELAYED RELEASE ORAL at 10:01

## 2018-03-24 RX ADMIN — HYDROCODONE BITARTRATE AND ACETAMINOPHEN 1 TABLET: 5; 325 TABLET ORAL at 06:20

## 2018-03-24 NOTE — PROGRESS NOTES
Problem: Falls - Risk of  Goal: *Absence of Falls  Document Jordyn Fall Risk and appropriate interventions in the flowsheet.    Outcome: Progressing Towards Goal  Fall Risk Interventions:            Medication Interventions: Evaluate medications/consider consulting pharmacy, Patient to call before getting OOB, Teach patient to arise slowly    Elimination Interventions: Patient to call for help with toileting needs, Toilet paper/wipes in reach, Toileting schedule/hourly rounds, Call light in reach

## 2018-03-24 NOTE — PROGRESS NOTES
[facility]   Discharge Planning/ Assessment    Reasons for Intervention: Chart reviewed and pt verified demographics. She lives alone and has a caregiver 7 days a week, 0900 to 1500 daily. After that time her son and her friends and neighbors call and come in to do any help. She has had [address] in the past. She has a cane, walker, and bedside comode at home  She has [place] part a and part b, and also [facility] she says. She has PT /OT consults, will see what they recommend. plan is to return home, maybe with PT/OT again [facility]. Sister Beka Aid to drive. High Risk Criteria  [] Yes  [x]No   Physician Referral  [] Yes  []No        Date    Nursing Referral  [] Yes  []No        Date .    Patient/Family Request  [] Yes  []No        Date       Resources:    Medicare  [x] Yes  []No   Medicaid  [x] Yes  []No   No Resources  [] Yes  []No   Private Insurance  [] Yes  []No    Name/Phone Number    Other  [] Yes  []No        (i.e. Workman's Comp)         Prior Services:    Prior Services  [x] Yes  []No   Home Health  [] Yes  []No   [address]  [] Yes  []No        Number of [address]  [] Yes  []No       Meals on Wheels  [] Yes  []No   Office on Aging  [] Yes  []No   Transportation Services  [] Yes  []No   Nursing Home  [] Yes  []No        Nursing Home Name    [address]  [] Yes  []No        [address] Name    Other       Information Source:      Information obtained from  [x] Patient  [] Parent   [] Leatha Sabrina  [] Child  [] Spouse   [] Significant Other/Partner   [] Friend      [] EMS    [] Nursing Home Chart          [] Other:   Chart Review  [x] Yes  []No     Family/Support System:    Patient lives with  [x] Alone    [] Spouse   [] Significant Other  [] Children  [] Caretaker   [] Parent  [] Sibling     [x] Other       Other Support System:    Is the patient responsible for care of others  [] Yes  [x]No   Information of person caring for patient on  discharge    Managers financial affairs independently  [x] Yes  []No   If no, explain:      Status Prior to Admission:    Mental Status  [x] Awake  [] Alert  [] Oriented  [] Quiet/Calm [] Lethargic/Sedated   [] Disoriented  [] Restless/Anxious  [] Combative   Personal Care  [x] Dependent  [] [address]  [] Requires Assistance   Meal Preparation Ability  [] Independent   [] Standby Assistance   [] Minimal Assistance   [] Moderate Assistance  [] Maximum Assistance     [x] Total Assistance   Chores  [] Independent with Chores   [] N/A Nursing Home Resident   [x] Requires Assistance   Bowel/Bladder  [x] Continent  [] Catheter  [] Incontinent  [] Ostomy Self-Care    [] Urine Diversion Self-Care  [] Maximum Assistance     [] Total Assistance   Number of Persons needed for assistance    DME at home  [] Christean Filter, Sherry December  [] Christean Filter, Straight   [] Commode    [] Bathroom/Grab Bars  [] Hospital Bed  [] Nebulizer  [] Oxygen           [] Raised Toilet Seat  [] Shower Chair  [] Side Rails for Bed   [] Tub Transfer Bench   [] Pierre King  [] Ingrid Juarez, Standard      [] Other:   Vendor      Treatment Presently Receiving:    Current Treatments  [] Chemotherapy  [] Dialysis  [] Insulin  [] IVAB [x] IVF   [] O2  [] PCA   [] PT   [] RT   [] Tube Feedings   [] Wound Care     Psychosocial Evaluation:    Verbalized Knowledge of Disease Process  [] Patient  []Family   Coping with Disease Process  [] Patient  []Family   Requires Further Counseling Coping with Disease Process  [] Patient  []Family     Identified Projected Needs:    Home Health Aid  [] Yes  []No   Transportation  [] Yes  []No   Education  [] Yes  []No        Specific Education     Financial Counseling  [] Yes  []No   Inability to Care for Self/Will Require 24 hour care  [] Yes  []No   Pain Management  [] Yes  []No   Home Infusion Therapy  [] Yes  []No   Oxygen Therapy  [] Yes  []No   DME  [] Yes  []No   Long Term Care Placement  [] Yes  []No Rehab  [] Yes  []No   Physical Therapy  [x] Yes  []No   Needs Anticipated At This Time  [x] Yes  []No     Intra-Hospital Referral:    [facility]  [] Yes  []No     [] Yes  []No   Patient Representative  [] Yes  []No   Staff for Teaching Needs  [] Yes  []No   Specialty Teaching Needs     Diabetic Educator  [] Yes  []No   Referral for Diabetic Educator Needed  [] Yes  []No  If Yes, place order for Nutritionist or Diabetic Consult     Tentative Discharge Plan:    Home with No Services  [] Yes  [x]No   Home with Home Health Follow-up  [x] Yes  []No        If Yes, specify type    Home Care Program  [] Yes  []No        If Yes, specify type    Meals on Wheels  [] Yes  []No   Office of Aging  [] Yes  []No   NHP  [] Yes  []No   Return to the Nursing Home  [] Yes  []No   Rehab Therapy  [] Yes  []No   Acute Rehab  [] Yes  []No   Subacute Rehab  [] Yes  []No   Private Care  [] Yes  []No   Substance Abuse Referral  [] Yes  []No   Transportation  [] Yes  []No   Chore Service  [] Yes  []No   Inpatient Hospice  [] Yes  []No   OP RT  [] Yes  [] No   OP Hemo  [] Yes  [] No   OP PT  [] Yes  []No   Support Group  [] Yes  []No   Reach to Recovery  [] Yes  []No   OP Oncology Clinic  [] Yes  []No   Clinic Appointment  [] Yes  []No   DME  [] Yes  []No   Comments    Name of D/C Planner or  Given to Patient or Family Augustine Aguila. Laura Darling RN   Phone Number         [phone]   Date 03/24/18   Time    If you are discharged home, whom do you designate to participate in your discharge plan and receive any information needed?      Enter name of designee Sister Karen        Phone # of designee         Address of designee         Updated         Patient refused to designate any           individual

## 2018-03-24 NOTE — ACP (ADVANCE CARE PLANNING)
Patient has designated ___son_____________________ to participate in his/her discharge plan and to receive any needed information.      Name: Dany Hdez  Address:  Phone number:[phone]

## 2018-03-24 NOTE — H&P
History and Physical    Patient: Fadi Champagne               Sex: female          DOA: 3/23/2018       YOB: 1975      Age:  37 y.o.        LOS:  LOS: 1 day        HPI:     Fadi Champagne is a 37 y.o. female who presented to the ER with weakness involving both of her legs. The weakness began this evening while she was at a movie. She had some difficulty walking to the bathroom. She also had difficulty getting up from the toilet. Her legs then began to shake. She does not have SOB. She does not have chest pain. She did not have weakness anywhere else in her body. No visual difficulty. She has had MS for 21 years. She will be admitted for ongoing management. Past Medical History:   Diagnosis Date    Arthritis     Arthropathy, unspecified, site unspecified     Depression     Diabetes ([address]     Hypercholesteremia     Hypertension     history of htn    Incontinence of urine     Insomnia     Migraine     MS (multiple sclerosis) ([address]     MS (multiple sclerosis) ([address]     Neurogenic bladder     Other ill-defined conditions(799.89)     multiple Sclerosis    Seizures ([address]     6/2013    Wears glasses        Social History:   Tobacco use:  Patient does not use tobacco   Alcohol use:  Patient does not use alcohol   Occupation:  Patient is a    Patient lives alone    Family History: Mother had hyperthyroidism   Father had diabetes    Review of Systems    Constitutional:  No fever or weight loss  HEENT:  No headache or visual changes  Cardiovascular:  No chest pain or diaphoresis  Respiratory:  No coughing, wheezing, or shortness of breath. GI:  No nausea or vomitting. No diarrhea  :  No hematuria or dysuria  Skin:  No rashes or moles  Neuro:  Weakness as above.   No seizures or syncope  Hematological:  No bruising or bleeding  Endocrine:  Patient has known diabetes, no thyroid disease    Physical Exam:      Visit Vitals    /89 (BP 1 Location: Left arm, BP Patient Position: At rest)    Pulse 97    Temp 98.2 °F (36.8 °C)    Resp 16    Ht 5' 3\" (1.6 m)    Wt 106.6 kg (235 lb)    SpO2 97%    BMI 41.63 kg/m2       Physical Exam:    Gen:  No distress, alert  HEENT:  Normal cephalic atraumatic, extra-occular movements are intact. Neck:  Supple, No JVD  Lungs:  Clear bilaterally, no wheeze, no rales, normal effort  Heart:  Regular Rate and Rhythm, normal S1 and S2, no edema  Abdomen:  Soft, non tender, normal bowel sounds, no guarding. Extremities:  Well perfused, no cyanosis or edema  Neurological:  Mild tremor involving both legs. Awake and alert, CN's are intact; Unable to resist gravity with either legs  Skin:  No rashes or moles  Mental Status:  Normal thought process, does not appear anxious    Laboratory Studies:    BMP:   Lab Results   Component Value Date/Time     (L) 03/23/2018 09:21 PM    K 3.5 03/23/2018 09:21 PM    CL 99 (L) 03/23/2018 09:21 PM    CO2 25 03/23/2018 09:21 PM    AGAP 10 03/23/2018 09:21 PM     (H) 03/23/2018 09:21 PM    BUN 7 03/23/2018 09:21 PM    CREA 0.67 03/23/2018 09:21 PM    GFRAA >60 03/23/2018 09:21 PM    GFRNA >60 03/23/2018 09:21 PM     CBC:   Lab Results   Component Value Date/Time    WBC 10.9 03/23/2018 09:21 PM    HGB 14.5 03/23/2018 09:21 PM    HCT 40.4 03/23/2018 09:21 PM     (H) 03/23/2018 09:21 PM       Assessment/Plan     Principal Problem:    Multiple sclerosis exacerbation ([facility] (3/16/2016)    Active Problems:    Type II diabetes mellitus ([facility] (3/24/2018)      Hyperlipidemia (3/17/2016)      Chronic low back pain (9/13/2012)      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

## 2018-03-24 NOTE — PROGRESS NOTES
Assumed patient care. Received patient awake, alert, oriented X4. Patient denies any pain at this time. Bed is locked and in lowest position and call bell is within reach. Not in acute distress.

## 2018-03-24 NOTE — PROGRESS NOTES
Problem: Mobility Impaired (Adult and Pediatric)  Goal: *Acute Goals and Plan of Care (Insert Text)  Physical Therapy Goals  Initiated 3/24/2018 and to be accomplished within 7 day(s)  1. Patient will move from supine to sit and sit to supine , scoot up and down and roll side to side in bed with independence. 2.  Patient will transfer from bed to chair and chair to bed with independence using the least restrictive device. 3.  Patient will perform sit to stand with independence. 4.  Patient will ambulate with independence for >/= 150 feet with the least restrictive device. 5.  Patient will demonstrate independence with performance of home exercise program.     physical Therapy EVALUATION    Patient: Lazarus Corns (30 y.o. female)  Date: 3/24/2018  Primary Diagnosis: MS (multiple sclerosis) ([facility]        Precautions:       PLOF: Lives alone, caregiver comes 6 hours everyday to help with cleaning, cooking, grocery, etc (with whatever she needs help with)  Normally ambulates without any assistive device but does use her cane and rolling walker on and off as needed. Works 6-12 hours a day as a . ASSESSMENT :   Patient requires contact guard assist for bed mobility, transfers and ambulation. Only ambulated 3 feet with rolling walker with CGA. Patient very wabbly. Declined to get up in chair at this time as patient wanted to rest for her MRI later on today. Did tolerate doing some leg exercises sitting edge of bed: AROM Exercises x 20 reps on each LE:  Heel Raise, Toe Raise, SAQ, Marching in place, hip abduction/adduction to neutral.  Will benefit from skilled PT intervention to increase overall functional mobility independence and safety. Patient presents with deficits in:    Bed Mobility, Transfers, Gait, Strength and Balance    Patient will benefit from skilled intervention to address the above impairments.   Patients rehabilitation potential is considered to be Good  Factors which may influence rehabilitation potential include:*   []         None noted  []         Mental ability/status  [x]         Medical condition  []         Home/family situation and support systems  []         Safety awareness  []         Pain tolerance/management  []         Other:     Recommendations for nursing:   Written on communication board: OOB with assist of 1  Verbally communicated to: nurse Cline Bold :  Recommendations and Planned Interventions:  [x]           Bed Mobility Training             []    Neuromuscular Re-Education  [x]           Transfer Training                   []    Orthotic/Prosthetic Training  [x]           Gait Training                          []    Modalities  [x]           Therapeutic Exercises          []    Edema Management/Control  []           Therapeutic Activities            [x]    Patient and Family Training/Education*  [x]           Other (comment):Plan of care, LE exercises    Frequency/Duration: Patient will be followed by physical therapy 1 time per day/4-7 days per week to address goals. Discharge Recommendations: Home Health versus [address] [place] [address] Recommendations for Discharge: wheelchair     SUBJECTIVE:   Patient stated ambulatory without device prior to this admission.     OBJECTIVE DATA SUMMARY:     Past Medical History:   Diagnosis Date    Arthritis     Arthropathy, unspecified, site unspecified     Depression     Diabetes ([address]     Hypercholesteremia     Hypertension     history of htn    Incontinence of urine     Insomnia     Migraine     MS (multiple sclerosis) ([address]     MS (multiple sclerosis) ([address]     Neurogenic bladder     Other ill-defined conditions(799.89)     multiple Sclerosis    Seizures ([address]     6/2013    Wears glasses      Past Surgical History:   Procedure Laterality Date    HX HYSTERECTOMY      HX ORTHOPAEDIC      1/2005 11/2008    HX OTHER SURGICAL       Barriers to Learning/Limitations: none  Compensate with: visual, verbal, tactile, kinesthetic cues/model    G CODE:Mobility [id number] Current  CL= 60-79%   Goal  CH= 0%. The severity rating is based on the [employer] Balance Scale    Eval Complexity: History: MEDIUM  Complexity : 1-2 comorbidities / personal factors will impact the outcome/ POC Exam:MEDIUM Complexity : 3 Standardized tests and measures addressing body structure, function, activity limitation and / or participation in recreation  Presentation: MEDIUM Complexity : Evolving with changing characteristics  Clinical Decision Making:Medium Complexity [facility] Standing Balance Scale Overall Complexity:MEDIUM    [address] Sitting Balance Scale  0: Pt performs 25% or less of sitting activity (Max assist) CN, 100% impaired. 1: Pt supports self with upper extremities but requires therapist assistance. Pt performs 25-50% of effort (Mod assist) CM, 80% to <100% impaired. 1+: Pt supports self with upper extremities but requires therapist assistance. Pt performs >50% effort. (Min assist). CL, 60% to <80% impaired. 2: Pt supports self independently with both upper extremities. CL, 60% to <80% impaired. 2+: Pt support self independently with 1 upper extremity. CK, 40% to <60% impaired. 3: Pt sits without upper extremity support for up to 30 seconds. CK, 40% to <60% impaired. 3+: Pt sits without upper extremity support for 30 seconds or greater. CJ, 20% to <40% impaired. 4: Pt moves and returns trunkal midpoint 1-2 inches in one plane. CJ, 20% to <40% impaired. 4+: Pt moves and returns trunkal midpoint 1-2 inches in multiple planes. CI, 1% to <20% impaired. 5: Pt moves and returns trunkal midpoint in all planes greater than 2 inches. CH, 0% impaired. [address] Standing Balance Scale  0: Pt performs 25% or less of standing activity (Max assist) CN, 100% impaired. 1: Pt supports self with upper extremities but requires therapist assistance.  Pt performs 25-50% of effort (Mod assist) CM, 80% to <100% impaired. 1+: Pt supports self with upper extremities but requires therapist assistance. Pt performs >50% effort. (Min assist). CL, 60% to <80% impaired. 2: Pt supports self independently with both upper extremities (walker, crutches, parallel bars). CL, 60% to <80% impaired. 2+: Pt support self independently with 1 upper extremity (cane, crutch, 1 parallel bar). CK, 40% to <60% impaired. 3: Pt stands without upper extremity support for up to 30 seconds. CK, 40% to <60% impaired. 3+: Pt stands without upper extremity support for 30 seconds or greater. CJ, 20% to <40% impaired. 4: Pt independently moves and returns center of gravity 1-2 inches in one plane. CJ, 20% to <40% impaired. 4+: Pt independently moves and returns center of gravity 1-2 inches in multiple planes. CI, 1% to <20% impaired. 5: Pt independently moves and returns center of gravity in all planes greater than 2 inches. CH, 0% impaired. Prior Level of Function/Home Situation: Lives alone, caregiver comes 6 hours everyday to help with cleaning, cooking, grocery, etc (with whatever she needs help with)  Normally ambulates without any assistive device but does use her cane and rolling walker on and off as needed. Works 6-12 hours a day as a . Home Situation  Home Environment: Private residence  One/Two Story Residence: One story  Living Alone: Yes  Support Systems: Family member(s)  Patient Expects to be Discharged to[de-identified] Private residence  Current DME Used/Available at Home: Cane, straight, Commode, bedside, Raised toilet seat, Walker, rolling  Critical Behavior:  Neurologic State: Alert  Orientation Level: Oriented X4  Cognition: Follows commands  Safety/Judgement: Awareness of environment  Psychosocial  Patient Behaviors: Calm; Cooperative  Purposeful Interaction: Yes  Pt Identified Daily Priority: Clinical issues (comment)  Caritas Process: Nurture loving kindness;Establish trust;Attend basic human needs  Caring Interventions: Therapeutic modalities  Therapeutic Modalities: Intentional therapeutic touch      Strength:    Strength: Generally decreased, functional (both LE grossly 4/4+/5)      Tone & Sensation:   Tone:  (Positive tremor both LE Left > Right)        Range Of Motion:  AROM: Generally decreased, functional (both LE)      Functional Mobility:  Bed Mobility:  Supine to Sit: Contact guard assistance; Additional time  Sit to Supine: Contact guard assistance; Additional time  Transfers:  Sit to Stand: Contact guard assistance  Stand to Sit: Contact guard assistance  Balance:   Sitting: With support  Sitting - Static: Good (unsupported)  Sitting - Dynamic: Good (unsupported)  Standing: With support  Standing - Static: Fair  Standing - Dynamic : Fair (Minus)  Ambulation/Gait Training:  Distance (ft): 3 Feet (ft) (side stepping, patient very wabbly)  Assistive Device: Walker, rolling  Ambulation - Level of Assistance: Contact guard assistance  Gait Abnormalities: Decreased step clearance  Step Length: Left shortened;Right shortened    Therapeutic Exercises:   Sitting edge of bed, AROM Exercises x 20 reps on each LE:  Heel Raise, Toe Raise, SAQ, Marching in place, hip abduction/adduction to neutral    Pain:  Pre treatment pain level:  0  Post treatment pain level:  0  Pain Scale 1: Numeric (0 - 10)  Pain Intensity 1: 7  Pain Location 1: Leg  Pain Orientation 1: Lower;Right  Pain Description 1: Sharp  Pain Intervention(s) 1: Medication (see MAR)     Activity Tolerance:  Fair Minus    Please refer to the flowsheet for vital signs taken during this treatment.   After treatment:   []         Patient left in no apparent distress sitting up in chair  [x]         Patient left in no apparent distress in bed  [x]         Call bell left within reach  [x]         Nursing notified  []         Caregiver present  []         Bed alarm activated    COMMUNICATION/EDUCATION:*   [x]         Fall prevention education was provided and the patient/caregiver indicated understanding. [x]         Patient/family have participated as able in goal setting and plan of care. [x]         Patient/family agree to work toward stated goals and plan of care. []         Patient understands intent and goals of therapy, but is neutral about his/her participation. []         Patient is unable to participate in goal setting and plan of care.     Thank you for this referral.  Yesi Anderson, PT   Time Calculation: 25 mins

## 2018-03-24 NOTE — PROGRESS NOTES
1940: Assumed pt care. Received pt resting in bed, pt is alert and oriented x 4. Denies any pain at this time. No signs of distress. Call bell within reach. 2133: sent a message to pharmacy to ask for refill, the pyxis says that solu-medrol does not have sufficient quantity. 2258: Solu-medrol not yet refilled. Called pharmacy talked to Daniel, notified her that refill is needed for solu-medrol, she ask for my call back number gave the unit's phone number. [phone]: received a call from [address] [place] [address], talked to Tonya Anaya he stated that they will send the medication through  and that he paged the hospitalist to change the dose to IVPB, this nurse verbalized understanding. 3-25-18  2217: received a call from Tonya Anaya from [facility], she said he was unsuccessful in paging the hospitalist to change the order he advised me to ask nursing supervisor to pull the solu-medrol from the ED, this nurse verbalized understanding, repeat back provided     [id number]: Called nsg supervisor, talked to [facility], informed her that I was advised by [address] [place] [address] to let nursing supervisor pull solu-medrol from the ED, [facility] stated she will pull the medicine for me but will take a few minutes because she is waiting for a call from the doctor, this nurse verbalized understanding. 0154: Called nsg supervisor, talked to [facility] to follow-up on the solu-medrol, she stated that she is going to get it now, she had to call 2 OIC.     0215: Solu-medrol 1,000 mg brought by nsg supervisor [facility]. 0217: Solu-medrol administered. Pt resting in bed no signs of distress. 9949: Bedside and Verbal shift change report given to Gunjan Simmons (oncoming nurse) by Price Montanez   (offgoing nurse). Report included the following information SBAR, Kardex, Intake/Output, MAR and Recent Results.

## 2018-03-24 NOTE — PROGRESS NOTES
Admitted earlier this morning for BLE weakness / shaking, hx of MS x 21 years. Receiving Solu-Medrol Iv in ED. Tele neuro consulted- recommend MRI brain/neck w/ and w/o contrast.  Neurology consulted- recommend MRI brain, C spine and T spine. Also recommend UA w/ micro and CXR to r/o infectious process- all recommended tests pending at this time. Pt asking for Neurontin to be restarted because her legs are still \"shaking. \"  She is however to slightly lift LLE, unable to move RLE- sensation intact. She is followed by Maryfrances Moritz, NP-C in [facility] for MS. Physical Exam:  General appearance: alert, cooperative, no distress, appears stated age  Head: Normocephalic, without obvious abnormality, atraumatic  Lungs: clear to auscultation bilaterally  Heart: tachycardia, asymptomatic, S1, S2 normal, no murmur, click, rub or gallop  Abdomen: soft, non tender, non distended . Normoactive bowel sounds.    Extremities: extremities normal, atraumatic, no cyanosis or edema  Skin: Skin color, texture, turgor normal. No rashes or lesions  Neurologic: hx of MS, shaking noted to BLE, sensation intact bilaterally, able to slightly lift LLE off bed, unable to move RLE, follows commands, A/O x 4  PSY: Mood and affect normal, appropriately behaved      A/P:  BLE weakness / MS Exacerbation  - hx of MS x 21 years  - received Solu-Medrol 1000 mg IV x 1 in ED  - neuro consulted- greatly appreciate input  - MRI brain, C spine and T spine pending  - UA w/ micro pending  - CXR negative- note low lung volumes  - Glatiramer 40 mg subcutaneous MWF  - Gabapentin 300 mg nightly  - PT/OT eval     Diabetes  - diabetic diet, monitor accuchecks, correctional SSI, Lantus 10 units nightly    Hypertension  - Norvasc 10 mg daily    DVT Prophylaxis  - Lovenox daily      FAIZAN Michelle  [facility]  Pager:  [phone]  Office:  [phone]

## 2018-03-24 NOTE — ED PROVIDER NOTES
EMERGENCY DEPARTMENT HISTORY AND PHYSICAL EXAM    9:53 PM      Date: 3/23/2018  Patient Name: Jc Rizvi    History of Presenting Illness     Chief Complaint   Patient presents with    Leg Pain         History Provided By: Patient    Chief Complaint: Leg pain   Duration:  Hours  Timing:  Constant  Location: bilat LEs  Quality: \"like pins and needles\"  Severity: 10 out of 10  Modifying Factors: No alleviating or exacerbating factors reported  Associated Symptoms: denies any other associated signs or symptoms      Additional History (Context): Jc Rizvi is a 37 y.o. female with a pertinent history of MS, DM, presenting to the ED c/o constant, non-traumatic, bilat LE pain starting this morning. Pain is described as \"like pins and needles. \" States her last MS flare was 2 months ago, and she was hospitalized for it. States she is followed by Dr. Ngozi Lezama (neurologist). States she ambulates with a walker as well as independently. Not on anticoagulants. Pt denies urinary sxs, fever, abd pain, SOB, CP. No other acute symptoms or complaints were noted. PCP: Sosa Rose MD    Current Outpatient Prescriptions   Medication Sig Dispense Refill    methylPREDNISolone (MEDROL, BOGDAN,) 4 mg tablet Take as instructed on package 1 Dose Pack 0    diazePAM (VALIUM) 2 mg tablet Take 1 Tab by mouth every eight (8) hours as needed for Anxiety. Max Daily Amount: 6 mg. 20 Tab 0    promethazine (PHENERGAN) 25 mg tablet Take 25 mg by mouth every six (6) hours as needed for Nausea.  amLODIPine (NORVASC) 10 mg tablet Take 10 mg by mouth daily.  naproxen (NAPROSYN) 500 mg tablet Take 500 mg by mouth two (2) times daily (with meals).  HYDROcodone-acetaminophen (NORCO) 5-325 mg per tablet Take 1 Tab by mouth every four (4) hours as needed for Pain.  NAPROXEN SOD/DIPHENHYDRAMINE (ALEVE PM PO) Take 2 Tabs by mouth nightly as needed (sleep).          metFORMIN (GLUCOPHAGE) 500 mg tablet Take 1.5 Tabs by mouth two (2) times daily (with meals). 60 Tab 0    DULoxetine (CYMBALTA) 60 mg capsule Take 60 mg by mouth daily.  QUEtiapine (SEROQUEL) 300 mg tablet Take 300 mg by mouth nightly.  potassium chloride (K-DUR, KLOR-CON) 20 mEq tablet Take 1 Tab by mouth two (2) times a day. 10 Tab 0    glatiramer (COPAXONE) 40 mg/mL injection 40 mg by SubCUTAneous route every Monday, Wednesday, Friday.  cyanocobalamin 1,000 mcg tablet Take 1,000 mcg by mouth daily. Past History     Past Medical History:  Past Medical History:   Diagnosis Date    Arthritis     Arthropathy, unspecified, site unspecified     Depression     Diabetes ([address]     Hypercholesteremia     Hypertension     history of htn    Incontinence of urine     Insomnia     Migraine     MS (multiple sclerosis) ([address]     MS (multiple sclerosis) ([address]     Neurogenic bladder     Other ill-defined conditions(799.89)     multiple Sclerosis    Seizures ([address]     6/2013    Wears glasses        Past Surgical History:  Past Surgical History:   Procedure Laterality Date    HX HYSTERECTOMY      HX ORTHOPAEDIC      1/2005 11/2008    HX OTHER SURGICAL         Family History:  Family History   Problem Relation Age of Onset    Heart Disease Father     Diabetes Father     Diabetes Sister     Other Other        Social History:  Social History   Substance Use Topics    Smoking status: Never Smoker    Smokeless tobacco: Never Used    Alcohol use No       Allergies: Allergies   Allergen Reactions    Oxycontin [Oxycodone] Hives and Other (comments)     intolerance         Review of Systems       Review of Systems   Constitutional: Negative for fever. Respiratory: Negative for shortness of breath. Cardiovascular: Negative for chest pain. Gastrointestinal: Negative for abdominal pain. Genitourinary: Negative for dysuria and frequency. Musculoskeletal: Positive for myalgias. All other systems reviewed and are negative.         Physical Exam Visit Vitals    BP (!) 152/103    Pulse (!) 116    Resp 18    Ht 5' 3\" (1.6 m)    Wt 106.6 kg (235 lb)    SpO2 97%    BMI 41.63 kg/m2         Physical Exam   Constitutional: She is oriented to person, place, and time. She appears well-developed and well-nourished. HENT:   Head: Normocephalic and atraumatic. Neck: Neck supple. No JVD present. Cardiovascular: Normal rate and regular rhythm. Pulmonary/Chest: Effort normal and breath sounds normal. No respiratory distress. Abdominal: Soft. She exhibits no distension. There is no tenderness. There is no rebound and no guarding. Musculoskeletal: She exhibits no edema. Bilat LE tremors noted. No UE tremors       Neurological: She is alert and oriented to person, place, and time. Strength 5/5   Gross sensations intact. Skin: Skin is warm and dry. No erythema. Psychiatric: Judgment normal.         Diagnostic Study Results     Labs -  Recent Results (from the past 12 hour(s))   CBC WITH AUTOMATED DIFF    Collection Time: 03/23/18  9:21 PM   Result Value Ref Range    WBC 10.9 4.6 - 13.2 K/uL    RBC 4.92 4.20 - 5.30 M/uL    HGB 14.5 12.0 - 16.0 g/dL    HCT 40.4 35.0 - 45.0 %    MCV 82.1 74.0 - 97.0 FL    MCH 29.5 24.0 - 34.0 PG    MCHC 35.9 31.0 - 37.0 g/dL    RDW 12.9 11.6 - 14.5 %    PLATELET 502 (H) 593 - 420 K/uL    MPV 11.0 9.2 - 11.8 FL    NEUTROPHILS 47 40 - 73 %    LYMPHOCYTES 45 21 - 52 %    MONOCYTES 7 3 - 10 %    EOSINOPHILS 1 0 - 5 %    BASOPHILS 0 0 - 2 %    ABS. NEUTROPHILS 5.1 1.8 - 8.0 K/UL    ABS. LYMPHOCYTES 4.9 (H) 0.9 - 3.6 K/UL    ABS. MONOCYTES 0.7 0.05 - 1.2 K/UL    ABS. EOSINOPHILS 0.2 0.0 - 0.4 K/UL    ABS.  BASOPHILS 0.0 0.0 - 0.06 K/UL    DF AUTOMATED     METABOLIC PANEL, BASIC    Collection Time: 03/23/18  9:21 PM   Result Value Ref Range    Sodium 134 (L) 136 - 145 mmol/L    Potassium 3.5 3.5 - 5.5 mmol/L    Chloride 99 (L) 100 - 108 mmol/L    CO2 25 21 - 32 mmol/L    Anion gap 10 3.0 - 18 mmol/L    Glucose 225 (H) 74 - 99 mg/dL    BUN 7 7.0 - 18 MG/DL    Creatinine 0.67 0.6 - 1.3 MG/DL    BUN/Creatinine ratio 10 (L) 12 - 20      GFR est AA >60 >60 ml/min/1.73m2    GFR est non-AA >60 >60 ml/min/1.73m2    Calcium 9.2 8.5 - 10.1 MG/DL   MAGNESIUM    Collection Time: 03/23/18  9:21 PM   Result Value Ref Range    Magnesium 1.7 1.6 - 2.6 mg/dL       Radiologic Studies -   No orders to display         Medical Decision Making   I am the first provider for this patient. I reviewed the vital signs, available nursing notes, past medical history, past surgical history, family history and social history. Vital Signs-Reviewed the patient's vital signs. Pulse Oximetry Analysis -  98% on room air (Interpretation)    Records Reviewed: Nursing Notes and Old Medical Records (Time of Review: 9:53 PM)    ED Course: Progress Notes, Reevaluation, and Consults:      Provider Notes (Medical Decision Making): 36 y/o female presents with leg pain, paresthesias or weakness. Not consistent with cva, has ms, seems consistent with flare. Check basic labs, lytes. Will consult neuro. No back pain or sxs of cauda equina, no risk factors. 10:32 PM Consult: I discussed care with Dr. Yonas Ocampo (tele-neurology). It was a standard discussion including patient history, chief complaint, available diagnostic results, and predicted treatment course. Will evaluate the pt.     10:50 PM Consult: Dr. Yonas Ocampo (tele-neurology) evaluated the pt. Recommends Solu-medrol and admission for MS exacerbation. 11:27 PM Consult: I discussed care with Dr. Juan Carols Casey (hospitalist). It was a standard discussion including patient history, chief complaint, available diagnostic results, and predicted treatment course. For Hospitalized Patients:    1. Hospitalization Decision Time:  The decision to hospitalize the patient was made by Dr. Juanita Marks at 21 [phone] on 3/23/2018    2.  Aspirin: Aspirin was not given because the patient did not present with a stroke at the time of their Emergency Department evaluation    Diagnosis     Clinical Impression:   1. Multiple sclerosis exacerbation ([address]        Disposition:  admit    Attestations:  Scribe Attestation     Alexia May acting as a scribe for and in the presence of Terra Bustamante DO      March 23, 2018 at 9:53 PM       Provider Attestation:      I personally performed the services described in the documentation, reviewed the documentation, as recorded by the scribe in my presence, and it accurately and completely records my words and actions.  March 23, 2018 at 9:53 PM - Terra Bustamante DO    _______________________________

## 2018-03-24 NOTE — PROGRESS NOTES
Problem: Falls - Risk of  Goal: *Absence of Falls  Document Jordyn Fall Risk and appropriate interventions in the flowsheet.   Outcome: Progressing Towards Goal  Fall Risk Interventions:

## 2018-03-24 NOTE — PROGRESS NOTES
Patient arrived on unit via stretcher. AAOX4, calm and cooperative. Assessment completed. Dual skin assessment completed. Patient oriented to room. Bed locked and at lowest position. Call bell within reach. Patient resting comfortably. 0730-Bedside and Verbal shift change report given to Abby Gutierrez RN (oncoming nurse) by Wilton Ramos RN (offgoing nurse). Report included the following information SBAR, Kardex and MAR. Patient resting in bed.

## 2018-03-24 NOTE — CONSULTS
Neurology Consult Note  Admit Date: 3/23/2018  Length of Stay: 1  Primary Care: Lizabeth Simmons MD   Principle Problem:      Assessment/Plan    LEG WEAKNESS  Multiple Sclerosis Exacerbation vs Conversion disorder/ Functional weakness. She is already greatly improved and near baseline compared to yesterday. I doubt MS exacerbation to cause this rapid decompensation and rapid improvement though steroids already administered and could have made improvement. Getting MRI Brain/ Cspine/ Tspine w/ wo contrast.   Continue Solumedrol 1000mg tonight and potentially tomorrow for total of 3 days. If MRI does not show new enhancing lesions then will stop. Some muscle fatigue yesterday after workout could have contributed to her worse picture yesterday but would expect to be completely normal by now and that also would not give paresthesias or  The leg tremor. Leg Tremor  The left leg is observed rocking in a side to side rotating movement which was distractible and stopped several times when she had to focus on other parts of the physical exam and then resumed after a particular task. She reports having this kind of tremor before that responded to gabapentin which she had recently not been taking for some reason.   -This appears to be a functional/psychogenic tremor to me.   -Ok to continue her gabapentin. MULTIPLE SCLEROSIS  She does have a history of multiple sclerosis  Follows with LAURIE Mcnulty  / Memo Jimenez MD at [facility] Neu[facility]. On copaxone  Advised to follow back up with [facility] neurology after this admission. History: This is a 37 old female with a 21 year history of multiple sclerosis. She reports that she has been trying to lose weight by working out and recently taking 3-4 pills of Jet-Alert caffeine supplement bid. She was at the movies yesterday and went to the bathroom and her legs gave out on her.   She notes she had been at the gym previously and reports her legs were tired.  She did not lose consciousness but after falling she needed help getting up and also reported her left leg started shaking as it is doing now as I get this history. The left leg is observed rocking in a side to side rotating movement which was distractible and stopped several times when she had to focus on other parts of the physical exam and then resumed after a particular task. She reports having this kind of tremor before that responded to gabapentin. She also notes entire leg paresthesias when this started. She received solumedrol last night by teleneurology and MRIs are ordered. I note she has several hospitalizations here for potential MS exacerbations for previous leg weakness or  leg paresthesias or leg tremors. MRIs at [facility]  Of brain /C/T spine have not shown new exacerbations but notes old white matter changes on the brain consistent with MS. She denies bowel or bladder problems incontinence. She denies speech or language problems, she denies visual disturbances. Allergies:    Allergies   Allergen Reactions    Oxycontin [Oxycodone] Hives and Other (comments)     intolerance      Review of Systems:    Y  N   Constitutional: [] [] recent weight change  [] [x] fever  [] [] sleep difficulties   ENT/Mouth:  [] [] hearing loss  [] [x] swallowing problems  [] [x] slurred speech   Cardiovascular:  [] [] chest pain   [x] [] palpitations    Respiratory: [] [] cough with swallow  [] [] shortness of breath  [] [] sleep apnea  [] [] intubated   Gastrointestinal: [] [] abdominal pain  [] [x] nausea   Genitourinary: [] [] frequent urination  [] [] incontinence    Musculoskeletal:   [] [] joint pain  [] [] muscle pain   Integument:   [] [] rash/itching   Neurological:  [] [] dizziness/vertigo  [] [] sedation  [] [x] confusion  [] [] agitation/combativeness  [] [] loss of consciousness  [] [] numbness/tingling sensation  [] [] tremors  [x] [] weakness in limbs  [] [] difficulty with balance  [] [] frequent or recurring headaches  [] [] memory loss   [] [] comatose  [] [] seizures   Psychiatric:   [] [x] depression  [] [] hallucinations   Endocrine: [] [] excessive thirst or urination   [] [] heat or cold intolerance   Hematologic/Lymphatic: [] [] bleeding tendency  [] [] enlarged lymph nodes     PMH:   Past Medical History:   Diagnosis Date    Arthritis     Arthropathy, unspecified, site unspecified     Depression     Diabetes ([address]     Hypercholesteremia     Hypertension     history of htn    Incontinence of urine     Insomnia     Migraine     MS (multiple sclerosis) ([address]     MS (multiple sclerosis) ([address]     Neurogenic bladder     Other ill-defined conditions(799.89)     multiple Sclerosis    Seizures ([address]     6/2013    Wears glasses         Problem List: Principal Problem:    Multiple sclerosis exacerbation ([address] (3/16/2016)    Active Problems:    Chronic low back pain (9/13/2012)      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)      Hyperlipidemia (3/17/2016)      Obesity (9/19/2017)      Type II diabetes mellitus ([address] (3/24/2018)        FH:   Family History   Problem Relation Age of Onset    Heart Disease Father     Diabetes Father     Diabetes Sister     Other Other         SH:   Social History     Social History    Marital status:      Spouse name: N/A    Number of children: N/A    Years of education: N/A     Social History Main Topics    Smoking status: Never Smoker    Smokeless tobacco: Never Used    Alcohol use No    Drug use: No    Sexual activity: Not Asked      Comment: Hysterectomy     Other Topics Concern    None     Social History Narrative          Vital Signs:   Visit Vitals    /87    Pulse (!) 106    Temp 97.8 °F (36.6 °C)    Resp 18    Ht 5' 3\" (1.6 m)    Wt 106.6 kg (235 lb)    SpO2 95%    BMI 41.63 kg/m2      Neurological examination:    Appearance:  In no acute distress, well developed, well nourished, cooperative   Cardiovascular:  peripheral edema is absent.  Mental status examination: Alert and oriented X 3. Normal speech and language. Normal attention, memory and fund of knowledge.  Cranial Nerves:     I: smell Not tested   II: visual fields Full to confrontation   II: pupils Equal, round, reactive to light   III,VII: ptosis none   III,IV,VI: extraocular muscles  Full ROM   V: facial light touch sensation  normal   V,VII: corneal reflex     VII: facial muscle function  normal   VIII: hearing    IX: soft palate elevation  normal   IX,X: gag reflex    XI: sternocleidomastoid strength    XII: tongue  midline      Fundoscopic:  Deferred. Mordecai Needs Motor exam: Station, gait:  deferred. Muscle tone, bulk were normal. Normal strength in bilateral upper extremities. Lower extremities: some give away quality at times. 4/5 hip flexion strength. Knee extension/flexion and foot dorsiflexion/extension were variable on repeat exam but able to get up to brief 5/5 strength with give away. Observed rocking side to side rotating movement of the leg which was distractible and stopped several times when she had to focus on other parts of the physical exam.   Sensory: Intact to LT    Coordination: Normal FTN   Reflexes: Symmetric and 2+ in bilateral biceps, triceps, patellar, ankle reflexes. Plantar reflexes are flexor.            Medications:      [x] REVIEWED  Current Facility-Administered Medications   Medication Dose Route Frequency    amLODIPine (NORVASC) tablet 10 mg  10 mg Oral DAILY    diazePAM (VALIUM) tablet 2 mg  2 mg Oral Q8H PRN    DULoxetine (CYMBALTA) capsule 60 mg  60 mg Oral DAILY    [START ON 3/26/2018] glatiramer (COPAXONE) injection 40 mg  40 mg SubCUTAneous Q MON, WED & FRI    HYDROcodone-acetaminophen (NORCO) 5-325 mg per tablet 1 Tab  1 Tab Oral Q4H PRN    naproxen (NAPROSYN) tablet 500 mg  500 mg Oral BID WITH MEALS    potassium chloride (K-DUR, KLOR-CON) SR tablet 20 mEq  20 mEq Oral BID    promethazine (PHENERGAN) tablet 25 mg  25 mg Oral Q6H PRN    QUEtiapine (SEROquel) tablet 300 mg  300 mg Oral QHS    enoxaparin (LOVENOX) injection 40 mg  40 mg SubCUTAneous Q24H    insulin lispro (HUMALOG) injection   SubCUTAneous AC&HS    glucose chewable tablet 16 g  4 Tab Oral PRN    glucagon (GLUCAGEN) injection 1 mg  1 mg IntraMUSCular PRN    dextrose (D50) infusion 12.5-25 g  25-50 mL IntraVENous PRN    insulin glargine (LANTUS) injection 10 Units  10 Units SubCUTAneous QHS    HYDROcodone-acetaminophen (NORCO) 5-325 mg per tablet        gabapentin (NEURONTIN) capsule 300 mg  300 mg Oral QHS      Data:      [x] REVIEWED  Recent Results (from the past 24 hour(s))   CBC WITH AUTOMATED DIFF    Collection Time: 03/23/18  9:21 PM   Result Value Ref Range    WBC 10.9 4.6 - 13.2 K/uL    RBC 4.92 4.20 - 5.30 M/uL    HGB 14.5 12.0 - 16.0 g/dL    HCT 40.4 35.0 - 45.0 %    MCV 82.1 74.0 - 97.0 FL    MCH 29.5 24.0 - 34.0 PG    MCHC 35.9 31.0 - 37.0 g/dL    RDW 12.9 11.6 - 14.5 %    PLATELET 477 (H) 332 - 420 K/uL    MPV 11.0 9.2 - 11.8 FL    NEUTROPHILS 47 40 - 73 %    LYMPHOCYTES 45 21 - 52 %    MONOCYTES 7 3 - 10 %    EOSINOPHILS 1 0 - 5 %    BASOPHILS 0 0 - 2 %    ABS. NEUTROPHILS 5.1 1.8 - 8.0 K/UL    ABS. LYMPHOCYTES 4.9 (H) 0.9 - 3.6 K/UL    ABS. MONOCYTES 0.7 0.05 - 1.2 K/UL    ABS. EOSINOPHILS 0.2 0.0 - 0.4 K/UL    ABS.  BASOPHILS 0.0 0.0 - 0.06 K/UL    DF AUTOMATED     METABOLIC PANEL, BASIC    Collection Time: 03/23/18  9:21 PM   Result Value Ref Range    Sodium 134 (L) 136 - 145 mmol/L    Potassium 3.5 3.5 - 5.5 mmol/L    Chloride 99 (L) 100 - 108 mmol/L    CO2 25 21 - 32 mmol/L    Anion gap 10 3.0 - 18 mmol/L    Glucose 225 (H) 74 - 99 mg/dL    BUN 7 7.0 - 18 MG/DL    Creatinine 0.67 0.6 - 1.3 MG/DL    BUN/Creatinine ratio 10 (L) 12 - 20      GFR est AA >60 >60 ml/min/1.73m2    GFR est non-AA >60 >60 ml/min/1.73m2    Calcium 9.2 8.5 - 10.1 MG/DL   MAGNESIUM    Collection Time: 03/23/18  9:21 PM Result Value Ref Range    Magnesium 1.7 1.6 - 2.6 mg/dL   HEMOGLOBIN A1C WITH EAG    Collection Time: 03/23/18  9:21 PM   Result Value Ref Range    Hemoglobin A1c 8.6 (H) 4.2 - 5.6 %    Est. average glucose 200 mg/dL   GLUCOSE, POC    Collection Time: 03/24/18 12:20 AM   Result Value Ref Range    Glucose (POC) 231 (H) 70 - 110 mg/dL   CBC WITH AUTOMATED DIFF    Collection Time: 03/24/18  4:40 AM   Result Value Ref Range    WBC 8.6 4.6 - 13.2 K/uL    RBC 4.72 4.20 - 5.30 M/uL    HGB 14.0 12.0 - 16.0 g/dL    HCT 39.2 35.0 - 45.0 %    MCV 83.1 74.0 - 97.0 FL    MCH 29.7 24.0 - 34.0 PG    MCHC 35.7 31.0 - 37.0 g/dL    RDW 13.0 11.6 - 14.5 %    PLATELET 378 (H) 709 - 420 K/uL    MPV 11.1 9.2 - 11.8 FL    NEUTROPHILS 82 (H) 40 - 73 %    LYMPHOCYTES 17 (L) 21 - 52 %    MONOCYTES 1 (L) 3 - 10 %    EOSINOPHILS 0 0 - 5 %    BASOPHILS 0 0 - 2 %    ABS. NEUTROPHILS 7.1 1.8 - 8.0 K/UL    ABS. LYMPHOCYTES 1.4 0.9 - 3.6 K/UL    ABS. MONOCYTES 0.1 0.05 - 1.2 K/UL    ABS. EOSINOPHILS 0.0 0.0 - 0.4 K/UL    ABS.  BASOPHILS 0.0 0.0 - 0.06 K/UL    DF AUTOMATED     METABOLIC PANEL, BASIC    Collection Time: 03/24/18  4:40 AM   Result Value Ref Range    Sodium 130 (L) 136 - 145 mmol/L    Potassium 4.3 3.5 - 5.5 mmol/L    Chloride 97 (L) 100 - 108 mmol/L    CO2 23 21 - 32 mmol/L    Anion gap 10 3.0 - 18 mmol/L    Glucose 387 (H) 74 - 99 mg/dL    BUN 9 7.0 - 18 MG/DL    Creatinine 0.78 0.6 - 1.3 MG/DL    BUN/Creatinine ratio 12 12 - 20      GFR est AA >60 >60 ml/min/1.73m2    GFR est non-AA >60 >60 ml/min/1.73m2    Calcium 9.3 8.5 - 10.1 MG/DL   GLUCOSE, POC    Collection Time: 03/24/18  7:50 AM   Result Value Ref Range    Glucose (POC) 371 (H) 70 - 110 mg/dL            Negar Navarro MD  11:42 AM  03/24/18

## 2018-03-24 NOTE — PROGRESS NOTES
conducted an initial consultation and Spiritual Assessment for Hima Parr, who is a 37 y.o.,female. Patients Primary Language is: [place]. According to the patients EMR Jehovah's witness Affiliation is: No Mandaeism. The reason the Patient came to the hospital is:   Patient Active Problem List    Diagnosis Date Noted    Type II diabetes mellitus ([facility] 03/24/2018    Obesity 09/19/2017    Neurogenic bladder 03/17/2016    Hyperlipidemia 03/17/2016    Multiple sclerosis exacerbation ([facility] 03/16/2016    Major depression 01/20/2016    Chronic low back pain 09/13/2012    Spastic gait 09/13/2012    Central pain syndrome 09/13/2012    Multiple sclerosis ([facility] 06/06/2012        The  provided the following Interventions:  Initiated a relationship of care and support. Explored issues of estiven, belief, spirituality and Gnosticist/ritual needs while hospitalized. Listened empathically. Provided information about Spiritual Care Services. Offered prayer and assurance of continued prayers on patient's behalf. Chart reviewed. The following outcomes were achieved:  Patient shared limited information about both their medical narrative and spiritual journey/beliefs. Patient processed feeling about current hospitalization. Patient expressed gratitude for 's visit. Assessment:  Patient does not have any Gnosticist/cultural needs that will affect patients preferences in health care. There are no further spiritual or Gnosticist issues which require intervention at this time. Plan:  Chaplains will continue to follow and will provide pastoral care on an as needed/requested basis.  recommends bedside caregivers page  on duty if patient shows signs of acute spiritual or emotional distress. Gustavo Jesus M.Div.   [facility]  [phone]

## 2018-03-25 VITALS
HEIGHT: 63 IN | HEART RATE: 97 BPM | BODY MASS INDEX: 41.64 KG/M2 | DIASTOLIC BLOOD PRESSURE: 66 MMHG | TEMPERATURE: 97.5 F | RESPIRATION RATE: 18 BRPM | WEIGHT: 235 LBS | SYSTOLIC BLOOD PRESSURE: 107 MMHG | OXYGEN SATURATION: 97 %

## 2018-03-25 LAB
ANION GAP SERPL CALC-SCNC: 12 MMOL/L (ref 3–18)
BASOPHILS # BLD: 0 K/UL (ref 0–0.06)
BASOPHILS NFR BLD: 0 % (ref 0–2)
BUN SERPL-MCNC: 18 MG/DL (ref 7–18)
BUN/CREAT SERPL: 26 (ref 12–20)
CALCIUM SERPL-MCNC: 9.5 MG/DL (ref 8.5–10.1)
CHLORIDE SERPL-SCNC: 99 MMOL/L (ref 100–108)
CO2 SERPL-SCNC: 22 MMOL/L (ref 21–32)
CREAT SERPL-MCNC: 0.68 MG/DL (ref 0.6–1.3)
DIFFERENTIAL METHOD BLD: ABNORMAL
EOSINOPHIL # BLD: 0 K/UL (ref 0–0.4)
EOSINOPHIL NFR BLD: 0 % (ref 0–5)
ERYTHROCYTE [DISTWIDTH] IN BLOOD BY AUTOMATED COUNT: 13 % (ref 11.6–14.5)
GLUCOSE BLD STRIP.AUTO-MCNC: 373 MG/DL (ref 70–110)
GLUCOSE BLD STRIP.AUTO-MCNC: 390 MG/DL (ref 70–110)
GLUCOSE SERPL-MCNC: 346 MG/DL (ref 74–99)
HCT VFR BLD AUTO: 37.1 % (ref 35–45)
HGB BLD-MCNC: 13.1 G/DL (ref 12–16)
LYMPHOCYTES # BLD: 1.6 K/UL (ref 0.9–3.6)
LYMPHOCYTES NFR BLD: 9 % (ref 21–52)
MCH RBC QN AUTO: 29.4 PG (ref 24–34)
MCHC RBC AUTO-ENTMCNC: 35.3 G/DL (ref 31–37)
MCV RBC AUTO: 83.2 FL (ref 74–97)
MONOCYTES # BLD: 0.5 K/UL (ref 0.05–1.2)
MONOCYTES NFR BLD: 3 % (ref 3–10)
NEUTS SEG # BLD: 15.8 K/UL (ref 1.8–8)
NEUTS SEG NFR BLD: 88 % (ref 40–73)
PLATELET # BLD AUTO: 469 K/UL (ref 135–420)
PMV BLD AUTO: 11.3 FL (ref 9.2–11.8)
POTASSIUM SERPL-SCNC: 4.4 MMOL/L (ref 3.5–5.5)
RBC # BLD AUTO: 4.46 M/UL (ref 4.2–5.3)
SODIUM SERPL-SCNC: 133 MMOL/L (ref 136–145)
WBC # BLD AUTO: 17.8 K/UL (ref 4.6–13.2)

## 2018-03-25 PROCEDURE — 82962 GLUCOSE BLOOD TEST: CPT

## 2018-03-25 PROCEDURE — 85025 COMPLETE CBC W/AUTO DIFF WBC: CPT | Performed by: HOSPITALIST

## 2018-03-25 PROCEDURE — 74011636637 HC RX REV CODE- 636/637: Performed by: HOSPITALIST

## 2018-03-25 PROCEDURE — 80048 BASIC METABOLIC PNL TOTAL CA: CPT | Performed by: HOSPITALIST

## 2018-03-25 PROCEDURE — 74011250636 HC RX REV CODE- 250/636: Performed by: HOSPITALIST

## 2018-03-25 PROCEDURE — 36415 COLL VENOUS BLD VENIPUNCTURE: CPT | Performed by: HOSPITALIST

## 2018-03-25 PROCEDURE — 74011250637 HC RX REV CODE- 250/637: Performed by: HOSPITALIST

## 2018-03-25 PROCEDURE — 74011250636 HC RX REV CODE- 250/636: Performed by: PSYCHIATRY & NEUROLOGY

## 2018-03-25 RX ORDER — NITROFURANTOIN MACROCRYSTALS 50 MG/1
50 CAPSULE ORAL 4 TIMES DAILY
Qty: 12 CAP | Refills: 0 | Status: SHIPPED | OUTPATIENT
Start: 2018-03-25 | End: 2018-03-28

## 2018-03-25 RX ADMIN — METHYLPREDNISOLONE SODIUM SUCCINATE 1000 MG: 125 INJECTION, POWDER, FOR SOLUTION INTRAMUSCULAR; INTRAVENOUS at 02:17

## 2018-03-25 RX ADMIN — HYDROCODONE BITARTRATE AND ACETAMINOPHEN 1 TABLET: 5; 325 TABLET ORAL at 03:58

## 2018-03-25 RX ADMIN — ENOXAPARIN SODIUM 40 MG: 40 INJECTION SUBCUTANEOUS at 02:17

## 2018-03-25 RX ADMIN — AMLODIPINE BESYLATE 10 MG: 10 TABLET ORAL at 09:37

## 2018-03-25 RX ADMIN — INSULIN GLARGINE 10 UNITS: 100 INJECTION, SOLUTION SUBCUTANEOUS at 09:33

## 2018-03-25 RX ADMIN — POTASSIUM CHLORIDE 20 MEQ: 1500 TABLET, FILM COATED, EXTENDED RELEASE ORAL at 09:37

## 2018-03-25 RX ADMIN — INSULIN LISPRO 15 UNITS: 100 INJECTION, SOLUTION INTRAVENOUS; SUBCUTANEOUS at 09:34

## 2018-03-25 RX ADMIN — DULOXETINE HYDROCHLORIDE 60 MG: 60 CAPSULE, DELAYED RELEASE ORAL at 09:36

## 2018-03-25 RX ADMIN — NAPROXEN 500 MG: 250 TABLET ORAL at 09:36

## 2018-03-25 RX ADMIN — INSULIN LISPRO 15 UNITS: 100 INJECTION, SOLUTION INTRAVENOUS; SUBCUTANEOUS at 12:38

## 2018-03-25 RX ADMIN — HYDROCODONE BITARTRATE AND ACETAMINOPHEN 1 TABLET: 5; 325 TABLET ORAL at 09:46

## 2018-03-25 NOTE — ROUTINE PROCESS
Bedside and Verbal shift change report given to Ethan Frias RN (oshncoming nurse) by Sandra Hanna RN (offgoing nurse). Report included the folowing information SBAR, Kardex, Intake/Output, MAR and Recent Results.

## 2018-03-25 NOTE — PROGRESS NOTES
Problem: Falls - Risk of  Goal: *Absence of Falls  Document Jordyn Fall Risk and appropriate interventions in the flowsheet.    Outcome: Progressing Towards Goal  Fall Risk Interventions:  Mobility Interventions: Communicate number of staff needed for ambulation/transfer, Patient to call before getting OOB         Medication Interventions: Evaluate medications/consider consulting pharmacy    Elimination Interventions: Call light in reach, Patient to call for help with toileting needs, Toileting schedule/hourly rounds

## 2018-03-25 NOTE — PROGRESS NOTES
Assumed patient care. Received patient asleep. No s/s of pain / discomfort noted. Bed is locked and in lowest position and call bell is within reach. Not in acute distress. Patient is ordered for D/C to home. D/C instructions given. Patient verbalized understanding. Patient is D/C to home accompanied by her mother. Not in acute distress.

## 2018-03-25 NOTE — PROGRESS NOTES
Problem: Falls - Risk of  Goal: *Absence of Falls  Document Jordyn Fall Risk and appropriate interventions in the flowsheet.    Outcome: Resolved/Met Date Met: 03/25/18  Fall Risk Interventions:  Mobility Interventions: Communicate number of staff needed for ambulation/transfer, Patient to call before getting OOB         Medication Interventions: Evaluate medications/consider consulting pharmacy    Elimination Interventions: Call light in reach, Patient to call for help with toileting needs, Toileting schedule/hourly rounds

## 2018-03-25 NOTE — DISCHARGE INSTRUCTIONS
Multiple Sclerosis (MS): Care Instructions  Your Care Instructions  Multiple sclerosis, also called MS, is a disease that can affect the brain, spinal cord, and nerves to the eyes. MS can cause problems with muscle control and strength, vision, balance, feeling, and thinking. Whatever your symptoms are, taking medicine correctly and following your doctor's advice for home care can help you maintain your quality of life. Follow-up care is a key part of your treatment and safety. Be sure to make and go to all appointments, and call your doctor if you are having problems. It's also a good idea to know your test results and keep a list of the medicines you take. How can you care for yourself at home? General care  · Take your medicines exactly as prescribed. Call your doctor if you think you are having a problem with your medicine. · Use a cane, walker, or scooter if your doctor suggests it. · Keep doing your normal activities as much as you can. · If you have problems urinating, press or tap your bladder area to help start urine flow. If you have trouble controlling your urine, plan your fluid intake and activities so that a toilet will be available when you need it. · Spend time with family and friends. Join a support group for people with MS if you want extra help. · Depression is common with this condition. Tell your doctor if you have trouble sleeping, are eating too much or are not hungry, or feel sad or tearful all the time. Depression can be treated with medicine and counseling. Diet and exercise  · Eat a balanced diet. · If you have problems swallowing, change how and what you eat:  ¨ Try thick drinks, such as milk shakes. They are easier to swallow than other fluids. ¨ Do not eat foods that crumble easily. These can cause choking. ¨ Use a  to prepare food. Soft foods need less chewing. ¨ Eat small meals often so that you do not get tired from eating larger meals.   · Get exercise on most days. Work with your doctor to set up a program of walking, swimming, or other exercise that you are able to do. A physical therapist can teach you exercises if you cannot walk but can move your limbs and trunk. Or you can do exercises to help with coordination and balance. You can help improve muscle stiffness by doing exercises while lying in certain positions. When should you call for help? Call your doctor now or seek immediate medical care if:  ? · You have a change in symptoms. ? · You fall or have another injury. ? · You have symptoms of a urinary infection. For example:  ¨ You have blood or pus in your urine. ¨ You have pain in your back just below your rib cage. This is called flank pain. ¨ You have a fever, chills, or body aches. ¨ It hurts to urinate. ¨ You have groin or belly pain. ? Watch closely for changes in your health, and be sure to contact your doctor if:  ? · You want more information about MS or medicines. ? · You have questions about alternative treatments. Do not use any other treatments without talking to your doctor first.   Where can you learn more? Go to http://annel-sadie.info/. Enter N971 in the search box to learn more about \"Multiple Sclerosis (MS): Care Instructions. \"  Current as of: October 14, 2016  Content Version: 11.4  © 3513-7992 [employer]. Care instructions adapted under license by [employer] (which disclaims liability or warranty for this information). If you have questions about a medical condition or this instruction, always ask your healthcare professional. [place] [address] any warranty or liability for your use of this information.       DISCHARGE SUMMARY from Nurse    PATIENT INSTRUCTIONS:    After general anesthesia or intravenous sedation, for 24 hours or while taking prescription Narcotics:  · Limit your activities  · Do not drive and operate hazardous machinery  · Do not make important personal or business decisions  · Do  not drink alcoholic beverages  · If you have not urinated within 8 hours after discharge, please contact your surgeon on call. Report the following to your surgeon:  · Excessive pain, swelling, redness or odor of or around the surgical area  · Temperature over 100.5  · Nausea and vomiting lasting longer than 4 hours or if unable to take medications  · Any signs of decreased circulation or nerve impairment to extremity: change in color, persistent  numbness, tingling, coldness or increase pain  · Any questions    What to do at Home:  Recommended activity: Activity as tolerated. If you experience any of the following symptoms chest pain, shortness of breath, altered mental status, fever, chills, nausea, vomiting, abdominal pain, increased leg weakness, or any other concerns, please follow up with your Primary Care Provider and/or call 911. *  Please give a list of your current medications to your Primary Care Provider. *  Please update this list whenever your medications are discontinued, doses are      changed, or new medications (including over-the-counter products) are added. *  Please carry medication information at all times in case of emergency situations. These are general instructions for a healthy lifestyle:    No smoking/ No tobacco products/ Avoid exposure to second hand smoke  Surgeon General's Warning:  Quitting smoking now greatly reduces serious risk to your health. Obesity, smoking, and sedentary lifestyle greatly increases your risk for illness    A healthy diet, regular physical exercise & weight monitoring are important for maintaining a healthy lifestyle    You may be retaining fluid if you have a history of heart failure or if you experience any of the following symptoms:  Weight gain of 3 pounds or more overnight or 5 pounds in a week, increased swelling in our hands or feet or shortness of breath while lying flat in bed. Please call your doctor as soon as you notice any of these symptoms; do not wait until your next office visit. Recognize signs and symptoms of STROKE:    F-face looks uneven    A-arms unable to move or move unevenly    S-speech slurred or non-existent    T-time-call 911 as soon as signs and symptoms begin-DO NOT go       Back to bed or wait to see if you get better-TIME IS BRAIN. Warning Signs of HEART ATTACK     Call 911 if you have these symptoms:   Chest discomfort. Most heart attacks involve discomfort in the center of the chest that lasts more than a few minutes, or that goes away and comes back. It can feel like uncomfortable pressure, squeezing, fullness, or pain.  Discomfort in other areas of the upper body. Symptoms can include pain or discomfort in one or both arms, the back, neck, jaw, or stomach.  Shortness of breath with or without chest discomfort.  Other signs may include breaking out in a cold sweat, nausea, or lightheadedness. Don't wait more than five minutes to call 911 - MINUTES MATTER! Fast action can save your life. Calling 911 is almost always the fastest way to get lifesaving treatment. Emergency Medical Services staff can begin treatment when they arrive -- up to an hour sooner than if someone gets to the hospital by car. The discharge information has been reviewed with the patient. The patient verbalized understanding. Discharge medications reviewed with the patient and appropriate educational materials and side effects teaching were provided.   ___________________________________________________________________________________________________________________________________    Patient armband removed and shredded

## 2018-03-25 NOTE — DISCHARGE SUMMARY
[facility]    Discharge Summary    Patient: Yesica Norwood MRN: [id number]  CSN: [id number]    YOB: 1975  Age: 37 y.o.   Sex: female    DOA: 3/23/2018 LOS:  LOS: 2 days   Discharge Date:      Admission Diagnoses: MS (multiple sclerosis) ([place] [id number].)    Discharge Diagnoses:    Problem List as of 3/25/2018  Date Reviewed: 9/30/2014          Codes Class Noted - Resolved    Type II diabetes mellitus ([place] [address].) ICD-10-CM: E11.9  ICD-9-CM: 250.00  3/24/2018 - Present        Obesity ICD-10-CM: E66.9  ICD-9-CM: 278.00  9/19/2017 - Present        Neurogenic bladder (Chronic) ICD-10-CM: N31.9  ICD-9-CM: 596.54  3/17/2016 - Present        Hyperlipidemia (Chronic) ICD-10-CM: E78.5  ICD-9-CM: 272.4  3/17/2016 - Present        * (Principal)Multiple sclerosis exacerbation ([place] [address].) ICD-10-CM: G35  ICD-9-CM: 365  3/16/2016 - Present        Major depression (Chronic) ICD-10-CM: F32.9  ICD-9-CM: 296.20  1/20/2016 - Present        Chronic low back pain (Chronic) ICD-10-CM: M54.5, G89.29  ICD-9-CM: 724.2, 338.29  9/13/2012 - Present        Spastic gait (Chronic) ICD-10-CM: R26.1  ICD-9-CM: 781.2  9/13/2012 - Present        Central pain syndrome (Chronic) ICD-10-CM: G89.0  ICD-9-CM: 338.0  9/13/2012 - Present        Multiple sclerosis (HCC) (Chronic) ICD-10-CM: G35  ICD-9-CM: 340  6/6/2012 - Present        RESOLVED: MS (multiple sclerosis) ([place] [address].) ICD-10-CM: G35  ICD-9-CM: 002  3/23/2018 - 3/24/2018        RESOLVED: Seizure ([place] [address].) ICD-10-CM: R56.9  ICD-9-CM: 780.39  8/23/2015 - 3/17/2016        RESOLVED: Elevated blood pressure ICD-10-CM: [id number]  ICD-9-CM: Bernarda Nguyen  12/3/2014 - 3/17/2016        RESOLVED: Anxiety state, unspecified ICD-10-CM: F41.1  ICD-9-CM: 300.00  4/7/2013 - 3/17/2016        RESOLVED: Pleurisy ICD-10-CM: R09.1  ICD-9-CM: 511.0  4/7/2013 - 8/23/2015        RESOLVED: Degeneration of lumbar or lumbosacral intervertebral disc ICD-10-CM: M51.37  ICD-9-CM: 722.52  9/13/2012 - 3/17/2016        RESOLVED: Sacroiliitis, not elsewhere classified ICD-10-CM: M46.1  ICD-9-CM: 720.2  9/13/2012 - 3/17/2016        RESOLVED: Arthritis of knee, degenerative ICD-10-CM: M17.10  ICD-9-CM: 715.36  9/13/2012 - 3/17/2016        RESOLVED: Migraine headache ICD-10-CM: X43.450  ICD-9-CM: 346.90  9/13/2012 - 3/17/2016        RESOLVED: Encounter for long-term (current) use of high-risk medication ICD-10-CM: Z79.899  ICD-9-CM: V58.69  9/13/2012 - 3/17/2016        RESOLVED: Ankle pain ICD-10-CM: M25.579  ICD-9-CM: 719.47  9/13/2012 - 3/17/2016        RESOLVED: Cognitive complaints ICD-10-CM: R41.9  ICD-9-CM: 799.59  9/13/2012 - 3/17/2016        RESOLVED: Neurogenic bladder, NOS (Chronic) ICD-10-CM: N31.9  ICD-9-CM: 596.54  6/6/2012 - 3/17/2016        RESOLVED: Intracranial arachnoid cyst ICD-10-CM: G93.0  ICD-9-CM: 348.0  6/6/2012 - 3/17/2016              Discharge Condition: Stable    Discharge To: Home    Consults: Hospitalist and Neurology    Hospital Course: 36 y/o [facility] American female with hx of MS x 21 years, presented to the ED on 3/23/2018 for BLE weakness / shaking. She reports that she has been trying to lose weight by working out and recently taking 3-4 pills of Jet-Alert caffeine supplement bid. She was at the movies and went to the bathroom and her legs gave out on her. She notes she had been at the gym previously and reports her legs were tired. She did not lose consciousness but after falling she needed help getting up and also reported her left leg started shaking. She reports having this kind of tremor before that responded to gabapentin. She also notes entire leg paresthesias when this started. She received Solu-Medrol 1000 mg IV in ED. Tele neuro consulted in the ED and neurology consulted.   She had an MRI of the brain 3/24/2018- no significant change in numerous supratentorial white matter lesions, in a pattern compatible with history of multiple sclerosis, although differential diagnosis also includes chronic small vessel ischemic changes, no evidence of PML, acute infarct or other new acute intracranial finding. MRI C spine 3/24/2018- no intrinsic cord lesion to suggest multiple sclerosis, stable mild multilevel degenerative spondylosis with mild cord flattening C4/5 and C5/6, no significant spinal stenosis. MRI thoracic spine 3/24/2018 normal.  Neurology recommended UA and CXR to r/o source of infection, and Solu-Medrol for total of three days. CXR negative. UA with positive nitrites, 2+ bacteria and few WBC's- pt endorses suprapubic discomfort, feels like she has a UTI. States she has frequent UTI's when she is incontinent of urine, had been doing well - not requiring straight cath- but began to experience incontinence ~6 months ago. Neurology evaluated pt again today- per note, \"transient leg weakness may have been in part to leg exhaustion after working out on day of admission possibly with amplification of symptoms from anxiety. OTC caffeine supplements that she was taking 4 tabs BID for weight loss may have exacerbated anxiety. Leg tremor was functional. Not neurologic. Not a multiple sclerosis exacerbation- recommend f/u with primary neurologist after discharge, continue Copaxone. \"  Her labs and vital signs have remained WNL. Strength in BLE improving, pt states this has returned to her baseline. She is stable for discharge home on oral abx x 3 days, to follow up with her PCP within 3-5 days and primary neurologist within 1-2 weeks.       Physical Exam:  General appearance: alert, cooperative, no distress, appears stated age  Head: Normocephalic, without obvious abnormality, atraumatic  Lungs: clear to auscultation bilaterally  Heart: tachycardia, asymptomatic, S1, S2 normal, no murmur, click, rub or gallop  Abdomen: soft, non tender, non distended . Normoactive bowel sounds.    Extremities: extremities normal, atraumatic, no cyanosis or edema  Skin: Skin color, texture, turgor normal. No rashes or lesions  Neurologic: hx of MS, A/O x 4, follows commands, able to REYNOLDS, no shaking or tremors noted  PSY: Mood and affect normal, appropriately behaved       Significant Diagnostic Studies: labs:   Recent Results (from the past 24 hour(s))   GLUCOSE, POC    Collection Time: 03/24/18 12:13 PM   Result Value Ref Range    Glucose (POC) 322 (H) 70 - 110 mg/dL   URINALYSIS W/ RFLX MICROSCOPIC    Collection Time: 03/24/18  2:09 PM   Result Value Ref Range    Color YELLOW      Appearance CLEAR      Specific gravity >1.030 (H) 1.005 - 1.030    pH (UA) 5.0 5.0 - 8.0      Protein 30 (A) NEG mg/dL    Glucose >1000 (A) NEG mg/dL    Ketone 40 (A) NEG mg/dL    Bilirubin NEGATIVE  NEG      Blood NEGATIVE  NEG      Urobilinogen 0.2 0.2 - 1.0 EU/dL    Nitrites POSITIVE (A) NEG      Leukocyte Esterase NEGATIVE  NEG     URINE MICROSCOPIC ONLY    Collection Time: 03/24/18  2:09 PM   Result Value Ref Range    WBC 1 to 3 0 - 4 /hpf    RBC 0 0 - 5 /hpf    Epithelial cells FEW 0 - 5 /lpf    Bacteria 1+ (A) NEG /hpf   GLUCOSE, POC    Collection Time: 03/24/18  6:08 PM   Result Value Ref Range    Glucose (POC) 311 (H) 70 - 110 mg/dL   GLUCOSE, POC    Collection Time: 03/24/18  9:27 PM   Result Value Ref Range    Glucose (POC) 338 (H) 70 - 110 mg/dL   CBC WITH AUTOMATED DIFF    Collection Time: 03/25/18  5:50 AM   Result Value Ref Range    WBC 17.8 (H) 4.6 - 13.2 K/uL    RBC 4.46 4.20 - 5.30 M/uL    HGB 13.1 12.0 - 16.0 g/dL    HCT 37.1 35.0 - 45.0 %    MCV 83.2 74.0 - 97.0 FL    MCH 29.4 24.0 - 34.0 PG    MCHC 35.3 31.0 - 37.0 g/dL    RDW 13.0 11.6 - 14.5 %    PLATELET 406 (H) 637 - 420 K/uL    MPV 11.3 9.2 - 11.8 FL    NEUTROPHILS 88 (H) 40 - 73 %    LYMPHOCYTES 9 (L) 21 - 52 %    MONOCYTES 3 3 - 10 %    EOSINOPHILS 0 0 - 5 %    BASOPHILS 0 0 - 2 %    ABS. NEUTROPHILS 15.8 (H) 1.8 - 8.0 K/UL    ABS. LYMPHOCYTES 1.6 0.9 - 3.6 K/UL    ABS. MONOCYTES 0.5 0.05 - 1.2 K/UL    ABS. EOSINOPHILS 0.0 0.0 - 0.4 K/UL    ABS.  BASOPHILS 0.0 0.0 - 0.06 K/UL    DF AUTOMATED METABOLIC PANEL, BASIC    Collection Time: 03/25/18  5:50 AM   Result Value Ref Range    Sodium 133 (L) 136 - 145 mmol/L    Potassium 4.4 3.5 - 5.5 mmol/L    Chloride 99 (L) 100 - 108 mmol/L    CO2 22 21 - 32 mmol/L    Anion gap 12 3.0 - 18 mmol/L    Glucose 346 (H) 74 - 99 mg/dL    BUN 18 7.0 - 18 MG/DL    Creatinine 0.68 0.6 - 1.3 MG/DL    BUN/Creatinine ratio 26 (H) 12 - 20      GFR est AA >60 >60 ml/min/1.73m2    GFR est non-AA >60 >60 ml/min/1.73m2    Calcium 9.5 8.5 - 10.1 MG/DL   GLUCOSE, POC    Collection Time: 03/25/18  8:52 AM   Result Value Ref Range    Glucose (POC) 373 (H) 70 - 110 mg/dL         Discharge Medications:     Current Discharge Medication List      START taking these medications    Details   nitrofurantoin (MACRODANTIN) 50 mg capsule Take 1 Cap by mouth four (4) times daily for 3 days. Qty: 12 Cap, Refills: 0         CONTINUE these medications which have NOT CHANGED    Details   gabapentin (NEURONTIN) 300 mg capsule Take 300 mg by mouth nightly. 1-2 tabs      diazePAM (VALIUM) 2 mg tablet Take 1 Tab by mouth every eight (8) hours as needed for Anxiety. Max Daily Amount: 6 mg. Qty: 20 Tab, Refills: 0      amLODIPine (NORVASC) 10 mg tablet Take 10 mg by mouth daily. metFORMIN (GLUCOPHAGE) 500 mg tablet Take 1.5 Tabs by mouth two (2) times daily (with meals). Qty: 60 Tab, Refills: 0      DULoxetine (CYMBALTA) 60 mg capsule Take 60 mg by mouth daily. QUEtiapine (SEROQUEL) 300 mg tablet Take 300 mg by mouth nightly. glatiramer (COPAXONE) 40 mg/mL injection 40 mg by SubCUTAneous route every Monday, Wednesday, Friday. cyanocobalamin 1,000 mcg tablet Take 1,000 mcg by mouth daily. promethazine (PHENERGAN) 25 mg tablet Take 25 mg by mouth every six (6) hours as needed for Nausea. naproxen (NAPROSYN) 500 mg tablet Take 500 mg by mouth two (2) times daily (with meals).       HYDROcodone-acetaminophen (NORCO) 5-325 mg per tablet Take 1 Tab by mouth every four (4) hours as needed for Pain. NAPROXEN SOD/DIPHENHYDRAMINE (ALEVE PM PO) Take 2 Tabs by mouth nightly as needed (sleep). potassium chloride (K-DUR, KLOR-CON) 20 mEq tablet Take 1 Tab by mouth two (2) times a day. Qty: 10 Tab, Refills: 0         STOP taking these medications       methylPREDNISolone (MEDROL, BOGDAN,) 4 mg tablet Comments:   Reason for Stopping:               Activity: Activity as tolerated and resume prior home health/personal care    Diet: Diabetic Diet    Wound Care: None needed    Follow-up: Follow up with PCP within 3-5 days. Follow up with primary neurologist-Vi SULLIVAN / Dr. Vale Perera within 1-2 weeks.     Discharge time: > 35 mins  Vanita Vang NP  3/25/2018, 11:22 AM

## 2018-03-25 NOTE — PROGRESS NOTES
Neurology Progress Note    Admit Date: 3/23/2018  Length of Stay: 2  Primary Care: Hannah Bravo MD       Assessment/Plan   Leg Weakness and Left Leg Tremor -** Resolved**  Now resolved. Not a neurological cause. -Transient leg weakness may have been in part to leg exhaustion after working out on day of admission possibly with amplification of symptoms from anxiety. OTC caffeine supplements that she was taking 4 tabs BID for weight loss may have exacerbated anxiety.   -Leg tremor was functional. Not neurologic. Hx of MULTIPLE SCLEROSIS  Not a multiple sclerosis exacerbation. She does have a history of multiple sclerosis  Follows with Stephen Galan, [id number]  / Leonard Cavanaugh MD at [facility]. On copaxone  Advised to follow back up with [facility] neurology after this admission. [id number] [address] for discharge from neurological perspective. Will sign off. Interim History: Feels she is back to full strength in her legs. Her side to side rotating left leg tremor resolved last night. .   Results reviewed:   MRI Brain /Cspine and T Spine stable from priors without enhancing demyelinating lesions. Allergies: Allergies   Allergen Reactions    Oxycontin [Oxycodone] Hives and Other (comments)     intolerance         Vital Signs:   Visit Vitals    /66 (BP 1 Location: Left arm, BP Patient Position: At rest)    Pulse 97    Temp 97.5 °F (36.4 °C)    Resp 18    Ht 5' 3\" (1.6 m)    Wt 106.6 kg (235 lb)    SpO2 97%    BMI 41.63 kg/m2        Neurological examination:    Appearance: In no acute distress, well developed, well nourished, cooperative   Cardiovascular: Heart is regular rate and rhythm, peripheral edema is absent.  Mental status examination: Alert and oriented X 3. Normal speech and language. Normal attention, memory and fund of knowledge.    Cranial Nerves:         II: visual fields Full to confrontation   II: pupils Equal, round, reactive to light   III,VII: ptosis none III,IV,VI: extraocular muscles  Full ROM   V: facial light touch sensation  normal   V,VII: corneal reflex     VII: facial muscle function  normal   VIII: hearing    IX: soft palate elevation  normal   X phonation normal   XI: sternocleidomastoid strength    XII: tongue  midline      Motor exam: Station, gait: wide based probably from body habitus. Steady gait. Pronator drift absent. Normal bulk and tone. Strength: 5/5 strength in upper and lower extremities.     Sensory: Intact to light touch            Review of Systems:   Y  N   Constitutional: [] [] recent weight change  [] [x] fever  [] [] sleep difficulties   ENT/Mouth:  [] [] hearing loss  [] [] swallowing problems   Cardiovascular:  [] [] chest pain   [] [] palpitations    Respiratory: [] [] cough   [] [] shortness of breath  [] [] sleep apnea  [] [] intubated   Gastrointestinal: [] [] abdominal pain  [] [x] nausea   Genitourinary: [] [] frequent urination  [] [] incontinence    Musculoskeletal:   [] [] joint pain  [] [] muscle pain   Integument:   [] [] rash/itching   Neurological:  [] [] dizziness/vertigo  [] [x] speech problems  [] [] language difficulty  [] [] sedation  [] [] confusion  [] [] agitation/combativeness  [] [] loss of consciousness  [] [] numbness/tingling sensation  [] [] tremors  [] [x] weakness in limbs  [] [] difficulty with balance  [] [] frequent or recurring headaches  [] [] memory loss   [] [] comatose  [] [] seizures   Psychiatric:   [] [] depression  [] [] hallucinations           PMH:   Past Medical History:   Diagnosis Date    Arthritis     Arthropathy, unspecified, site unspecified     Depression     Diabetes ([facility] [address] [facility]     Hypercholesteremia     Hypertension     history of htn    Incontinence of urine     Insomnia     Migraine     [facility] sclerosis) [facility] [facility]     [facility] sclerosis) [facility] [facility]     Neurogenic bladder     Other ill-defined conditions(799.89)     multiple Sclerosis    Seizures ([facility] [address]     6/2013    Wears glasses       FH:   Family History   Problem Relation Age of Onset    Heart Disease Father     Diabetes Father     Diabetes Sister     Other Other       SH:   Social History     Social History    Marital status:      Spouse name: N/A    Number of children: N/A    Years of education: N/A     Social History Main Topics    Smoking status: Never Smoker    Smokeless tobacco: Never Used    Alcohol use No    Drug use: No    Sexual activity: Not Asked      Comment: Hysterectomy     Other Topics Concern    None     Social History Narrative          Medications:    [x] REVIEWED  Current Facility-Administered Medications   Medication Dose Route Frequency    methylPREDNISolone (PF) (SOLU-MEDROL) 125 mg/2 mL injection        amLODIPine (NORVASC) tablet 10 mg  10 mg Oral DAILY    diazePAM (VALIUM) tablet 2 mg  2 mg Oral Q8H PRN    DULoxetine (CYMBALTA) capsule 60 mg  60 mg Oral DAILY    [START ON 3/26/2018] glatiramer (COPAXONE) injection 40 mg  40 mg SubCUTAneous Q MON, WED & FRI    HYDROcodone-acetaminophen (NORCO) 5-325 mg per tablet 1 Tab  1 Tab Oral Q4H PRN    naproxen (NAPROSYN) tablet 500 mg  500 mg Oral BID WITH MEALS    potassium chloride (K-DUR, KLOR-CON) SR tablet 20 mEq  20 mEq Oral BID    promethazine (PHENERGAN) tablet 25 mg  25 mg Oral Q6H PRN    QUEtiapine (SEROquel) tablet 300 mg  300 mg Oral QHS    enoxaparin (LOVENOX) injection 40 mg  40 mg SubCUTAneous Q24H    insulin lispro (HUMALOG) injection   SubCUTAneous AC&HS    glucose chewable tablet 16 g  4 Tab Oral PRN    glucagon (GLUCAGEN) injection 1 mg  1 mg IntraMUSCular PRN    dextrose (D50) infusion 12.5-25 g  25-50 mL IntraVENous PRN    gabapentin (NEURONTIN) capsule 300 mg  300 mg Oral QHS    methylPREDNISolone (PF) (SOLU-MEDROL) injection 1,000 mg  1,000 mg IntraVENous QHS    insulin glargine (LANTUS) injection 10 Units  10 Units SubCUTAneous Q12H      Data:      [x] REVIEWED  Recent Results (from the past 24 hour(s))   GLUCOSE, POC    Collection Time: 03/24/18 12:13 PM   Result Value Ref Range    Glucose (POC) 322 (H) 70 - 110 mg/dL   URINALYSIS W/ RFLX MICROSCOPIC    Collection Time: 03/24/18  2:09 PM   Result Value Ref Range    Color YELLOW      Appearance CLEAR      Specific gravity >1.030 (H) 1.005 - 1.030    pH (UA) 5.0 5.0 - 8.0      Protein 30 (A) NEG mg/dL    Glucose >1000 (A) NEG mg/dL    Ketone 40 (A) NEG mg/dL    Bilirubin NEGATIVE  NEG      Blood NEGATIVE  NEG      Urobilinogen 0.2 0.2 - 1.0 EU/dL    Nitrites POSITIVE (A) NEG      Leukocyte Esterase NEGATIVE  NEG     URINE MICROSCOPIC ONLY    Collection Time: 03/24/18  2:09 PM   Result Value Ref Range    WBC 1 to 3 0 - 4 /hpf    RBC 0 0 - 5 /hpf    Epithelial cells FEW 0 - 5 /lpf    Bacteria 1+ (A) NEG /hpf   GLUCOSE, POC    Collection Time: 03/24/18  6:08 PM   Result Value Ref Range    Glucose (POC) 311 (H) 70 - 110 mg/dL   GLUCOSE, POC    Collection Time: 03/24/18  9:27 PM   Result Value Ref Range    Glucose (POC) 338 (H) 70 - 110 mg/dL   CBC WITH AUTOMATED DIFF    Collection Time: 03/25/18  5:50 AM   Result Value Ref Range    WBC 17.8 (H) 4.6 - 13.2 K/uL    RBC 4.46 4.20 - 5.30 M/uL    HGB 13.1 12.0 - 16.0 g/dL    HCT 37.1 35.0 - 45.0 %    MCV 83.2 74.0 - 97.0 FL    MCH 29.4 24.0 - 34.0 PG    MCHC 35.3 31.0 - 37.0 g/dL    RDW 13.0 11.6 - 14.5 %    PLATELET 035 (H) 392 - 420 K/uL    MPV 11.3 9.2 - 11.8 FL    NEUTROPHILS 88 (H) 40 - 73 %    LYMPHOCYTES 9 (L) 21 - 52 %    MONOCYTES 3 3 - 10 %    EOSINOPHILS 0 0 - 5 %    BASOPHILS 0 0 - 2 %    ABS. NEUTROPHILS 15.8 (H) 1.8 - 8.0 K/UL    ABS. LYMPHOCYTES 1.6 0.9 - 3.6 K/UL    ABS. MONOCYTES 0.5 0.05 - 1.2 K/UL    ABS. EOSINOPHILS 0.0 0.0 - 0.4 K/UL    ABS.  BASOPHILS 0.0 0.0 - 0.06 K/UL    DF AUTOMATED     METABOLIC PANEL, BASIC    Collection Time: 03/25/18  5:50 AM   Result Value Ref Range    Sodium 133 (L) 136 - 145 mmol/L    Potassium 4.4 3.5 - 5.5 mmol/L    Chloride 99 (L) 100 - 108 mmol/L    CO2 22 21 - 32 mmol/L    Anion gap 12 3.0 - 18 mmol/L    Glucose 346 (H) 74 - 99 mg/dL    BUN 18 7.0 - 18 MG/DL    Creatinine 0.68 0.6 - 1.3 MG/DL    BUN/Creatinine ratio 26 (H) 12 - 20      GFR est AA >60 >60 ml/min/1.73m2    GFR est non-AA >60 >60 ml/min/1.73m2    Calcium 9.5 8.5 - 10.1 MG/DL   GLUCOSE, POC    Collection Time: 03/25/18  8:52 AM   Result Value Ref Range    Glucose (POC) 373 (H) 70 - 110 mg/dL       Peggy Welch MD  10:05 AM  03/25/18

## 2018-04-06 ENCOUNTER — HOSPITAL ENCOUNTER (EMERGENCY)
Age: 43
Discharge: HOME | End: 2018-04-06
Attending: EMERGENCY MEDICINE
Payer: MEDICARE

## 2018-04-06 VITALS
HEART RATE: 102 BPM | WEIGHT: 235 LBS | SYSTOLIC BLOOD PRESSURE: 166 MMHG | BODY MASS INDEX: 41.64 KG/M2 | RESPIRATION RATE: 16 BRPM | HEIGHT: 63 IN | TEMPERATURE: 97.6 F | OXYGEN SATURATION: 99 % | DIASTOLIC BLOOD PRESSURE: 111 MMHG

## 2018-04-06 DIAGNOSIS — R73.9 HYPERGLYCEMIA: Primary | ICD-10-CM

## 2018-04-06 DIAGNOSIS — B37.31 YEAST VAGINITIS: ICD-10-CM

## 2018-04-06 LAB
ANION GAP SERPL CALC-SCNC: 10 MMOL/L (ref 3–18)
APPEARANCE UR: CLEAR
BACTERIA URNS QL MICRO: NEGATIVE /HPF
BASOPHILS # BLD: 0 K/UL (ref 0–0.06)
BASOPHILS NFR BLD: 0 % (ref 0–2)
BILIRUB UR QL: NEGATIVE
BUN SERPL-MCNC: 9 MG/DL (ref 7–18)
BUN/CREAT SERPL: 11 (ref 12–20)
CALCIUM SERPL-MCNC: 9.6 MG/DL (ref 8.5–10.1)
CHLORIDE SERPL-SCNC: 98 MMOL/L (ref 100–108)
CO2 SERPL-SCNC: 25 MMOL/L (ref 21–32)
COLOR UR: YELLOW
CREAT SERPL-MCNC: 0.81 MG/DL (ref 0.6–1.3)
DIFFERENTIAL METHOD BLD: ABNORMAL
EOSINOPHIL # BLD: 0.1 K/UL (ref 0–0.4)
EOSINOPHIL NFR BLD: 1 % (ref 0–5)
EPITH CASTS URNS QL MICRO: ABNORMAL /LPF (ref 0–5)
ERYTHROCYTE [DISTWIDTH] IN BLOOD BY AUTOMATED COUNT: 13.2 % (ref 11.6–14.5)
GLUCOSE BLD STRIP.AUTO-MCNC: 225 MG/DL (ref 70–110)
GLUCOSE SERPL-MCNC: 371 MG/DL (ref 74–99)
GLUCOSE UR STRIP.AUTO-MCNC: >1000 MG/DL
HCT VFR BLD AUTO: 38.8 % (ref 35–45)
HGB BLD-MCNC: 14.1 G/DL (ref 12–16)
HGB UR QL STRIP: ABNORMAL
KETONES UR QL STRIP.AUTO: NEGATIVE MG/DL
LEUKOCYTE ESTERASE UR QL STRIP.AUTO: NEGATIVE
LYMPHOCYTES # BLD: 4.1 K/UL (ref 0.9–3.6)
LYMPHOCYTES NFR BLD: 38 % (ref 21–52)
MCH RBC QN AUTO: 29.6 PG (ref 24–34)
MCHC RBC AUTO-ENTMCNC: 36.3 G/DL (ref 31–37)
MCV RBC AUTO: 81.3 FL (ref 74–97)
MONOCYTES # BLD: 0.6 K/UL (ref 0.05–1.2)
MONOCYTES NFR BLD: 5 % (ref 3–10)
NEUTS SEG # BLD: 5.9 K/UL (ref 1.8–8)
NEUTS SEG NFR BLD: 56 % (ref 40–73)
NITRITE UR QL STRIP.AUTO: NEGATIVE
PH UR STRIP: 6 [PH] (ref 5–8)
PLATELET # BLD AUTO: 448 K/UL (ref 135–420)
PMV BLD AUTO: 10.5 FL (ref 9.2–11.8)
POTASSIUM SERPL-SCNC: 3.4 MMOL/L (ref 3.5–5.5)
PROT UR STRIP-MCNC: NEGATIVE MG/DL
RBC # BLD AUTO: 4.77 M/UL (ref 4.2–5.3)
RBC #/AREA URNS HPF: ABNORMAL /HPF (ref 0–5)
SODIUM SERPL-SCNC: 133 MMOL/L (ref 136–145)
SP GR UR REFRACTOMETRY: >1.03 (ref 1–1.03)
UROBILINOGEN UR QL STRIP.AUTO: 0.2 EU/DL (ref 0.2–1)
WBC # BLD AUTO: 10.8 K/UL (ref 4.6–13.2)
WBC URNS QL MICRO: ABNORMAL /HPF (ref 0–4)
YEAST URNS QL MICRO: ABNORMAL

## 2018-04-06 PROCEDURE — 85025 COMPLETE CBC W/AUTO DIFF WBC: CPT | Performed by: EMERGENCY MEDICINE

## 2018-04-06 PROCEDURE — 74011636637 HC RX REV CODE- 636/637: Performed by: EMERGENCY MEDICINE

## 2018-04-06 PROCEDURE — 74011250636 HC RX REV CODE- 250/636: Performed by: EMERGENCY MEDICINE

## 2018-04-06 PROCEDURE — 81001 URINALYSIS AUTO W/SCOPE: CPT | Performed by: EMERGENCY MEDICINE

## 2018-04-06 PROCEDURE — 80048 BASIC METABOLIC PNL TOTAL CA: CPT | Performed by: EMERGENCY MEDICINE

## 2018-04-06 PROCEDURE — 96361 HYDRATE IV INFUSION ADD-ON: CPT

## 2018-04-06 PROCEDURE — 96374 THER/PROPH/DIAG INJ IV PUSH: CPT

## 2018-04-06 PROCEDURE — 74011250637 HC RX REV CODE- 250/637: Performed by: EMERGENCY MEDICINE

## 2018-04-06 PROCEDURE — 82962 GLUCOSE BLOOD TEST: CPT

## 2018-04-06 PROCEDURE — 99285 EMERGENCY DEPT VISIT HI MDM: CPT

## 2018-04-06 RX ORDER — FLUCONAZOLE 150 MG/1
150 TABLET ORAL
Status: COMPLETED | OUTPATIENT
Start: 2018-04-06 | End: 2018-04-06

## 2018-04-06 RX ADMIN — SODIUM CHLORIDE 1000 ML: 900 INJECTION, SOLUTION INTRAVENOUS at 22:01

## 2018-04-06 RX ADMIN — HUMAN INSULIN 5 UNITS: 100 INJECTION, SOLUTION SUBCUTANEOUS at 21:41

## 2018-04-06 RX ADMIN — FLUCONAZOLE 150 MG: 150 TABLET ORAL at 22:04

## 2018-04-07 NOTE — DISCHARGE INSTRUCTIONS
Vaginal Yeast Infection: Care Instructions  Your Care Instructions    A vaginal yeast infection is caused by too many yeast cells in the vagina. This is common in women of all ages. Itching, vaginal discharge and irritation, and other symptoms can bother you. But yeast infections don't often cause other health problems. Some medicines can increase your risk of getting a yeast infection. These include antibiotics, birth control pills, hormones, and steroids. You may also be more likely to get a yeast infection if you are pregnant, have diabetes, douche, or wear tight clothes. With treatment, most yeast infections get better in 2 to 3 days. Follow-up care is a key part of your treatment and safety. Be sure to make and go to all appointments, and call your doctor if you are having problems. It's also a good idea to know your test results and keep a list of the medicines you take. How can you care for yourself at home? · Take your medicines exactly as prescribed. Call your doctor if you think you are having a problem with your medicine. · Ask your doctor about over-the-counter (OTC) medicines for yeast infections. They may cost less than prescription medicines. If you use an OTC treatment, read and follow all instructions on the label. · Do not use tampons while using a vaginal cream or suppository. The tampons can absorb the medicine. Use pads instead. · Wear loose cotton clothing. Do not wear nylon or other fabric that holds body heat and moisture close to the skin. · Try sleeping without underwear. · Do not scratch. Relieve itching with a cold pack or a cool bath. · Do not wash your vaginal area more than once a day. Use plain water or a mild, unscented soap. Air-dry the vaginal area. · Change out of wet swimsuits after swimming. · Do not have sex until you have finished your treatment. · Do not douche. When should you call for help?   Call your doctor now or seek immediate medical care if:  ? · You have unexpected vaginal bleeding. ? · You have new or increased pain in your vagina or pelvis. ? Watch closely for changes in your health, and be sure to contact your doctor if:  ? · You have a fever. ? · You are not getting better after 2 days. ? · Your symptoms come back after you finish your medicines. Where can you learn more? Go to http://annel-sadie.info/. Enter W190 in the search box to learn more about \"Vaginal Yeast Infection: Care Instructions. \"  Current as of: October 13, 2016  Content Version: 11.4  © 7320-6993 [employer]. Care instructions adapted under license by [employer] (which disclaims liability or warranty for this information). If you have questions about a medical condition or this instruction, always ask your healthcare professional. [address] any warranty or liability for your use of this information. Learning About High Blood Sugar  What is high blood sugar? Your body turns the food you eat into glucose (sugar), which it uses for energy. But if your body isn't able to use the sugar right away, it can build up in your blood and lead to high blood sugar. When the amount of sugar in your blood stays too high for too much of the time, you may have diabetes. Diabetes is a disease that can cause serious health problems. The good news is that lifestyle changes may help you get your blood sugar back to normal and avoid or delay diabetes. What causes high blood sugar? Sugar (glucose) can build up in your blood if you:  · Are overweight. · Have a family history of diabetes. · Take certain medicines, such as steroids. What are the symptoms? Having high blood sugar may not cause any symptoms at all. Or it may make you feel very thirsty or very hungry. You may also urinate more often than usual, have blurry vision, or lose weight without trying. How is high blood sugar treated?   You can take steps to lower your blood sugar level if you understand what makes it get higher. Your doctor may want you to learn how to test your blood sugar level at home. Then you can see how illness, stress, or different kinds of food or medicine raise or lower your blood sugar level. Other tests may be needed to see if you have diabetes. How can you prevent high blood sugar? · Watch your weight. If you're overweight, losing just a small amount of weight may help. Reducing fat around your waist is most important. · Limit the amount of calories, sweets, and unhealthy fat you eat. Ask your doctor if a dietitian can help you. A registered dietitian can help you create meal plans that fit your lifestyle. · Get at least 30 minutes of exercise on most days of the week. Exercise helps control your blood sugar. It also helps you maintain a healthy weight. Walking is a good choice. You also may want to do other activities, such as running, swimming, cycling, or playing tennis or team sports. · If your doctor prescribed medicines, take them exactly as prescribed. Call your doctor if you think you are having a problem with your medicine. You will get more details on the specific medicines your doctor prescribes. Follow-up care is a key part of your treatment and safety. Be sure to make and go to all appointments, and call your doctor if you are having problems. It's also a good idea to know your test results and keep a list of the medicines you take. Where can you learn more? Go to http://annel-sadie.info/. Enter O108 in the search box to learn more about \"Learning About High Blood Sugar. \"  Current as of: March 13, 2017  Content Version: 11.4  © 0898-5140 [employer]. Care instructions adapted under license by [employer] (which disclaims liability or warranty for this information).  If you have questions about a medical condition or this instruction, always ask your healthcare professional. [address] any warranty or liability for your use of this information.

## 2018-04-07 NOTE — ED PROVIDER NOTES
EMERGENCY DEPARTMENT HISTORY AND PHYSICAL EXAM    8:45 PM      Date: 4/6/2018  Patient Name: Lew Carson    History of Presenting Illness     Chief Complaint   Patient presents with    Vaginal Bleeding         History Provided By: Patient    Chief Complaint: Vaginal bleeding  Duration:  Hours, began today  Timing:  Acute  Location: Vagina  Quality: N/A  Severity: Moderate  Modifying Factors: Nothing improves or worsens  Associated Symptoms: Nausea, light-headedness, dysuria       Additional History (Context): Lew Carson is a 37 y.o. female with h/o incontinence of urine, HTN, Diabetes, MS, and complete hysterectomy who presents with c/o of vaginal bleeding with acute onset beginning today. Patient reports no injury to the area. She states that she had a complete hysterectomy in 2004. Patients reports that she is on insulin. She notes that she checked her blood sugar when the bleeding began, and it was 530. Patient states that she has been doing well with her diet, but that her PCP might have to adjust her insulin. She denies fever, but c/o nausea, light-headedness, and dysuria. Denies any further complaints or symptoms at the moment. PCP: Keli Ely MD    Current Facility-Administered Medications   Medication Dose Route Frequency Provider Last Rate Last Dose    sodium chloride 0.9 % bolus infusion 1,000 mL  1,000 mL IntraVENous ONCE Bayron Lozoya MD 1,000 mL/hr at 04/06/18 2201 1,000 mL at 04/06/18 2201     Current Outpatient Prescriptions   Medication Sig Dispense Refill    gabapentin (NEURONTIN) 300 mg capsule Take 300 mg by mouth nightly. 1-2 tabs      promethazine (PHENERGAN) 25 mg tablet Take 25 mg by mouth every six (6) hours as needed for Nausea.  amLODIPine (NORVASC) 10 mg tablet Take 10 mg by mouth daily.  NAPROXEN SOD/DIPHENHYDRAMINE (ALEVE PM PO) Take 2 Tabs by mouth nightly as needed (sleep).          metFORMIN (GLUCOPHAGE) 500 mg tablet Take 1.5 Tabs by mouth two (2) times daily (with meals). 60 Tab 0    DULoxetine (CYMBALTA) 60 mg capsule Take 60 mg by mouth daily.  QUEtiapine (SEROQUEL) 300 mg tablet Take 300 mg by mouth nightly.  potassium chloride (K-DUR, KLOR-CON) 20 mEq tablet Take 1 Tab by mouth two (2) times a day. 10 Tab 0    glatiramer (COPAXONE) 40 mg/mL injection 40 mg by SubCUTAneous route every Monday, Wednesday, Friday.  cyanocobalamin 1,000 mcg tablet Take 1,000 mcg by mouth daily.  naproxen (NAPROSYN) 500 mg tablet Take 500 mg by mouth two (2) times daily (with meals). Past History     Past Medical History:  Past Medical History:   Diagnosis Date    Arthritis     Arthropathy, unspecified, site unspecified     Depression     Diabetes ([address]     Hypercholesteremia     Hypertension     history of htn    Incontinence of urine     Insomnia     Migraine     MS (multiple sclerosis) ([address]     Neurogenic bladder     Other ill-defined conditions(799.89)     multiple Sclerosis    Seiz[facility] [address]     6/2013    Wears glasses        Past Surgical History:  Past Surgical History:   Procedure Laterality Date    HX CARPAL TUNNEL RELEASE  11/2008    HX HYSTERECTOMY      HX OTHER SURGICAL         Family History:  Family History   Problem Relation Age of Onset    Heart Disease Father     Diabetes Father     Diabetes Sister     Other Other        Social History:  Social History   Substance Use Topics    Smoking status: Former Smoker    Smokeless tobacco: Never Used    Alcohol use No       Allergies: Allergies   Allergen Reactions    Oxycontin [Oxycodone] Hives and Other (comments)     intolerance         Review of Systems       Review of Systems   Constitutional: Negative for fever. Gastrointestinal: Positive for nausea. Genitourinary: Positive for dysuria and vaginal bleeding. Neurological: Positive for light-headedness. All other systems reviewed and are negative.         Physical Exam     Visit Vitals    /88    Pulse (!) 102    Temp 97.6 °F (36.4 °C)    Resp 16    Ht 5' 3\" (1.6 m)    Wt 106.6 kg (235 lb)    SpO2 99%    BMI 41.63 kg/m2         Physical Exam   Constitutional:   General:  Well-developed, well-nourished, obese nontoxic nondiaphoretic. Head:  Normocephalic atraumatic. Eyes:  Pupils midrange extraocular movements intact. No pallor or conjunctival injection. Nose:  No rhinorrhea, inspection grossly normal.    Ears:  Grossly normal to inspection, no discharge. Mouth:  Mucous membranes moist, no appreciable intraoral lesion. Neck/Back:  Trachea midline, no asymmetry. Chest:  Grossly normal inspection, symmetric chest rise. Pulmonary:  Clear to auscultation bilaterally no wheezes rhonchi or rales. Cardiovascular:  S1-S2 no murmurs rubs or gallops. Abdomen: Soft, nontender, protuberant no guarding rebound or peritoneal signs. No CVA tenderness    Extremities:  Grossly normal to inspection, peripheral pulses intact in all 4 extremity is no edema  Neurologic:  Alert and oriented no appreciable focal neurologic deficit. Skin:  Warm and dry  Psychiatric:  Grossly normal mood and affect. Nursing note reviewed, vital signs reviewed.            Diagnostic Study Results     Labs -  Recent Results (from the past 12 hour(s))   URINALYSIS W/ RFLX MICROSCOPIC    Collection Time: 04/06/18  7:47 PM   Result Value Ref Range    Color YELLOW      Appearance CLEAR      Specific gravity >1.030 (H) 1.005 - 1.030    pH (UA) 6.0 5.0 - 8.0      Protein NEGATIVE  NEG mg/dL    Glucose >1000 (A) NEG mg/dL    Ketone NEGATIVE  NEG mg/dL    Bilirubin NEGATIVE  NEG      Blood MODERATE (A) NEG      Urobilinogen 0.2 0.2 - 1.0 EU/dL    Nitrites NEGATIVE  NEG      Leukocyte Esterase NEGATIVE  NEG     URINE MICROSCOPIC ONLY    Collection Time: 04/06/18  7:47 PM   Result Value Ref Range    WBC 0 to 3 0 - 4 /hpf    RBC 0 to 3 0 - 5 /hpf    Epithelial cells 1+ 0 - 5 /lpf    Bacteria NEGATIVE  NEG /hpf    Yeast 1+ (A) NEG CBC WITH AUTOMATED DIFF    Collection Time: 04/06/18  8:36 PM   Result Value Ref Range    WBC 10.8 4.6 - 13.2 K/uL    RBC 4.77 4.20 - 5.30 M/uL    HGB 14.1 12.0 - 16.0 g/dL    HCT 38.8 35.0 - 45.0 %    MCV 81.3 74.0 - 97.0 FL    MCH 29.6 24.0 - 34.0 PG    MCHC 36.3 31.0 - 37.0 g/dL    RDW 13.2 11.6 - 14.5 %    PLATELET 937 (H) 761 - 420 K/uL    MPV 10.5 9.2 - 11.8 FL    NEUTROPHILS 56 40 - 73 %    LYMPHOCYTES 38 21 - 52 %    MONOCYTES 5 3 - 10 %    EOSINOPHILS 1 0 - 5 %    BASOPHILS 0 0 - 2 %    ABS. NEUTROPHILS 5.9 1.8 - 8.0 K/UL    ABS. LYMPHOCYTES 4.1 (H) 0.9 - 3.6 K/UL    ABS. MONOCYTES 0.6 0.05 - 1.2 K/UL    ABS. EOSINOPHILS 0.1 0.0 - 0.4 K/UL    ABS. BASOPHILS 0.0 0.0 - 0.06 K/UL    DF AUTOMATED     METABOLIC PANEL, BASIC    Collection Time: 04/06/18  8:36 PM   Result Value Ref Range    Sodium 133 (L) 136 - 145 mmol/L    Potassium 3.4 (L) 3.5 - 5.5 mmol/L    Chloride 98 (L) 100 - 108 mmol/L    CO2 25 21 - 32 mmol/L    Anion gap 10 3.0 - 18 mmol/L    Glucose 371 (H) 74 - 99 mg/dL    BUN 9 7.0 - 18 MG/DL    Creatinine 0.81 0.6 - 1.3 MG/DL    BUN/Creatinine ratio 11 (L) 12 - 20      GFR est AA >60 >60 ml/min/1.73m2    GFR est non-AA >60 >60 ml/min/1.73m2    Calcium 9.6 8.5 - 10.1 MG/DL   GLUCOSE, POC    Collection Time: 04/06/18 10:46 PM   Result Value Ref Range    Glucose (POC) 225 (H) 70 - 110 mg/dL       Radiologic Studies -   No orders to display         Medical Decision Making   I am the first provider for this patient. I reviewed the vital signs, available nursing notes, past medical history, past surgical history, family history and social history. Vital Signs-Reviewed the patient's vital signs. Records Reviewed: Nursing Notes (Time of Review: 8:45 PM)    ED Course: Progress Notes, Reevaluation, and Consults:  ED course:  Patient status post total hysterectomy presents with pelvic pain and vaginal bleeding urinary frequency elevated blood sugar in the 500s.   We'll establish IV line hydrate check for diabetic complication, primary concern for infectious etiology versus trauma versus urinary tract infection cystitis, she seems adamant this was not a urinary source or a rectal source, pelvic pending staff for chaperone    Labs unremarkable urinalysis not concerning for infection    Wet prep with clue cells consistent with bacterial vaginosis    CT per radiology: Hepatic and splenic cyst, no clear etiology of her pain    Reevaluated vital signs completely normal.    We'll treat for BV, urged follow-up with PCP/OB/GYN for further management    The patient's history, physical and laboratory evaluations were reviewed. At this time I can find no definitive cause for the patient's presentation and current information is reassuring. I feel the patient is stable for outpatient evaluation and cannot reasonably expect the patient to decompensate before follow-up with primary care physician/specialist.  Patient was informed about the diagnostic uncertainty of ED evaluation, red flags were discussed and patient was instructed to return with any concerns. Disposition:    Discharged home      Portions of this chart were created with Dragon medical speech to text program.   Unrecognized errors may be present. Follow-up Information     Follow up With Details Comments Black Dr Corrie Villanueva MD Call in 2 days  [address] [facility] [address] [facility] EMERGENCY DEPT  As needed, If symptoms worsen [address] [place] [address]  [phone]    [facility], DO Call in 2 days if unable to see your own OB [address]  [address] [facility] [phone]             Patient's Medications   Start Taking    No medications on file   Continue Taking    AMLODIPINE (NORVASC) 10 MG TABLET    Take 10 mg by mouth daily. CYANOCOBALAMIN 1,000 MCG TABLET    Take 1,000 mcg by mouth daily. DULOXETINE (CYMBALTA) 60 MG CAPSULE    Take 60 mg by mouth daily.     GABAPENTIN (NEURONTIN) 300 MG CAPSULE    Take 300 mg by mouth nightly. 1-2 tabs    GLATIRAMER (COPAXONE) 40 MG/ML INJECTION    40 mg by SubCUTAneous route every Monday, Wednesday, Friday. METFORMIN (GLUCOPHAGE) 500 MG TABLET    Take 1.5 Tabs by mouth two (2) times daily (with meals). NAPROXEN (NAPROSYN) 500 MG TABLET    Take 500 mg by mouth two (2) times daily (with meals). NAPROXEN SOD/DIPHENHYDRAMINE (ALEVE PM PO)    Take 2 Tabs by mouth nightly as needed (sleep). POTASSIUM CHLORIDE (K-DUR, KLOR-CON) 20 MEQ TABLET    Take 1 Tab by mouth two (2) times a day. PROMETHAZINE (PHENERGAN) 25 MG TABLET    Take 25 mg by mouth every six (6) hours as needed for Nausea. QUETIAPINE (SEROQUEL) 300 MG TABLET    Take 300 mg by mouth nightly. These Medications have changed    No medications on file   Stop Taking    DIAZEPAM (VALIUM) 2 MG TABLET    Take 1 Tab by mouth every eight (8) hours as needed for Anxiety. Max Daily Amount: 6 mg. HYDROCODONE-ACETAMINOPHEN (NORCO) 5-325 MG PER TABLET    Take 1 Tab by mouth every four (4) hours as needed for Pain.     _______________________________    Attestations:  [place] [address] [place] [address] acting as a scribe for and in the presence of Kelby Martínez MD      April 06, 2018 at 8:45 PM       Provider Attestation:      I personally performed the services described in the documentation, reviewed the documentation, as recorded by the scribe in my presence, and it accurately and completely records my words and actions.  April 06, 2018 at 8:45 PM - Kelby Martínez MD    _______________________________

## 2018-04-09 ENCOUNTER — HOSPITAL ENCOUNTER (EMERGENCY)
Age: 43
Discharge: HOME | End: 2018-04-09
Attending: EMERGENCY MEDICINE
Payer: MEDICARE

## 2018-04-09 VITALS
HEIGHT: 63 IN | OXYGEN SATURATION: 99 % | BODY MASS INDEX: 41.64 KG/M2 | DIASTOLIC BLOOD PRESSURE: 69 MMHG | RESPIRATION RATE: 16 BRPM | HEART RATE: 105 BPM | SYSTOLIC BLOOD PRESSURE: 141 MMHG | TEMPERATURE: 97.7 F | WEIGHT: 235 LBS

## 2018-04-09 DIAGNOSIS — G43.809 OTHER MIGRAINE WITHOUT STATUS MIGRAINOSUS, NOT INTRACTABLE: Primary | ICD-10-CM

## 2018-04-09 PROCEDURE — 96374 THER/PROPH/DIAG INJ IV PUSH: CPT

## 2018-04-09 PROCEDURE — 96361 HYDRATE IV INFUSION ADD-ON: CPT

## 2018-04-09 PROCEDURE — 74011250636 HC RX REV CODE- 250/636: Performed by: PHYSICIAN ASSISTANT

## 2018-04-09 PROCEDURE — 96375 TX/PRO/DX INJ NEW DRUG ADDON: CPT

## 2018-04-09 PROCEDURE — 99281 EMR DPT VST MAYX REQ PHY/QHP: CPT

## 2018-04-09 RX ORDER — METOCLOPRAMIDE HYDROCHLORIDE 5 MG/ML
10 INJECTION INTRAMUSCULAR; INTRAVENOUS
Status: COMPLETED | OUTPATIENT
Start: 2018-04-09 | End: 2018-04-09

## 2018-04-09 RX ORDER — DIPHENHYDRAMINE HYDROCHLORIDE 50 MG/ML
25 INJECTION, SOLUTION INTRAMUSCULAR; INTRAVENOUS ONCE
Status: COMPLETED | OUTPATIENT
Start: 2018-04-09 | End: 2018-04-09

## 2018-04-09 RX ADMIN — SODIUM CHLORIDE 1000 ML: 900 INJECTION, SOLUTION INTRAVENOUS at 18:08

## 2018-04-09 RX ADMIN — METOCLOPRAMIDE 10 MG: 5 INJECTION, SOLUTION INTRAMUSCULAR; INTRAVENOUS at 18:08

## 2018-04-09 RX ADMIN — DIPHENHYDRAMINE HYDROCHLORIDE 25 MG: 50 INJECTION, SOLUTION INTRAMUSCULAR; INTRAVENOUS at 18:08

## 2018-04-09 NOTE — ED NOTES
Pt PMH MS, DM and migraines presents to ED w c/o frontal lobe HA x 1 day. Endorses photosensitivity and aura. Takes Gabapentin for migraines.

## 2018-04-09 NOTE — ED PROVIDER NOTES
EMERGENCY DEPARTMENT HISTORY AND PHYSICAL EXAM    Date: 4/9/2018  Patient Name: Justin Rodriguez    History of Presenting Illness     Chief Complaint   Patient presents with    Headache         History Provided By: Patient    Chief Complaint: headache  Duration: today   Timing:  Acute  Location: left side of head  Quality: throbbing  Severity: Moderate  Modifying Factors: none  Associated Symptoms: nausea, photophobia      Additional History (Context): Justin Rodriguez is a 37 y.o. female with migraines, MS, HTN, seizure, DM who presents with HA which started this morning. HA located on left side, which is a typical presentation for her migraines. Has associated nausea and photophobia. Took tylenol and gabapentin without improvement in sx. Denies fever, chills, vomiting, diarrhea, vision changes. No other complaints. PCP: Nely Carrillo MD    Current Facility-Administered Medications   Medication Dose Route Frequency Provider Last Rate Last Dose    sodium chloride 0.9 % bolus infusion 1,000 mL  1,000 mL IntraVENous ONCE Azam Magdaleno PA-C 1,000 mL/hr at 04/09/18 1808 1,000 mL at 04/09/18 1808     Current Outpatient Prescriptions   Medication Sig Dispense Refill    gabapentin (NEURONTIN) 300 mg capsule Take 300 mg by mouth nightly. 1-2 tabs      promethazine (PHENERGAN) 25 mg tablet Take 25 mg by mouth every six (6) hours as needed for Nausea.  amLODIPine (NORVASC) 10 mg tablet Take 10 mg by mouth daily.  metFORMIN (GLUCOPHAGE) 500 mg tablet Take 1.5 Tabs by mouth two (2) times daily (with meals). 60 Tab 0    DULoxetine (CYMBALTA) 60 mg capsule Take 60 mg by mouth daily.  QUEtiapine (SEROQUEL) 300 mg tablet Take 300 mg by mouth nightly.  glatiramer (COPAXONE) 40 mg/mL injection 40 mg by SubCUTAneous route every Monday, Wednesday, Friday.          Past History     Past Medical History:  Past Medical History:   Diagnosis Date    Arthropathy, unspecified, site unspecified     Depression     Diabetes ([facility] [address]     Hypercholesteremia     Hypertension     history of htn    Incontinence of urine     Insomnia     Migraine     MS (multiple sclerosis) (HCC)     Neurogenic bladder     Other ill-defined conditions(799.89)     multiple Sclerosis    Seizures ([facility] [address]     6/2013    Wears glasses        Past Surgical History:  Past Surgical History:   Procedure Laterality Date    HX CARPAL TUNNEL RELEASE  11/2008    HX HYSTERECTOMY      HX OTHER SURGICAL         Family History:  Family History   Problem Relation Age of Onset    Heart Disease Father     Diabetes Father     Diabetes Sister     Other Other        Social History:  Social History   Substance Use Topics    Smoking status: Never Smoker    Smokeless tobacco: Never Used    Alcohol use No       Allergies: Allergies   Allergen Reactions    Oxycontin [Oxycodone] Hives and Other (comments)     intolerance         Review of Systems   Review of Systems   Constitutional: Negative for chills and fever. Eyes: Positive for photophobia. Respiratory: Negative for shortness of breath. Cardiovascular: Negative for chest pain. Gastrointestinal: Positive for nausea. Negative for vomiting. Neurological: Positive for headaches. Negative for dizziness, weakness and numbness. All other systems reviewed and are negative. All Other Systems Negative  Physical Exam     Vitals:    04/09/18 1713   BP: 141/69   Pulse: (!) 105   Resp: 16   Temp: 97.7 °F (36.5 °C)   SpO2: 99%   Weight: 106.6 kg (235 lb)   Height: 5' 3\" (1.6 m)     Physical Exam   Constitutional: She is oriented to person, place, and time. She appears well-developed and well-nourished. No distress. HENT:   Head: Normocephalic and atraumatic.    Right Ear: Tympanic membrane and ear canal normal.   Left Ear: Tympanic membrane and ear canal normal.   Mouth/Throat: Uvula is midline, oropharynx is clear and moist and mucous membranes are normal.   Eyes: Conjunctivae and EOM are normal. Pupils are equal, round, and reactive to light. Neck: Normal range of motion. Neck supple. Cardiovascular: Normal rate, regular rhythm and normal heart sounds. Pulmonary/Chest: Effort normal and breath sounds normal. No respiratory distress. She has no wheezes. She has no rales. Musculoskeletal: Normal range of motion. Neurological: She is alert and oriented to person, place, and time. Skin: Skin is warm and dry. Psychiatric: She has a normal mood and affect. Her behavior is normal. Judgment and thought content normal.   Nursing note and vitals reviewed. Diagnostic Study Results     Labs -   No results found for this or any previous visit (from the past 12 hour(s)). Radiologic Studies -   No orders to display     CT Results  (Last 48 hours)    None        CXR Results  (Last 48 hours)    None            Medical Decision Making   I am the first provider for this patient. I reviewed the vital signs, available nursing notes, past medical history, past surgical history, family history and social history. Records Reviewed: Nursing Notes and Old Medical Records    Procedures:  Procedures    Provider Notes (Medical Decision Making):     DDX: migraine HA, doubt SAH or intracranial pathology    7:03 PM Pt well appearing and in NAD. HA typical presentation for migraine. No red flags. Feeling better with meds here. Will d/h to f/u with PCP for further eval.     MED RECONCILIATION:  Current Facility-Administered Medications   Medication Dose Route Frequency    sodium chloride 0.9 % bolus infusion 1,000 mL  1,000 mL IntraVENous ONCE     Current Outpatient Prescriptions   Medication Sig    gabapentin (NEURONTIN) 300 mg capsule Take 300 mg by mouth nightly. 1-2 tabs    promethazine (PHENERGAN) 25 mg tablet Take 25 mg by mouth every six (6) hours as needed for Nausea.  amLODIPine (NORVASC) 10 mg tablet Take 10 mg by mouth daily.     metFORMIN (GLUCOPHAGE) 500 mg tablet Take 1.5 Tabs by mouth two (2) times daily (with meals).  DULoxetine (CYMBALTA) 60 mg capsule Take 60 mg by mouth daily.  QUEtiapine (SEROQUEL) 300 mg tablet Take 300 mg by mouth nightly.  glatiramer (COPAXONE) 40 mg/mL injection 40 mg by SubCUTAneous route every Monday, Wednesday, Friday. Disposition:  home    DISCHARGE NOTE:     Patient is improved, resting quietly and comfortably. The patient will be discharged home.      The patient was reassured that these symptoms do not appear to represent a serious or life threatening condition at this time. Warning signs of worsening condition were discussed and understood by the patient.      Based on patient's age, coexisting illness, exam, and the results of this ED evaluation, the decision to treat as an outpatient was made. Based on the information available at time of discharge, acute pathology requiring immediate intervention was deemed relative unlikely. While it is impossible to completely exclude the possibility of underlying serious disease or worsening of condition, I feel the relative likelihood is extremely low. I discussed this uncertainty with the patient, who understood ED evaluation and treatment and felt comfortable with the outpatient treatment plan.      All questions regarding care, test results, and follow up were answered. The patient is stable and appropriate to discharge. They understand that they should return to the emergency department for any new or worsening symptoms. I stressed the importance of follow up for repeat assessment and possibly further evaluation/treatment. Follow-up Information     Follow up With Details Comments Wayne Villanueva MD  As needed [id number] [email or web address] [address] [facility] EMERGENCY DEPT  Immediately if symptoms worsen [address]  [phone]          Current Discharge Medication List            Diagnosis     Clinical Impression:   1.  Other migraine without status migrainosus, not intractable

## 2018-04-09 NOTE — DISCHARGE INSTRUCTIONS
Migraine Headache: Care Instructions  Your Care Instructions  Migraines are painful, throbbing headaches that often start on one side of the head. They may cause nausea and vomiting and make you sensitive to light, sound, or smell. Without treatment, migraines can last from 4 hours to a few days. Medicines can help prevent migraines or stop them after they have started. Your doctor can help you find which ones work best for you. Follow-up care is a key part of your treatment and safety. Be sure to make and go to all appointments, and call your doctor if you are having problems. It's also a good idea to know your test results and keep a list of the medicines you take. How can you care for yourself at home? · Do not drive if you have taken a prescription pain medicine. · Rest in a quiet, dark room until your headache is gone. Close your eyes, and try to relax or go to sleep. Don't watch TV or read. · Put a cold, moist cloth or cold pack on the painful area for 10 to 20 minutes at a time. Put a thin cloth between the cold pack and your skin. · Use a warm, moist towel or a heating pad set on low to relax tight shoulder and neck muscles. · Have someone gently massage your neck and shoulders. · Take your medicines exactly as prescribed. Call your doctor if you think you are having a problem with your medicine. You will get more details on the specific medicines your doctor prescribes. · Be careful not to take pain medicine more often than the instructions allow. You could get worse or more frequent headaches when the medicine wears off. To prevent migraines  · Keep a headache diary so you can figure out what triggers your headaches. Avoiding triggers may help you prevent headaches. Record when each headache began, how long it lasted, and what the pain was like.  (Was it throbbing, aching, stabbing, or dull?) Write down any other symptoms you had with the headache, such as nausea, flashing lights or dark spots, or sensitivity to bright light or loud noise. Note if the headache occurred near your period. List anything that might have triggered the headache. Triggers may include certain foods (chocolate, cheese, wine) or odors, smoke, bright light, stress, or lack of sleep. · If your doctor has prescribed medicine for your migraines, take it as directed. You may have medicine that you take only when you get a migraine and medicine that you take all the time to help prevent migraines. ¨ If your doctor has prescribed medicine for when you get a headache, take it at the first sign of a migraine, unless your doctor has given you other instructions. ¨ If your doctor has prescribed medicine to prevent migraines, take it exactly as prescribed. Call your doctor if you think you are having a problem with your medicine. · Find healthy ways to deal with stress. Migraines are most common during or right after stressful times. Take time to relax before and after you do something that has caused a migraine in the past.  · Try to keep your muscles relaxed by keeping good posture. Check your jaw, face, neck, and shoulder muscles for tension. Try to relax them. When you sit at a desk, change positions often. And make sure to stretch for 30 seconds each hour. · Get plenty of sleep and exercise. · Eat meals on a regular schedule. Avoid foods and drinks that often trigger migraines. These include chocolate, alcohol (especially red wine and port), aspartame, monosodium glutamate (MSG), and some additives found in foods (such as hot dogs, damon, cold cuts, aged cheeses, and pickled foods). · Limit caffeine. Don't drink too much coffee, tea, or soda. But don't quit caffeine suddenly. That can also give you migraines. · Do not smoke or allow others to smoke around you. If you need help quitting, talk to your doctor about stop-smoking programs and medicines. These can increase your chances of quitting for good.   · If you are taking birth control pills or hormone therapy, talk to your doctor about whether they are triggering your migraines. When should you call for help? Call 911 anytime you think you may need emergency care. For example, call if:  ? · You have signs of a stroke. These may include:  ¨ Sudden numbness, paralysis, or weakness in your face, arm, or leg, especially on only one side of your body. ¨ Sudden vision changes. ¨ Sudden trouble speaking. ¨ Sudden confusion or trouble understanding simple statements. ¨ Sudden problems with walking or balance. ¨ A sudden, severe headache that is different from past headaches. ?Call your doctor now or seek immediate medical care if:  ? · You have new or worse nausea and vomiting. ? · You have a new or higher fever. ? · Your headache gets much worse. ? Watch closely for changes in your health, and be sure to contact your doctor if:  ? · You are not getting better after 2 days (48 hours). Where can you learn more? Go to http://annel-sadie.info/. Enter Z101 in the search box to learn more about \"Migraine Headache: Care Instructions. \"  Current as of: October 14, 2016  Content Version: 11.4  © 3688-7099 [employer]. Care instructions adapted under license by [employer] (which disclaims liability or warranty for this information). If you have questions about a medical condition or this instruction, always ask your healthcare professional. [place] [address] any warranty or liability for your use of this information.

## 2018-04-12 ENCOUNTER — HOSPITAL ENCOUNTER (EMERGENCY)
Age: 43
Discharge: HOME | End: 2018-04-12
Attending: EMERGENCY MEDICINE
Payer: MEDICARE

## 2018-04-12 ENCOUNTER — APPOINTMENT (OUTPATIENT)
Dept: GENERAL RADIOLOGY | Age: 43
End: 2018-04-12
Attending: PHYSICIAN ASSISTANT
Payer: MEDICARE

## 2018-04-12 VITALS
SYSTOLIC BLOOD PRESSURE: 141 MMHG | TEMPERATURE: 98.3 F | RESPIRATION RATE: 16 BRPM | HEART RATE: 90 BPM | DIASTOLIC BLOOD PRESSURE: 95 MMHG | OXYGEN SATURATION: 100 %

## 2018-04-12 DIAGNOSIS — R05.9 COUGH: Primary | ICD-10-CM

## 2018-04-12 PROCEDURE — 99282 EMERGENCY DEPT VISIT SF MDM: CPT

## 2018-04-12 PROCEDURE — 71046 X-RAY EXAM CHEST 2 VIEWS: CPT

## 2018-04-12 RX ORDER — BENZONATATE 100 MG/1
100 CAPSULE ORAL
Qty: 30 CAP | Refills: 0 | Status: SHIPPED | OUTPATIENT
Start: 2018-04-12 | End: 2018-04-19

## 2018-04-12 NOTE — ED PROVIDER NOTES
Patient is a 37 y.o. female presenting with cough and headaches. The history is provided by the patient. Cough   This is a new problem. Episode onset: 3 days. The problem occurs constantly. The problem has not changed since onset. The cough is productive of sputum. There has been no fever. Associated symptoms include sore throat. Pertinent negatives include no chest pain, no chills, no sweats, no weight loss, no rhinorrhea, no myalgias, no shortness of breath, no wheezing, no nausea, no vomiting and no confusion. She has tried nothing for the symptoms. She is not a smoker. Her past medical history is significant for bronchitis. Her past medical history does not include pneumonia, bronchiectasis, COPD, asthma, cancer, heart failure or CHF. Headache    Pertinent negatives include no fever, no shortness of breath, no nausea and no vomiting. Past Medical History:   Diagnosis Date    Arthropathy, unspecified, site unspecified     Depression     Diabetes ([address]     Hypercholesteremia     Hypertension     history of htn    Incontinence of urine     Insomnia     Migraine     MS (multiple sclerosis) ([address]     Neurogenic bladder     Other ill-defined conditions(799.89)     multiple Sclerosis    Seizures ([address]     6/2013    Wears glasses        Past Surgical History:   Procedure Laterality Date    HX CARPAL TUNNEL RELEASE  11/2008    HX HYSTERECTOMY      HX OTHER SURGICAL           Family History:   Problem Relation Age of Onset    Heart Disease Father     Diabetes Father     Diabetes Sister     Other Other        Social History     Social History    Marital status:      Spouse name: N/A    Number of children: N/A    Years of education: N/A     Occupational History    Not on file.      Social History Main Topics    Smoking status: Never Smoker    Smokeless tobacco: Never Used    Alcohol use No    Drug use: No    Sexual activity: Not on file      Comment: Hysterectomy     Other Topics Concern    Not on file     Social History Narrative         ALLERGIES: Oxycontin [oxycodone]    Review of Systems   Constitutional: Negative for chills, fever and weight loss. HENT: Positive for sore throat. Negative for rhinorrhea. Respiratory: Positive for cough. Negative for shortness of breath and wheezing. Cardiovascular: Negative for chest pain. Gastrointestinal: Negative for nausea and vomiting. Musculoskeletal: Negative for myalgias. Psychiatric/Behavioral: Negative for confusion. All other systems reviewed and are negative. Vitals:    04/12/18 1508   BP: (!) 141/95   Pulse: (!) 102   Resp: 16   Temp: 98.3 °F (36.8 °C)   SpO2: 100%            Physical Exam   Constitutional: She is oriented to person, place, and time. She appears well-developed and well-nourished. No distress. HENT:   Head: Normocephalic and atraumatic. Mouth/Throat: Oropharynx is clear and moist.   Eyes: Conjunctivae are normal.   Neck: Normal range of motion. Neck supple. Cardiovascular: Normal rate, regular rhythm and normal heart sounds. Pulmonary/Chest: Effort normal and breath sounds normal. No respiratory distress. She has no wheezes. She exhibits no tenderness. Occasional wet cough     Abdominal: Soft. She exhibits no distension. There is no tenderness. Musculoskeletal: Normal range of motion. Neurological: She is alert and oriented to person, place, and time. No cranial nerve deficit. Coordination normal.   Skin: Skin is warm and dry. No rash noted. She is not diaphoretic. Psychiatric: She has a normal mood and affect. Nursing note and vitals reviewed. MDM  Number of Diagnoses or Management Options  Cough:   Diagnosis management comments: Pt is well appearing, NAD, here with productive cough and sore throat x 3 days. VSS- HR <100 after triage, lungs CTA. CXR clear. Will give cough suppressant and d/c home. Return sx discussed and f/u instructions given.         Amount and/or Complexity of Data Reviewed  Tests in the radiology section of CPT®: reviewed and ordered          ED Course       Procedures

## 2018-04-12 NOTE — DISCHARGE INSTRUCTIONS

## 2018-05-07 ENCOUNTER — HOSPITAL ENCOUNTER (EMERGENCY)
Age: 43
Discharge: HOME | End: 2018-05-07
Attending: EMERGENCY MEDICINE | Admitting: EMERGENCY MEDICINE
Payer: MEDICARE

## 2018-05-07 ENCOUNTER — APPOINTMENT (OUTPATIENT)
Dept: GENERAL RADIOLOGY | Age: 43
End: 2018-05-07
Attending: NURSE PRACTITIONER
Payer: MEDICARE

## 2018-05-07 VITALS
OXYGEN SATURATION: 99 % | BODY MASS INDEX: 41.64 KG/M2 | SYSTOLIC BLOOD PRESSURE: 152 MMHG | DIASTOLIC BLOOD PRESSURE: 102 MMHG | TEMPERATURE: 97.8 F | RESPIRATION RATE: 16 BRPM | HEIGHT: 63 IN | HEART RATE: 84 BPM | WEIGHT: 235 LBS

## 2018-05-07 DIAGNOSIS — S50.11XA CONTUSION OF RIGHT FOREARM, INITIAL ENCOUNTER: Primary | ICD-10-CM

## 2018-05-07 DIAGNOSIS — R03.0 ELEVATED BLOOD PRESSURE READING: ICD-10-CM

## 2018-05-07 PROCEDURE — 74011250637 HC RX REV CODE- 250/637: Performed by: NURSE PRACTITIONER

## 2018-05-07 PROCEDURE — 99283 EMERGENCY DEPT VISIT LOW MDM: CPT

## 2018-05-07 PROCEDURE — 73090 X-RAY EXAM OF FOREARM: CPT

## 2018-05-07 RX ORDER — IBUPROFEN 400 MG/1
800 TABLET ORAL
Status: COMPLETED | OUTPATIENT
Start: 2018-05-07 | End: 2018-05-07

## 2018-05-07 RX ORDER — IBUPROFEN 600 MG/1
600 TABLET ORAL
Qty: 20 TAB | Refills: 0 | Status: SHIPPED | OUTPATIENT
Start: 2018-05-07 | End: 2018-09-05

## 2018-05-07 RX ADMIN — IBUPROFEN 800 MG: 400 TABLET ORAL at 10:19

## 2018-05-07 NOTE — DISCHARGE INSTRUCTIONS
Elevated Blood Pressure: Care Instructions  Your Care Instructions    Blood pressure is a measure of how hard the blood pushes against the walls of your arteries. It's normal for blood pressure to go up and down throughout the day. But if it stays up over time, you have high blood pressure. Two numbers tell you your blood pressure. The first number is the systolic pressure. It shows how hard the blood pushes when your heart is pumping. The second number is the diastolic pressure. It shows how hard the blood pushes between heartbeats, when your heart is relaxed and filling with blood. An ideal blood pressure in adults is less than 120/80 (say \"120 over 80\"). High blood pressure is 140/90 or higher. You have high blood pressure if your top number is 140 or higher or your bottom number is 90 or higher, or both. The main test for high blood pressure is simple, fast, and painless. To diagnose high blood pressure, your doctor will test your blood pressure at different times. After testing your blood pressure, your doctor may ask you to test it again when you are home. If you are diagnosed with high blood pressure, you can work with your doctor to make a long-term plan to manage it. Follow-up care is a key part of your treatment and safety. Be sure to make and go to all appointments, and call your doctor if you are having problems. It's also a good idea to know your test results and keep a list of the medicines you take. How can you care for yourself at home? · Do not smoke. Smoking increases your risk for heart attack and stroke. If you need help quitting, talk to your doctor about stop-smoking programs and medicines. These can increase your chances of quitting for good. · Stay at a healthy weight. · Try to limit how much sodium you eat to less than 2,300 milligrams (mg) a day. Your doctor may ask you to try to eat less than 1,500 mg a day. · Be physically active.  Get at least 30 minutes of exercise on most days of the week. Walking is a good choice. You also may want to do other activities, such as running, swimming, cycling, or playing tennis or team sports. · Avoid or limit alcohol. Talk to your doctor about whether you can drink any alcohol. · Eat plenty of fruits, vegetables, and low-fat dairy products. Eat less saturated and total fats. · Learn how to check your blood pressure at home. When should you call for help? Call your doctor now or seek immediate medical care if:  ? · Your blood pressure is much higher than normal (such as 180/110 or higher). ? · You think high blood pressure is causing symptoms such as:  ¨ Severe headache. ¨ Blurry vision. ? Watch closely for changes in your health, and be sure to contact your doctor if:  ? · You do not get better as expected. Where can you learn more? Go to http://annelTagitosadie.info/. Enter K463 in the search box to learn more about \"Elevated Blood Pressure: Care Instructions. \"  Current as of: September 21, 2016  Content Version: 11.4  © 2196-7225 [employer]. Care instructions adapted under license by [employer] (which disclaims liability or warranty for this information). If you have questions about a medical condition or this instruction, always ask your healthcare professional. [address] any warranty or liability for your use of this information. [facility] Activation    Thank you for requesting access to [facility]. Please follow the instructions below to securely access and download your online medical record. [facility] allows you to send messages to your doctor, view your test results, renew your prescriptions, schedule appointments, and more. How Do I Sign Up? 1. In your internet browser, go to [email or web address]  2. Click on the First Time User? Click Here link in the Sign In box. You will be redirect to the New Member Sign Up page.   3. Enter your [facility] Access Code exactly as it appears below. You will not need to use this code after youve completed the sign-up process. If you do not sign up before the expiration date, you must request a new code. [employer] Access Code: [email or web address]  Expires: 2018  7:42 PM (This is the date your [employer] access code will )    4. Enter the last four digits of your Social Security Number (xxxx) and Date of Birth (mm/dd/yyyy) as indicated and click Submit. You will be taken to the next sign-up page. 5. Create a [employer] ID. This will be your [employer] login ID and cannot be changed, so think of one that is secure and easy to remember. 6. Create a [employer] password. You can change your password at any time. 7. Enter your Password Reset Question and Answer. This can be used at a later time if you forget your password. 8. Enter your e-mail address. You will receive e-mail notification when new information is available in [address]. 9. Click Sign Up. You can now view and download portions of your medical record. 10. Click the Download Summary menu link to download a portable copy of your medical information. Additional Information    If you have questions, please visit the Frequently Asked Questions section of the [employer] website at [email or web address] Remember, [employer] is NOT to be used for urgent needs. For medical emergencies, dial 911. Follow up with your primary care provider as needed.
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
## 2018-05-18 ENCOUNTER — HOSPITAL ENCOUNTER (EMERGENCY)
Age: 43
Discharge: HOME | End: 2018-05-18
Attending: EMERGENCY MEDICINE
Payer: MEDICARE

## 2018-05-18 VITALS
BODY MASS INDEX: 41.64 KG/M2 | RESPIRATION RATE: 20 BRPM | OXYGEN SATURATION: 98 % | HEIGHT: 63 IN | DIASTOLIC BLOOD PRESSURE: 79 MMHG | HEART RATE: 118 BPM | SYSTOLIC BLOOD PRESSURE: 136 MMHG | TEMPERATURE: 98.1 F | WEIGHT: 235 LBS

## 2018-05-18 DIAGNOSIS — T78.40XA ALLERGIC REACTION, INITIAL ENCOUNTER: Primary | ICD-10-CM

## 2018-05-18 PROCEDURE — 99285 EMERGENCY DEPT VISIT HI MDM: CPT

## 2018-05-18 PROCEDURE — 74011250637 HC RX REV CODE- 250/637: Performed by: EMERGENCY MEDICINE

## 2018-05-18 PROCEDURE — A9270 NON-COVERED ITEM OR SERVICE: HCPCS | Performed by: EMERGENCY MEDICINE

## 2018-05-18 PROCEDURE — 74011636637 HC RX REV CODE- 636/637: Performed by: EMERGENCY MEDICINE

## 2018-05-18 RX ORDER — DIPHENHYDRAMINE HCL 50 MG
50 CAPSULE ORAL
Status: COMPLETED | OUTPATIENT
Start: 2018-05-18 | End: 2018-05-18

## 2018-05-18 RX ORDER — DIPHENHYDRAMINE HCL 25 MG
50 CAPSULE ORAL
Qty: 20 CAP | Refills: 0 | Status: SHIPPED | OUTPATIENT
Start: 2018-05-18 | End: 2018-05-28

## 2018-05-18 RX ORDER — PREDNISONE 50 MG/1
50 TABLET ORAL DAILY
Qty: 4 TAB | Refills: 0 | Status: SHIPPED | OUTPATIENT
Start: 2018-05-18 | End: 2018-05-22

## 2018-05-18 RX ORDER — PREDNISONE 20 MG/1
60 TABLET ORAL
Status: COMPLETED | OUTPATIENT
Start: 2018-05-18 | End: 2018-05-18

## 2018-05-18 RX ADMIN — PREDNISONE 60 MG: 20 TABLET ORAL at 13:48

## 2018-05-18 RX ADMIN — DIPHENHYDRAMINE HYDROCHLORIDE 50 MG: 50 CAPSULE ORAL at 13:48

## 2018-05-18 NOTE — ED TRIAGE NOTES
Pt arrived through triage with c/o allergic reaction to insulin. Pt has hives noted on left side of neck and arm. Pt also states she feels as though her tongue is swollen. Pt speaking in full complete sentences in triage, no airway compromise noted, VSS.
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
## 2018-05-18 NOTE — ED PROVIDER NOTES
EMERGENCY DEPARTMENT HISTORY AND PHYSICAL EXAM    1:43 PM      Date: 5/18/2018  Patient Name: Abby Barry    History of Presenting Illness     Chief Complaint   Patient presents with    Allergic Reaction         History Provided By: Patient    Chief Complaint: Rash  Duration: This morning  Timing:  Acute  Location: Left neck and shoulder  Quality: Itchy  Severity: Moderate  Modifying Factors: Exacerbated with taking her insulin shot  Associated Symptoms: \"Funny feeling in throat\"      Additional History (Context):1:45 PM Abby Barry is a 37 y.o. female with h/o DM, Seizures, and HTN who presents to ED complaining of acute moderate itchy rash to the left neck and shoulder that is consistent with hives associated with a \"funny feeling\" in her throat onset this morning. Patient states that she took her insulin shot that she just started this morning and broke out in hives. The hives progressed over time so patient came in. She states her throat is not swollen or closed but feels funny. She denies SOB. No other concerns or symptoms at this time. PCP: Nely Timmons MD    Current Outpatient Prescriptions   Medication Sig Dispense Refill    predniSONE (DELTASONE) 50 mg tablet Take 1 Tab by mouth daily for 4 days. 4 Tab 0    diphenhydrAMINE (BENADRYL) 25 mg capsule Take 2 Caps by mouth every six (6) hours as needed for up to 10 days. 20 Cap 0    ibuprofen (MOTRIN) 600 mg tablet Take 1 Tab by mouth every six (6) hours as needed for Pain. 20 Tab 0    gabapentin (NEURONTIN) 300 mg capsule Take 300 mg by mouth nightly. 1-2 tabs      promethazine (PHENERGAN) 25 mg tablet Take 25 mg by mouth every six (6) hours as needed for Nausea.  amLODIPine (NORVASC) 10 mg tablet Take 10 mg by mouth daily.  metFORMIN (GLUCOPHAGE) 500 mg tablet Take 1.5 Tabs by mouth two (2) times daily (with meals). 60 Tab 0    DULoxetine (CYMBALTA) 60 mg capsule Take 60 mg by mouth daily.       QUEtiapine (SEROQUEL) 300 mg tablet Take 300 mg by mouth nightly.  glatiramer (COPAXONE) 40 mg/mL injection 40 mg by SubCUTAneous route every Monday, Wednesday, Friday. Past History     Past Medical History:  Past Medical History:   Diagnosis Date    Arthropathy, unspecified, site unspecified     Depression     Diabetes ([address]     Hypercholesteremia     Hypertension     history of htn    Incontinence of urine     Insomnia     Migraine     MS (multiple sclerosis) ([address]     Neurogenic bladder     Other ill-defined conditions(799.89)     multiple Sclerosis    Seizures ([address]     6/2013    Wears glasses        Past Surgical History:  Past Surgical History:   Procedure Laterality Date    HX CARPAL TUNNEL RELEASE  11/2008    HX HYSTERECTOMY      HX OTHER SURGICAL         Family History:  Family History   Problem Relation Age of Onset    Heart Disease Father     Diabetes Father     Diabetes Sister     Other Other        Social History:  Social History   Substance Use Topics    Smoking status: Never Smoker    Smokeless tobacco: Never Used    Alcohol use No       Allergies: Allergies   Allergen Reactions    Oxycontin [Oxycodone] Hives and Other (comments)     intolerance         Review of Systems     Review of Systems   Constitutional: Negative for diaphoresis and fever. HENT: Negative for congestion and sore throat. Funny feeling in throat   Eyes: Negative for pain and itching. Respiratory: Negative for cough and shortness of breath. Cardiovascular: Negative for chest pain and palpitations. Gastrointestinal: Negative for abdominal pain and diarrhea. Endocrine: Negative for polydipsia and polyuria. Genitourinary: Negative for dysuria and hematuria. Musculoskeletal: Negative for arthralgias and myalgias. Skin: Positive for rash. Negative for wound. Neurological: Negative for seizures and syncope. Hematological: Does not bruise/bleed easily.    Psychiatric/Behavioral: Negative for agitation and hallucinations. Physical Exam     Visit Vitals    /88 (BP 1 Location: Right arm, BP Patient Position: At rest)    Pulse (!) 118    Temp 98.1 °F (36.7 °C)    Resp 20    Ht 5' 3\" (1.6 m)    Wt 106.6 kg (235 lb)    SpO2 100%    BMI 41.63 kg/m2       Physical Exam   Constitutional: She appears well-developed and well-nourished. HENT:   Head: Normocephalic and atraumatic. Eyes: Conjunctivae are normal. No scleral icterus. Neck: Normal range of motion. Neck supple. No JVD present. Cardiovascular: Normal rate, regular rhythm and normal heart sounds. 4 intact extremity pulses   Pulmonary/Chest: Effort normal and breath sounds normal.   Abdominal: Soft. She exhibits no mass. There is no tenderness. Musculoskeletal: Normal range of motion. Lymphadenopathy:     She has no cervical adenopathy. Neurological: She is alert. Skin: Skin is warm and dry. Rash noted. Couple welts to left shoulder and neck   Nursing note and vitals reviewed. Diagnostic Study Results     Labs -  No results found for this or any previous visit (from the past 12 hour(s)). Radiologic Studies -   No orders to display     No results found. Medical Decision Making   Initial Medical Decision Making and DDx:  Localized allergic reaction. Not consistent with airway obstruction or anaphylaxis. ED Course: Progress Notes, Reevaluation, and Consults:  2:58 PM  Patient reassessed and her itching has improved with a cold wash cloth. No involvement of the airway. Not consistent with anaphylaxis. Will send her home with rx for benadryl and prednisone. I am the first provider for this patient. I reviewed the vital signs, available nursing notes, past medical history, past surgical history, family history and social history. Vital Signs-Reviewed the patient's vital signs.     Pulse Oximetry Analysis - 100% in room air, normal    Records Reviewed: Nursing Notes and Old Medical Records (Time of Review: 1:43 PM)    Diagnosis     Clinical Impression:   1. Allergic reaction, initial encounter        Disposition: CO    Follow-up Information     Follow up With Details Comments Black Dr Corrie Villanueva MD In 2 days  [address]  [phone]             Patient's Medications   Start Taking    DIPHENHYDRAMINE (BENADRYL) 25 MG CAPSULE    Take 2 Caps by mouth every six (6) hours as needed for up to 10 days. PREDNISONE (DELTASONE) 50 MG TABLET    Take 1 Tab by mouth daily for 4 days. Continue Taking    AMLODIPINE (NORVASC) 10 MG TABLET    Take 10 mg by mouth daily. DULOXETINE (CYMBALTA) 60 MG CAPSULE    Take 60 mg by mouth daily. GABAPENTIN (NEURONTIN) 300 MG CAPSULE    Take 300 mg by mouth nightly. 1-2 tabs    GLATIRAMER (COPAXONE) 40 MG/ML INJECTION    40 mg by SubCUTAneous route every Monday, Wednesday, Friday. IBUPROFEN (MOTRIN) 600 MG TABLET    Take 1 Tab by mouth every six (6) hours as needed for Pain. METFORMIN (GLUCOPHAGE) 500 MG TABLET    Take 1.5 Tabs by mouth two (2) times daily (with meals). PROMETHAZINE (PHENERGAN) 25 MG TABLET    Take 25 mg by mouth every six (6) hours as needed for Nausea. QUETIAPINE (SEROQUEL) 300 MG TABLET    Take 300 mg by mouth nightly. These Medications have changed    No medications on file   Stop Taking    No medications on file     _______________________________    Attestations:  Amara Villegas acting as a scribe for and in the presence of Shara Ding MD      May 18, 2018 at 1:43 PM       Provider Attestation:      I personally performed the services described in the documentation, reviewed the documentation, as recorded by the scribe in my presence, and it accurately and completely records my words and actions.  May 18, 2018 at 1:43 PM - Shara Ding MD    _______________________________

## 2018-05-18 NOTE — DISCHARGE INSTRUCTIONS

## 2018-05-18 NOTE — LETTER
NOTIFICATION RETURN TO WORK / SCHOOL 
 
5/18/2018 2:57 PM 
 
Ms. Veronica Hager [address] [place] [address] [phone] To Whom It May Concern: Veronica Hager is currently under the care of [address] [place] [address] EMERGENCY DEPT. She will return to work/school on: 5/19/2018 If there are questions or concerns please have the patient contact our office. Sincerely, Jose Lux MD

## 2018-06-22 ENCOUNTER — HOSPITAL ENCOUNTER (EMERGENCY)
Age: 43
Discharge: HOME | End: 2018-06-22
Attending: EMERGENCY MEDICINE
Payer: MEDICARE

## 2018-06-22 VITALS
HEIGHT: 63 IN | OXYGEN SATURATION: 99 % | SYSTOLIC BLOOD PRESSURE: 109 MMHG | WEIGHT: 235 LBS | BODY MASS INDEX: 41.64 KG/M2 | DIASTOLIC BLOOD PRESSURE: 59 MMHG | TEMPERATURE: 97.6 F | HEART RATE: 67 BPM | RESPIRATION RATE: 13 BRPM

## 2018-06-22 DIAGNOSIS — I95.9 HYPOTENSION, UNSPECIFIED HYPOTENSION TYPE: Primary | ICD-10-CM

## 2018-06-22 DIAGNOSIS — E86.0 DEHYDRATION: ICD-10-CM

## 2018-06-22 LAB
ALBUMIN SERPL-MCNC: 3.8 G/DL (ref 3.4–5)
ALBUMIN/GLOB SERPL: 1.1 {RATIO} (ref 0.8–1.7)
ALP SERPL-CCNC: 80 U/L (ref 45–117)
ALT SERPL-CCNC: 58 U/L (ref 13–56)
ANION GAP SERPL CALC-SCNC: 11 MMOL/L (ref 3–18)
APPEARANCE UR: NORMAL
AST SERPL-CCNC: 36 U/L (ref 15–37)
BASOPHILS # BLD: 0 K/UL (ref 0–0.06)
BASOPHILS NFR BLD: 0 % (ref 0–2)
BILIRUB SERPL-MCNC: 0.7 MG/DL (ref 0.2–1)
BILIRUB UR QL: NEGATIVE
BUN SERPL-MCNC: 17 MG/DL (ref 7–18)
BUN/CREAT SERPL: 11 (ref 12–20)
CALCIUM SERPL-MCNC: 9.3 MG/DL (ref 8.5–10.1)
CHLORIDE SERPL-SCNC: 101 MMOL/L (ref 100–108)
CO2 SERPL-SCNC: 23 MMOL/L (ref 21–32)
COLOR UR: YELLOW
CREAT SERPL-MCNC: 1.51 MG/DL (ref 0.6–1.3)
DIFFERENTIAL METHOD BLD: ABNORMAL
EOSINOPHIL # BLD: 0.1 K/UL (ref 0–0.4)
EOSINOPHIL NFR BLD: 1 % (ref 0–5)
ERYTHROCYTE [DISTWIDTH] IN BLOOD BY AUTOMATED COUNT: 13.3 % (ref 11.6–14.5)
GLOBULIN SER CALC-MCNC: 3.5 G/DL (ref 2–4)
GLUCOSE SERPL-MCNC: 181 MG/DL (ref 74–99)
GLUCOSE UR STRIP.AUTO-MCNC: NEGATIVE MG/DL
HCT VFR BLD AUTO: 36.1 % (ref 35–45)
HGB BLD-MCNC: 12.8 G/DL (ref 12–16)
HGB UR QL STRIP: NEGATIVE
KETONES UR QL STRIP.AUTO: NEGATIVE MG/DL
LEUKOCYTE ESTERASE UR QL STRIP.AUTO: NEGATIVE
LYMPHOCYTES # BLD: 2.7 K/UL (ref 0.9–3.6)
LYMPHOCYTES NFR BLD: 40 % (ref 21–52)
MCH RBC QN AUTO: 28.8 PG (ref 24–34)
MCHC RBC AUTO-ENTMCNC: 35.5 G/DL (ref 31–37)
MCV RBC AUTO: 81.1 FL (ref 74–97)
MONOCYTES # BLD: 0.5 K/UL (ref 0.05–1.2)
MONOCYTES NFR BLD: 8 % (ref 3–10)
NEUTS SEG # BLD: 3.4 K/UL (ref 1.8–8)
NEUTS SEG NFR BLD: 51 % (ref 40–73)
NITRITE UR QL STRIP.AUTO: NEGATIVE
PH UR STRIP: 6 [PH] (ref 5–8)
PLATELET # BLD AUTO: 470 K/UL (ref 135–420)
PMV BLD AUTO: 10 FL (ref 9.2–11.8)
POTASSIUM SERPL-SCNC: 3.9 MMOL/L (ref 3.5–5.5)
PROT SERPL-MCNC: 7.3 G/DL (ref 6.4–8.2)
PROT UR STRIP-MCNC: NEGATIVE MG/DL
RBC # BLD AUTO: 4.45 M/UL (ref 4.2–5.3)
SODIUM SERPL-SCNC: 135 MMOL/L (ref 136–145)
SP GR UR REFRACTOMETRY: 1.01 (ref 1–1.03)
TSH SERPL DL<=0.05 MIU/L-ACNC: 1.05 UIU/ML (ref 0.36–3.74)
UROBILINOGEN UR QL STRIP.AUTO: 0.2 EU/DL (ref 0.2–1)
WBC # BLD AUTO: 6.7 K/UL (ref 4.6–13.2)

## 2018-06-22 PROCEDURE — 81003 URINALYSIS AUTO W/O SCOPE: CPT | Performed by: EMERGENCY MEDICINE

## 2018-06-22 PROCEDURE — 80053 COMPREHEN METABOLIC PANEL: CPT | Performed by: EMERGENCY MEDICINE

## 2018-06-22 PROCEDURE — 85025 COMPLETE CBC W/AUTO DIFF WBC: CPT | Performed by: EMERGENCY MEDICINE

## 2018-06-22 PROCEDURE — 96360 HYDRATION IV INFUSION INIT: CPT

## 2018-06-22 PROCEDURE — 96361 HYDRATE IV INFUSION ADD-ON: CPT

## 2018-06-22 PROCEDURE — 84443 ASSAY THYROID STIM HORMONE: CPT | Performed by: EMERGENCY MEDICINE

## 2018-06-22 PROCEDURE — 99285 EMERGENCY DEPT VISIT HI MDM: CPT

## 2018-06-22 PROCEDURE — 93005 ELECTROCARDIOGRAM TRACING: CPT

## 2018-06-22 PROCEDURE — 74011250636 HC RX REV CODE- 250/636: Performed by: EMERGENCY MEDICINE

## 2018-06-22 RX ADMIN — SODIUM CHLORIDE 1000 ML: 900 INJECTION, SOLUTION INTRAVENOUS at 14:45

## 2018-06-22 NOTE — DISCHARGE INSTRUCTIONS
Dehydration: Care Instructions  Your Care Instructions  Dehydration happens when your body loses too much fluid. This might happen when you do not drink enough water or you lose large amounts of fluids from your body because of diarrhea, vomiting, or sweating. Severe dehydration can be life-threatening. Water and minerals called electrolytes help put your body fluids back in balance. Learn the early signs of fluid loss, and drink more fluids to prevent dehydration. Follow-up care is a key part of your treatment and safety. Be sure to make and go to all appointments, and call your doctor if you are having problems. It's also a good idea to know your test results and keep a list of the medicines you take. How can you care for yourself at home? · To prevent dehydration, drink plenty of fluids, enough so that your urine is light yellow or clear like water. Choose water and other caffeine-free clear liquids until you feel better. If you have kidney, heart, or liver disease and have to limit fluids, talk with your doctor before you increase the amount of fluids you drink. · If you do not feel like eating or drinking, try taking small sips of water, sports drinks, or other rehydration drinks. · Get plenty of rest.  To prevent dehydration  · Add more fluids to your diet and daily routine, unless your doctor has told you not to. · During hot weather, drink more fluids. Drink even more fluids if you exercise a lot. Stay away from drinks with alcohol or caffeine. · Watch for the symptoms of dehydration. These include:  ¨ A dry, sticky mouth. ¨ Dark yellow urine, and not much of it. ¨ Dry and sunken eyes. ¨ Feeling very tired. · Learn what problems can lead to dehydration. These include:  ¨ Diarrhea, fever, and vomiting. ¨ Any illness with a fever, such as pneumonia or the flu. ¨ Activities that cause heavy sweating, such as endurance races and heavy outdoor work in hot or humid weather.   ¨ Alcohol or drug abuse or withdrawal.  ¨ Certain medicines, such as cold and allergy pills (antihistamines), diet pills (diuretics), and laxatives. ¨ Certain diseases, such as diabetes, cancer, and heart or kidney disease. When should you call for help? Call 911 anytime you think you may need emergency care. For example, call if:  ? · You passed out (lost consciousness). ?Call your doctor now or seek immediate medical care if:  ? · You are confused and cannot think clearly. ? · You are dizzy or lightheaded, or you feel like you may faint. ? · You have signs of needing more fluids. You have sunken eyes and a dry mouth, and you pass only a little dark urine. ? · You cannot keep fluids down. ? Watch closely for changes in your health, and be sure to contact your doctor if:  ? · You are not making tears. ? · Your skin is very dry and sags slowly back into place after you pinch it. ? · Your mouth and eyes are very dry. Where can you learn more? Go to http://annel-sadie.info/. Enter I538 in the search box to learn more about \"Dehydration: Care Instructions. \"  Current as of: March 20, 2017  Content Version: 11.4  © 8724-5208 [employer]. Care instructions adapted under license by [employer] (which disclaims liability or warranty for this information). If you have questions about a medical condition or this instruction, always ask your healthcare professional. [place] [address] any warranty or liability for your use of this information.

## 2018-06-22 NOTE — ED NOTES
I have reviewed discharge instructions with the patient. The patient verbalized understanding. Patient armband removed and given to patient to take home. Patient was informed of the privacy risks if armband lost or stolen. Pt alert, oriented x4 and ambulatory out of ER in [facility] at this time. VSS.

## 2018-06-23 ENCOUNTER — HOSPITAL ENCOUNTER (EMERGENCY)
Age: 43
Discharge: HOME | End: 2018-06-24
Attending: EMERGENCY MEDICINE
Payer: MEDICARE

## 2018-06-23 VITALS
TEMPERATURE: 97.4 F | SYSTOLIC BLOOD PRESSURE: 136 MMHG | OXYGEN SATURATION: 98 % | HEART RATE: 85 BPM | DIASTOLIC BLOOD PRESSURE: 75 MMHG | RESPIRATION RATE: 18 BRPM

## 2018-06-23 DIAGNOSIS — E87.6 HYPOKALEMIA: Primary | ICD-10-CM

## 2018-06-23 DIAGNOSIS — R25.1 TREMOR: ICD-10-CM

## 2018-06-23 LAB
ANION GAP SERPL CALC-SCNC: 10 MMOL/L (ref 3–18)
ATRIAL RATE: 58 BPM
BASOPHILS # BLD: 0 K/UL (ref 0–0.06)
BASOPHILS NFR BLD: 0 % (ref 0–2)
BUN SERPL-MCNC: 9 MG/DL (ref 7–18)
BUN/CREAT SERPL: 12 (ref 12–20)
CALCIUM SERPL-MCNC: 8.3 MG/DL (ref 8.5–10.1)
CALCULATED P AXIS, ECG09: 49 DEGREES
CALCULATED R AXIS, ECG10: 12 DEGREES
CALCULATED T AXIS, ECG11: 18 DEGREES
CHLORIDE SERPL-SCNC: 103 MMOL/L (ref 100–108)
CO2 SERPL-SCNC: 26 MMOL/L (ref 21–32)
CREAT SERPL-MCNC: 0.74 MG/DL (ref 0.6–1.3)
DIAGNOSIS, 93000: NORMAL
DIFFERENTIAL METHOD BLD: ABNORMAL
EOSINOPHIL # BLD: 0.2 K/UL (ref 0–0.4)
EOSINOPHIL NFR BLD: 2 % (ref 0–5)
ERYTHROCYTE [DISTWIDTH] IN BLOOD BY AUTOMATED COUNT: 12.9 % (ref 11.6–14.5)
GLUCOSE SERPL-MCNC: 114 MG/DL (ref 74–99)
HCT VFR BLD AUTO: 36 % (ref 35–45)
HGB BLD-MCNC: 13.1 G/DL (ref 12–16)
LYMPHOCYTES # BLD: 4.5 K/UL (ref 0.9–3.6)
LYMPHOCYTES NFR BLD: 59 % (ref 21–52)
MCH RBC QN AUTO: 29.4 PG (ref 24–34)
MCHC RBC AUTO-ENTMCNC: 36.4 G/DL (ref 31–37)
MCV RBC AUTO: 80.7 FL (ref 74–97)
MONOCYTES # BLD: 0.5 K/UL (ref 0.05–1.2)
MONOCYTES NFR BLD: 7 % (ref 3–10)
NEUTS SEG # BLD: 2.5 K/UL (ref 1.8–8)
NEUTS SEG NFR BLD: 32 % (ref 40–73)
P-R INTERVAL, ECG05: 176 MS
PLATELET # BLD AUTO: 495 K/UL (ref 135–420)
PMV BLD AUTO: 9.9 FL (ref 9.2–11.8)
POTASSIUM SERPL-SCNC: 3.4 MMOL/L (ref 3.5–5.5)
Q-T INTERVAL, ECG07: 424 MS
QRS DURATION, ECG06: 92 MS
QTC CALCULATION (BEZET), ECG08: 416 MS
RBC # BLD AUTO: 4.46 M/UL (ref 4.2–5.3)
SODIUM SERPL-SCNC: 139 MMOL/L (ref 136–145)
VENTRICULAR RATE, ECG03: 58 BPM
WBC # BLD AUTO: 7.7 K/UL (ref 4.6–13.2)

## 2018-06-23 PROCEDURE — 99285 EMERGENCY DEPT VISIT HI MDM: CPT

## 2018-06-23 PROCEDURE — 96365 THER/PROPH/DIAG IV INF INIT: CPT

## 2018-06-23 PROCEDURE — 80048 BASIC METABOLIC PNL TOTAL CA: CPT | Performed by: EMERGENCY MEDICINE

## 2018-06-23 PROCEDURE — 85025 COMPLETE CBC W/AUTO DIFF WBC: CPT | Performed by: EMERGENCY MEDICINE

## 2018-06-23 PROCEDURE — 74011250636 HC RX REV CODE- 250/636: Performed by: EMERGENCY MEDICINE

## 2018-06-23 PROCEDURE — 74011250637 HC RX REV CODE- 250/637: Performed by: EMERGENCY MEDICINE

## 2018-06-23 RX ORDER — POTASSIUM CHLORIDE 7.45 MG/ML
10 INJECTION INTRAVENOUS
Status: COMPLETED | OUTPATIENT
Start: 2018-06-23 | End: 2018-06-24

## 2018-06-23 RX ORDER — ACETAMINOPHEN 500 MG
1000 TABLET ORAL
Status: COMPLETED | OUTPATIENT
Start: 2018-06-23 | End: 2018-06-23

## 2018-06-23 RX ORDER — POTASSIUM CHLORIDE 20 MEQ/1
40 TABLET, EXTENDED RELEASE ORAL
Status: DISCONTINUED | OUTPATIENT
Start: 2018-06-23 | End: 2018-06-23

## 2018-06-23 RX ORDER — LORAZEPAM 1 MG/1
1 TABLET ORAL
Status: COMPLETED | OUTPATIENT
Start: 2018-06-23 | End: 2018-06-23

## 2018-06-23 RX ADMIN — ACETAMINOPHEN 1000 MG: 500 TABLET, FILM COATED ORAL at 23:54

## 2018-06-23 RX ADMIN — LORAZEPAM 1 MG: 1 TABLET ORAL at 21:50

## 2018-06-23 RX ADMIN — POTASSIUM CHLORIDE 10 MEQ: 10 INJECTION, SOLUTION INTRAVENOUS at 23:55

## 2018-06-23 RX ADMIN — SODIUM CHLORIDE 1000 ML: 900 INJECTION, SOLUTION INTRAVENOUS at 23:57

## 2018-06-23 NOTE — LETTER
[facility] EMERGENCY DEPT 
[address] [facility] [address] [id number] 
[phone] Work Note Date: 6/23/2018 To Whom It May concern: Lucy Marshall was seen and treated today in the emergency room by: 
Attending Provider: Alex Reyna MD. Lucy Marshall may return to work on 6/26/2018.  
 
Sincerely, 
 
 
 
 
 
Carlos Ruggiero MD

## 2018-06-24 NOTE — ED NOTES
Patient brought to ED by family, was called out to car for patient apparently having a seizure. Once staff approached vehicle, patient awake and able to ambulate to stretcher. Patient seen several days ago for the same, recently diagnosed with conversion disorder.

## 2018-06-24 NOTE — DISCHARGE INSTRUCTIONS
Electrolyte Imbalance: Care Instructions  Your Care Instructions  Electrolytes are minerals in your blood. They include sodium, potassium, calcium, and magnesium. When they are not at the right levels, you can feel very ill. You may not know what is causing it, but you know something is wrong. You may feel weak or numb, have muscle spasms, or twitch. Your heart may beat fast. Symptoms are different with each mineral. Too much is as bad as too little. Minerals help keep your body working as it should. Vomiting, diarrhea, and fever can cause you to lose minerals. A problem with your kidneys can tip a mineral out of balance. So can taking certain medicines. Your doctor may do more tests. He or she may change your medicine and diet. If you are low in one or more minerals, they may be given through a tube into your vein (IV). Your doctor may have you take or drink special fluids or pills. If your kidneys are failing, your blood may be filtered. This is called dialysis. It can put the minerals back in balance. Follow-up care is a key part of your treatment and safety. Be sure to make and go to all appointments, and call your doctor if you are having problems. It's also a good idea to know your test results and keep a list of the medicines you take. How can you care for yourself at home? · Take your medicines exactly as prescribed. Call your doctor if you have any problems with your medicines. You will get more details on the specific medicines your doctor prescribes. · Do not take any medicine without talking to your doctor first. This includes prescription, over-the-counter, and herbal medicines. · If you have kidney, heart, or liver disease and have to limit fluids, talk with your doctor before you increase the amount of fluids you drink. · Your doctor or dietitian may give you a diet plan to help balance your minerals. Follow the diet carefully. When should you call for help?   Call 911 anytime you think you may need emergency care. For example, call if:  ? · You passed out (lost consciousness). ? · Your heartbeat seems to be irregular. It might be speeding up and then slowing down or skipping beats. ?Call your doctor now or seek immediate medical care if:  ? · You have muscle aches. ? · You feel very weak. ? · You are confused or cannot think clearly. ? Watch closely for changes in your health, and be sure to contact your doctor if:  ? · You do not get better as expected. Where can you learn more? Go to http://annel-sadie.info/. Enter I120 in the search box to learn more about \"Electrolyte Imbalance: Care Instructions. \"  Current as of: May 12, 2017  Content Version: 11.4  © 9985-4044 [employer]. Care instructions adapted under license by [employer] (which disclaims liability or warranty for this information). If you have questions about a medical condition or this instruction, always ask your healthcare professional. [place] [address] any warranty or liability for your use of this information. Hypokalemia: Care Instructions  Your Care Instructions    Hypokalemia (say \"zt-[id number]-[id number]-GEMINI-lino-uh\") is a low level of potassium. The heart, muscles, kidneys, and nervous system all need potassium to work well. This problem has many different causes. Kidney problems, diet, and medicines like diuretics and laxatives can cause it. So can vomiting or diarrhea. In some cases, cancer is the cause. Your doctor may do tests to find the cause of your low potassium levels. You may need medicines to bring your potassium levels back to normal. You may also need regular blood tests to check your potassium. If you have very low potassium, you may need intravenous (IV) medicines. You also may need tests to check the electrical activity of your heart. Heart problems caused by low potassium levels can be very serious.   Follow-up care is a key part of your treatment and safety. Be sure to make and go to all appointments, and call your doctor if you are having problems. It's also a good idea to know your test results and keep a list of the medicines you take. How can you care for yourself at home? · If your doctor recommends it, eat foods that have a lot of potassium. These include fresh fruits, juices, and vegetables. They also include nuts, beans, and milk. · Be safe with medicines. If your doctor prescribes medicines or potassium supplements, take them exactly as directed. Call your doctor if you have any problems with your medicines. · Get your potassium levels tested as often as your doctor tells you. When should you call for help? Call 911 anytime you think you may need emergency care. For example, call if:  ? · You feel like your heart is missing beats. Heart problems caused by low potassium can cause death. ? · You passed out (lost consciousness). ? · You have a seizure. ?Call your doctor now or seek immediate medical care if:  ? · You feel weak or unusually tired. ? · You have severe arm or leg cramps. ? · You have tingling or numbness. ? · You feel sick to your stomach, or you vomit. ? · You have belly cramps. ? · You feel bloated or constipated. ? · You have to urinate a lot. ? · You feel very thirsty most of the time. ? · You are dizzy or lightheaded, or you feel like you may faint. ? · You feel depressed, or you lose touch with reality. ? Watch closely for changes in your health, and be sure to contact your doctor if:  ? · You do not get better as expected. Where can you learn more? Go to http://annel-sadie.info/. Enter G358 in the search box to learn more about \"Hypokalemia: Care Instructions. \"  Current as of: May 12, 2017  Content Version: 11.4  © 6710-0274 [employer].  Care instructions adapted under license by [employer] (which disclaims liability or warranty for this information). If you have questions about a medical condition or this instruction, always ask your healthcare professional. [place] [address] any warranty or liability for your use of this information.

## 2018-06-24 NOTE — ED PROVIDER NOTES
EMERGENCY DEPARTMENT HISTORY AND PHYSICAL EXAM    9:40 PM      Date: 6/23/2018  Patient Name: Keya Lovelace    History of Presenting Illness     Chief Complaint   Patient presents with    Seizure         History Provided By: Patient    Chief Complaint: HA after seizure   Duration:  since just PTA  Timing:  acute  Location: not specified  Quality: not specified  Severity: Moderate  Modifying Factors: s/p seizure just PTA  Associated Symptoms: bilateral LE cramps      Additional History (Context): Keya Lovelace, a 37 y.o. Female, nonsmoker, non-alcohol drinker with h/o seizure, MS, HTN, HLD, depression and migraines, on diazepam, presents to the ED c/o moderate severity, acute HA s/p a seizure episode just PTA. EMS was called by family for seizure episode. Pt has associated hypotension x 2 days and bilateral LE cramps but no urinary sx, cough or fever. Pt states she's been following up with a neurologist for her MS. No other complaints/concerns are reported at this time. PCP: Carla Judge MD    Current Outpatient Prescriptions   Medication Sig Dispense Refill    ibuprofen (MOTRIN) 600 mg tablet Take 1 Tab by mouth every six (6) hours as needed for Pain. 20 Tab 0    gabapentin (NEURONTIN) 300 mg capsule Take 300 mg by mouth nightly. 1-2 tabs      promethazine (PHENERGAN) 25 mg tablet Take 25 mg by mouth every six (6) hours as needed for Nausea.  amLODIPine (NORVASC) 10 mg tablet Take 10 mg by mouth daily.  metFORMIN (GLUCOPHAGE) 500 mg tablet Take 1.5 Tabs by mouth two (2) times daily (with meals). 60 Tab 0    DULoxetine (CYMBALTA) 60 mg capsule Take 60 mg by mouth daily.  QUEtiapine (SEROQUEL) 300 mg tablet Take 300 mg by mouth nightly.  glatiramer (COPAXONE) 40 mg/mL injection 40 mg by SubCUTAneous route every Monday, Wednesday, Friday.          Past History     Past Medical History:  Past Medical History:   Diagnosis Date    Arthropathy, unspecified, site unspecified     Depression     Diabetes ([facility] [address]     Hypercholesteremia     Hypertension     history of htn    Incontinence of urine     Insomnia     Migraine     MS (multiple sclerosis) (HCC)     Neurogenic bladder     Other ill-defined conditions(799.89)     multiple Sclerosis    Seizures ([facility] [address]     6/2013    Wears glasses        Past Surgical History:  Past Surgical History:   Procedure Laterality Date    HX CARPAL TUNNEL RELEASE  11/2008    HX HYSTERECTOMY      HX OTHER SURGICAL         Family History:  Family History   Problem Relation Age of Onset    Heart Disease Father     Diabetes Father     Diabetes Sister     Other Other        Social History:  Social History   Substance Use Topics    Smoking status: Never Smoker    Smokeless tobacco: Never Used    Alcohol use No       Allergies: Allergies   Allergen Reactions    Levemir [Insulin Detemir] Hives    Oxycontin [Oxycodone] Hives and Other (comments)     intolerance         Review of Systems       Review of Systems   Constitutional: Negative for fever. Hypotension    Respiratory: Negative for cough. Genitourinary:        No urinary sx   Musculoskeletal:        Bilateral LE cramps    Neurological: Positive for seizures and headaches. All other systems reviewed and are negative. Physical Exam     Visit Vitals    /75    Pulse 85    Temp 97.4 °F (36.3 °C)    Resp 18    SpO2 98%       Physical Exam  Physical exam:  General:  Well-developed, well-nourished, obese nontoxic nondiaphoretic making a minimal shaking motion of her arms. Head:  Normocephalic atraumatic. Eyes:  Pupils midrange extraocular movements intact. No pallor or conjunctival injection. Nose:  No rhinorrhea, inspection grossly normal.    Ears:  Grossly normal to inspection, no discharge. Mouth:  Mucous membranes moist, no appreciable intraoral lesion. Neck/Back:  Trachea midline, no asymmetry.   No meningeal signs  Chest:  Grossly normal inspection, symmetric chest rise. Pulmonary:  Clear to auscultation bilaterally no wheezes rhonchi or rales. Cardiovascular:  S1-S2 no murmurs rubs or gallops. Abdomen: Soft, nontender, nondistended no guarding rebound or peritoneal signs. Extremities:  Grossly normal to inspection, peripheral pulses intact    Neurologic:  Alert and oriented no appreciable focal neurologic deficit     Skin:  Warm and dry  Psychiatric:  Grossly normal mood and affect  Nursing note reviewed, vital signs reviewed. Diagnostic Study Results     Labs -  Recent Results (from the past 12 hour(s))   CBC WITH AUTOMATED DIFF    Collection Time: 06/23/18  9:40 PM   Result Value Ref Range    WBC 7.7 4.6 - 13.2 K/uL    RBC 4.46 4.20 - 5.30 M/uL    HGB 13.1 12.0 - 16.0 g/dL    HCT 36.0 35.0 - 45.0 %    MCV 80.7 74.0 - 97.0 FL    MCH 29.4 24.0 - 34.0 PG    MCHC 36.4 31.0 - 37.0 g/dL    RDW 12.9 11.6 - 14.5 %    PLATELET 959 (H) 552 - 420 K/uL    MPV 9.9 9.2 - 11.8 FL    NEUTROPHILS 32 (L) 40 - 73 %    LYMPHOCYTES 59 (H) 21 - 52 %    MONOCYTES 7 3 - 10 %    EOSINOPHILS 2 0 - 5 %    BASOPHILS 0 0 - 2 %    ABS. NEUTROPHILS 2.5 1.8 - 8.0 K/UL    ABS. LYMPHOCYTES 4.5 (H) 0.9 - 3.6 K/UL    ABS. MONOCYTES 0.5 0.05 - 1.2 K/UL    ABS. EOSINOPHILS 0.2 0.0 - 0.4 K/UL    ABS. BASOPHILS 0.0 0.0 - 0.06 K/UL    DF AUTOMATED     METABOLIC PANEL, BASIC    Collection Time: 06/23/18  9:40 PM   Result Value Ref Range    Sodium 139 136 - 145 mmol/L    Potassium 3.4 (L) 3.5 - 5.5 mmol/L    Chloride 103 100 - 108 mmol/L    CO2 26 21 - 32 mmol/L    Anion gap 10 3.0 - 18 mmol/L    Glucose 114 (H) 74 - 99 mg/dL    BUN 9 7.0 - 18 MG/DL    Creatinine 0.74 0.6 - 1.3 MG/DL    BUN/Creatinine ratio 12 12 - 20      GFR est AA >60 >60 ml/min/1.73m2    GFR est non-AA >60 >60 ml/min/1.73m2    Calcium 8.3 (L) 8.5 - 10.1 MG/DL       Radiologic Studies -   No orders to display         Medical Decision Making   I am the first provider for this patient.     I reviewed the vital signs, available nursing notes, past medical history, past surgical history, family history and social history. Vital Signs-Reviewed the patient's vital signs. Pulse Oximetry Analysis -  98% on room air (Interpretation) Non-hypoxic     Records Reviewed: Nursing Notes and Old Medical Records (Time of Review: 9:40 PM)    ED Course: Progress Notes, Reevaluation, and Consults:    ED course:  Patient with history of MS, seen multiple times for tremor presents with reported seizure, she states that she takes Valium for her seizures we'll give a dose of Ativan here check labs and monitor    Potassium low at 3.4 she declined by mouth medication would rather have IV and was obliged. Unknown etiology of her symptoms she was urged to follow up with neurology for further management    Patient's presentation, history, physical exam and laboratory evaluations were reviewed. At this time patient was felt to be stable for outpatient management and follow with primary care/specialist.  Patient was instructed to return to the emergency department with any concerns. Disposition:    Discharged home      Portions of this chart were created with Dragon medical speech to text program.   Unrecognized errors may be present. Diagnosis     Clinical Impression:   1. Hypokalemia    2. Tremor        Disposition: Discharged    Follow-up Information     Follow up With Details Comments [address], MD Call in 2 days  [facility] [address]  [address]      [facility] EMERGENCY DEPT  As needed, If symptoms worsen [address] [facility].  [phone]           Discharge Medication List as of 6/23/2018 11:36 PM      CONTINUE these medications which have NOT CHANGED    Details   ibuprofen (MOTRIN) 600 mg tablet Take 1 Tab by mouth every six (6) hours as needed for Pain., Print, Disp-20 Tab, R-0      gabapentin (NEURONTIN) 300 mg capsule Take 300 mg by mouth nightly.  1-2 tabs, Historical Med      promethazine (PHENERGAN) 25 mg tablet Take 25 mg by mouth every six (6) hours as needed for Nausea., Historical Med      amLODIPine (NORVASC) 10 mg tablet Take 10 mg by mouth daily. , Historical Med      metFORMIN (GLUCOPHAGE) 500 mg tablet Take 1.5 Tabs by mouth two (2) times daily (with meals). , Print, Disp-60 Tab, R-0      DULoxetine (CYMBALTA) 60 mg capsule Take 60 mg by mouth daily. , Historical Med      QUEtiapine (SEROQUEL) 300 mg tablet Take 300 mg by mouth nightly., Historical Med      glatiramer (COPAXONE) 40 mg/mL injection 40 mg by SubCUTAneous route every Monday, Wednesday, Friday., Historical Med           _______________________________    Attestations:  Scribe Attestation     Kimmie Eduardo acting as a scribe for and in the presence of David Steven MD      June 23, 2018 at 9:40 PM       Provider Attestation:      I personally performed the services described in the documentation, reviewed the documentation, as recorded by the scribe in my presence, and it accurately and completely records my words and actions.  June 23, 2018 at 9:40 PM - David Steven MD    _______________________________

## 2018-06-24 NOTE — ED TRIAGE NOTES
Called to parking lot by patients family members stating that the patient is having a seizure.   Patient found sitting in the back seat of the car with no active seizure activity noted and patient verbal and able to stand on her own with stand by assist of ED staff

## 2018-06-24 NOTE — ED NOTES
1:06 AM  06/24/18     Discharge instructions given to patient (name) with verbalization of understanding. Patient accompanied by family member. Patient discharged with the following prescriptions none. Patient discharged to home (destination).       Chasidy Stout RN

## 2018-08-17 ENCOUNTER — HOSPITAL ENCOUNTER (EMERGENCY)
Age: 43
Discharge: HOME | End: 2018-08-17
Attending: EMERGENCY MEDICINE
Payer: MEDICARE

## 2018-08-17 VITALS
SYSTOLIC BLOOD PRESSURE: 141 MMHG | HEART RATE: 102 BPM | WEIGHT: 244 LBS | BODY MASS INDEX: 43.23 KG/M2 | TEMPERATURE: 98.3 F | OXYGEN SATURATION: 99 % | DIASTOLIC BLOOD PRESSURE: 94 MMHG | RESPIRATION RATE: 18 BRPM | HEIGHT: 63 IN

## 2018-08-17 DIAGNOSIS — T67.2XXA HEAT CRAMPS, INITIAL ENCOUNTER: Primary | ICD-10-CM

## 2018-08-17 LAB
ANION GAP BLD CALC-SCNC: 13 MMOL/L (ref 10–20)
BUN BLD-MCNC: 8 MG/DL (ref 7–18)
CA-I BLD-MCNC: 1.09 MMOL/L (ref 1.12–1.32)
CHLORIDE BLD-SCNC: 104 MMOL/L (ref 100–108)
CO2 BLD-SCNC: 29 MMOL/L (ref 19–24)
CREAT UR-MCNC: 0.5 MG/DL (ref 0.6–1.3)
GLUCOSE BLD STRIP.AUTO-MCNC: 70 MG/DL (ref 74–106)
HCT VFR BLD CALC: 37 % (ref 36–49)
HGB BLD-MCNC: 12.6 G/DL (ref 12–16)
POTASSIUM BLD-SCNC: 4.6 MMOL/L (ref 3.5–5.5)
SODIUM BLD-SCNC: 139 MMOL/L (ref 136–145)

## 2018-08-17 PROCEDURE — 80047 BASIC METABLC PNL IONIZED CA: CPT

## 2018-08-17 PROCEDURE — 99284 EMERGENCY DEPT VISIT MOD MDM: CPT

## 2018-08-17 NOTE — DISCHARGE INSTRUCTIONS
Heat Exhaustion: Care Instructions  Your Care Instructions  Heat exhaustion occurs when you are hot, sweat a lot, and do not drink enough to replace the lost fluids. Heat exhaustion is not the same as heatstroke, which is much more serious. Heatstroke can lead to problems with many different organs and can be life-threatening. After medical care for heat exhaustion, you will need to limit your activities and take good care of your body while it recovers. Follow-up care is a key part of your treatment and safety. Be sure to make and go to all appointments, and call your doctor if you are having problems. It's also a good idea to know your test results and keep a list of the medicines you take. How can you care for yourself at home? · Reduce your activities, and get plenty of rest. Your doctor will give you instructions on when you can resume your normal schedule. · Stay in a cool room for at least the next 24 hours. · Drink rehydration drinks, juices, and water to replace fluids. Drinks such as sports drinks that contain electrolytes work best, because they have salt and minerals. You need salt and minerals as well as water. You are drinking enough fluids when your urine is normal in color (light yellow or clear), and you are urinating every 2 to 4 hours. If you have kidney, heart, or liver disease and have to limit fluids or salt, talk with your doctor before you increase your fluid or salt intake. · Avoid drinks that have caffeine or alcohol. To prevent heat exhaustion  · Drink plenty of fluids, enough so that your urine is light yellow or clear like water. If you have kidney, heart, or liver disease and have to limit fluids, talk with your doctor before you increase the amount of fluids you drink. · Drink plenty of water before, during, and after you are active. This is very important when it is hot out and when you do intense exercise.   · During hot weather, wear light-colored clothing that fits loosely and a hat with a brim to reflect the sun. · Limit or avoid strenuous activity during hot or humid weather, especially during the hottest part of the day (10 a.m. to 4 p.m.). Heat exhaustion and heatstroke usually develop when you are working or exercising in hot weather. Humidity makes hot weather even more dangerous. · Cars can get very hot inside. Open the windows or turn on the air conditioning before you get in and close the doors. · Try to stay cool during hot weather. If your home is not air-conditioned, seek an air-conditioned place. That could be in [employer], a neighborhood café, or a friend's home. [place] yourself with a cool mist. Take a cool shower, bath, or sponge bath. · Be aware that some medicines, such as major tranquilizers, can raise the risk of heat exhaustion. Ask your doctor whether any medicine you take raises your chance of getting heat exhaustion. When should you call for help? Call 911 anytime you think you may need emergency care. For example, call if:    · You feel very hot and:  ¨ You have a seizure. ¨ You feel confused. ¨ Your skin is red, hot, and dry. ¨ You passed out (lost consciousness).    Call your doctor now or seek immediate medical care if:    · You cannot keep fluids down.     · After returning to your normal activities, you have symptoms of heat exhaustion, such as sweating a lot, fatigue, dizziness, or nausea.    Watch closely for changes in your health, and be sure to contact your doctor if:    · You do not get better as expected. Where can you learn more? Go to http://annel-sadie.info/. Enter S222 in the search box to learn more about \"Heat Exhaustion: Care Instructions. \"  Current as of: November 20, 2017  Content Version: 11.7  © 8750-4217 [employer]. Care instructions adapted under license by [employer] (which disclaims liability or warranty for this information).  If you have questions about a medical condition or this instruction, always ask your healthcare professional. [place] [address] any warranty or liability for your use of this information.

## 2018-08-17 NOTE — ED NOTES
Assume care of patient, patient with no AC in her house or fans, patient states that it is very hot in the house, patient C/O general upper arm muscle spasams

## 2018-08-17 NOTE — ED PROVIDER NOTES
EMERGENCY DEPARTMENT HISTORY AND PHYSICAL EXAM    12:50 PM      Date: 8/17/2018  Patient Name: Gabriele Simmons    History of Presenting Illness     Chief Complaint   Patient presents with    Muscle Pain         History Provided By: Patient     Chief Complaint: Muscle pain  Duration: Unspecified  Timing: Acute  Location: Generalized  Quality: Aching  Severity: Severe  Modifying Factors: 2 Aleve did not improve   Associated Symptoms: Denies any further complaints or symptoms at the moment. Additional History (Context): Gabriele Simmons is a 37 y.o. female with hx of migraine, depression, DM, HTN, siezures, and MS who presents via EMS with complaints of severe aching muscle cramps with unspecified but acute onset attributed to MS, per patient. She notes that the [facility] in her house has not been working for some time, so she has been using fans in her house, taking cold showers, and opening her windows for relief. She reports that she has been overheating because when she woke up this morning, her sheets were wet. Patient notes that her muscle cramps are severe and that she took 2 Aleve but noticed no relief. She reports her AC will be fixed in 3 days. She denies vomiting or diarrhea and notes she now feels slightly better in [facility]. Denies any further complaints or symptoms at the moment. PCP: Archie Pringle MD    Current Outpatient Prescriptions   Medication Sig Dispense Refill    ibuprofen (MOTRIN) 600 mg tablet Take 1 Tab by mouth every six (6) hours as needed for Pain. 20 Tab 0    gabapentin (NEURONTIN) 300 mg capsule Take 300 mg by mouth nightly. 1-2 tabs      promethazine (PHENERGAN) 25 mg tablet Take 25 mg by mouth every six (6) hours as needed for Nausea.  amLODIPine (NORVASC) 10 mg tablet Take 10 mg by mouth daily.  metFORMIN (GLUCOPHAGE) 500 mg tablet Take 1.5 Tabs by mouth two (2) times daily (with meals). 60 Tab 0    DULoxetine (CYMBALTA) 60 mg capsule Take 60 mg by mouth daily.       QUEtiapine (SEROQUEL) 300 mg tablet Take 300 mg by mouth nightly.  glatiramer (COPAXONE) 40 mg/mL injection 40 mg by SubCUTAneous route every Monday, Wednesday, Friday. Past History     Past Medical History:  Past Medical History:   Diagnosis Date    Arthropathy, unspecified, site unspecified     Depression     [facility] ([facility] [address]     Hypercholesteremia     Hypertension     history of htn    Incontinence of urine     Insomnia     Migraine     MS (multiple sclerosis) ([address]     Neurogenic bladder     Other ill-defined conditions(799.89)     multiple Sclerosis    Seizures ([facility] [address]     6/2013    Wears glasses        Past Surgical History:  Past Surgical History:   Procedure Laterality Date    HX CARPAL TUNNEL RELEASE  11/2008    HX HYSTERECTOMY      HX OTHER SURGICAL         Family History:  Family History   Problem Relation Age of Onset    Heart Disease Father     Diabetes Father     Diabetes Sister     Other Other        Social History:  Social History   Substance Use Topics    Smoking status: Never Smoker    Smokeless tobacco: Never Used    Alcohol use No       Allergies: Allergies   Allergen Reactions    Levemir [Insulin Detemir] Hives    Oxycontin [Oxycodone] Hives and Other (comments)     intolerance         Review of Systems     Review of Systems   Constitutional: Negative for diaphoresis and fever. HENT: Negative for congestion and sore throat. Eyes: Negative for pain and itching. Respiratory: Negative for cough and shortness of breath. Cardiovascular: Negative for chest pain and palpitations. Gastrointestinal: Negative for abdominal pain, diarrhea and vomiting. Endocrine: Negative for polydipsia and polyuria. Genitourinary: Negative for dysuria and hematuria. Musculoskeletal: Positive for myalgias (generalized). Negative for arthralgias. Skin: Negative for rash and wound. Neurological: Negative for seizures and syncope. Hematological: Does not bruise/bleed easily. Psychiatric/Behavioral: Negative for agitation and hallucinations. Physical Exam     Visit Vitals    BP (!) 141/94    Pulse (!) 102    Temp 98.3 °F (36.8 °C)    Resp 18    Ht 5' 3\" (1.6 m)    Wt 110.7 kg (244 lb)    SpO2 99%    Breastfeeding No    BMI 43.22 kg/m2       Physical Exam   Constitutional: She appears well-developed and well-nourished. HENT:   Head: Normocephalic and atraumatic. Eyes: Conjunctivae are normal. No scleral icterus. Neck: Normal range of motion. Neck supple. No JVD present. Cardiovascular: Normal rate, regular rhythm and normal heart sounds. 4 intact extremity pulses   Pulmonary/Chest: Effort normal and breath sounds normal.   Abdominal: Soft. She exhibits no mass. There is no tenderness. Musculoskeletal: Normal range of motion. Slight waving motion of LLE  RUE rigid but relaxes with encouragement   Lymphadenopathy:     She has no cervical adenopathy. Neurological: She is alert. Overall, neuro intact    Skin: Skin is warm and dry. Nursing note and vitals reviewed. Diagnostic Study Results   Labs -  Recent Results (from the past 12 hour(s))   POC CHEM8    Collection Time: 08/17/18  1:11 PM   Result Value Ref Range    CO2, POC 29 (H) 19 - 24 MMOL/L    Glucose, POC 70 (L) 74 - 106 MG/DL    BUN, POC 8 7 - 18 MG/DL    Creatinine, POC 0.5 (L) 0.6 - 1.3 MG/DL    GFRAA, POC >60 >60 ml/min/1.73m2    GFRNA, POC >60 >60 ml/min/1.73m2    Sodium,  136 - 145 MMOL/L    Potassium, POC 4.6 3.5 - 5.5 MMOL/L    Calcium, ionized (POC) 1.09 (L) 1.12 - 1.32 mmol/L    Chloride,  100 - 108 MMOL/L    Anion gap, POC 13 10 - 20      Hematocrit, POC 37 36 - 49 %    Hemoglobin, POC 12.6 12 - 16 G/DL       Radiologic Studies -   No orders to display     No results found. Medications ordered:   Medications - No data to display      Medical Decision Making   Initial Medical Decision Making and DDx:    Consistent with heat exhaustion and muscle cramps.  Will evaluate for electrolyte abnormality, not consistent with stroke or rhabdomyolysis. ED Course: Progress Notes, Reevaluation, and Consults:    1:45 PM Pt reevaluated at this time. Discussed results and findings, as well as, diagnosis and plan for discharge. Follow up with doctors/services listed. Return to the emergency department for alarming symptoms. Pt verbalizes understanding and agreement with plan. All questions addressed. I am the first provider for this patient. I reviewed the vital signs, available nursing notes, past medical history, past surgical history, family history and social history. Vital Signs-Reviewed the patient's vital signs. Pulse Oximetry Analysis - 99% on RA    Cardiac Monitor:  Rate/Rhythm: 102 bpm    Records Reviewed: Nursing Notes and Old Medical Records (Time of Review: 12:50 PM)    Diagnosis     Clinical Impression:   1. Heat cramps, initial encounter        Disposition: Discharge     Follow-up Information     Follow up With Details Comments Wayne Villanueva MD In 2 days  [address]  [phone]             Patient's Medications   Start Taking    No medications on file   Continue Taking    AMLODIPINE (NORVASC) 10 MG TABLET    Take 10 mg by mouth daily. DULOXETINE (CYMBALTA) 60 MG CAPSULE    Take 60 mg by mouth daily. GABAPENTIN (NEURONTIN) 300 MG CAPSULE    Take 300 mg by mouth nightly. 1-2 tabs    GLATIRAMER (COPAXONE) 40 MG/ML INJECTION    40 mg by SubCUTAneous route every Monday, Wednesday, Friday. IBUPROFEN (MOTRIN) 600 MG TABLET    Take 1 Tab by mouth every six (6) hours as needed for Pain. METFORMIN (GLUCOPHAGE) 500 MG TABLET    Take 1.5 Tabs by mouth two (2) times daily (with meals). PROMETHAZINE (PHENERGAN) 25 MG TABLET    Take 25 mg by mouth every six (6) hours as needed for Nausea. QUETIAPINE (SEROQUEL) 300 MG TABLET    Take 300 mg by mouth nightly.    These Medications have changed    No medications on file   Stop Taking    No medications on file     _______________________________    Attestations:  Aaron Daniels acting as a scribe for and in the presence of Andressa Hadley MD      August 17, 2018 at 12:50 PM       Provider Attestation:      I personally performed the services described in the documentation, reviewed the documentation, as recorded by the scribe in my presence, and it accurately and completely records my words and actions.  August 17, 2018 at 12:50 PM - Andressa Hadley MD    _______________________________

## 2018-09-05 ENCOUNTER — APPOINTMENT (OUTPATIENT)
Dept: GENERAL RADIOLOGY | Age: 43
End: 2018-09-05
Attending: NURSE PRACTITIONER
Payer: MEDICARE

## 2018-09-05 ENCOUNTER — HOSPITAL ENCOUNTER (EMERGENCY)
Age: 43
Discharge: HOME | End: 2018-09-05
Attending: EMERGENCY MEDICINE
Payer: MEDICARE

## 2018-09-05 VITALS
BODY MASS INDEX: 40.75 KG/M2 | HEART RATE: 87 BPM | OXYGEN SATURATION: 98 % | HEIGHT: 63 IN | RESPIRATION RATE: 18 BRPM | WEIGHT: 230 LBS | DIASTOLIC BLOOD PRESSURE: 80 MMHG | TEMPERATURE: 98.3 F | SYSTOLIC BLOOD PRESSURE: 140 MMHG
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
## 2018-09-05 DIAGNOSIS — S39.012A LUMBAR STRAIN, INITIAL ENCOUNTER: ICD-10-CM

## 2018-09-05 DIAGNOSIS — S09.90XA CLOSED HEAD INJURY, INITIAL ENCOUNTER: ICD-10-CM

## 2018-09-05 DIAGNOSIS — R03.0 ELEVATED BLOOD PRESSURE READING: ICD-10-CM

## 2018-09-05 DIAGNOSIS — S16.1XXA STRAIN OF NECK MUSCLE, INITIAL ENCOUNTER: ICD-10-CM

## 2018-09-05 PROCEDURE — 74011250637 HC RX REV CODE- 250/637: Performed by: NURSE PRACTITIONER

## 2018-09-05 PROCEDURE — 72100 X-RAY EXAM L-S SPINE 2/3 VWS: CPT

## 2018-09-05 PROCEDURE — 96372 THER/PROPH/DIAG INJ SC/IM: CPT

## 2018-09-05 PROCEDURE — 99283 EMERGENCY DEPT VISIT LOW MDM: CPT

## 2018-09-05 PROCEDURE — 74011250636 HC RX REV CODE- 250/636: Performed by: NURSE PRACTITIONER

## 2018-09-05 RX ORDER — KETOROLAC TROMETHAMINE 30 MG/ML
60 INJECTION, SOLUTION INTRAMUSCULAR; INTRAVENOUS
Status: COMPLETED | OUTPATIENT
Start: 2018-09-05 | End: 2018-09-05

## 2018-09-05 RX ORDER — CYCLOBENZAPRINE HCL 10 MG
10 TABLET ORAL
Qty: 12 TAB | Refills: 0 | Status: SHIPPED | OUTPATIENT
Start: 2018-09-05 | End: 2019-05-17

## 2018-09-05 RX ORDER — CYCLOBENZAPRINE HCL 10 MG
10 TABLET ORAL
Status: COMPLETED | OUTPATIENT
Start: 2018-09-05 | End: 2018-09-05

## 2018-09-05 RX ORDER — IBUPROFEN 600 MG/1
600 TABLET ORAL
Qty: 20 TAB | Refills: 0 | Status: SHIPPED | OUTPATIENT
Start: 2018-09-05 | End: 2019-05-17

## 2018-09-05 RX ADMIN — KETOROLAC TROMETHAMINE 60 MG: 30 INJECTION, SOLUTION INTRAMUSCULAR at 19:15

## 2018-09-05 RX ADMIN — CYCLOBENZAPRINE HYDROCHLORIDE 10 MG: 10 TABLET, FILM COATED ORAL at 19:14

## 2018-09-05 NOTE — LETTER
[facility] EMERGENCY DEPT 
[address] [place] [id number] 
[phone] Work/School Note Date: 9/5/2018 To Whom It May concern: Lamonte Faye was seen and treated today in the emergency room by the following provider(s): 
Attending Provider: Tan Nugent MD 
Nurse Practitioner: Clifford Jose NP. Lamonte Faye may return to work on 09/07/2018. Sincerely, Clifford Jose NP
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
## 2018-09-06 NOTE — ED NOTES
I have reviewed discharge instructions with the patient. The patient verbalized understanding. All questions answered. Patient armband removed and shredded. Patient ambulated independently out of care area

## 2018-09-23 ENCOUNTER — HOSPITAL ENCOUNTER (EMERGENCY)
Age: 43
Discharge: HOME | End: 2018-09-23
Attending: EMERGENCY MEDICINE
Payer: MEDICARE

## 2018-09-23 VITALS
HEIGHT: 63 IN | HEART RATE: 80 BPM | SYSTOLIC BLOOD PRESSURE: 106 MMHG | DIASTOLIC BLOOD PRESSURE: 57 MMHG | RESPIRATION RATE: 15 BRPM | OXYGEN SATURATION: 97 % | TEMPERATURE: 98.2 F | WEIGHT: 234 LBS | BODY MASS INDEX: 41.46 KG/M2

## 2018-09-23 DIAGNOSIS — R11.0 NAUSEA WITHOUT VOMITING: ICD-10-CM

## 2018-09-23 DIAGNOSIS — R51.9 HEADACHE, UNSPECIFIED HEADACHE TYPE: ICD-10-CM

## 2018-09-23 DIAGNOSIS — I95.9 HYPOTENSION, UNSPECIFIED HYPOTENSION TYPE: Primary | ICD-10-CM

## 2018-09-23 DIAGNOSIS — R42 DIZZINESS: ICD-10-CM

## 2018-09-23 DIAGNOSIS — E87.6 HYPOKALEMIA: ICD-10-CM

## 2018-09-23 LAB
ANION GAP SERPL CALC-SCNC: 12 MMOL/L (ref 3–18)
BASOPHILS # BLD: 0 K/UL (ref 0–0.1)
BASOPHILS NFR BLD: 0 % (ref 0–2)
BNP SERPL-MCNC: 8 PG/ML (ref 0–450)
BUN SERPL-MCNC: 9 MG/DL (ref 7–18)
BUN/CREAT SERPL: 9 (ref 12–20)
CALCIUM SERPL-MCNC: 8 MG/DL (ref 8.5–10.1)
CHLORIDE SERPL-SCNC: 103 MMOL/L (ref 100–108)
CK MB CFR SERPL CALC: NORMAL % (ref 0–4)
CK MB SERPL-MCNC: <1 NG/ML (ref 5–25)
CK SERPL-CCNC: 86 U/L (ref 26–192)
CO2 SERPL-SCNC: 25 MMOL/L (ref 21–32)
CREAT SERPL-MCNC: 1.03 MG/DL (ref 0.6–1.3)
DIFFERENTIAL METHOD BLD: ABNORMAL
EOSINOPHIL # BLD: 0.2 K/UL (ref 0–0.4)
EOSINOPHIL NFR BLD: 3 % (ref 0–5)
ERYTHROCYTE [DISTWIDTH] IN BLOOD BY AUTOMATED COUNT: 13.6 % (ref 11.6–14.5)
GLUCOSE SERPL-MCNC: 231 MG/DL (ref 74–99)
HCT VFR BLD AUTO: 31.8 % (ref 35–45)
HGB BLD-MCNC: 11.2 G/DL (ref 12–16)
LACTATE BLD-SCNC: 1.8 MMOL/L (ref 0.4–2)
LACTATE BLD-SCNC: 2.2 MMOL/L (ref 0.4–2)
LYMPHOCYTES # BLD: 3.2 K/UL (ref 0.9–3.6)
LYMPHOCYTES NFR BLD: 44 % (ref 21–52)
MAGNESIUM SERPL-MCNC: 1.5 MG/DL (ref 1.6–2.6)
MCH RBC QN AUTO: 28.7 PG (ref 24–34)
MCHC RBC AUTO-ENTMCNC: 35.2 G/DL (ref 31–37)
MCV RBC AUTO: 81.5 FL (ref 74–97)
MONOCYTES # BLD: 0.4 K/UL (ref 0.05–1.2)
MONOCYTES NFR BLD: 6 % (ref 3–10)
NEUTS SEG # BLD: 3.5 K/UL (ref 1.8–8)
NEUTS SEG NFR BLD: 47 % (ref 40–73)
PLATELET # BLD AUTO: 472 K/UL (ref 135–420)
PMV BLD AUTO: 9.1 FL (ref 9.2–11.8)
POTASSIUM SERPL-SCNC: 2.8 MMOL/L (ref 3.5–5.5)
RBC # BLD AUTO: 3.9 M/UL (ref 4.2–5.3)
SODIUM SERPL-SCNC: 140 MMOL/L (ref 136–145)
TROPONIN I SERPL-MCNC: <0.02 NG/ML (ref 0–0.04)
WBC # BLD AUTO: 7.4 K/UL (ref 4.6–13.2)

## 2018-09-23 PROCEDURE — 99285 EMERGENCY DEPT VISIT HI MDM: CPT

## 2018-09-23 PROCEDURE — 93005 ELECTROCARDIOGRAM TRACING: CPT

## 2018-09-23 PROCEDURE — 74011250636 HC RX REV CODE- 250/636: Performed by: EMERGENCY MEDICINE

## 2018-09-23 PROCEDURE — 83735 ASSAY OF MAGNESIUM: CPT | Performed by: EMERGENCY MEDICINE

## 2018-09-23 PROCEDURE — 96375 TX/PRO/DX INJ NEW DRUG ADDON: CPT

## 2018-09-23 PROCEDURE — 96374 THER/PROPH/DIAG INJ IV PUSH: CPT

## 2018-09-23 PROCEDURE — 82550 ASSAY OF CK (CPK): CPT | Performed by: EMERGENCY MEDICINE

## 2018-09-23 PROCEDURE — 80048 BASIC METABOLIC PNL TOTAL CA: CPT | Performed by: EMERGENCY MEDICINE

## 2018-09-23 PROCEDURE — 83880 ASSAY OF NATRIURETIC PEPTIDE: CPT | Performed by: EMERGENCY MEDICINE

## 2018-09-23 PROCEDURE — 74011250637 HC RX REV CODE- 250/637: Performed by: EMERGENCY MEDICINE

## 2018-09-23 PROCEDURE — 96361 HYDRATE IV INFUSION ADD-ON: CPT

## 2018-09-23 PROCEDURE — 83605 ASSAY OF LACTIC ACID: CPT

## 2018-09-23 PROCEDURE — 85025 COMPLETE CBC W/AUTO DIFF WBC: CPT | Performed by: EMERGENCY MEDICINE

## 2018-09-23 RX ORDER — POTASSIUM CHLORIDE 20 MEQ/1
40 TABLET, EXTENDED RELEASE ORAL
Status: COMPLETED | OUTPATIENT
Start: 2018-09-23 | End: 2018-09-23

## 2018-09-23 RX ORDER — POTASSIUM CHLORIDE 20 MEQ/1
20 TABLET, EXTENDED RELEASE ORAL 3 TIMES DAILY
Qty: 9 TAB | Refills: 0 | Status: SHIPPED | OUTPATIENT
Start: 2018-09-23 | End: 2018-09-26

## 2018-09-23 RX ORDER — PROCHLORPERAZINE EDISYLATE 5 MG/ML
10 INJECTION INTRAMUSCULAR; INTRAVENOUS ONCE
Status: COMPLETED | OUTPATIENT
Start: 2018-09-23 | End: 2018-09-23

## 2018-09-23 RX ORDER — ONDANSETRON 4 MG/1
8 TABLET, FILM COATED ORAL
Qty: 12 TAB | Refills: 0 | Status: SHIPPED | OUTPATIENT
Start: 2018-09-23 | End: 2019-04-30 | Stop reason: ALTCHOICE

## 2018-09-23 RX ORDER — DIPHENHYDRAMINE HYDROCHLORIDE 50 MG/ML
25 INJECTION, SOLUTION INTRAMUSCULAR; INTRAVENOUS ONCE
Status: COMPLETED | OUTPATIENT
Start: 2018-09-23 | End: 2018-09-23

## 2018-09-23 RX ADMIN — POTASSIUM CHLORIDE 40 MEQ: 20 TABLET, EXTENDED RELEASE ORAL at 21:38

## 2018-09-23 RX ADMIN — SODIUM CHLORIDE 1000 ML: 900 INJECTION, SOLUTION INTRAVENOUS at 21:29

## 2018-09-23 RX ADMIN — PROCHLORPERAZINE EDISYLATE 10 MG: 5 INJECTION INTRAMUSCULAR; INTRAVENOUS at 21:38

## 2018-09-23 RX ADMIN — SODIUM CHLORIDE 1000 ML: 900 INJECTION, SOLUTION INTRAVENOUS at 20:31

## 2018-09-23 RX ADMIN — DIPHENHYDRAMINE HYDROCHLORIDE 25 MG: 50 INJECTION, SOLUTION INTRAMUSCULAR; INTRAVENOUS at 21:45

## 2018-09-24 LAB
ATRIAL RATE: 77 BPM
CALCULATED P AXIS, ECG09: 55 DEGREES
CALCULATED R AXIS, ECG10: 30 DEGREES
CALCULATED T AXIS, ECG11: 55 DEGREES
DIAGNOSIS, 93000: NORMAL
P-R INTERVAL, ECG05: 160 MS
Q-T INTERVAL, ECG07: 384 MS
QRS DURATION, ECG06: 76 MS
QTC CALCULATION (BEZET), ECG08: 434 MS
VENTRICULAR RATE, ECG03: 77 BPM

## 2018-09-24 NOTE — ED PROVIDER NOTES
[facility] EMERGENCY DEPT 
 
 
8:24 PM 
 
Date: 9/23/2018 Patient Name: Rossana Castrejon History of Presenting Illness Chief Complaint Patient presents with  
 Headache  Dizziness History Provided By: Patient Chief Complaint: dizziness Duration:  Just started this evening Timing:  Acute Location: N/A Quality: room-spinning Severity: N/A Modifying Factors: has not taken anything for it Associated Symptoms: nasuea for 1 week. Decreased appetite, headache  
 
37 y.o. female with noted past medical history who presents to the emergency department complaining of acute dizziness that just started earleir this evening. Patient describes her dizziness as \"room-spinning. \" She reports associated headache, chills, changes in her vision. Upon arrival to the ED and per EMS, patient is hypotensive with a BP of 78/56. She also notes that she has had nausea for the last week with a decreased appetite. Denies any vomiting, abdominal pain, diarrhea, sore throat, cough. Patient with a history of MS, DM, and hypertension which she takes Amlodipine for. No other complaints. Nursing notes regarding the HPI and triage nursing notes were reviewed. Prior medical records were reviewed. Current Outpatient Prescriptions Medication Sig Dispense Refill  ondansetron hcl (ZOFRAN) 4 mg tablet Take 2 Tabs by mouth every eight (8) hours as needed for Nausea. 12 Tab 0  
 potassium chloride (K-DUR, KLOR-CON) 20 mEq tablet Take 1 Tab by mouth three (3) times daily for 3 days. 9 Tab 0  ibuprofen (MOTRIN) 600 mg tablet Take 1 Tab by mouth every six (6) hours as needed for Pain. 20 Tab 0  cyclobenzaprine (FLEXERIL) 10 mg tablet Take 1 Tab by mouth three (3) times daily as needed for Muscle Spasm(s). 12 Tab 0  
 gabapentin (NEURONTIN) 300 mg capsule Take 300 mg by mouth nightly. 1-2 tabs  promethazine (PHENERGAN) 25 mg tablet Take 25 mg by mouth every six (6) hours as needed for Nausea.  amLODIPine (NORVASC) 10 mg tablet Take 10 mg by mouth daily.  metFORMIN (GLUCOPHAGE) 500 mg tablet Take 1.5 Tabs by mouth two (2) times daily (with meals). 60 Tab 0  
 DULoxetine (CYMBALTA) 60 mg capsule Take 60 mg by mouth daily.  QUEtiapine (SEROQUEL) 300 mg tablet Take 300 mg by mouth nightly.  glatiramer (COPAXONE) 40 mg/mL injection 40 mg by SubCUTAneous route every Monday, Wednesday, Friday. Past History Past Medical History: 
Past Medical History:  
Diagnosis Date  Arthropathy, unspecified, site unspecified  Depression  Diabetes ([address]  Hypercholesteremia  Hypertension   
 history of htn  Incontinence of urine  Insomnia  Migraine  MS (multiple sclerosis) ([address]  MS (multiple sclerosis) ([address]  Neurogenic bladder  Other ill-defined conditions(799.89)   
 multiple Sclerosis  Seizures ([address]   
 6/2013  Wears glasses Past Surgical History: 
Past Surgical History:  
Procedure Laterality Date  HX CARPAL TUNNEL RELEASE  11/2008  HX HYSTERECTOMY  HX OTHER SURGICAL Family History: 
Family History Problem Relation Age of Onset  Heart Disease Father  Diabetes Father  Diabetes Sister  Other Other Social History: 
Social History Substance Use Topics  Smoking status: Never Smoker  Smokeless tobacco: Never Used  Alcohol use No  
 
 
Allergies: Allergies Allergen Reactions  Levemir [Insulin Detemir] Hives  Oxycontin [Oxycodone] Hives and Other (comments)  
  intolerance Patient's primary care provider (as noted in EPIC):  Sin Sethi MD 
 
Review of Systems Constitutional: Positive for appetite change (decreased) and chills. Negative for diaphoresis and fever. HENT: Negative for congestion and sore throat. Eyes: Positive for visual disturbance. Negative for discharge. Respiratory: Negative for cough, shortness of breath and stridor. Cardiovascular: Negative for chest pain and palpitations. Gastrointestinal: Positive for nausea. Negative for abdominal pain, diarrhea and vomiting. Genitourinary: Negative for flank pain. Musculoskeletal: Negative for back pain. Neurological: Positive for dizziness and headaches. Negative for weakness. Psychiatric/Behavioral: Negative for hallucinations. All other systems reviewed and are negative. Visit Vitals  /57  Pulse 80  Temp 98.2 °F (36.8 °C)  Resp 15  Ht 5' 3\" (1.6 m)  Wt 106.1 kg (234 lb)  SpO2 97%  BMI 41.45 kg/m2 PHYSICAL EXAM: 
 
CONSTITUTIONAL:  Alert, in no apparent distress;  well developed;  well nourished. HEAD:  Normocephalic, atraumatic. EYES:  EOMI. Non-icteric sclera. Normal conjunctiva. ENTM:  Nose:  no rhinorrhea. Throat:  no erythema or exudate, mucous membranes moist. 
NECK:  No JVD. Supple RESPIRATORY:  Chest clear, equal breath sounds, good air movement. CARDIOVASCULAR:  Regular rate and rhythm. No murmurs, rubs, or gallops. GI:  Normal bowel sounds, abdomen soft and non-tender. No rebound or guarding. BACK:  Non-tender. UPPER EXT:  Normal inspection. LOWER EXT:  No edema, no calf tenderness. Distal pulses intact. NEURO:  Moves all four extremities. Normal motor exam and sensation in all four extremities. Normal CN II-XII exam.  Normal bilateral finger-to-nose exam.   
 
SKIN:  No rashes;  Normal for age. PSYCH:  Alert and normal affect. DIFFERENTIAL DIAGNOSES/ MEDICAL DECISION MAKING:  
Dehydration, hyperglycemia-induced weakness, electrolyte and/or endocrine imbalance, CVA, intracranial hemorrhage, sepsis, cardiac arrhythmia, central versus peripheral vertigo, illicit drug intoxication, alcohol intoxication, prescribed drug toxicity, pregnancy in females patients, anxiety disorder, versus other etiologies or a combination of the above. ED COURSE:   
 
Diagnostic Study Results Abnormal lab results from this emergency department encounter: 
Labs Reviewed CBC WITH AUTOMATED DIFF - Abnormal; Notable for the following:   
    Result Value RBC 3.90 (*) HGB 11.2 (*) HCT 31.8 (*) PLATELET 476 (*) MPV 9.1 (*) All other components within normal limits METABOLIC PANEL, BASIC - Abnormal; Notable for the following: Potassium 2.8 (*) Glucose 231 (*)   
 BUN/Creatinine ratio 9 (*)   
 GFR est non-AA 58 (*) Calcium 8.0 (*) All other components within normal limits MAGNESIUM - Abnormal; Notable for the following:   
 Magnesium 1.5 (*) All other components within normal limits POC LACTIC ACID - Abnormal; Notable for the following:   
 Lactic Acid (POC) 2.2 (*) All other components within normal limits CARDIAC PANEL,(CK, CKMB & TROPONIN) NT-PRO BNP  
POC LACTIC ACID Lab values for this patient within approximately the last 12 hours: 
No results found for this or any previous visit (from the past 12 hour(s)). Radiologist and cardiologist interpretations if available at time of this note: No results found. Medication(s) ordered for patient during this emergency visit encounter: 
Medications  
sodium chloride 0.9 % bolus infusion 1,000 mL (0 mL IntraVENous IV Completed 9/23/18 2131) prochlorperazine (COMPAZINE) injection 10 mg (10 mg IntraVENous Given 9/23/18 2138)  
sodium chloride 0.9 % bolus infusion 1,000 mL (0 mL IntraVENous IV Completed 9/23/18 2217) potassium chloride (K-DUR, KLOR-CON) SR tablet 40 mEq (40 mEq Oral Given 9/23/18 2138) diphenhydrAMINE (BENADRYL) injection 25 mg (25 mg IntraVENous Given 9/23/18 2145) Medical Decision Making I am the first provider for this patient. I reviewed the vital signs, available nursing notes, past medical history, past surgical history, family history and social history. Vital Signs:  Reviewed the patient's vital signs. ED COURSE:   
8:50 PM 
Patient has had significantly decreased PO intake the last several days and I believe this is the source of her hypotension, and secondary dizziness from the hypotension. Initial EKG interpretation by attending emergency physician at 20:49:  Normal sinus rhythm at a rate of 77 bpm. QTc of 434 ms. No STEMI. SPECIFIC PATIENT INSTRUCTIONS FROM THE PHYSICIAN WHO TREATED YOU IN THE ER TODAY: 
1. Return if any concerns or worsening of condition(s). 2. FOLLOW UP APPOINTMENT:  Your primary doctor in 1-2 days. 3. K-Dur as prescribed until finished. 4. Zofran as prescribed for nausea and/or vomiting. Patient is improved, resting quietly and comfortably. The patient will be discharged home. The patient was reassured that these symptoms do not appear to represent a serious or life threatening condition at this time. Warning signs of worsening condition were discussed and understood by the patient. Based on patient's age, coexisting illness, exam, and the results of this ED evaluation, the decision to treat as an outpatient was made. Based on the information available at time of discharge, acute pathology requiring immediate intervention was deemed relative unlikely. While it is impossible to completely exclude the possibility of underlying serious disease or worsening of condition, I feel the relative likelihood is extremely low. I discussed this uncertainty with the patient, who understood ED evaluation and treatment and felt comfortable with the outpatient treatment plan. All questions regarding care, test results, and follow up were answered. The patient is stable and appropriate to discharge. They understand that they should return to the emergency department for any new or worsening symptoms. I stressed the importance of follow up for repeat assessment and possibly further evaluation/treatment. Coding Diagnoses Clinical Impression: 1. Hypotension, unspecified hypotension type 2. Dizziness 3. Headache, unspecified headache type 4. Nausea without vomiting 5. Hypokalemia Disposition Disposition:  Home. Feli Llamas M.D. NEEL Board Certified Emergency Physician Provider Attestation: If a scribe was utilized in generation of this patient record, I personally performed the services described in the documentation, reviewed the documentation, as recorded by the scribe in my presence, and it accurately records the patient's history of presenting illness, review of systems, patient physical examination, and procedures performed by me as the attending physician. Feli Llamas M.D. NEEL Board Certified Emergency Physician 
9/23/2018. 
8:24 PM 
 
Scribe Attestation Maria Guadalupe Sat acting as a scribe for and in the presence of Michelle Villalobos MD     
September 23, 2018 at 8:39 PM

## 2018-09-24 NOTE — DISCHARGE INSTRUCTIONS
SPECIFIC PATIENT INSTRUCTIONS FROM THE PHYSICIAN WHO TREATED YOU IN THE ER TODAY:  1. Return if any concerns or worsening of condition(s). 2. FOLLOW UP APPOINTMENT:  Your primary doctor in 1-2 days. 3. K-Dur as prescribed until finished. 4. Zofran as prescribed for nausea and/or vomiting. Dizziness: Care Instructions  Your Care Instructions  Dizziness is the feeling of unsteadiness or fuzziness in your head. It is different than having vertigo, which is a feeling that the room is spinning or that you are moving or falling. It is also different from lightheadedness, which is the feeling that you are about to faint. It can be hard to know what causes dizziness. Some people feel dizzy when they have migraine headaches. Sometimes bouts of flu can make you feel dizzy. Some medical conditions, such as heart problems or high blood pressure, can make you feel dizzy. Many medicines can cause dizziness, including medicines for high blood pressure, pain, or anxiety. If a medicine causes your symptoms, your doctor may recommend that you stop or change the medicine. If it is a problem with your heart, you may need medicine to help your heart work better. If there is no clear reason for your symptoms, your doctor may suggest watching and waiting for a while to see if the dizziness goes away on its own. Follow-up care is a key part of your treatment and safety. Be sure to make and go to all appointments, and call your doctor if you are having problems. It's also a good idea to know your test results and keep a list of the medicines you take. How can you care for yourself at home? · If your doctor recommends or prescribes medicine, take it exactly as directed. Call your doctor if you think you are having a problem with your medicine. · Do not drive while you feel dizzy. · Try to prevent falls. Steps you can take include:  ¨ Using nonskid mats, adding grab bars near the tub, and using night-lights.   ¨ Clearing your home so that walkways are free of anything you might trip on. ¨ Letting family and friends know that you have been feeling dizzy. This will help them know how to help you. When should you call for help? Call 911 anytime you think you may need emergency care. For example, call if:    · You passed out (lost consciousness).     · You have dizziness along with symptoms of a heart attack. These may include:  ¨ Chest pain or pressure, or a strange feeling in the chest.  ¨ Sweating. ¨ Shortness of breath. ¨ Nausea or vomiting. ¨ Pain, pressure, or a strange feeling in the back, neck, jaw, or upper belly or in one or both shoulders or arms. ¨ Lightheadedness or sudden weakness. ¨ A fast or irregular heartbeat.     · You have symptoms of a stroke. These may include:  ¨ Sudden numbness, tingling, weakness, or loss of movement in your face, arm, or leg, especially on only one side of your body. ¨ Sudden vision changes. ¨ Sudden trouble speaking. ¨ Sudden confusion or trouble understanding simple statements. ¨ Sudden problems with walking or balance. ¨ A sudden, severe headache that is different from past headaches.    Call your doctor now or seek immediate medical care if:    · You feel dizzy and have a fever, headache, or ringing in your ears.     · You have new or increased nausea and vomiting.     · Your dizziness does not go away or comes back.    Watch closely for changes in your health, and be sure to contact your doctor if:    · You do not get better as expected. Where can you learn more? Go to http://annel-sadie.info/. Enter K733 in the search box to learn more about \"Dizziness: Care Instructions. \"  Current as of: November 20, 2017  Content Version: 11.7  © 4544-1931 [email or web address]. Care instructions adapted under license by [employer] (which disclaims liability or warranty for this information).  If you have questions about a medical condition or this instruction, always ask your healthcare professional. [address] any warranty or liability for your use of this information. Low Blood Pressure: Care Instructions  Your Care Instructions    Blood pressure is a measurement of the force of the blood against the walls of the blood vessels during and after each beat of the heart. Low blood pressure is also called hypotension. It means that your blood pressure is much lower than normal. Some people, especially young, slim women, may have slightly low blood pressure without symptoms. But in many people, low blood pressure can cause symptoms such as feeling dizzy or lightheaded. When your blood pressure is too low, your heart, brain, and other organs do not get enough blood. Low blood pressure can be caused by many things, including heart problems and some medicines. Diabetes that is not under control can cause your blood pressure to drop. And so can a severe allergic reaction or infection. Another cause is dehydration, which is when your body loses too much fluid. Treatment for low blood pressure depends on the cause. Follow-up care is a key part of your treatment and safety. Be sure to make and go to all appointments, and call your doctor if you are having problems. It's also a good idea to know your test results and keep a list of the medicines you take. How can you care for yourself at home? · Drink plenty of fluids, enough so that your urine is light yellow or clear like water. If you have kidney, heart, or liver disease and have to limit fluids, talk with your doctor before you increase the amount of fluids you drink. · Be safe with medicines. Call your doctor if you think you are having a problem with your medicine. You will get more details on the specific medicines your doctor prescribes. · Stand up or get out of bed very slowly to allow your body to adjust.  · Get plenty of rest.  · Do not smoke.  Smoking increases your risk of heart attack. If you need help quitting, talk to your doctor about stop-smoking programs and medicines. These can increase your chances of quitting for good. · Limit alcohol to 2 drinks a day for men and 1 drink a day for women. Alcohol may interfere with your medicine. In addition, alcohol can make your low blood pressure worse by causing your body to lose water. When should you call for help? Call 911 anytime you think you may need emergency care. For example, call if:    · You passed out (lost consciousness).    Call your doctor now or seek immediate medical care if:    · You are dizzy or lightheaded, or you feel like you may faint.    Watch closely for changes in your health, and be sure to contact your doctor if you have any problems. Where can you learn more? Go to http://annel-sadie.info/. Enter C304 in the search box to learn more about \"Low Blood Pressure: Care Instructions. \"  Current as of: December 6, 2017  Content Version: 11.7  © 9792-7148 [employer]. Care instructions adapted under license by [employer] (which disclaims liability or warranty for this information). If you have questions about a medical condition or this instruction, always ask your healthcare professional. [place] [address] any warranty or liability for your use of this information. Headache: Care Instructions  Your Care Instructions    Headaches have many possible causes. Most headaches aren't a sign of a more serious problem, and they will get better on their own. Home treatment may help you feel better faster. The doctor has checked you carefully, but problems can develop later. If you notice any problems or new symptoms, get medical treatment right away. Follow-up care is a key part of your treatment and safety. Be sure to make and go to all appointments, and call your doctor if you are having problems.  It's also a good idea to know your test results and keep a list of the medicines you take. How can you care for yourself at home? · Do not drive if you have taken a prescription pain medicine. · Rest in a quiet, dark room until your headache is gone. Close your eyes and try to relax or go to sleep. Don't watch TV or read. · Put a cold, moist cloth or cold pack on the painful area for 10 to 20 minutes at a time. Put a thin cloth between the cold pack and your skin. · Use a warm, moist towel or a heating pad set on low to relax tight shoulder and neck muscles. · Have someone gently massage your neck and shoulders. · Take pain medicines exactly as directed. ¨ If the doctor gave you a prescription medicine for pain, take it as prescribed. ¨ If you are not taking a prescription pain medicine, ask your doctor if you can take an over-the-counter medicine. · Be careful not to take pain medicine more often than the instructions allow, because you may get worse or more frequent headaches when the medicine wears off. · Do not ignore new symptoms that occur with a headache, such as a fever, weakness or numbness, vision changes, or confusion. These may be signs of a more serious problem. To prevent headaches  · Keep a headache diary so you can figure out what triggers your headaches. Avoiding triggers may help you prevent headaches. Record when each headache began, how long it lasted, and what the pain was like (throbbing, aching, stabbing, or dull). Write down any other symptoms you had with the headache, such as nausea, flashing lights or dark spots, or sensitivity to bright light or loud noise. Note if the headache occurred near your period. List anything that might have triggered the headache, such as certain foods (chocolate, cheese, wine) or odors, smoke, bright light, stress, or lack of sleep. · Find healthy ways to deal with stress. Headaches are most common during or right after stressful times.  Take time to relax before and after you do something that has caused a headache in the past.  · Try to keep your muscles relaxed by keeping good posture. Check your jaw, face, neck, and shoulder muscles for tension, and try relaxing them. When sitting at a desk, change positions often, and stretch for 30 seconds each hour. · Get plenty of sleep and exercise. · Eat regularly and well. Long periods without food can trigger a headache. · Treat yourself to a massage. Some people find that regular massages are very helpful in relieving tension. · Limit caffeine by not drinking too much coffee, tea, or soda. But don't quit caffeine suddenly, because that can also give you headaches. · Reduce eyestrain from computers by blinking frequently and looking away from the computer screen every so often. Make sure you have proper eyewear and that your monitor is set up properly, about an arm's length away. · Seek help if you have depression or anxiety. Your headaches may be linked to these conditions. Treatment can both prevent headaches and help with symptoms of anxiety or depression. When should you call for help? Call 911 anytime you think you may need emergency care. For example, call if:    · You have signs of a stroke. These may include:  ¨ Sudden numbness, paralysis, or weakness in your face, arm, or leg, especially on only one side of your body. ¨ Sudden vision changes. ¨ Sudden trouble speaking. ¨ Sudden confusion or trouble understanding simple statements. ¨ Sudden problems with walking or balance. ¨ A sudden, severe headache that is different from past headaches.    Call your doctor now or seek immediate medical care if:    · You have a new or worse headache.     · Your headache gets much worse. Where can you learn more? Go to http://annel-sadie.info/. Enter M271 in the search box to learn more about \"Headache: Care Instructions. \"  Current as of: October 9, 2017  Content Version: 11.7  © 3885-3779 [employer].  Care instructions adapted under license by [employer] (which disclaims liability or warranty for this information). If you have questions about a medical condition or this instruction, always ask your healthcare professional. [address] any warranty or liability for your use of this information. Hypokalemia: Care Instructions  Your Care Instructions    Hypokalemia (say \"jd-fn-[id number]-GEMINI-lino-uh\") is a low level of potassium. The heart, muscles, kidneys, and nervous system all need potassium to work well. This problem has many different causes. Kidney problems, diet, and medicines like diuretics and laxatives can cause it. So can vomiting or diarrhea. In some cases, cancer is the cause. Your doctor may do tests to find the cause of your low potassium levels. You may need medicines to bring your potassium levels back to normal. You may also need regular blood tests to check your potassium. If you have very low potassium, you may need intravenous (IV) medicines. You also may need tests to check the electrical activity of your heart. Heart problems caused by low potassium levels can be very serious. Follow-up care is a key part of your treatment and safety. Be sure to make and go to all appointments, and call your doctor if you are having problems. It's also a good idea to know your test results and keep a list of the medicines you take. How can you care for yourself at home? · If your doctor recommends it, eat foods that have a lot of potassium. These include fresh fruits, juices, and vegetables. They also include nuts, beans, and milk. · Be safe with medicines. If your doctor prescribes medicines or potassium supplements, take them exactly as directed. Call your doctor if you have any problems with your medicines. · Get your potassium levels tested as often as your doctor tells you. When should you call for help? Call 911 anytime you think you may need emergency care.  For example, call if:    · You feel like your heart is missing beats. Heart problems caused by low potassium can cause death.     · You passed out (lost consciousness).     · You have a seizure.    Call your doctor now or seek immediate medical care if:    · You feel weak or unusually tired.     · You have severe arm or leg cramps.     · You have tingling or numbness.     · You feel sick to your stomach, or you vomit.     · You have belly cramps.     · You feel bloated or constipated.     · You have to urinate a lot.     · You feel very thirsty most of the time.     · You are dizzy or lightheaded, or you feel like you may faint.     · You feel depressed, or you lose touch with reality.    Watch closely for changes in your health, and be sure to contact your doctor if:    · You do not get better as expected. Where can you learn more? Go to http://annel-sadie.info/. Enter G358 in the search box to learn more about \"Hypokalemia: Care Instructions. \"  Current as of: May 12, 2017  Content Version: 11.7  © 8531-9947 [employer]. Care instructions adapted under license by [employer] (which disclaims liability or warranty for this information). If you have questions about a medical condition or this instruction, always ask your healthcare professional. [address] any warranty or liability for your use of this information. Nausea and Vomiting: Care Instructions  Your Care Instructions    When you are nauseated, you may feel weak and sweaty and notice a lot of saliva in your mouth. Nausea often leads to vomiting. Most of the time you do not need to worry about nausea and vomiting, but they can be signs of other illnesses. Two common causes of nausea and vomiting are stomach flu and food poisoning. Nausea and vomiting from viral stomach flu will usually start to improve within 24 hours.  Nausea and vomiting from food poisoning may last from 12 to 48 hours.  The doctor has checked you carefully, but problems can develop later. If you notice any problems or new symptoms, get medical treatment right away. Follow-up care is a key part of your treatment and safety. Be sure to make and go to all appointments, and call your doctor if you are having problems. It's also a good idea to know your test results and keep a list of the medicines you take. How can you care for yourself at home? · To prevent dehydration, drink plenty of fluids, enough so that your urine is light yellow or clear like water. Choose water and other caffeine-free clear liquids until you feel better. If you have kidney, heart, or liver disease and have to limit fluids, talk with your doctor before you increase the amount of fluids you drink. · Rest in bed until you feel better. · When you are able to eat, try clear soups, mild foods, and liquids until all symptoms are gone for 12 to 48 hours. Other good choices include dry toast, crackers, cooked cereal, and gelatin dessert, such as Jell-O. When should you call for help? Call 911 anytime you think you may need emergency care. For example, call if:    · You passed out (lost consciousness).    Call your doctor now or seek immediate medical care if:    · You have symptoms of dehydration, such as:  ¨ Dry eyes and a dry mouth. ¨ Passing only a little dark urine. ¨ Feeling thirstier than usual.     · You have new or worsening belly pain.     · You have a new or higher fever.     · You vomit blood or what looks like coffee grounds.    Watch closely for changes in your health, and be sure to contact your doctor if:    · You have ongoing nausea and vomiting.     · Your vomiting is getting worse.     · Your vomiting lasts longer than 2 days.     · You are not getting better as expected. Where can you learn more? Go to http://annel-sadie.info/.   Enter 82 [id number] in the search box to learn more about \"Nausea and Vomiting: Care Instructions. \"  Current as of: 2017  Content Version: 11.7  © 1260-3532 [employer]. Care instructions adapted under license by [employer] (which disclaims liability or warranty for this information). If you have questions about a medical condition or this instruction, always ask your healthcare professional. [address] any warranty or liability for your use of this information. [employer] Activation    Thank you for requesting access to [employer]. Please follow the instructions below to securely access and download your online medical record. [employer] allows you to send messages to your doctor, view your test results, renew your prescriptions, schedule appointments, and more. How Do I Sign Up? 1. In your internet browser, go to [email or web address]. 2. Click on the First Time User? Click Here link in the Sign In box. You will see the New Member Sign Up page. 3. Enter your [employer] Access Code exactly as it appears below. You will not need to use this code after youve completed the sign-up process. If you do not sign up before the expiration date, you must request a new code. [employer] Access Code: [email or web address]  Expires: 2018  5:18 AM (This is the date your [employer] access code will )    4. Enter the last four digits of your Social Security Number (xxxx) and Date of Birth (mm/dd/yyyy) as indicated and click Submit. You will be taken to the next sign-up page. 5. Create a [employer] ID. This will be your [employer] login ID and cannot be changed, so think of one that is secure and easy to remember. 6. Create a [employer] password. You can change your password at any time. 7. Enter your Password Reset Question and Answer. This can be used at a later time if you forget your password. 8. Enter your e-mail address. You will receive e-mail notification when new information is available in [address] 9. Click Sign Up.  You can now view and download portions of your medical record. 10. Click the Download Summary menu link to download a portable copy of your medical information. Additional Information    If you have questions, please visit the Frequently Asked Questions section of the [employer] website at [email or web address] Remember, [employer] is NOT to be used for urgent needs. For medical emergencies, dial 911.

## 2018-12-01 ENCOUNTER — HOSPITAL ENCOUNTER (EMERGENCY)
Age: 43
Discharge: HOME | End: 2018-12-01
Attending: EMERGENCY MEDICINE
Payer: MEDICARE

## 2018-12-01 VITALS
RESPIRATION RATE: 18 BRPM | TEMPERATURE: 98.2 F | DIASTOLIC BLOOD PRESSURE: 91 MMHG | HEIGHT: 62 IN | SYSTOLIC BLOOD PRESSURE: 140 MMHG | BODY MASS INDEX: 43.24 KG/M2 | OXYGEN SATURATION: 99 % | HEART RATE: 89 BPM | WEIGHT: 235 LBS

## 2018-12-01 DIAGNOSIS — R25.9 ABNORMAL MOVEMENT: Primary | ICD-10-CM

## 2018-12-01 LAB
ALBUMIN SERPL-MCNC: 4.1 G/DL (ref 3.4–5)
ALBUMIN/GLOB SERPL: 1.1 {RATIO} (ref 0.8–1.7)
ALP SERPL-CCNC: 81 U/L (ref 45–117)
ALT SERPL-CCNC: 30 U/L (ref 13–56)
ANION GAP SERPL CALC-SCNC: 8 MMOL/L (ref 3–18)
APPEARANCE UR: CLEAR
AST SERPL-CCNC: 18 U/L (ref 15–37)
BASOPHILS # BLD: 0 K/UL (ref 0–0.1)
BASOPHILS NFR BLD: 0 % (ref 0–2)
BILIRUB SERPL-MCNC: 0.5 MG/DL (ref 0.2–1)
BILIRUB UR QL: NEGATIVE
BUN SERPL-MCNC: 10 MG/DL (ref 7–18)
BUN/CREAT SERPL: 16 (ref 12–20)
CALCIUM SERPL-MCNC: 9 MG/DL (ref 8.5–10.1)
CHLORIDE SERPL-SCNC: 104 MMOL/L (ref 100–108)
CO2 SERPL-SCNC: 24 MMOL/L (ref 21–32)
COLOR UR: YELLOW
CREAT SERPL-MCNC: 0.63 MG/DL (ref 0.6–1.3)
DIFFERENTIAL METHOD BLD: ABNORMAL
EOSINOPHIL # BLD: 0.2 K/UL (ref 0–0.4)
EOSINOPHIL NFR BLD: 3 % (ref 0–5)
ERYTHROCYTE [DISTWIDTH] IN BLOOD BY AUTOMATED COUNT: 13.2 % (ref 11.6–14.5)
GLOBULIN SER CALC-MCNC: 3.7 G/DL (ref 2–4)
GLUCOSE SERPL-MCNC: 121 MG/DL (ref 74–99)
GLUCOSE UR STRIP.AUTO-MCNC: NEGATIVE MG/DL
HCT VFR BLD AUTO: 39 % (ref 35–45)
HGB BLD-MCNC: 13.7 G/DL (ref 12–16)
HGB UR QL STRIP: NEGATIVE
KETONES UR QL STRIP.AUTO: NEGATIVE MG/DL
LEUKOCYTE ESTERASE UR QL STRIP.AUTO: NEGATIVE
LYMPHOCYTES # BLD: 4 K/UL (ref 0.9–3.6)
LYMPHOCYTES NFR BLD: 49 % (ref 21–52)
MAGNESIUM SERPL-MCNC: 2.1 MG/DL (ref 1.6–2.6)
MCH RBC QN AUTO: 29 PG (ref 24–34)
MCHC RBC AUTO-ENTMCNC: 35.1 G/DL (ref 31–37)
MCV RBC AUTO: 82.5 FL (ref 74–97)
MONOCYTES # BLD: 0.6 K/UL (ref 0.05–1.2)
MONOCYTES NFR BLD: 7 % (ref 3–10)
NEUTS SEG # BLD: 3.3 K/UL (ref 1.8–8)
NEUTS SEG NFR BLD: 41 % (ref 40–73)
NITRITE UR QL STRIP.AUTO: NEGATIVE
PH UR STRIP: 7 [PH] (ref 5–8)
PLATELET # BLD AUTO: 443 K/UL (ref 135–420)
PMV BLD AUTO: 9.7 FL (ref 9.2–11.8)
POTASSIUM SERPL-SCNC: 4 MMOL/L (ref 3.5–5.5)
PROT SERPL-MCNC: 7.8 G/DL (ref 6.4–8.2)
PROT UR STRIP-MCNC: NEGATIVE MG/DL
RBC # BLD AUTO: 4.73 M/UL (ref 4.2–5.3)
SODIUM SERPL-SCNC: 136 MMOL/L (ref 136–145)
SP GR UR REFRACTOMETRY: 1.01 (ref 1–1.03)
UROBILINOGEN UR QL STRIP.AUTO: 0.2 EU/DL (ref 0.2–1)
WBC # BLD AUTO: 8.1 K/UL (ref 4.6–13.2)

## 2018-12-01 PROCEDURE — 96374 THER/PROPH/DIAG INJ IV PUSH: CPT

## 2018-12-01 PROCEDURE — 83735 ASSAY OF MAGNESIUM: CPT

## 2018-12-01 PROCEDURE — 81003 URINALYSIS AUTO W/O SCOPE: CPT

## 2018-12-01 PROCEDURE — 99285 EMERGENCY DEPT VISIT HI MDM: CPT

## 2018-12-01 PROCEDURE — 96361 HYDRATE IV INFUSION ADD-ON: CPT

## 2018-12-01 PROCEDURE — 80053 COMPREHEN METABOLIC PANEL: CPT

## 2018-12-01 PROCEDURE — 74011250636 HC RX REV CODE- 250/636: Performed by: EMERGENCY MEDICINE

## 2018-12-01 PROCEDURE — 85025 COMPLETE CBC W/AUTO DIFF WBC: CPT

## 2018-12-01 RX ORDER — KETOROLAC TROMETHAMINE 30 MG/ML
30 INJECTION, SOLUTION INTRAMUSCULAR; INTRAVENOUS
Status: COMPLETED | OUTPATIENT
Start: 2018-12-01 | End: 2018-12-01

## 2018-12-01 RX ADMIN — SODIUM CHLORIDE 1000 ML: 900 INJECTION, SOLUTION INTRAVENOUS at 15:00

## 2018-12-01 RX ADMIN — KETOROLAC TROMETHAMINE 30 MG: 30 INJECTION, SOLUTION INTRAMUSCULAR at 16:08

## 2018-12-01 NOTE — ED PROVIDER NOTES
EMERGENCY DEPARTMENT HISTORY AND PHYSICAL EXAM 
 
1:38 PM 
 
 
Date: 12/1/2018 Patient Name: Richard Anglin History of Presenting Illness Chief Complaint Patient presents with  Muscle Pain MS flare up cramping History Provided By: Patient Chief Complaint: Muscle pain Duration: 10 Hours Timing:  Acute Location: Right side Quality: Cramping Severity: Moderate Modifying Factors: \"checklist\" and sleep with no relief Associated Symptoms: Left eye twitching, left side of body twitching, HA Additional History (Context): Richard Anglin is a 37 y.o. female with diabetes, hypertension, seizure and MS who presents with acute, moderate, right-sided muscle cramping onset about 10 hours ago. She states that this feels like an MS flare. Associated symptoms include left eye twitching, left side of body twitching, and HA, but she denies chest pain, cough, SOB, congestion, nausea, vomiting, diarrhea, dysuria, and hematuria. She states she is followed by Dr. Lorene Santa (neurology). She reports taking a nap and trying her MS \"checklist\" with no relief. She states that every flare has been different, and normally is admitted and treated with steroids. No other concerns or symptoms at this time. PCP: Tan Orellana MD 
 
Current Outpatient Medications Medication Sig Dispense Refill  ondansetron hcl (ZOFRAN) 4 mg tablet Take 2 Tabs by mouth every eight (8) hours as needed for Nausea. 12 Tab 0  ibuprofen (MOTRIN) 600 mg tablet Take 1 Tab by mouth every six (6) hours as needed for Pain. 20 Tab 0  cyclobenzaprine (FLEXERIL) 10 mg tablet Take 1 Tab by mouth three (3) times daily as needed for Muscle Spasm(s). 12 Tab 0  
 gabapentin (NEURONTIN) 300 mg capsule Take 300 mg by mouth nightly. 1-2 tabs  promethazine (PHENERGAN) 25 mg tablet Take 25 mg by mouth every six (6) hours as needed for Nausea.  amLODIPine (NORVASC) 10 mg tablet Take 10 mg by mouth daily.  metFORMIN (GLUCOPHAGE) 500 mg tablet Take 1.5 Tabs by mouth two (2) times daily (with meals). 60 Tab 0  
 DULoxetine (CYMBALTA) 60 mg capsule Take 60 mg by mouth daily.  QUEtiapine (SEROQUEL) 300 mg tablet Take 300 mg by mouth nightly.  glatiramer (COPAXONE) 40 mg/mL injection 40 mg by SubCUTAneous route every Monday, Wednesday, Friday. Past History Past Medical History: 
Past Medical History:  
Diagnosis Date  Arthropathy, unspecified, site unspecified  Depression  Diabetes ([address]  Hypercholesteremia  Hypertension   
 history of htn  Incontinence of urine  Insomnia  Migraine  MS [facility]  MS (multiple sclerosis) ([facility]  Neurogenic bladder  Other ill-defined conditions(799.89)   
 multiple Sclerosis  Seizures ([facility]   
 6/2013  Wears glasses Past Surgical History: 
Past Surgical History:  
Procedure Laterality Date  HX CARPAL TUNNEL RELEASE  11/2008  HX HYSTERECTOMY  HX OTHER SURGICAL Family History: 
Family History Problem Relation Age of Onset  Heart Disease Father  Diabetes Father  Diabetes Sister  Other Other Social History: 
Social History Tobacco Use  Smoking status: Never Smoker  Smokeless tobacco: Never Used Substance Use Topics  Alcohol use: No  
 Drug use: No  
 
 
Allergies: Allergies Allergen Reactions  Levemir [Insulin Detemir] Hives  Oxycontin [Oxycodone] Hives and Other (comments)  
  intolerance Review of Systems Review of Systems Constitutional: Negative for chills. HENT: Negative for congestion and sore throat. Eyes:  
     Positive for L eye twitching. Respiratory: Negative for cough and shortness of breath. Cardiovascular: Negative for chest pain. Gastrointestinal: Negative for abdominal pain, diarrhea, nausea and vomiting. Genitourinary: Negative for dysuria and hematuria. Musculoskeletal: Negative for back pain. Positive for R side muscle cramping. Skin: Negative for rash. Neurological: Positive for headaches. Negative for dizziness. Positive for Left side body twitching. All other systems reviewed and are negative. Physical Exam  
 
Visit Vitals /79 (BP 1 Location: Right arm, BP Patient Position: At rest) Pulse 98 Temp 98.2 °F (36.8 °C) Resp 21 Ht 5' 2\" (1.575 m) Wt 106.6 kg (235 lb) SpO2 99% BMI 42.98 kg/m² Physical Exam 
General Exam: Patient is well developed and well nourished in no distress. Patient does not appear acutely ill or toxic. Eye Exam: Lids and conjunctiva are normal 
ENT Exam: The general head and facial exam is normal.  The neck is supple without meningeal signs. No significant adenopathy. Pulmonary Exam: No respiratory distress. The respiratory rate is normal.  No stridor. The breath sounds are equal bilaterally. There are no wheezes, rales, or rhonchi noted. Cardiac Exam: The cardiac rate and rhythm are normal.  No significant murmurs, rubs, or gallops. The peripheral pulses are normal. 
Abdominal Exam: Abdomen is soft and non-distended. No pulsatile masses. There is no local tenderness. There is no rebound or guarding noted. Skin and Soft Tissue: The skin is warm and dry, without significant abnormality. Good color. Musculoskeletal Exam: There is no clubbing or cyanosis. There is no peripheral edema. The musculoskeletal exam of the lower extremities is normal without significant local tenderness or spasm. Neurologic: Pt is alert and appropriate. Normal speech. Normal gait. Cranial nerves 2-12 intact. Twitching to left face, arm, and leg. No pronator drift. Psychiatric: Normal adult with appropriate demeanor and interpersonal interaction. Is oriented to person, place, and time. Diagnostic Study Results Labs - Recent Results (from the past 12 hour(s)) CBC WITH AUTOMATED DIFF Collection Time: 12/01/18  2:30 PM  
Result Value Ref Range WBC 8.1 4.6 - 13.2 K/uL  
 RBC 4.73 4.20 - 5.30 M/uL  
 HGB 13.7 12.0 - 16.0 g/dL HCT 39.0 35.0 - 45.0 % MCV 82.5 74.0 - 97.0 FL  
 MCH 29.0 24.0 - 34.0 PG  
 MCHC 35.1 31.0 - 37.0 g/dL  
 RDW 13.2 11.6 - 14.5 % PLATELET 403 (H) 546 - 420 K/uL MPV 9.7 9.2 - 11.8 FL  
 NEUTROPHILS 41 40 - 73 % LYMPHOCYTES 49 21 - 52 % MONOCYTES 7 3 - 10 % EOSINOPHILS 3 0 - 5 % BASOPHILS 0 0 - 2 %  
 ABS. NEUTROPHILS 3.3 1.8 - 8.0 K/UL  
 ABS. LYMPHOCYTES 4.0 (H) 0.9 - 3.6 K/UL  
 ABS. MONOCYTES 0.6 0.05 - 1.2 K/UL  
 ABS. EOSINOPHILS 0.2 0.0 - 0.4 K/UL  
 ABS. BASOPHILS 0.0 0.0 - 0.1 K/UL  
 DF AUTOMATED METABOLIC PANEL, COMPREHENSIVE Collection Time: 12/01/18  2:30 PM  
Result Value Ref Range Sodium 136 136 - 145 mmol/L Potassium 4.0 3.5 - 5.5 mmol/L Chloride 104 100 - 108 mmol/L  
 CO2 24 21 - 32 mmol/L Anion gap 8 3.0 - 18 mmol/L Glucose 121 (H) 74 - 99 mg/dL BUN 10 7.0 - 18 MG/DL Creatinine 0.63 0.6 - 1.3 MG/DL  
 BUN/Creatinine ratio 16 12 - 20 GFR est AA >60 >60 ml/min/1.73m2 GFR est non-AA >60 >60 ml/min/1.73m2 Calcium 9.0 8.5 - 10.1 MG/DL Bilirubin, total 0.5 0.2 - 1.0 MG/DL  
 ALT (SGPT) 30 13 - 56 U/L  
 AST (SGOT) 18 15 - 37 U/L Alk. phosphatase 81 45 - 117 U/L Protein, total 7.8 6.4 - 8.2 g/dL Albumin 4.1 3.4 - 5.0 g/dL Globulin 3.7 2.0 - 4.0 g/dL A-G Ratio 1.1 0.8 - 1.7 MAGNESIUM Collection Time: 12/01/18  2:30 PM  
Result Value Ref Range Magnesium 2.1 1.6 - 2.6 mg/dL URINALYSIS W/ RFLX MICROSCOPIC Collection Time: 12/01/18  3:45 PM  
Result Value Ref Range Color YELLOW Appearance CLEAR Specific gravity 1.008 1.005 - 1.030    
 pH (UA) 7.0 5.0 - 8.0 Protein NEGATIVE  NEG mg/dL Glucose NEGATIVE  NEG mg/dL Ketone NEGATIVE  NEG mg/dL Bilirubin NEGATIVE  NEG  Blood NEGATIVE  NEG    
 Urobilinogen 0.2 0.2 - 1.0 EU/dL Nitrites NEGATIVE  NEG Leukocyte Esterase NEGATIVE  NEG Radiologic Studies - No orders to display Medical Decision Making I am the first provider for this patient. I reviewed the vital signs, available nursing notes, past medical history, past surgical history, family history and social history. Vital Signs-Reviewed the patient's vital signs. Pulse Oximetry Analysis -  99% on room air Cardiac Monitor: 
Rate: 98 BPM 
 
Records Reviewed: Nursing Notes and Old Medical Records (Time of Review: 1:38 PM) ED Course: Progress Notes, Reevaluation, and Consults: 
13:50 Consult:  Discussed care with Dr. Kelly Carrillo, Specialty: Neurology  Standard discussion; including history of patients chief complaint, available diagnostic results, and treatment course. 16:21: Patient Re-evaluated. She is feeling better and states that she has an appointment with neurology on 12/5/18. Provider Notes (Medical Decision Making): Pt with reports of MS flare consistent of body twitching. Pt's reported complaints seem atypical for MS and tele neuro consult obtained. Tele neuro does not feel this is MS flare or that this is a neurologic process. Pt feeling better with IV fluids. No electrolyte abnormality or signs of infection. Pt able to ambulate at discharge and has neuro appt this week. Diagnosis Clinical Impression: 1. Abnormal movement Disposition: Discharge Follow-up Information Follow up With Specialties Details Why Contact Info Yeyo Villaseñor MD Family Practice In 2 days  [facility] [address] [facility] [address] [id number] 
[phone] [facility] EMERGENCY DEPT Emergency Medicine  If symptoms worsen [address] 
[phone] Medication List  
  
ASK your doctor about these medications   
amLODIPine 10 mg tablet Commonly known as:  NORVASC 
  
COPAXONE 40 mg/mL injection Generic drug:  glatiramer 
  
cyclobenzaprine 10 mg tablet Commonly known as:  FLEXERIL Take 1 Tab by mouth three (3) times daily as needed for Muscle Spasm(s). CYMBALTA 60 mg capsule Generic drug:  DULoxetine 
  
gabapentin 300 mg capsule Commonly known as:  NEURONTIN 
  
ibuprofen 600 mg tablet Commonly known as:  MOTRIN Take 1 Tab by mouth every six (6) hours as needed for Pain. 
  
metFORMIN 500 mg tablet Commonly known as:  GLUCOPHAGE Take 1.5 Tabs by mouth two (2) times daily (with meals). ondansetron hcl 4 mg tablet Commonly known as:  Aida Gauze Take 2 Tabs by mouth every eight (8) hours as needed for Nausea. promethazine 25 mg tablet Commonly known as:  PHENERGAN 
  
SEROquel 300 mg tablet Generic drug:  QUEtiapine 
  
  
 
_______________________________ Scribe Attestation [facility] acting as a scribe for and in the presence of Jen Ortega MD     
December 01, 2018 at 1:38 PM 
    
Provider Attestation:     
I personally performed the services described in the documentation, reviewed the documentation, as recorded by the scribe in my presence, and it accurately and completely records my words and actions. December 01, 2018 at 1:38 PM - Jen Ortega MD   
 
 
_______________________________

## 2018-12-01 NOTE — ED NOTES
I have reviewed discharge instruction and prescriptions with pt. Pt verbalized understanding and has no further questions at this time. Education taught and patient verbalized understanding of education. Teach back method used. 22g IV removed, catheter tip intact on removal.  Armband removed and shredded per patients request.   
Patients pain 0/10. Belongings are with pt. Pt offered wheelchair and declined, ambulated to lobby . Patient discharged with self to home.

## 2019-01-23 PROCEDURE — 99283 EMERGENCY DEPT VISIT LOW MDM: CPT

## 2019-01-24 ENCOUNTER — HOSPITAL ENCOUNTER (EMERGENCY)
Age: 44
Discharge: HOME | End: 2019-01-24
Attending: EMERGENCY MEDICINE
Payer: MEDICARE

## 2019-01-24 VITALS
TEMPERATURE: 98.1 F | RESPIRATION RATE: 18 BRPM | SYSTOLIC BLOOD PRESSURE: 154 MMHG | BODY MASS INDEX: 41.64 KG/M2 | WEIGHT: 235 LBS | HEART RATE: 96 BPM | DIASTOLIC BLOOD PRESSURE: 109 MMHG | OXYGEN SATURATION: 99 % | HEIGHT: 63 IN

## 2019-01-24 DIAGNOSIS — H66.91 RIGHT ACUTE OTITIS MEDIA: Primary | ICD-10-CM

## 2019-01-24 PROCEDURE — 74011250637 HC RX REV CODE- 250/637: Performed by: EMERGENCY MEDICINE

## 2019-01-24 RX ORDER — AMOXICILLIN AND CLAVULANATE POTASSIUM 875; 125 MG/1; MG/1
1 TABLET, FILM COATED ORAL 2 TIMES DAILY
Qty: 14 TAB | Refills: 0 | Status: SHIPPED | OUTPATIENT
Start: 2019-01-24 | End: 2019-01-31

## 2019-01-24 RX ORDER — AMOXICILLIN AND CLAVULANATE POTASSIUM 875; 125 MG/1; MG/1
1 TABLET, FILM COATED ORAL
Status: COMPLETED | OUTPATIENT
Start: 2019-01-24 | End: 2019-01-24

## 2019-01-24 RX ADMIN — AMOXICILLIN AND CLAVULANATE POTASSIUM 1 TABLET: 875; 125 TABLET, FILM COATED ORAL at 00:46

## 2019-01-24 NOTE — DISCHARGE INSTRUCTIONS

## 2019-01-24 NOTE — ED PROVIDER NOTES
EMERGENCY DEPARTMENT HISTORY AND PHYSICAL EXAM 
 
12:33 AM 
 
 
Date: 1/24/2019 Patient Name: Latasha Jaffe History of Presenting Illness Chief Complaint Patient presents with  Cough  Generalized Body Aches  Ear Pain  Sore Throat History Provided By: Patient Chief Complaint: sore throat, right ear pain, horsed voice, dry non productive cough, and generalized body aches Duration:  Days Timing:  Constant Location: HENT/generalized Quality: Aching Severity: Moderate Modifying Factors: none Associated Symptoms: intermittent chills,1 episode of diarrhea last night, and congestion Additional History (Context): Latasha Jaffe is a 37 y.o. female with h/o HTN, seizures, DM, and MS who presents with 3-4 days of sore throat, right ear pain, horsed voice, dry non productive cough, and generalized body aches for the past 3-4 days. Associated s/o intermittent chills,1 episode of diarrhea last night, and congestion. She has been treating her sx w/ OTC cold medications w/o relief; last dose of medications around 1700 pm today (1/23/2019). No sick contacts at home. Denies CP, SOB, fever, N/V, abdominal pain, back pain, neck pain, urinary sx, or any other associated sx. No other complaints or concerns. PCP: Sapna Dunbar MD 
 
 
Past History Past Medical History: 
Past Medical History:  
Diagnosis Date  Arthropathy, unspecified, site unspecified  Depression  Diabetes ([address]  Hypercholesteremia  Hypertension   
 history of htn  Incontinence of urine  Insomnia  Migraine  MS (multiple sclerosis) ([address]  MS (multiple sclerosis) ([address]  Neurogenic bladder  Other ill-defined conditions(799.89)   
 multiple Sclerosis  Seizures ([address]   
 6/2013  Wears glasses Past Surgical History: 
Past Surgical History:  
Procedure Laterality Date  HX CARPAL TUNNEL RELEASE  11/2008  HX HYSTERECTOMY  HX OTHER SURGICAL Family History: 
Family History Problem Relation Age of Onset  Heart Disease Father  Diabetes Father  Diabetes Sister  Other Other Social History: 
Social History Tobacco Use  Smoking status: Never Smoker  Smokeless tobacco: Never Used Substance Use Topics  Alcohol use: No  
 Drug use: No  
 
 
Allergies: Allergies Allergen Reactions  Levemir [Insulin Detemir] Hives  Oxycontin [Oxycodone] Hives and Other (comments)  
  intolerance Review of Systems Review of Systems Constitutional: Positive for chills. Negative for fever. HENT: Positive for congestion, ear pain (right), sore throat and voice change. Negative for ear discharge and trouble swallowing. Respiratory: Positive for cough. Negative for shortness of breath. Cardiovascular: Negative for chest pain. Gastrointestinal: Positive for diarrhea. Negative for abdominal pain and vomiting. Genitourinary: Negative for difficulty urinating. Musculoskeletal: Positive for myalgias (body aches). Negative for back pain and neck pain. Skin: Negative for wound. Neurological: Negative for syncope. Psychiatric/Behavioral: Negative for behavioral problems. All other systems reviewed and are negative. Physical Exam  
 
Visit Vitals BP (!) 154/109 (BP 1 Location: Right arm, BP Patient Position: At rest) Pulse 96 Temp 98.1 °F (36.7 °C) Resp 18 Ht 5' 3\" (1.6 m) Wt 106.6 kg (235 lb) SpO2 99% BMI 41.63 kg/m² Physical Exam  
Constitutional: She is oriented to person, place, and time. She appears well-developed. No distress. well-appearing, nad HENT:  
Head: Normocephalic and atraumatic. Mouth/Throat: Oropharynx is clear and moist. No oropharyngeal exudate. Right TM erythematous, some loss of landmarks Eyes: EOM are normal. Pupils are equal, round, and reactive to light. Neck: Normal range of motion. Cardiovascular: Normal rate and intact distal pulses. Pulmonary/Chest: Effort normal and breath sounds normal. No respiratory distress. Abdominal: Soft. There is no tenderness. Musculoskeletal: Normal range of motion. She exhibits no edema. Mechanically stable Neurological: She is alert and oriented to person, place, and time. No focal deficits noted Skin: Skin is warm. Psychiatric: Her behavior is normal.  
Nursing note and vitals reviewed. Diagnostic Study Results Labs - No results found for this or any previous visit (from the past 12 hour(s)). Radiologic Studies - No orders to display Medical Decision Making I am the first provider for this patient. I reviewed the vital signs, available nursing notes, past medical history, past surgical history, family history and social history. Vital Signs-Reviewed the patient's vital signs. Records Reviewed: Nursing Notes and Old Medical Records (Time of Review: 12:33 AM) Provider Notes (Medical Decision Making): MDM Number of Diagnoses or Management Options Right acute otitis media:  
Diagnosis management comments: 38 yo AAF with PMHx DM, HTN presents with a couple days cold symptoms, including right ear pain. Examination with some redness and loss of landmarks to right ear, otherwise unremarkable with OP clear and CTAB with no increased wob. Will treat empirically as acute otitis media. Discussed results and poc for dc home, abx - first dose in ED, symptom management, follow-up, return precautions. Amount and/or Complexity of Data Reviewed Review and summarize past medical records: yes Patient Progress Patient progress: stable Medications  
amoxicillin-clavulanate (AUGMENTIN) 875-125 mg per tablet 1 Tab (1 Tab Oral Given 1/24/19 0046) Procedures:  
Procedures Diagnosis Clinical Impression: 1. Right acute otitis media Disposition: home Follow-up Information Follow up With Specialties Details Why Contact Info Esau Landers MD Family Practice Call in 2 days Follow up Jose Guadalupe [address] [id number] 
[phone] [address] EMERGENCY DEPT Emergency Medicine  If symptoms worsen, As needed [address] 
[phone] Medication List  
  
START taking these medications   
amoxicillin-clavulanate 875-125 mg per tablet Commonly known as:  AUGMENTIN Take 1 Tab by mouth two (2) times a day for 7 days. ASK your doctor about these medications   
amLODIPine 10 mg tablet Commonly known as:  NORVASC 
  
COPAXONE 40 mg/mL injection Generic drug:  glatiramer 
  
cyclobenzaprine 10 mg tablet Commonly known as:  FLEXERIL Take 1 Tab by mouth three (3) times daily as needed for Muscle Spasm(s). CYMBALTA 60 mg capsule Generic drug:  DULoxetine 
  
gabapentin 300 mg capsule Commonly known as:  NEURONTIN 
  
ibuprofen 600 mg tablet Commonly known as:  MOTRIN Take 1 Tab by mouth every six (6) hours as needed for Pain. 
  
metFORMIN 500 mg tablet Commonly known as:  GLUCOPHAGE Take 1.5 Tabs by mouth two (2) times daily (with meals). ondansetron hcl 4 mg tablet Commonly known as:  Berenice Million Take 2 Tabs by mouth every eight (8) hours as needed for Nausea. promethazine 25 mg tablet Commonly known as:  PHENERGAN 
  
SEROquel 300 mg tablet Generic drug:  QUEtiapine Where to Get Your Medications Information about where to get these medications is not yet available Ask your nurse or doctor about these medications · amoxicillin-clavulanate 875-125 mg per tablet 
  
 
_______________________________ Attestations: 
Scribe Attestation Gerardo Hughes acting as a scribe for and in the presence of Gabriel Preston MD     
January 24, 2019 at 12:33 AM 
    
Provider Attestation:     
I personally performed the services described in the documentation, reviewed the documentation, as recorded by the scribe in my presence, and it accurately and completely records my words and actions. January 24, 2019 at 12:33 DORI - Toya Gonzales MD   
___________________ 
____________

## 2019-01-24 NOTE — ED NOTES
I have reviewed discharge instructions with the patient. The patient verbalized understanding. Patient armband removed and shredded. 1 prescription given. Pt ambulated out of the ED.

## 2019-03-08 ENCOUNTER — HOSPITAL ENCOUNTER (EMERGENCY)
Age: 44
Discharge: HOME | End: 2019-03-08
Attending: EMERGENCY MEDICINE
Payer: MEDICARE

## 2019-03-08 VITALS
TEMPERATURE: 97.8 F | RESPIRATION RATE: 15 BRPM | WEIGHT: 230 LBS | HEIGHT: 63 IN | OXYGEN SATURATION: 98 % | SYSTOLIC BLOOD PRESSURE: 148 MMHG | DIASTOLIC BLOOD PRESSURE: 74 MMHG | HEART RATE: 101 BPM | BODY MASS INDEX: 40.75 KG/M2

## 2019-03-08 DIAGNOSIS — G43.809 OTHER MIGRAINE WITHOUT STATUS MIGRAINOSUS, NOT INTRACTABLE: Primary | ICD-10-CM

## 2019-03-08 DIAGNOSIS — E86.0 DEHYDRATION: ICD-10-CM

## 2019-03-08 PROCEDURE — 96375 TX/PRO/DX INJ NEW DRUG ADDON: CPT

## 2019-03-08 PROCEDURE — 99284 EMERGENCY DEPT VISIT MOD MDM: CPT

## 2019-03-08 PROCEDURE — 74011250636 HC RX REV CODE- 250/636: Performed by: PHYSICIAN ASSISTANT

## 2019-03-08 PROCEDURE — 96374 THER/PROPH/DIAG INJ IV PUSH: CPT

## 2019-03-08 PROCEDURE — 96361 HYDRATE IV INFUSION ADD-ON: CPT

## 2019-03-08 RX ORDER — DIPHENHYDRAMINE HYDROCHLORIDE 50 MG/ML
25 INJECTION, SOLUTION INTRAMUSCULAR; INTRAVENOUS
Status: COMPLETED | OUTPATIENT
Start: 2019-03-08 | End: 2019-03-08

## 2019-03-08 RX ORDER — KETOROLAC TROMETHAMINE 30 MG/ML
15 INJECTION, SOLUTION INTRAMUSCULAR; INTRAVENOUS
Status: COMPLETED | OUTPATIENT
Start: 2019-03-08 | End: 2019-03-08

## 2019-03-08 RX ORDER — METOCLOPRAMIDE HYDROCHLORIDE 5 MG/ML
5 INJECTION INTRAMUSCULAR; INTRAVENOUS
Status: COMPLETED | OUTPATIENT
Start: 2019-03-08 | End: 2019-03-08

## 2019-03-08 RX ADMIN — METOCLOPRAMIDE 5 MG: 5 INJECTION, SOLUTION INTRAMUSCULAR; INTRAVENOUS at 18:49

## 2019-03-08 RX ADMIN — DIPHENHYDRAMINE HYDROCHLORIDE 25 MG: 50 INJECTION, SOLUTION INTRAMUSCULAR; INTRAVENOUS at 18:49

## 2019-03-08 RX ADMIN — SODIUM CHLORIDE 1000 ML: 900 INJECTION, SOLUTION INTRAVENOUS at 18:57

## 2019-03-08 RX ADMIN — KETOROLAC TROMETHAMINE 15 MG: 30 INJECTION, SOLUTION INTRAMUSCULAR; INTRAVENOUS at 18:49

## 2019-03-08 NOTE — ED PROVIDER NOTES
EMERGENCY DEPARTMENT HISTORY AND PHYSICAL EXAM    Date: 3/8/2019  Patient Name: Fahad Somers    History of Presenting Illness     Chief Complaint   Patient presents with    Headache         History Provided By: Patient    Chief Complaint: migraine  Duration: 2 Days  Timing:  Acute  Location: entire head  Quality: Aching, Pressure and pounding  Severity: Severe  Modifying Factors: light, sound and smells makes pain worse  Associated Symptoms: nausea, photophobia      HPI: Fahad Somres is a 40 y.o. female with a PMH of migraines, depression, insomnia, arthropathy, HTN, seizures, DM, MS who presents to the ER c/o migraine headache. Patient states her symptoms began 2 days prior. Tried taking gabapentin with minimal relief. Denied any recent falls or head trauma. Associated photophobia, nausea, sensitivity to sound and smells. Denied any fevers, chills, neck pain, chest pain, sob, abd pain, and has no other symptoms or complaints. PCP: Jazmin Bergeron MD    Current Outpatient Medications   Medication Sig Dispense Refill    ondansetron hcl (ZOFRAN) 4 mg tablet Take 2 Tabs by mouth every eight (8) hours as needed for Nausea. 12 Tab 0    ibuprofen (MOTRIN) 600 mg tablet Take 1 Tab by mouth every six (6) hours as needed for Pain. 20 Tab 0    cyclobenzaprine (FLEXERIL) 10 mg tablet Take 1 Tab by mouth three (3) times daily as needed for Muscle Spasm(s). 12 Tab 0    gabapentin (NEURONTIN) 300 mg capsule Take 300 mg by mouth nightly. 1-2 tabs      promethazine (PHENERGAN) 25 mg tablet Take 25 mg by mouth every six (6) hours as needed for Nausea.  amLODIPine (NORVASC) 10 mg tablet Take 10 mg by mouth daily.  metFORMIN (GLUCOPHAGE) 500 mg tablet Take 1.5 Tabs by mouth two (2) times daily (with meals). 60 Tab 0    DULoxetine (CYMBALTA) 60 mg capsule Take 60 mg by mouth daily.  QUEtiapine (SEROQUEL) 300 mg tablet Take 300 mg by mouth nightly.       glatiramer (COPAXONE) 40 mg/mL injection 40 mg by SubCUTAneous route every Monday, Wednesday, Friday. Past History     Past Medical History:  Past Medical History:   Diagnosis Date    Arthropathy, unspecified, site unspecified     Depression     Diabetes ([address]     Hypercholesteremia     Hypertension     history of htn    Incontinence of urine     Insomnia     Migraine     MS (multiple sclerosis) ([address]     Neurogenic bladder     Other ill-defined conditions(799.89)     multiple Sclerosis    Seizures ([address]     6/2013    Wears glasses        Past Surgical History:  Past Surgical History:   Procedure Laterality Date    HX CARPAL TUNNEL RELEASE  11/2008    HX HYSTERECTOMY      HX OTHER SURGICAL         Family History:  Family History   Problem Relation Age of Onset    Heart Disease Father     Diabetes Father     Diabetes Sister     Other Other        Social History:  Social History     Tobacco Use    Smoking status: Never Smoker    Smokeless tobacco: Never Used   Substance Use Topics    Alcohol use: No    Drug use: No       Allergies: Allergies   Allergen Reactions    Levemir [Insulin Detemir] Hives    Oxycontin [Oxycodone] Hives and Other (comments)     intolerance         Review of Systems   Review of Systems   Constitutional: Negative for chills, fatigue and fever. HENT: Negative. Negative for sore throat. Eyes: Positive for photophobia. Respiratory: Negative for cough and shortness of breath. Cardiovascular: Negative for chest pain and palpitations. Gastrointestinal: Positive for nausea. Negative for abdominal pain and vomiting. Genitourinary: Negative for dysuria. Musculoskeletal: Negative. Skin: Negative. Neurological: Positive for headaches. Negative for dizziness, weakness and light-headedness. Psychiatric/Behavioral: Negative. All other systems reviewed and are negative.       Physical Exam     Vitals:    03/08/19 1809 03/08/19 1930 03/08/19 1945   BP: (!) 155/101 146/75 144/78   Pulse: (!) 101 Resp: 15     Temp: 97.8 °F (36.6 °C)     SpO2: 100% 98% 98%   Weight: 104.3 kg (230 lb)     Height: 5' 3\" (1.6 m)       Physical Exam   Constitutional: She is oriented to person, place, and time. She appears well-developed and well-nourished. No distress. HENT:   Head: Normocephalic and atraumatic. Mouth/Throat: Oropharynx is clear and moist.   Eyes: Conjunctivae and EOM are normal. Pupils are equal, round, and reactive to light. No scleral icterus. Neck: Normal range of motion. Neck supple. No JVD present. No tracheal deviation present. No Brudzinski's sign and no Kernig's sign noted. Cardiovascular: Normal rate, regular rhythm and normal heart sounds. No murmur heard. Pulmonary/Chest: Effort normal and breath sounds normal. No respiratory distress. She has no wheezes. She has no rales. She exhibits no tenderness. Abdominal: Soft. She exhibits no distension. There is no tenderness. There is no rebound and no guarding. Musculoskeletal: Normal range of motion. Neurological: She is alert and oriented to person, place, and time. She has normal strength. Gait normal. GCS eye subscore is 4. GCS verbal subscore is 5. GCS motor subscore is 6. Skin: Skin is warm and dry. She is not diaphoretic. Psychiatric: She has a normal mood and affect. Nursing note and vitals reviewed. Diagnostic Study Results     Labs -   No results found for this or any previous visit (from the past 12 hour(s)). Radiologic Studies -   No orders to display     CT Results  (Last 48 hours)    None        CXR Results  (Last 48 hours)    None            Medical Decision Making   I am the first provider for this patient. I reviewed the vital signs, available nursing notes, past medical history, past surgical history, family history and social history. Vital Signs-Reviewed the patient's vital signs.     Records Reviewed: Nursing Notes and Old Medical Records     7:06 PM  39 y/o female who presents to the ER c/o migraine headache x 2 days. States she tried taking her gabapentin with no relief. Denied any recent falls or head trauma. Associated nausea, photophobia, sensitivity to smells. NVI on exam.  Will plan on meds for symptom mgmt at this time. Padmini Anthony PA-C     8:36 PM  Pt reports moderate improvement in headache. Tolerating po fluids w/o difficulty. No indication for further imaging/testing at this time. Will have follow up with pcp. All questions answered and patient in agreement with plan of care. Will plan for discharge. Padmini Anthony PA-C    Disposition:  discharged    DISCHARGE NOTE:         Care plan outlined and precautions discussed. Patient has no new complaints, changes, or physical findings. Results of n/a were reviewed with the patient. All medications were reviewed with the patient; will d/c home with n/a. All of pt's questions and concerns were addressed. Patient was instructed and agrees to follow up with pcp, as well as to return to the ED upon further deterioration. Patient is ready to go home. Follow-up Information     Follow up With Specialties Details Why [facility] EMERGENCY DEPT Emergency Medicine  If symptoms worsen [address]  [phone]    Esau Landers MD Family Practice Call in 3 days As needed for ER follow up [address]            Current Discharge Medication List      CONTINUE these medications which have NOT CHANGED    Details   ondansetron hcl (ZOFRAN) 4 mg tablet Take 2 Tabs by mouth every eight (8) hours as needed for Nausea. Qty: 12 Tab, Refills: 0      ibuprofen (MOTRIN) 600 mg tablet Take 1 Tab by mouth every six (6) hours as needed for Pain. Qty: 20 Tab, Refills: 0      cyclobenzaprine (FLEXERIL) 10 mg tablet Take 1 Tab by mouth three (3) times daily as needed for Muscle Spasm(s).   Qty: 12 Tab, Refills: 0      gabapentin (NEURONTIN) 300 mg capsule Take 300 mg by mouth nightly. 1-2 tabs      promethazine (PHENERGAN) 25 mg tablet Take 25 mg by mouth every six (6) hours as needed for Nausea. amLODIPine (NORVASC) 10 mg tablet Take 10 mg by mouth daily. metFORMIN (GLUCOPHAGE) 500 mg tablet Take 1.5 Tabs by mouth two (2) times daily (with meals). Qty: 60 Tab, Refills: 0      DULoxetine (CYMBALTA) 60 mg capsule Take 60 mg by mouth daily. QUEtiapine (SEROQUEL) 300 mg tablet Take 300 mg by mouth nightly. glatiramer (COPAXONE) 40 mg/mL injection 40 mg by SubCUTAneous route every Monday, Wednesday, Friday. Provider Notes (Medical Decision Making):     Procedures:  Procedures        Diagnosis     Clinical Impression:   1. Other migraine without status migrainosus, not intractable    2.  Dehydration

## 2019-03-09 NOTE — DISCHARGE INSTRUCTIONS
Patient Education        Dehydration: Care Instructions  Your Care Instructions  Dehydration happens when your body loses too much fluid. This might happen when you do not drink enough water or you lose large amounts of fluids from your body because of diarrhea, vomiting, or sweating. Severe dehydration can be life-threatening. Water and minerals called electrolytes help put your body fluids back in balance. Learn the early signs of fluid loss, and drink more fluids to prevent dehydration. Follow-up care is a key part of your treatment and safety. Be sure to make and go to all appointments, and call your doctor if you are having problems. It's also a good idea to know your test results and keep a list of the medicines you take. How can you care for yourself at home? · To prevent dehydration, drink plenty of fluids, enough so that your urine is light yellow or clear like water. Choose water and other caffeine-free clear liquids until you feel better. If you have kidney, heart, or liver disease and have to limit fluids, talk with your doctor before you increase the amount of fluids you drink. · If you do not feel like eating or drinking, try taking small sips of water, sports drinks, or other rehydration drinks. · Get plenty of rest.  To prevent dehydration  · Add more fluids to your diet and daily routine, unless your doctor has told you not to. · During hot weather, drink more fluids. Drink even more fluids if you exercise a lot. Stay away from drinks with alcohol or caffeine. · Watch for the symptoms of dehydration. These include:  ? A dry, sticky mouth. ? Dark yellow urine, and not much of it. ? Dry and sunken eyes. ? Feeling very tired. · Learn what problems can lead to dehydration. These include:  ? Diarrhea, fever, and vomiting. ? Any illness with a fever, such as pneumonia or the flu. ?  Activities that cause heavy sweating, such as endurance races and heavy outdoor work in hot or humid weather. ? Alcohol or drug abuse or withdrawal.  ? Certain medicines, such as cold and allergy pills (antihistamines), diet pills (diuretics), and laxatives. ? Certain diseases, such as diabetes, cancer, and heart or kidney disease. When should you call for help? Call 911 anytime you think you may need emergency care. For example, call if:    · You passed out (lost consciousness).    Call your doctor now or seek immediate medical care if:    · You are confused and cannot think clearly.     · You are dizzy or lightheaded, or you feel like you may faint.     · You have signs of needing more fluids. You have sunken eyes and a dry mouth, and you pass only a little dark urine.     · You cannot keep fluids down.    Watch closely for changes in your health, and be sure to contact your doctor if:    · You are not making tears.     · Your skin is very dry and sags slowly back into place after you pinch it.     · Your mouth and eyes are very dry. Where can you learn more? Go to http://annel-sadie.info/. Enter M557 in the search box to learn more about \"Dehydration: Care Instructions. \"  Current as of: September 23, 2018  Content Version: 11.9  © 2068-3524 [employer]. Care instructions adapted under license by [employer] (which disclaims liability or warranty for this information). If you have questions about a medical condition or this instruction, always ask your healthcare professional. [place] [address] any warranty or liability for your use of this information. Patient Education        Migraine Headache: Care Instructions  Your Care Instructions  Migraines are painful, throbbing headaches that often start on one side of the head. They may cause nausea and vomiting and make you sensitive to light, sound, or smell. Without treatment, migraines can last from 4 hours to a few days.  Medicines can help prevent migraines or stop them after they have started. Your doctor can help you find which ones work best for you. Follow-up care is a key part of your treatment and safety. Be sure to make and go to all appointments, and call your doctor if you are having problems. It's also a good idea to know your test results and keep a list of the medicines you take. How can you care for yourself at home? · Do not drive if you have taken a prescription pain medicine. · Rest in a quiet, dark room until your headache is gone. Close your eyes, and try to relax or go to sleep. Don't watch TV or read. · Put a cold, moist cloth or cold pack on the painful area for 10 to 20 minutes at a time. Put a thin cloth between the cold pack and your skin. · Use a warm, moist towel or a heating pad set on low to relax tight shoulder and neck muscles. · Have someone gently massage your neck and shoulders. · Take your medicines exactly as prescribed. Call your doctor if you think you are having a problem with your medicine. You will get more details on the specific medicines your doctor prescribes. · Be careful not to take pain medicine more often than the instructions allow. You could get worse or more frequent headaches when the medicine wears off. To prevent migraines  · Keep a headache diary so you can figure out what triggers your headaches. Avoiding triggers may help you prevent headaches. Record when each headache began, how long it lasted, and what the pain was like. (Was it throbbing, aching, stabbing, or dull?) Write down any other symptoms you had with the headache, such as nausea, flashing lights or dark spots, or sensitivity to bright light or loud noise. Note if the headache occurred near your period. List anything that might have triggered the headache. Triggers may include certain foods (chocolate, cheese, wine) or odors, smoke, bright light, stress, or lack of sleep. · If your doctor has prescribed medicine for your migraines, take it as directed.  You may have medicine that you take only when you get a migraine and medicine that you take all the time to help prevent migraines. ? If your doctor has prescribed medicine for when you get a headache, take it at the first sign of a migraine, unless your doctor has given you other instructions. ? If your doctor has prescribed medicine to prevent migraines, take it exactly as prescribed. Call your doctor if you think you are having a problem with your medicine. · Find healthy ways to deal with stress. Migraines are most common during or right after stressful times. Take time to relax before and after you do something that has caused a migraine in the past.  · Try to keep your muscles relaxed by keeping good posture. Check your jaw, face, neck, and shoulder muscles for tension. Try to relax them. When you sit at a desk, change positions often. And make sure to stretch for 30 seconds each hour. · Get plenty of sleep and exercise. · Eat meals on a regular schedule. Avoid foods and drinks that often trigger migraines. These include chocolate, alcohol (especially red wine and port), aspartame, monosodium glutamate (MSG), and some additives found in foods (such as hot dogs, damon, cold cuts, aged cheeses, and pickled foods). · Limit caffeine. Don't drink too much coffee, tea, or soda. But don't quit caffeine suddenly. That can also give you migraines. · Do not smoke or allow others to smoke around you. If you need help quitting, talk to your doctor about stop-smoking programs and medicines. These can increase your chances of quitting for good. · If you are taking birth control pills or hormone therapy, talk to your doctor about whether they are triggering your migraines. When should you call for help? Call 911 anytime you think you may need emergency care. For example, call if:    · You have signs of a stroke.  These may include:  ? Sudden numbness, paralysis, or weakness in your face, arm, or leg, especially on only one side of your body. ? Sudden vision changes. ? Sudden trouble speaking. ? Sudden confusion or trouble understanding simple statements. ? Sudden problems with walking or balance. ? A sudden, severe headache that is different from past headaches.    Call your doctor now or seek immediate medical care if:    · You have new or worse nausea and vomiting.     · You have a new or higher fever.     · Your headache gets much worse.    Watch closely for changes in your health, and be sure to contact your doctor if:    · You are not getting better after 2 days (48 hours). Where can you learn more? Go to http://annel-sadie.info/. Enter L870 in the search box to learn more about \"Migraine Headache: Care Instructions. \"  Current as of: Colette 3, 2018  Content Version: 11.9  © 2154-7687 [employer]. Care instructions adapted under license by [employer] (which disclaims liability or warranty for this information). If you have questions about a medical condition or this instruction, always ask your healthcare professional. [address] any warranty or liability for your use of this information.

## 2019-03-09 NOTE — ED NOTES
I have reviewed discharge instructions with the patient. The patient verbalized understanding. Patient armband removed and given to patient to take home.   Patient armband removed and shredded

## 2019-03-17 ENCOUNTER — APPOINTMENT (OUTPATIENT)
Dept: GENERAL RADIOLOGY | Age: 44
End: 2019-03-17
Attending: EMERGENCY MEDICINE
Payer: MEDICARE

## 2019-03-17 ENCOUNTER — HOSPITAL ENCOUNTER (EMERGENCY)
Age: 44
Discharge: HOME | End: 2019-03-17
Attending: EMERGENCY MEDICINE
Payer: MEDICARE

## 2019-03-17 VITALS
TEMPERATURE: 97.8 F | WEIGHT: 230 LBS | RESPIRATION RATE: 18 BRPM | HEIGHT: 63 IN | DIASTOLIC BLOOD PRESSURE: 91 MMHG | BODY MASS INDEX: 40.75 KG/M2 | SYSTOLIC BLOOD PRESSURE: 139 MMHG | OXYGEN SATURATION: 100 % | HEART RATE: 96 BPM

## 2019-03-17 DIAGNOSIS — M79.641 RIGHT HAND PAIN: ICD-10-CM

## 2019-03-17 DIAGNOSIS — R56.9 SEIZURE (HCC): ICD-10-CM

## 2019-03-17 DIAGNOSIS — G35 MS (MULTIPLE SCLEROSIS) (HCC): Primary | ICD-10-CM

## 2019-03-17 LAB
ANION GAP BLD CALC-SCNC: 17 MMOL/L (ref 10–20)
ANION GAP SERPL CALC-SCNC: 8 MMOL/L (ref 3–18)
BASOPHILS # BLD: 0 K/UL (ref 0–0.1)
BASOPHILS NFR BLD: 0 % (ref 0–2)
BUN BLD-MCNC: 8 MG/DL (ref 7–18)
BUN SERPL-MCNC: 9 MG/DL (ref 7–18)
BUN/CREAT SERPL: 13 (ref 12–20)
CA-I BLD-MCNC: 0.91 MMOL/L (ref 1.12–1.32)
CALCIUM SERPL-MCNC: 8.2 MG/DL (ref 8.5–10.1)
CHLORIDE BLD-SCNC: 103 MMOL/L (ref 100–108)
CHLORIDE SERPL-SCNC: 104 MMOL/L (ref 100–108)
CK SERPL-CCNC: 166 U/L (ref 26–192)
CO2 BLD-SCNC: 22 MMOL/L (ref 19–24)
CO2 SERPL-SCNC: 25 MMOL/L (ref 21–32)
CREAT SERPL-MCNC: 0.68 MG/DL (ref 0.6–1.3)
CREAT UR-MCNC: 0.5 MG/DL (ref 0.6–1.3)
DIFFERENTIAL METHOD BLD: ABNORMAL
EOSINOPHIL # BLD: 0.2 K/UL (ref 0–0.4)
EOSINOPHIL NFR BLD: 2 % (ref 0–5)
ERYTHROCYTE [DISTWIDTH] IN BLOOD BY AUTOMATED COUNT: 13.9 % (ref 11.6–14.5)
GLUCOSE BLD STRIP.AUTO-MCNC: 161 MG/DL (ref 74–106)
GLUCOSE SERPL-MCNC: 168 MG/DL (ref 74–99)
HCG SERPL QL: NEGATIVE
HCT VFR BLD AUTO: 33.8 % (ref 35–45)
HCT VFR BLD CALC: 34 % (ref 36–49)
HGB BLD-MCNC: 11.6 G/DL (ref 12–16)
HGB BLD-MCNC: 11.6 G/DL (ref 12–16)
LYMPHOCYTES # BLD: 3.8 K/UL (ref 0.9–3.6)
LYMPHOCYTES NFR BLD: 43 % (ref 21–52)
MCH RBC QN AUTO: 28.3 PG (ref 24–34)
MCHC RBC AUTO-ENTMCNC: 34.3 G/DL (ref 31–37)
MCV RBC AUTO: 82.4 FL (ref 74–97)
MONOCYTES # BLD: 0.6 K/UL (ref 0.05–1.2)
MONOCYTES NFR BLD: 6 % (ref 3–10)
NEUTS SEG # BLD: 4.3 K/UL (ref 1.8–8)
NEUTS SEG NFR BLD: 49 % (ref 40–73)
PLATELET # BLD AUTO: 408 K/UL (ref 135–420)
PMV BLD AUTO: 9.1 FL (ref 9.2–11.8)
POTASSIUM BLD-SCNC: 4 MMOL/L (ref 3.5–5.5)
POTASSIUM SERPL-SCNC: 4.3 MMOL/L (ref 3.5–5.5)
RBC # BLD AUTO: 4.1 M/UL (ref 4.2–5.3)
SODIUM BLD-SCNC: 137 MMOL/L (ref 136–145)
SODIUM SERPL-SCNC: 137 MMOL/L (ref 136–145)
WBC # BLD AUTO: 8.8 K/UL (ref 4.6–13.2)

## 2019-03-17 PROCEDURE — 73130 X-RAY EXAM OF HAND: CPT

## 2019-03-17 PROCEDURE — 74011636637 HC RX REV CODE- 636/637: Performed by: EMERGENCY MEDICINE

## 2019-03-17 PROCEDURE — 80047 BASIC METABLC PNL IONIZED CA: CPT

## 2019-03-17 PROCEDURE — 99284 EMERGENCY DEPT VISIT MOD MDM: CPT

## 2019-03-17 PROCEDURE — A9270 NON-COVERED ITEM OR SERVICE: HCPCS | Performed by: EMERGENCY MEDICINE

## 2019-03-17 PROCEDURE — 82550 ASSAY OF CK (CPK): CPT

## 2019-03-17 PROCEDURE — 85025 COMPLETE CBC W/AUTO DIFF WBC: CPT

## 2019-03-17 PROCEDURE — 80048 BASIC METABOLIC PNL TOTAL CA: CPT

## 2019-03-17 PROCEDURE — 84703 CHORIONIC GONADOTROPIN ASSAY: CPT

## 2019-03-17 PROCEDURE — 75810000053 HC SPLINT APPLICATION

## 2019-03-17 RX ORDER — PREDNISONE 20 MG/1
60 TABLET ORAL
Status: COMPLETED | OUTPATIENT
Start: 2019-03-17 | End: 2019-03-17

## 2019-03-17 RX ORDER — PREDNISONE 50 MG/1
50 TABLET ORAL DAILY
Qty: 3 TAB | Refills: 0 | Status: SHIPPED | OUTPATIENT
Start: 2019-03-17 | End: 2019-03-20

## 2019-03-17 RX ADMIN — PREDNISONE 60 MG: 20 TABLET ORAL at 22:34

## 2019-03-18 NOTE — ED NOTES
Patient states that she has been having a lot of muscle spasms due to her MS the past few days. EMS reports that patient was not post-ictal when they arrived, was not incontinent, and could answer all questions appropriately.

## 2019-03-18 NOTE — DISCHARGE INSTRUCTIONS
Patient Education   Keep the hand in a splint and follow-up with the Orthopedic Surgeon for a repeat x-ray. There may be a scaphoid fracture. Also, follow-up with your Neurologist.     Seizure: Care Instructions  Your Care Instructions    Seizures are caused by abnormal patterns of electrical signals in the brain. They are different for each person. Seizures can affect movement, speech, vision, or awareness. Some people have only slight shaking of a hand and do not pass out. Other people may pass out and have violent shaking of the whole body. Some people appear to stare into space. They are awake, but they can't respond normally. Later, they may not remember what happened. You may need tests to identify the type and cause of the seizures. A seizure may occur only once, or you may have them more than one time. Taking medicines as directed and following up with your doctor may help keep you from having more seizures. The doctor has checked you carefully, but problems can develop later. If you notice any problems or new symptoms, get medical treatment right away. Follow-up care is a key part of your treatment and safety. Be sure to make and go to all appointments, and call your doctor if you are having problems. It's also a good idea to know your test results and keep a list of the medicines you take. How can you care for yourself at home? · Be safe with medicines. Take your medicines exactly as prescribed. Call your doctor if you think you are having a problem with your medicine. · Do not do any activity that could be dangerous to you or others until your doctor says it is safe to do so. For example, do not drive a car, operate machinery, swim, or climb ladders. · Be sure that anyone treating you for any health problem knows that you have had a seizure and what medicines you are taking for it. · Identify and avoid things that may make you more likely to have a seizure.  These may include lack of sleep, alcohol or drug use, stress, or not eating. · Make sure you go to your follow-up appointment. When should you call for help? Call 911 anytime you think you may need emergency care. For example, call if:    · You have another seizure.     · You have more than one seizure in 24 hours.     · You have new symptoms, such as trouble walking, speaking, or thinking clearly.    Call your doctor now or seek immediate medical care if:    · You are not acting normally.    Watch closely for changes in your health, and be sure to contact your doctor if you have any problems. Where can you learn more? Go to http://annel-sadie.info/. Enter E259 in the search box to learn more about \"Seizure: Care Instructions. \"  Current as of: Colette 3, 2018  Content Version: 11.9  © 9829-7077 [employer]. Care instructions adapted under license by [employer] (which disclaims liability or warranty for this information). If you have questions about a medical condition or this instruction, always ask your healthcare professional. [place] [address] any warranty or liability for your use of this information. Patient Education        Multiple Sclerosis (MS): Care Instructions  Your Care Instructions  Multiple sclerosis, also called MS, is a disease that can affect the brain, spinal cord, and nerves to the eyes. MS can cause problems with muscle control and strength, vision, balance, feeling, and thinking. Whatever your symptoms are, taking medicine correctly and following your doctor's advice for home care can help you maintain your quality of life. Follow-up care is a key part of your treatment and safety. Be sure to make and go to all appointments, and call your doctor if you are having problems. It's also a good idea to know your test results and keep a list of the medicines you take. How can you care for yourself at home?   General care  · Take your medicines exactly as prescribed. Call your doctor if you think you are having a problem with your medicine. · Use a cane, walker, or scooter if your doctor suggests it. · Keep doing your normal activities as much as you can. · If you have problems urinating, press or tap your bladder area to help start urine flow. If you have trouble controlling your urine, plan your fluid intake and activities so that a toilet will be available when you need it. · Spend time with family and friends. Join a support group for people with MS if you want extra help. · Depression is common with this condition. Tell your doctor if you have trouble sleeping, are eating too much or are not hungry, or feel sad or tearful all the time. Depression can be treated with medicine and counseling. Diet and exercise  · Eat a balanced diet. · If you have problems swallowing, change how and what you eat:  ? Try thick drinks, such as milk shakes. They are easier to swallow than other fluids. ? Do not eat foods that crumble easily. These can cause choking. ? Use a  to prepare food. Soft foods need less chewing. ? Eat small meals often so that you do not get tired from eating larger meals. · Get exercise on most days. Work with your doctor to set up a program of walking, swimming, or other exercise that you are able to do. A physical therapist can teach you exercises if you cannot walk but can move your limbs and trunk. Or you can do exercises to help with coordination and balance. You can help improve muscle stiffness by doing exercises while lying in certain positions. When should you call for help? Call your doctor now or seek immediate medical care if:    · You have a change in symptoms.     · You fall or have another injury.     · You have symptoms of a urinary infection. For example:  ? You have blood or pus in your urine. ? You have pain in your back just below your rib cage. This is called flank pain. ?  You have a fever, chills, or body aches.  ? It hurts to urinate. ? You have groin or belly pain.    Watch closely for changes in your health, and be sure to contact your doctor if:    · You want more information about MS or medicines.     · You have questions about alternative treatments. Do not use any other treatments without talking to your doctor first.   Where can you learn more? Go to http://annel-sadie.info/. Enter G591 in the search box to learn more about \"Multiple Sclerosis (MS): Care Instructions. \"  Current as of: Colette 3, 2018  Content Version: 11.9  © 5163-0703 [employer]. Care instructions adapted under license by [employer] (which disclaims liability or warranty for this information). If you have questions about a medical condition or this instruction, always ask your healthcare professional. [address] any warranty or liability for your use of this information.

## 2019-03-18 NOTE — ED PROVIDER NOTES
Emergency Department Treatment Report    Patient: Justin Rodriguez Age: 40 y.o. Sex: female    YOB: 1975 Admit Date: 3/17/2019 PCP: Carlette Cheadle, MD   MRN: [id number]  CSN: [id number]     Room: [place] [address] Time Dictated: 8:25 PM      Chief Complaint   Chief Complaint   Patient presents with    Seizure       History of Present Illness   40 y.o. female, PMH MS, seizure, migraines, and DM, presents with generalized cramping that has been worsening since Friday. Pt says her MS is \"acting up\"; usually has flare ups when the season change. Dr. Yoly Rainey is neurologist; last appointment was last month. Today she was in the kitchen today she loss her balance and hurt her right hand. Not sure if she had seizure; she is not on seizure medications. She has been taking all her medications as directed w/o missing any doses. Denies CP, SOB, fever, chills, abdominal pain, N/V, back pain, neck pain, weakness, numbness, or any other associated sx. No other complaints or concerns. Review of Systems   Constitutional: No fever, chills, or weight loss  Eyes: No visual symptoms. ENT: No sore throat, runny nose or ear pain. Respiratory: No cough, dyspnea or wheezing. Cardiovascular: No chest pain, pressure, palpitations, tightness or heaviness. Gastrointestinal:no nausea, diarrhea, or abdominal pain  Genitourinary: No dysuria, frequency, or urgency. Musculoskeletal: positive muscle cramps and right hand pain. Integumentary: No rashes. Neurological: +no focal motor/sensory changes  Denies complaints in all other systems.     Past Medical/Surgical History     Past Medical History:   Diagnosis Date    Arthropathy, unspecified, site unspecified     Depression     Diabetes ([facility] [address]     Hypercholesteremia     Hypertension     history of htn    Incontinence of urine     Insomnia     Migraine     MS (multiple sclerosis) (HCC)     Neurogenic bladder     Other ill-defined conditions(799.89)     multiple Sclerosis    Seizures ([facility] [address]     2013    Wears glasses      Past Surgical History:   Procedure Laterality Date    HX CARPAL TUNNEL RELEASE  2008    HX HYSTERECTOMY      HX OTHER SURGICAL         Social History     Social History     Socioeconomic History    Marital status:      Spouse name: Not on file    Number of children: Not on file    Years of education: Not on file    Highest education level: Not on file   Tobacco Use    Smoking status: Never Smoker    Smokeless tobacco: Never Used   Substance and Sexual Activity    Alcohol use: No    Drug use: No       Family History     Family History   Problem Relation Age of Onset    Heart Disease Father     Diabetes Father     Diabetes Sister     Other Other        Home Medications     Prior to Admission Medications   Prescriptions Last Dose Informant Patient Reported? Taking? DULoxetine (CYMBALTA) 60 mg capsule   Yes No   Sig: Take 60 mg by mouth daily. QUEtiapine (SEROQUEL) 300 mg tablet   Yes No   Sig: Take 300 mg by mouth nightly. amLODIPine (NORVASC) 10 mg tablet   Yes No   Sig: Take 10 mg by mouth daily. cyclobenzaprine (FLEXERIL) 10 mg tablet   No No   Sig: Take 1 Tab by mouth three (3) times daily as needed for Muscle Spasm(s). gabapentin (NEURONTIN) 300 mg capsule   Yes No   Sig: Take 300 mg by mouth nightly. 1-2 tabs   glatiramer (COPAXONE) 40 mg/mL injection   Yes No   Si mg by SubCUTAneous route every Monday, Wednesday, Friday. ibuprofen (MOTRIN) 600 mg tablet   No No   Sig: Take 1 Tab by mouth every six (6) hours as needed for Pain.   metFORMIN (GLUCOPHAGE) 500 mg tablet   No No   Sig: Take 1.5 Tabs by mouth two (2) times daily (with meals). ondansetron hcl (ZOFRAN) 4 mg tablet   No No   Sig: Take 2 Tabs by mouth every eight (8) hours as needed for Nausea. promethazine (PHENERGAN) 25 mg tablet   Yes No   Sig: Take 25 mg by mouth every six (6) hours as needed for Nausea.       Facility-Administered Medications: None Allergies     Allergies   Allergen Reactions    Levemir [Insulin Detemir] Hives    Oxycontin [Oxycodone] Hives and Other (comments)     intolerance       Physical Exam     ED Triage Vitals [03/17/19 2001]   ED Encounter Vitals Group      BP (!) 139/91      Pulse (Heart Rate) 96      Resp Rate 18      Temp 97.8 °F (36.6 °C)      Temp src       O2 Sat (%) 100 %      Weight 230 lb      Height 5' 3\"     Constitutional: Patient appears well developed and well nourished. Appearance and behavior are age and situation appropriate. HEENT: Conjunctiva clear. PERRLA. Mucous membranes moist, non-erythematous. Surface of the pharynx, palate, and tongue are pink, moist and without lesions. Neck: supple, non tender, symmetrical, no masses or JVD. Respiratory: lungs clear to auscultation, nonlabored respirations. No tachypnea or accessory muscle use. Cardiovascular: heart regular rate and rhythm without murmur rubs or gallops. Calves soft and non-tender. Distal pulses 2+ and equal bilaterally. No peripheral edema. Gastrointestinal:  Abdomen soft, no abdominal tenderness to palpation . Musculoskeletal: Nail beds pink with prompt capillary refill. Right snuff box tenderness. Integumentary: warm and dry without rashes or lesions  Neurologic: alert and oriented, Sensation intact, motor strength equal and symmetric. No facial asymmetry or dysarthria.     Diagnostic Studies   Lab:   Recent Results (from the past 12 hour(s))   POC CHEM8    Collection Time: 03/17/19  8:51 PM   Result Value Ref Range    CO2, POC 22 19 - 24 MMOL/L    Glucose,  (H) 74 - 106 MG/DL    BUN, POC 8 7 - 18 MG/DL    Creatinine, POC 0.5 (L) 0.6 - 1.3 MG/DL    GFRAA, POC >60 >60 ml/min/1.73m2    GFRNA, POC >60 >60 ml/min/1.73m2    Sodium,  136 - 145 MMOL/L    Potassium, POC 4.0 3.5 - 5.5 MMOL/L    Calcium, ionized (POC) 0.91 (L) 1.12 - 1.32 mmol/L    Chloride,  100 - 108 MMOL/L    Anion gap, POC 17 10 - 20      Hematocrit, POC 34 (L) 36 - 49 %    Hemoglobin, POC 11.6 (L) 12 - 16 G/DL       Imaging:    No results found. Jonathan    ED Course/Medical Decision Making   Patient appears well. Unclear if she had a seizure today. Blood work is unremarkable. She feels like her MS is flaring and says that 3 days of prednisone helps her symptoms. Patient also has right snuffbox tenderness, but no scaphoid fracture on right hand x-ray. Placed in thumb spica splint and she will f/u with Ortho in 1 week for a repeat check of her hand. Patient given strict return precautions and she understands. Splint, Other  Date/Time: 3/17/2019 9:24 PM  Performed by: Vale Ferrara MD  Authorized by: Vale Ferrara MD     Consent:     Consent obtained:  Verbal    Consent given by:  Patient    Risks discussed:  Discoloration, numbness, pain and swelling    Alternatives discussed:  No treatment, delayed treatment, referral and observation  Pre-procedure details:     Sensation:  Normal  Procedure details:     Laterality:  Right    Location:  Wrist    Wrist:  R wrist    Strapping: no      Splint type:  Thumb spica    Supplies:  Ortho-Glass  Post-procedure details:     Pain:  Improved    Sensation:  Normal    Skin color:  Pink    Patient tolerance of procedure: Tolerated well, no immediate complications        Medications - No data to display    Final Diagnosis       ICD-10-CM ICD-9-CM   1. MS (multiple sclerosis) ([facility] [address] G35 340   2. Seizure ([facility] [address] R56.9 780.39   3. Right hand pain M79.641 729.5       Disposition   Patient is discharged home in stable condition. Advised to follow with their primary care physician. Patient advised to return to the ER for any new or worsening symptoms. My Medications      CONTINUE taking these medications      Instructions Each Dose to Equal Morning Noon Evening Bedtime   amLODIPine 10 mg tablet  Commonly known as:  NORVASC    Your last dose was: Your next dose is: Take 10 mg by mouth daily.    10 mg COPAXONE 40 mg/mL injection  Generic drug:  glatiramer    Your last dose was: Your next dose is:          40 mg by SubCUTAneous route every Monday, Wednesday, Friday. 40 mg                 cyclobenzaprine 10 mg tablet  Commonly known as:  FLEXERIL    Your last dose was: Your next dose is: Take 1 Tab by mouth three (3) times daily as needed for Muscle Spasm(s). 10 mg                 CYMBALTA 60 mg capsule  Generic drug:  DULoxetine    Your last dose was: Your next dose is: Take 60 mg by mouth daily. 60 mg                 gabapentin 300 mg capsule  Commonly known as:  NEURONTIN    Your last dose was: Your next dose is: Take 300 mg by mouth nightly. 1-2 tabs   300 mg                 ibuprofen 600 mg tablet  Commonly known as:  MOTRIN    Your last dose was: Your next dose is: Take 1 Tab by mouth every six (6) hours as needed for Pain. 600 mg                 metFORMIN 500 mg tablet  Commonly known as:  GLUCOPHAGE    Your last dose was: Your next dose is: Take 1.5 Tabs by mouth two (2) times daily (with meals). 850 mg                 ondansetron hcl 4 mg tablet  Commonly known as:  ZOFRAN    Your last dose was: Your next dose is: Take 2 Tabs by mouth every eight (8) hours as needed for Nausea. 8 mg                 promethazine 25 mg tablet  Commonly known as:  PHENERGAN    Your last dose was: Your next dose is: Take 25 mg by mouth every six (6) hours as needed for Nausea. 25 mg                 SEROquel 300 mg tablet  Generic drug:  QUEtiapine    Your last dose was: Your next dose is: Take 300 mg by mouth nightly. 300 mg                          Harleen De La Cruz MD  March 17, 2019    My signature above authenticates this document and my orders, the final    diagnosis (es), discharge prescription (s), and instructions in the Epic    record.   If you have any questions please contact [phone].     Nursing notes have been reviewed by the physician/ advanced practice    Clinician.

## 2019-04-30 ENCOUNTER — HOSPITAL ENCOUNTER (EMERGENCY)
Age: 44
Discharge: HOME | End: 2019-04-30
Attending: EMERGENCY MEDICINE
Payer: MEDICARE

## 2019-04-30 ENCOUNTER — APPOINTMENT (OUTPATIENT)
Dept: MRI IMAGING | Age: 44
End: 2019-04-30
Attending: EMERGENCY MEDICINE
Payer: MEDICARE

## 2019-04-30 ENCOUNTER — APPOINTMENT (OUTPATIENT)
Dept: CT IMAGING | Age: 44
End: 2019-04-30
Attending: EMERGENCY MEDICINE
Payer: MEDICARE

## 2019-04-30 VITALS
RESPIRATION RATE: 15 BRPM | HEIGHT: 63 IN | OXYGEN SATURATION: 96 % | HEART RATE: 86 BPM | SYSTOLIC BLOOD PRESSURE: 128 MMHG | TEMPERATURE: 98 F | BODY MASS INDEX: 41.64 KG/M2 | WEIGHT: 235 LBS | DIASTOLIC BLOOD PRESSURE: 68 MMHG

## 2019-04-30 DIAGNOSIS — G35 MS (MULTIPLE SCLEROSIS) (HCC): ICD-10-CM

## 2019-04-30 DIAGNOSIS — R51.9 INTRACTABLE HEADACHE, UNSPECIFIED CHRONICITY PATTERN, UNSPECIFIED HEADACHE TYPE: ICD-10-CM

## 2019-04-30 DIAGNOSIS — R42 VERTIGO: ICD-10-CM

## 2019-04-30 DIAGNOSIS — R29.898 RIGHT ARM WEAKNESS: Primary | ICD-10-CM

## 2019-04-30 DIAGNOSIS — R29.898 RIGHT LEG WEAKNESS: ICD-10-CM

## 2019-04-30 LAB
ANION GAP SERPL CALC-SCNC: 9 MMOL/L (ref 3–18)
BASOPHILS # BLD: 0 K/UL (ref 0–0.1)
BASOPHILS NFR BLD: 0 % (ref 0–2)
BUN SERPL-MCNC: 8 MG/DL (ref 7–18)
BUN/CREAT SERPL: 11 (ref 12–20)
CALCIUM SERPL-MCNC: 8.7 MG/DL (ref 8.5–10.1)
CHLORIDE SERPL-SCNC: 103 MMOL/L (ref 100–108)
CK MB CFR SERPL CALC: NORMAL % (ref 0–4)
CK MB SERPL-MCNC: <1 NG/ML (ref 5–25)
CK SERPL-CCNC: 107 U/L (ref 26–192)
CO2 SERPL-SCNC: 23 MMOL/L (ref 21–32)
CREAT SERPL-MCNC: 0.73 MG/DL (ref 0.6–1.3)
DIFFERENTIAL METHOD BLD: ABNORMAL
EOSINOPHIL # BLD: 0.1 K/UL (ref 0–0.4)
EOSINOPHIL NFR BLD: 1 % (ref 0–5)
ERYTHROCYTE [DISTWIDTH] IN BLOOD BY AUTOMATED COUNT: 13.4 % (ref 11.6–14.5)
GLUCOSE SERPL-MCNC: 198 MG/DL (ref 74–99)
HCT VFR BLD AUTO: 37.3 % (ref 35–45)
HGB BLD-MCNC: 12.8 G/DL (ref 12–16)
LYMPHOCYTES # BLD: 3.6 K/UL (ref 0.9–3.6)
LYMPHOCYTES NFR BLD: 45 % (ref 21–52)
MCH RBC QN AUTO: 28 PG (ref 24–34)
MCHC RBC AUTO-ENTMCNC: 34.3 G/DL (ref 31–37)
MCV RBC AUTO: 81.6 FL (ref 74–97)
MONOCYTES # BLD: 0.5 K/UL (ref 0.05–1.2)
MONOCYTES NFR BLD: 7 % (ref 3–10)
NEUTS SEG # BLD: 3.7 K/UL (ref 1.8–8)
NEUTS SEG NFR BLD: 47 % (ref 40–73)
PLATELET # BLD AUTO: 495 K/UL (ref 135–420)
PMV BLD AUTO: 9.8 FL (ref 9.2–11.8)
POTASSIUM SERPL-SCNC: 4 MMOL/L (ref 3.5–5.5)
RBC # BLD AUTO: 4.57 M/UL (ref 4.2–5.3)
SODIUM SERPL-SCNC: 135 MMOL/L (ref 136–145)
TROPONIN I SERPL-MCNC: <0.02 NG/ML (ref 0–0.04)
WBC # BLD AUTO: 7.9 K/UL (ref 4.6–13.2)

## 2019-04-30 PROCEDURE — 70450 CT HEAD/BRAIN W/O DYE: CPT

## 2019-04-30 PROCEDURE — 96375 TX/PRO/DX INJ NEW DRUG ADDON: CPT

## 2019-04-30 PROCEDURE — 85025 COMPLETE CBC W/AUTO DIFF WBC: CPT

## 2019-04-30 PROCEDURE — 99285 EMERGENCY DEPT VISIT HI MDM: CPT

## 2019-04-30 PROCEDURE — 74011250636 HC RX REV CODE- 250/636: Performed by: EMERGENCY MEDICINE

## 2019-04-30 PROCEDURE — 80048 BASIC METABOLIC PNL TOTAL CA: CPT

## 2019-04-30 PROCEDURE — 70551 MRI BRAIN STEM W/O DYE: CPT

## 2019-04-30 PROCEDURE — 96374 THER/PROPH/DIAG INJ IV PUSH: CPT

## 2019-04-30 PROCEDURE — 93005 ELECTROCARDIOGRAM TRACING: CPT

## 2019-04-30 PROCEDURE — 82550 ASSAY OF CK (CPK): CPT

## 2019-04-30 PROCEDURE — 74011636637 HC RX REV CODE- 636/637: Performed by: EMERGENCY MEDICINE

## 2019-04-30 PROCEDURE — A9270 NON-COVERED ITEM OR SERVICE: HCPCS | Performed by: EMERGENCY MEDICINE

## 2019-04-30 RX ORDER — DIPHENHYDRAMINE HYDROCHLORIDE 50 MG/ML
25 INJECTION, SOLUTION INTRAMUSCULAR; INTRAVENOUS
Status: COMPLETED | OUTPATIENT
Start: 2019-04-30 | End: 2019-04-30

## 2019-04-30 RX ORDER — METHYLPREDNISOLONE 4 MG/1
TABLET ORAL
Qty: 1 DOSE PACK | Refills: 0 | Status: SHIPPED | OUTPATIENT
Start: 2019-04-30 | End: 2019-08-02

## 2019-04-30 RX ORDER — PREDNISONE 20 MG/1
60 TABLET ORAL
Status: COMPLETED | OUTPATIENT
Start: 2019-04-30 | End: 2019-04-30

## 2019-04-30 RX ORDER — PROCHLORPERAZINE EDISYLATE 5 MG/ML
5 INJECTION INTRAMUSCULAR; INTRAVENOUS
Status: COMPLETED | OUTPATIENT
Start: 2019-04-30 | End: 2019-04-30

## 2019-04-30 RX ORDER — PROCHLORPERAZINE MALEATE 5 MG
5 TABLET ORAL
Qty: 12 TAB | Refills: 0 | Status: SHIPPED | OUTPATIENT
Start: 2019-04-30 | End: 2019-05-07

## 2019-04-30 RX ORDER — MECLIZINE HYDROCHLORIDE 25 MG/1
25 TABLET ORAL
Qty: 20 TAB | Refills: 0 | Status: SHIPPED | OUTPATIENT
Start: 2019-04-30 | End: 2019-05-05

## 2019-04-30 RX ORDER — METHYLPREDNISOLONE 4 MG/1
TABLET ORAL
Qty: 1 DOSE PACK | Refills: 0 | Status: SHIPPED | OUTPATIENT
Start: 2019-04-30 | End: 2019-04-30

## 2019-04-30 RX ORDER — KETOROLAC TROMETHAMINE 30 MG/ML
15 INJECTION, SOLUTION INTRAMUSCULAR; INTRAVENOUS
Status: COMPLETED | OUTPATIENT
Start: 2019-04-30 | End: 2019-04-30

## 2019-04-30 RX ORDER — MECLIZINE HCL 12.5 MG 12.5 MG/1
50 TABLET ORAL
Status: COMPLETED | OUTPATIENT
Start: 2019-04-30 | End: 2019-04-30

## 2019-04-30 RX ADMIN — DIPHENHYDRAMINE HYDROCHLORIDE 25 MG: 50 INJECTION, SOLUTION INTRAMUSCULAR; INTRAVENOUS at 17:58

## 2019-04-30 RX ADMIN — PREDNISONE 60 MG: 20 TABLET ORAL at 17:54

## 2019-04-30 RX ADMIN — PROCHLORPERAZINE EDISYLATE 5 MG: 5 INJECTION INTRAMUSCULAR; INTRAVENOUS at 17:57

## 2019-04-30 RX ADMIN — KETOROLAC TROMETHAMINE 15 MG: 30 INJECTION, SOLUTION INTRAMUSCULAR at 17:55

## 2019-04-30 RX ADMIN — MECLIZINE 50 MG: 12.5 TABLET ORAL at 17:54

## 2019-04-30 NOTE — ED PROVIDER NOTES
EMERGENCY DEPARTMENT HISTORY AND PHYSICAL EXAM 
 
 
Date: 4/30/2019 Patient Name: Maurice Reese History of Presenting Illness Chief Complaint Patient presents with  Extremity Weakness History Provided By: Patient HPI/Chief Complaint: (Context):who presents with headache diffuse constant 2 days no trauma no radiation history of headaches in the past but this is significantly worse per patient Patient with right arm and leg weakness that started waking up this morning. Patient states she has continuous vertiginous symptoms not intermittent not positional nothing makes it better or worse. Patient denies any chest pain shortness of breath abdominal pain Patient does hold a history of multiple sclerosis and she does state that arm and leg weakness has occurred before but her vertiginous constant symptoms have never occurred in the past.  Patient states her last symptoms similar arm and leg weakness were 6 months ago She rarely gets headaches now she used to get migraines Patient also states she has not used steroids in 12 months Patient also states she is with a neurologist named Brittanie Cummins. Patient is a history of diabetes and hypertension Patient's triage note is reviewed Patient does have normal vitals with normal blood pressure on repeat Headache dizziness right-sided weakness is appreciated Patient's allergies are appreciated Patient's medication include Norvasc Flexeril Neurontin Motrin Glucophage Seroquel are appreciated Medical problems including migraine depression PCP: Juan Damon MD 
 
Current Outpatient Medications Medication Sig Dispense Refill  meclizine (ANTIVERT) 25 mg tablet Take 1 Tab by mouth three (3) times daily as needed for Dizziness for up to 5 days. 20 Tab 0  prochlorperazine (COMPAZINE) 5 mg tablet Take 1 Tab by mouth every eight (8) hours as needed for Nausea for up to 7 days.  12 Tab 0  
  methylPREDNISolone (MEDROL, BOGDAN,) 4 mg tablet Use as prescribed by the manufacture 1 Dose Pack 0  ibuprofen (MOTRIN) 600 mg tablet Take 1 Tab by mouth every six (6) hours as needed for Pain. 20 Tab 0  cyclobenzaprine (FLEXERIL) 10 mg tablet Take 1 Tab by mouth three (3) times daily as needed for Muscle Spasm(s). 12 Tab 0  
 gabapentin (NEURONTIN) 300 mg capsule Take 300 mg by mouth nightly. 1-2 tabs  amLODIPine (NORVASC) 10 mg tablet Take 10 mg by mouth daily.  metFORMIN (GLUCOPHAGE) 500 mg tablet Take 1.5 Tabs by mouth two (2) times daily (with meals). 60 Tab 0  
 DULoxetine (CYMBALTA) 60 mg capsule Take 60 mg by mouth daily.  QUEtiapine (SEROQUEL) 300 mg tablet Take 300 mg by mouth nightly.  glatiramer (COPAXONE) 40 mg/mL injection 40 mg by SubCUTAneous route every Monday, Wednesday, Friday. Past History Past Medical History: 
Past Medical History:  
Diagnosis Date  Arthropathy, unspecified, site unspecified  Depression  Diabetes ([address]  Hypercholesteremia  Hypertension   
 history of htn  Incontinence of urine  Insomnia  Migraine  MS (multiple sclerosis) ([address]  Neurogenic bladder  Other ill-defined conditions(799.89)   
 multiple Sclerosis  Seizures ([facility] [address]   
 6/2013  Wears glasses Past Surgical History: 
Past Surgical History:  
Procedure Laterality Date  HX CARPAL TUNNEL RELEASE  11/2008  HX HYSTERECTOMY  HX OTHER SURGICAL Family History: 
Family History Problem Relation Age of Onset  Heart Disease Father  Diabetes Father  Diabetes Sister  Other Other Social History: 
Social History Tobacco Use  Smoking status: Never Smoker  Smokeless tobacco: Never Used Substance Use Topics  Alcohol use: No  
 Drug use: No  
 
 
Allergies: Allergies Allergen Reactions  Levemir [Insulin Detemir] Hives  Oxycontin [Oxycodone] Hives and Other (comments)  
  intolerance Review of Systems Review of Systems Constitutional: Negative for activity change, fatigue and fever. HENT: Negative for congestion and rhinorrhea. Eyes: Negative for visual disturbance. Respiratory: Negative for shortness of breath. Cardiovascular: Negative for chest pain and palpitations. Gastrointestinal: Negative for abdominal pain, diarrhea, nausea and vomiting. Genitourinary: Negative for difficulty urinating, dysuria, flank pain and hematuria. Musculoskeletal: Positive for arthralgias and myalgias. Negative for back pain. Skin: Negative for rash. Neurological: Positive for dizziness, weakness, light-headedness and headaches. Psychiatric/Behavioral: Negative for agitation. All other systems reviewed and are negative. Physical Exam  
 
Physical Exam  
Constitutional: She is oriented to person, place, and time. She appears well-developed and well-nourished. No distress. HENT:  
Head: Normocephalic and atraumatic. Right Ear: External ear normal.  
Left Ear: External ear normal.  
Nose: Nose normal.  
Mouth/Throat: Oropharynx is clear and moist.  
Eyes: Pupils are equal, round, and reactive to light. Conjunctivae and EOM are normal. No scleral icterus. Neck: Normal range of motion. Neck supple. No JVD present. No tracheal deviation present. No thyromegaly present. Cardiovascular: Normal rate and regular rhythm. Exam reveals no friction rub. No murmur heard. Pulmonary/Chest: Effort normal and breath sounds normal. No stridor. She exhibits no tenderness. Abdominal: Soft. Bowel sounds are normal. She exhibits no distension. There is no tenderness. There is no rebound and no guarding. Musculoskeletal: Normal range of motion. She exhibits no edema or tenderness. Lymphadenopathy:  
  She has no cervical adenopathy. Neurological: She is alert and oriented to person, place, and time. She displays abnormal reflex. She displays no atrophy and no tremor.  No cranial nerve deficit or sensory deficit. She displays no seizure activity. Coordination normal. GCS eye subscore is 4. GCS verbal subscore is 5. GCS motor subscore is 6. Patient with right arm weakness and right leg weakness against gravity. No sensory deficit appreciated No facial droop appreciated patient has no nystagmus. Skin: Skin is warm and dry. Psychiatric: She has a normal mood and affect. Her behavior is normal. Judgment and thought content normal.  
Nursing note and vitals reviewed. Medical Decision Making I am the first provider for this patient. I reviewed the vital signs, available nursing notes, past medical history, past surgical history, family history and social history. Provider Notes (Medical Decision Making): Patient presents emergency department headache acute on chronic Effuse headache Patient also has arm and leg weakness the patient still comes intermittently with her MS Patient also has constant vertiginous symptoms that she states started this morning. Wake-up symptoms Patient is no exertional chest pain or shortness of breath I will check patient CT brain, get neurology consult, start meclizine and Reglan for this patient's care I will defer to neurology to make sure if steroids need to be started I will do so. Vital Signs-Reviewed the patient's vital signs. Pulse Oximetry Analysis - 100% on room air, normal 
Cardiac Monitor: 
Rate/Rhythm:98, 
 
EKG: 
Interpreted by the EP. Time of the EKG is 1724 
93, normal 
Is poor R wave progression There is poor ST-T flattening as well appreciated in the anterior wall. This EKG is compared with September 23, 2018 and the poor R wave progression is appreciated on that EKG as well. Vitals:  
 04/30/19 1755 04/30/19 1800 04/30/19 1815 04/30/19 1830 BP: (!) 131/91 143/86 107/67 122/70 Pulse: 94 94 91 86 Resp: 17 14 14 15 Temp:      
SpO2: 98% 97% 96% 96% Weight:      
Height: Records Reviewed: Nursing Notes Consult Note: 
4:24 PM 
I spoke with Dr. Elo Brito, tele-neurology. He interviewed and evaluated patient and stated that patient needs an MRI if negative she can be discharged home this is no multiple sclerosis and he is not concerned about stroke if patient does not have anything on the MRI and to do symptomatic treatment relief subsequently. I have ordered the MRI at this point. Discussed pt's hx, disposition, and available diagnostic and imaging results. Reviewed care plans. Consultant agrees with plans as outlined. 6:52 PM 
Patient reevaluated wanted some additional for a headache that she has had them in the past.  At this point I did columba request the patient start steroid this patient has been having significant headache and I have not been able to give her good control on the patient agrees with the plan she agrees to follow-up as well. Diagnostic Study Results Labs - Recent Results (from the past 12 hour(s)) METABOLIC PANEL, BASIC Collection Time: 04/30/19  3:40 PM  
Result Value Ref Range Sodium 135 (L) 136 - 145 mmol/L Potassium 4.0 3.5 - 5.5 mmol/L Chloride 103 100 - 108 mmol/L  
 CO2 23 21 - 32 mmol/L Anion gap 9 3.0 - 18 mmol/L Glucose 198 (H) 74 - 99 mg/dL BUN 8 7.0 - 18 MG/DL Creatinine 0.73 0.6 - 1.3 MG/DL  
 BUN/Creatinine ratio 11 (L) 12 - 20 GFR est AA >60 >60 ml/min/1.73m2 GFR est non-AA >60 >60 ml/min/1.73m2 Calcium 8.7 8.5 - 10.1 MG/DL  
CBC WITH AUTOMATED DIFF Collection Time: 04/30/19  3:40 PM  
Result Value Ref Range WBC 7.9 4.6 - 13.2 K/uL  
 RBC 4.57 4.20 - 5.30 M/uL  
 HGB 12.8 12.0 - 16.0 g/dL HCT 37.3 35.0 - 45.0 % MCV 81.6 74.0 - 97.0 FL  
 MCH 28.0 24.0 - 34.0 PG  
 MCHC 34.3 31.0 - 37.0 g/dL  
 RDW 13.4 11.6 - 14.5 % PLATELET 680 (H) 244 - 420 K/uL MPV 9.8 9.2 - 11.8 FL  
 NEUTROPHILS 47 40 - 73 % LYMPHOCYTES 45 21 - 52 % MONOCYTES 7 3 - 10 % EOSINOPHILS 1 0 - 5 % BASOPHILS 0 0 - 2 %  
 ABS. NEUTROPHILS 3.7 1.8 - 8.0 K/UL  
 ABS. LYMPHOCYTES 3.6 0.9 - 3.6 K/UL  
 ABS. MONOCYTES 0.5 0.05 - 1.2 K/UL  
 ABS. EOSINOPHILS 0.1 0.0 - 0.4 K/UL  
 ABS. BASOPHILS 0.0 0.0 - 0.1 K/UL  
 DF AUTOMATED CARDIAC PANEL,(CK, CKMB & TROPONIN) Collection Time: 04/30/19  3:40 PM  
Result Value Ref Range  26 - 192 U/L  
 CK - MB <1.0 <3.6 ng/ml CK-MB Index  0.0 - 4.0 % CALCULATION NOT PERFORMED WHEN RESULT IS BELOW LINEAR LIMIT Troponin-I, QT <0.02 0.0 - 0.045 NG/ML  
EKG, 12 LEAD, INITIAL Collection Time: 04/30/19  5:24 PM  
Result Value Ref Range Ventricular Rate 93 BPM  
 Atrial Rate 93 BPM  
 P-R Interval 164 ms QRS Duration 74 ms Q-T Interval 344 ms QTC Calculation (Bezet) 427 ms Calculated P Axis 46 degrees Calculated R Axis 14 degrees Calculated T Axis 28 degrees Diagnosis Normal sinus rhythm Septal infarct (cited on or before 23-SEP-2018) Abnormal ECG When compared with ECG of 23-SEP-2018 20:49, No significant change was found Radiologic Studies -  
MRI BRAIN WO CONT Final Result IMPRESSION:  
  
  
1. Moderate burden of white matter lesions with a nonspecific pattern and  
distribution. Given the patient's history, these could represent the sequela of  
chronic small vessel changes and/or previous demyelination. No definite new  
areas of involvement are identified. 2.  No acute intracranial abnormalities are identified. CT HEAD WO CONT Final Result IMPRESSION:  
  
1. No CT evidence of an acute intracranial abnormality - in particular, no  
acute cortical infarct, hemorrhage, or mass effect. 2. Unchanged, moderate nonspecific white matter disease, likely representing  
patient's known multiple sclerosis. As this examination was ordered as a Code S, the findings were discussed with  
the ordering physician, Dr. Noreen Cabot, at 4:38 PM on 4/30/2019. CT Results  (Last 48 hours) 04/30/19 1631  CT HEAD WO CONT Final result Impression:  IMPRESSION:  
   
1. No CT evidence of an acute intracranial abnormality - in particular, no  
acute cortical infarct, hemorrhage, or mass effect. 2. Unchanged, moderate nonspecific white matter disease, likely representing  
patient's known multiple sclerosis. As this examination was ordered as a Code S, the findings were discussed with  
the ordering physician, Dr. Rose Mary Osorio, at 4:38 PM on 4/30/2019. Narrative:  EXAM: CT HEAD, WITHOUT IV CONTRAST  
   
COMPARISON: Head CT 12/27/2017; brain MRI 3/24/2018 INDICATION: Persistent headache and dizziness for the past 2 days with new onset  
right-sided weakness, tingling, and numbness since this morning with right-sided  
blurry vision TECHNIQUE: Multiple axial CT images of the head were obtained extending from the  
skull base through the vertex. Additional coronal and sagittal reformations were  
also performed. One or more dose reduction techniques were used on this CT:  
automated exposure control, adjustment of the mAs and/or kVp according to  
patient size, and iterative reconstruction techniques. The specific techniques  
used on this CT exam have been documented in the patient's electronic medical  
record. Digital Imaging and Communications in Medicine (DICOM) format image  
data are available to nonaffiliated external healthcare facilities or entities  
on a secure, media free, reciprocally searchable basis with patient authorization for at least a 12-month period after this study. _______________ FINDINGS:  
   
BRAIN AND POSTERIOR FOSSA: The sulci, folia, ventricles and basal cisterns are  
within normal limits for the patient's age. Moderate hypoattenuation identified  
along the periventricular and subcortical white matter, a nonspecific finding. There is no intracranial hemorrhage, mass effect, or midline shift. No new or acute appearing areas of abnormal parenchymal attenuation. EXTRA-AXIAL SPACES AND MENINGES: There are no abnormal extra-axial fluid  
collections. CALVARIUM: No acute osseous abnormality. OTHER: The visualized portions of the paranasal sinuses and mastoid air cells  
are clear.  
   
_______________ CXR Results  (Last 48 hours) None Discharge Clinical Impression: 1. Right arm weakness 2. Right leg weakness 3. Vertigo 4. Intractable headache, unspecified chronicity pattern, unspecified headache type 5. MS (multiple sclerosis) ([facility] [address] Disposition: 
Home It should be noted that I will be the provider of record for this patient Cristy Muniz MD, Ricky Lilly Follow-up Information Follow up With Specialties Details Why Contact Info Carrie Beck MD Family Practice Call in 1 day Follow Up From Emergency Department [facility] [address] [facility] [id number] [phone] Bayron Waters NP Psychiatry, Neurology Call in 1 day Follow Up From Emergency Department [address] [facility] [address] [facility] [address] 
[phone] [facility] EMERGENCY DEPT Emergency Medicine  If symptoms worsen [address] 
[phone] Current Discharge Medication List  
  
START taking these medications Details  
meclizine (ANTIVERT) 25 mg tablet Take 1 Tab by mouth three (3) times daily as needed for Dizziness for up to 5 days. Qty: 20 Tab, Refills: 0  
  
prochlorperazine (COMPAZINE) 5 mg tablet Take 1 Tab by mouth every eight (8) hours as needed for Nausea for up to 7 days. Qty: 12 Tab, Refills: 0  
  
methylPREDNISolone (MEDROL, BOGDAN,) 4 mg tablet Use as prescribed by the manufacture Qty: 1 Dose Pack, Refills: 0

## 2019-04-30 NOTE — ED TRIAGE NOTES
Patient having HA and dizziness for 2 days. Right sided weakness, tingling and numbness starting this morning at 11am. Blurred vision in right eye.

## 2019-04-30 NOTE — DISCHARGE INSTRUCTIONS
Patient Education        Dizziness: Care Instructions  Your Care Instructions  Dizziness is the feeling of unsteadiness or fuzziness in your head. It is different than having vertigo, which is a feeling that the room is spinning or that you are moving or falling. It is also different from lightheadedness, which is the feeling that you are about to faint. It can be hard to know what causes dizziness. Some people feel dizzy when they have migraine headaches. Sometimes bouts of flu can make you feel dizzy. Some medical conditions, such as heart problems or high blood pressure, can make you feel dizzy. Many medicines can cause dizziness, including medicines for high blood pressure, pain, or anxiety. If a medicine causes your symptoms, your doctor may recommend that you stop or change the medicine. If it is a problem with your heart, you may need medicine to help your heart work better. If there is no clear reason for your symptoms, your doctor may suggest watching and waiting for a while to see if the dizziness goes away on its own. Follow-up care is a key part of your treatment and safety. Be sure to make and go to all appointments, and call your doctor if you are having problems. It's also a good idea to know your test results and keep a list of the medicines you take. How can you care for yourself at home? · If your doctor recommends or prescribes medicine, take it exactly as directed. Call your doctor if you think you are having a problem with your medicine. · Do not drive while you feel dizzy. · Try to prevent falls. Steps you can take include:  ? Using nonskid mats, adding grab bars near the tub, and using night-lights. ? Clearing your home so that walkways are free of anything you might trip on.  ? Letting family and friends know that you have been feeling dizzy. This will help them know how to help you. When should you call for help? Call 911 anytime you think you may need emergency care.  For example, call if:    · You passed out (lost consciousness).     · You have dizziness along with symptoms of a heart attack. These may include:  ? Chest pain or pressure, or a strange feeling in the chest.  ? Sweating. ? Shortness of breath. ? Nausea or vomiting. ? Pain, pressure, or a strange feeling in the back, neck, jaw, or upper belly or in one or both shoulders or arms. ? Lightheadedness or sudden weakness. ? A fast or irregular heartbeat.     · You have symptoms of a stroke. These may include:  ? Sudden numbness, tingling, weakness, or loss of movement in your face, arm, or leg, especially on only one side of your body. ? Sudden vision changes. ? Sudden trouble speaking. ? Sudden confusion or trouble understanding simple statements. ? Sudden problems with walking or balance. ? A sudden, severe headache that is different from past headaches.    Call your doctor now or seek immediate medical care if:    · You feel dizzy and have a fever, headache, or ringing in your ears.     · You have new or increased nausea and vomiting.     · Your dizziness does not go away or comes back.    Watch closely for changes in your health, and be sure to contact your doctor if:    · You do not get better as expected. Where can you learn more? Go to http://annel-sadie.info/. Enter Z361 in the search box to learn more about \"Dizziness: Care Instructions. \"  Current as of: September 23, 2018  Content Version: 11.9  © 9416-5069 [employer]. Care instructions adapted under license by [employer] (which disclaims liability or warranty for this information). If you have questions about a medical condition or this instruction, always ask your healthcare professional. [place] [address] any warranty or liability for your use of this information.          Patient Education        Head or Face Pain: Care Instructions  Your Care Instructions    Common causes of head or face pain are allergies, stress, and injuries. Other causes include tooth problems and sinus infections. Eating certain foods, such as chocolate or cheese, or drinking certain liquids, such as coffee or cola, can cause head pain for some people. If you have mild head pain, you may not need treatment. It is important to watch your symptoms and talk to your doctor if your pain continues or gets worse. Follow-up care is a key part of your treatment and safety. Be sure to make and go to all appointments, and call your doctor if you are having problems. It's also a good idea to know your test results and keep a list of the medicines you take. How can you care for yourself at home? · Take pain medicines exactly as directed. ? If the doctor gave you a prescription medicine for pain, take it as prescribed. ? If you are not taking a prescription pain medicine, ask your doctor if you can take an over-the-counter pain medicine. · Take it easy for the next few days or longer if you are not feeling well. · Use a warm, moist towel or heating pad set on low to relax tight muscles in your shoulder and neck. Have someone gently massage your neck and shoulders. · Put ice or a cold pack on the area for 10 to 20 minutes at a time. Put a thin cloth between the ice and your skin. When should you call for help? Call 911 anytime you think you may need emergency care. For example, call if:    · You have twitching, jerking, or a seizure.     · You passed out (lost consciousness).     · You have symptoms of a stroke. These may include:  ? Sudden numbness, tingling, weakness, or loss of movement in your face, arm, or leg, especially on only one side of your body. ? Sudden vision changes. ? Sudden trouble speaking. ? Sudden confusion or trouble understanding simple statements. ? Sudden problems with walking or balance.   ? A sudden, severe headache that is different from past headaches.     · You have jaw pain and pain in your chest, shoulder, neck, or arm.    Call your doctor now or seek immediate medical care if:    · You have a fever with a stiff neck or a severe headache.     · You have nausea and vomiting, or you cannot keep food or liquids down.    Watch closely for changes in your health, and be sure to contact your doctor if:    · Your head or face pain does not get better as expected. Where can you learn more? Go to http://annel-sadie.info/. Enter P568 in the search box to learn more about \"Head or Face Pain: Care Instructions. \"  Current as of: September 23, 2018  Content Version: 11.9  © 5261-4088 [employer]. Care instructions adapted under license by [employer] (which disclaims liability or warranty for this information). If you have questions about a medical condition or this instruction, always ask your healthcare professional. [place] [address] any warranty or liability for your use of this information. Patient Education        Lightheadedness or Faintness: Care Instructions  Your Care Instructions  Lightheadedness is a feeling that you are about to faint or \"pass out. \" You do not feel as if you or your surroundings are moving. It is different from vertigo, which is the feeling that you or things around you are spinning or tilting. Lightheadedness usually goes away or gets better when you lie down. If lightheadedness gets worse, it can lead to a fainting spell. It is common to feel lightheaded from time to time. Lightheadedness usually is not caused by a serious problem. It often is caused by a short-lasting drop in blood pressure and blood flow to your head that occurs when you get up too quickly from a seated or lying position. Follow-up care is a key part of your treatment and safety. Be sure to make and go to all appointments, and call your doctor if you are having problems.  It's also a good idea to know your test results and keep a list of the medicines you take. How can you care for yourself at home? · Lie down for 1 or 2 minutes when you feel lightheaded. After lying down, sit up slowly and remain sitting for 1 to 2 minutes before slowly standing up. · Avoid movements, positions, or activities that have made you lightheaded in the past.  · Get plenty of rest, especially if you have a cold or flu, which can cause lightheadedness. · Make sure you drink plenty of fluids, especially if you have a fever or have been sweating. · Do not drive or put yourself and others in danger while you feel lightheaded. When should you call for help? Call 911 anytime you think you may need emergency care. For example, call if:    · You have symptoms of a stroke. These may include:  ? Sudden numbness, tingling, weakness, or loss of movement in your face, arm, or leg, especially on only one side of your body. ? Sudden vision changes. ? Sudden trouble speaking. ? Sudden confusion or trouble understanding simple statements. ? Sudden problems with walking or balance. ? A sudden, severe headache that is different from past headaches.     · You have symptoms of a heart attack. These may include:  ? Chest pain or pressure, or a strange feeling in the chest.  ? Sweating. ? Shortness of breath. ? Nausea or vomiting. ? Pain, pressure, or a strange feeling in the back, neck, jaw, or upper belly or in one or both shoulders or arms. ? Lightheadedness or sudden weakness. ? A fast or irregular heartbeat. After you call 911, the  may tell you to chew 1 adult-strength or 2 to 4 low-dose aspirin. Wait for an ambulance. Do not try to drive yourself.    Watch closely for changes in your health, and be sure to contact your doctor if:    · Your lightheadedness gets worse or does not get better with home care. Where can you learn more? Go to http://annel-sadie.info/.   Enter R456 in the search box to learn more about \"Lightheadedness or Faintness: Care Instructions. \"  Current as of: September 23, 2018  Content Version: 11.9  © 0965-1822 [employer]. Care instructions adapted under license by [employer] (which disclaims liability or warranty for this information). If you have questions about a medical condition or this instruction, always ask your healthcare professional. [address] any warranty or liability for your use of this information. Patient Education        Multiple Sclerosis (MS): Care Instructions  Your Care Instructions  Multiple sclerosis, also called MS, is a disease that can affect the brain, spinal cord, and nerves to the eyes. MS can cause problems with muscle control and strength, vision, balance, feeling, and thinking. Whatever your symptoms are, taking medicine correctly and following your doctor's advice for home care can help you maintain your quality of life. Follow-up care is a key part of your treatment and safety. Be sure to make and go to all appointments, and call your doctor if you are having problems. It's also a good idea to know your test results and keep a list of the medicines you take. How can you care for yourself at home? General care  · Take your medicines exactly as prescribed. Call your doctor if you think you are having a problem with your medicine. · Use a cane, walker, or scooter if your doctor suggests it. · Keep doing your normal activities as much as you can. · If you have problems urinating, press or tap your bladder area to help start urine flow. If you have trouble controlling your urine, plan your fluid intake and activities so that a toilet will be available when you need it. · Spend time with family and friends. Join a support group for people with MS if you want extra help. · Depression is common with this condition. Tell your doctor if you have trouble sleeping, are eating too much or are not hungry, or feel sad or tearful all the time. Depression can be treated with medicine and counseling. Diet and exercise  · Eat a balanced diet. · If you have problems swallowing, change how and what you eat:  ? Try thick drinks, such as milk shakes. They are easier to swallow than other fluids. ? Do not eat foods that crumble easily. These can cause choking. ? Use a  to prepare food. Soft foods need less chewing. ? Eat small meals often so that you do not get tired from eating larger meals. · Get exercise on most days. Work with your doctor to set up a program of walking, swimming, or other exercise that you are able to do. A physical therapist can teach you exercises if you cannot walk but can move your limbs and trunk. Or you can do exercises to help with coordination and balance. You can help improve muscle stiffness by doing exercises while lying in certain positions. When should you call for help? Call your doctor now or seek immediate medical care if:    · You have a change in symptoms.     · You fall or have another injury.     · You have symptoms of a urinary infection. For example:  ? You have blood or pus in your urine. ? You have pain in your back just below your rib cage. This is called flank pain. ? You have a fever, chills, or body aches. ? It hurts to urinate. ? You have groin or belly pain.    Watch closely for changes in your health, and be sure to contact your doctor if:    · You want more information about MS or medicines.     · You have questions about alternative treatments. Do not use any other treatments without talking to your doctor first.   Where can you learn more? Go to http://annel-sadie.info/. Enter L030 in the search box to learn more about \"Multiple Sclerosis (MS): Care Instructions. \"  Current as of: Colette 3, 2018  Content Version: 11.9  © 6278-9583 [employer].  Care instructions adapted under license by [employer] (which disclaims liability or warranty for this information). If you have questions about a medical condition or this instruction, always ask your healthcare professional. [place] [address] any warranty or liability for your use of this information.

## 2019-05-01 LAB
ATRIAL RATE: 93 BPM
CALCULATED P AXIS, ECG09: 46 DEGREES
CALCULATED R AXIS, ECG10: 14 DEGREES
CALCULATED T AXIS, ECG11: 28 DEGREES
DIAGNOSIS, 93000: NORMAL
P-R INTERVAL, ECG05: 164 MS
Q-T INTERVAL, ECG07: 344 MS
QRS DURATION, ECG06: 74 MS
QTC CALCULATION (BEZET), ECG08: 427 MS
VENTRICULAR RATE, ECG03: 93 BPM

## 2019-05-01 NOTE — ED NOTES
I have reviewed discharge instructions with the patient. The patient verbalized understanding. Patient armband removed and shredded Pt ambulatory to ED [facility].

## 2019-05-17 ENCOUNTER — HOSPITAL ENCOUNTER (EMERGENCY)
Age: 44
Discharge: HOME | End: 2019-05-17
Attending: EMERGENCY MEDICINE
Payer: MEDICARE

## 2019-05-17 VITALS
TEMPERATURE: 97.5 F | SYSTOLIC BLOOD PRESSURE: 162 MMHG | RESPIRATION RATE: 16 BRPM | BODY MASS INDEX: 41.64 KG/M2 | OXYGEN SATURATION: 97 % | DIASTOLIC BLOOD PRESSURE: 101 MMHG | WEIGHT: 235 LBS | HEIGHT: 63 IN | HEART RATE: 88 BPM

## 2019-05-17 DIAGNOSIS — S46.811A STRAIN OF RIGHT TRAPEZIUS MUSCLE, INITIAL ENCOUNTER: Primary | ICD-10-CM

## 2019-05-17 DIAGNOSIS — S16.1XXA NECK STRAIN, INITIAL ENCOUNTER: ICD-10-CM

## 2019-05-17 PROCEDURE — 99282 EMERGENCY DEPT VISIT SF MDM: CPT

## 2019-05-17 RX ORDER — HYDROCODONE BITARTRATE AND ACETAMINOPHEN 5; 325 MG/1; MG/1
1 TABLET ORAL
Status: DISCONTINUED | OUTPATIENT
Start: 2019-05-17 | End: 2019-05-17 | Stop reason: HOSPADM

## 2019-05-17 RX ORDER — CYCLOBENZAPRINE HCL 5 MG
10 TABLET ORAL 3 TIMES DAILY
Qty: 18 TAB | Refills: 0 | Status: SHIPPED | OUTPATIENT
Start: 2019-05-17 | End: 2019-05-20

## 2019-05-17 RX ORDER — IBUPROFEN 800 MG/1
800 TABLET ORAL EVERY 8 HOURS
Qty: 15 TAB | Refills: 0 | Status: SHIPPED | OUTPATIENT
Start: 2019-05-17 | End: 2019-05-22

## 2019-05-17 NOTE — DISCHARGE INSTRUCTIONS
SPECIFIC PATIENT INSTRUCTIONS FROM THE PHYSICIAN WHO TREATED YOU IN THE ER TODAY:  1. Ibuprofen and flexeril as prescribed until finished. 2. FOLLOW UP APPOINTMENT:  Your primary doctor in 4-5 days for reevaluation. 3. Return if any concerns or worsening condition(s). Return here for increasing pain, change in pain, numbness or tingling in arms and legs. Patient Education        Neck Strain: Care Instructions  Your Care Instructions    You have strained the muscles and ligaments in your neck. A sudden, awkward movement can strain the neck. This often occurs with falls or car accidents or during certain sports. Everyday activities like working on a computer or sleeping can also cause neck strain if they force you to hold your neck in an awkward position for a long time. It is common for neck pain to get worse for a day or two after an injury, but it should start to feel better after that. You may have more pain and stiffness for several days before it gets better. This is expected. It may take a few weeks or longer for it to heal completely. Good home treatment can help you get better faster and avoid future neck problems. Follow-up care is a key part of your treatment and safety. Be sure to make and go to all appointments, and call your doctor if you are having problems. It's also a good idea to know your test results and keep a list of the medicines you take. How can you care for yourself at home? · If you were given a neck brace (cervical collar) to limit neck motion, wear it as instructed for as many days as your doctor tells you to. Do not wear it longer than you were told to. Wearing a brace for too long can make neck stiffness worse and weaken the neck muscles. · You can try using heat or ice to see if it helps. ? Try using a heating pad on a low or medium setting for 15 to 20 minutes every 2 to 3 hours. Try a warm shower in place of one session with the heating pad.  You can also buy single-use heat wraps that last up to 8 hours. ? You can also try an ice pack for 10 to 15 minutes every 2 to 3 hours. · Take pain medicines exactly as directed. ? If the doctor gave you a prescription medicine for pain, take it as prescribed. ? If you are not taking a prescription pain medicine, ask your doctor if you can take an over-the-counter medicine. · Gently rub the area to relieve pain and help with blood flow. Do not massage the area if it hurts to do so. · Do not do anything that makes the pain worse. Take it easy for a couple of days. You can do your usual activities if they do not hurt your neck or put it at risk for more stress or injury. · Try sleeping on a special neck pillow. Place it under your neck, not under your head. Placing a tightly rolled-up towel under your neck while you sleep will also work. If you use a neck pillow or rolled towel, do not use your regular pillow at the same time. · To prevent future neck pain, do exercises to stretch and strengthen your neck and back. Learn how to use good posture, safe lifting techniques, and proper body mechanics. When should you call for help? Call 911 anytime you think you may need emergency care. For example, call if:    · You are unable to move an arm or a leg at all.   [facility] your doctor now or seek immediate medical care if:    · You have new or worse symptoms in your arms, legs, chest, belly, or buttocks. Symptoms may include:  ? Numbness or tingling. ? Weakness. ? Pain.     · You lose bladder or bowel control.    Watch closely for changes in your health, and be sure to contact your doctor if:    · You are not getting better as expected. Where can you learn more? Go to http://annel-sadie.info/. Enter M253 in the search box to learn more about \"Neck Strain: Care Instructions. \"  Current as of: September 20, 2018  Content Version: 11.9  © 9011-7849 [employer].  Care instructions adapted under license by [employer] (which disclaims liability or warranty for this information). If you have questions about a medical condition or this instruction, always ask your healthcare professional. [place] [address] any warranty or liability for your use of this information. [facility] Activation    Thank you for requesting access to [facility]. Please follow the instructions below to securely access and download your online medical record. [facility] allows you to send messages to your doctor, view your test results, renew your prescriptions, schedule appointments, and more. How Do I Sign Up? 1. In your internet browser, go to [email or web address]. 2. Click on the First Time User? Click Here link in the Sign In box. You will see the New Member Sign Up page. 3. Enter your [facility] Access Code exactly as it appears below. You will not need to use this code after youve completed the sign-up process. If you do not sign up before the expiration date, you must request a new code. [facility] Access Code: [email or web address]  Expires: 2019  1:51 AM (This is the date your [facility] access code will )    4. Enter the last four digits of your Social Security Number (xxxx) and Date of Birth (mm/dd/yyyy) as indicated and click Submit. You will be taken to the next sign-up page. 5. Create a [facility] ID. This will be your [facility] login ID and cannot be changed, so think of one that is secure and easy to remember. 6. Create a [facility] password. You can change your password at any time. 7. Enter your Password Reset Question and Answer. This can be used at a later time if you forget your password. 8. Enter your e-mail address. You will receive e-mail notification when new information is available in [address] 9. Click Sign Up. You can now view and download portions of your medical record.   10. Click the Download Summary menu link to download a portable copy of your medical information. Additional Information    If you have questions, please visit the Frequently Asked Questions section of the [employer] website at [email or web address] Remember, [facility] is NOT to be used for urgent needs. For medical emergencies, dial 911.

## 2019-05-17 NOTE — ED PROVIDER NOTES
[address] EMERGENCY DEPT 
 
 
3:05 AM 
 
Date: 5/17/2019 Patient Name: Ki Zhang History of Presenting Illness Chief Complaint Patient presents with  
 Headache  Neck Pain  Numbness 40 y.o. female with noted past medical history who presents to the emergency department with right neck and trapezius pain. Patient states that she has had right neck and right trapezius area pain that started 2 days ago and has continued and worsened to the present. Pain is worse with neck movements and shoulder shrugging. There is no radiation of the pain as well. She notes that she had intermittent headache that she associates with the pain at times. She is tried Naprosyn and ibuprofen with some relief but the pain still persists. She denies any fall trauma or overuse of the neck and right trapezius region. Patient denies any other associated signs or symptoms. Patient denies any other complaints. Nursing notes regarding the HPI and triage nursing notes were reviewed. Prior medical records were reviewed. Current Facility-Administered Medications Medication Dose Route Frequency Provider Last Rate Last Dose  
 HYDROcodone-acetaminophen (NORCO) 5-325 mg per tablet 1 Tab  1 Tab Oral NOW Eduardo Wiseman MD      
 
Current Outpatient Medications Medication Sig Dispense Refill  gabapentin (NEURONTIN) 300 mg capsule Take 300 mg by mouth nightly. 1-2 tabs  amLODIPine (NORVASC) 10 mg tablet Take 10 mg by mouth daily.  metFORMIN (GLUCOPHAGE) 500 mg tablet Take 1.5 Tabs by mouth two (2) times daily (with meals). 60 Tab 0  
 DULoxetine (CYMBALTA) 60 mg capsule Take 60 mg by mouth daily.  QUEtiapine (SEROQUEL) 300 mg tablet Take 300 mg by mouth nightly.  glatiramer (COPAXONE) 40 mg/mL injection 40 mg by SubCUTAneous route every Monday, Wednesday, Friday.     
 methylPREDNISolone (MEDROL, BOGADN,) 4 mg tablet Use as prescribed by the manufacture 1 Dose Pack 0  ibuprofen (MOTRIN) 600 mg tablet Take 1 Tab by mouth every six (6) hours as needed for Pain. 20 Tab 0  cyclobenzaprine (FLEXERIL) 10 mg tablet Take 1 Tab by mouth three (3) times daily as needed for Muscle Spasm(s). 12 Tab 0 Past History Past Medical History: 
Past Medical History:  
Diagnosis Date  Arthropathy, unspecified, site unspecified  Depression  Diabetes ([address]  Hypercholesteremia  Hypertension   
 history of htn  Incontinence of urine  Insomnia  Migraine  MS (multiple sclerosis) ([address]  Neurogenic bladder  Other ill-defined conditions(799.89)   
 multiple Sclerosis  Seizures ([address]   
 6/2013  Wears glasses Past Surgical History: 
Past Surgical History:  
Procedure Laterality Date  HX CARPAL TUNNEL RELEASE  11/2008  HX HYSTERECTOMY  HX OTHER SURGICAL Family History: 
Family History Problem Relation Age of Onset  Heart Disease Father  Diabetes Father  Diabetes Sister  Other Other Social History: 
Social History Tobacco Use  Smoking status: Never Smoker  Smokeless tobacco: Never Used Substance Use Topics  Alcohol use: No  
 Drug use: No  
 
 
Allergies: Allergies Allergen Reactions  Levemir [Insulin Detemir] Hives  Oxycontin [Oxycodone] Hives and Other (comments)  
  intolerance Patient's primary care provider (as noted in EPIC):  Rome Edmondson MD 
 
Review of Systems Constitutional: Negative for diaphoresis. HENT: Negative for congestion. Eyes: Negative for discharge. Respiratory: Negative for stridor. Cardiovascular: Negative for palpitations. Gastrointestinal: Negative for diarrhea. Endocrine: Negative for heat intolerance. Genitourinary: Negative for flank pain. Musculoskeletal: Positive for neck pain. Negative for back pain. Neurological: Negative for weakness. Psychiatric/Behavioral: Negative for hallucinations. All other systems reviewed and are negative. Visit Vitals BP (!) 162/101 (BP Patient Position: At rest) Pulse 88 Temp 97.5 °F (36.4 °C) Resp 16 Ht 5' 3\" (1.6 m) Wt 106.6 kg (235 lb) SpO2 97% BMI 41.63 kg/m² PHYSICAL EXAM: 
 
CONSTITUTIONAL:  Alert, in no apparent distress;  well developed;  well nourished. HEAD:  Normocephalic, atraumatic. EYES:  EOMI. Non-icteric sclera. Normal conjunctiva. ENTM:  Nose:  no rhinorrhea. Throat:  no erythema or exudate, mucous membranes moist. 
 
NECK:   Right lateral neck and adjacent medial trapezius focal mild reproducible tenderness to palpation. No rash, lesions, bruising. No JVD. Supple. RESPIRATORY:  Chest clear, equal breath sounds, good air movement. CARDIOVASCULAR:  Regular rate and rhythm. No murmurs, rubs, or gallops. GI:  Normal bowel sounds, abdomen soft and non-tender. No rebound or guarding. BACK:  Grossly normal exam.  
UPPER EXT:  Normal inspection. LOWER EXT:  No edema, no calf tenderness. Distal pulses intact. NEURO:  Moves all four extremities, and grossly normal motor exam. 
SKIN:  No rashes;  Normal for age. PSYCH:  Alert and normal affect. DIFFERENTIAL DIAGNOSES/ MEDICAL DECISION MAKING: 
Neck myofascial strain, spasm, neuropathy pain to include nerve impingement, cervical spine pathology such as spinal stenosis and/or a combination of the aforementioned. Diagnostic Study Results Abnormal lab results from this emergency department encounter: 
Labs Reviewed - No data to display Lab values for this patient within approximately the last 12 hours: 
No results found for this or any previous visit (from the past 12 hour(s)). Radiologist and cardiologist interpretations if available at time of this note: No results found. Medication(s) ordered for patient during this emergency visit encounter: 
Medications HYDROcodone-acetaminophen (NORCO) 5-325 mg per tablet 1 Tab (has no administration in time range) Medical Decision Making I am the first provider for this patient. I reviewed the vital signs, available nursing notes, past medical history, past surgical history, family history and social history. Vital Signs:  Reviewed the patient's vital signs. ED COURSE:   
 
IMPRESSION AND MEDICAL DECISION MAKING: 
Based upon the patients presentation with noted HPI and PE, along with the work up done in the emergency department, I believe that the patient is having noted myofascial strain. DIAGNOSIS: 
1. Neck and trapezius myofascial strain SPECIFIC PATIENT INSTRUCTIONS FROM THE PHYSICIAN WHO TREATED YOU IN THE ER TODAY: 
1. Ibuprofen and flexeril as prescribed until finished. 2. FOLLOW UP APPOINTMENT:  Your primary doctor in 4-5 days for reevaluation. 3. Return if any concerns or worsening condition(s). Return here for increasing pain, change in pain, numbness or tingling in arms and legs. Patient is improved, resting quietly and comfortably. The patient will be discharged home. The patient was reassured that these symptoms do not appear to represent a serious or life threatening condition at this time. Warning signs of worsening condition were discussed and understood by the patient. Based on patient's age, coexisting illness, exam, and the results of this ED evaluation, the decision to treat as an outpatient was made. Based on the information available at time of discharge, acute pathology requiring immediate intervention was deemed relative unlikely. While it is impossible to completely exclude the possibility of underlying serious disease or worsening of condition, I feel the relative likelihood is extremely low. I discussed this uncertainty with the patient, who understood ED evaluation and treatment and felt comfortable with the outpatient treatment plan. All questions regarding care, test results, and follow up were answered. The patient is stable and appropriate to discharge. They understand that they should return to the emergency department for any new or worsening symptoms. I stressed the importance of follow up for repeat assessment and possibly further evaluation/treatment. Dictation disclaimer:  Please note that this dictation was completed with [employer], the computer voice recognition software. Quite often unanticipated grammatical, syntax, homophones, and other interpretive errors are inadvertently transcribed by the computer software. Please disregard these errors. Please excuse any errors that have escaped final proofreading. Coding Diagnoses Clinical Impression: 1. Strain of right trapezius muscle, initial encounter 2. Neck strain, initial encounter Disposition Disposition: Home. Jimmy Bautista M.D. NEEL Board Certified Emergency Physician Provider Attestation: If a scribe was utilized in generation of this patient record, I personally performed the services described in the documentation, reviewed the documentation, as recorded by the scribe in my presence, and it accurately records the patient's history of presenting illness, review of systems, patient physical examination, and procedures performed by me as the attending physician. FREEMAN Garcia Board Certified Emergency Physician 5/17/2019. 
3:11 AM

## 2019-06-17 ENCOUNTER — HOSPITAL ENCOUNTER (EMERGENCY)
Age: 44
Discharge: HOME | End: 2019-06-17
Attending: EMERGENCY MEDICINE
Payer: MEDICARE

## 2019-06-17 ENCOUNTER — APPOINTMENT (OUTPATIENT)
Dept: GENERAL RADIOLOGY | Age: 44
End: 2019-06-17
Attending: EMERGENCY MEDICINE
Payer: MEDICARE

## 2019-06-17 VITALS
DIASTOLIC BLOOD PRESSURE: 81 MMHG | HEART RATE: 82 BPM | OXYGEN SATURATION: 98 % | TEMPERATURE: 98.3 F | SYSTOLIC BLOOD PRESSURE: 140 MMHG | RESPIRATION RATE: 17 BRPM

## 2019-06-17 DIAGNOSIS — R07.89 ATYPICAL CHEST PAIN: Primary | ICD-10-CM

## 2019-06-17 LAB
ALBUMIN SERPL-MCNC: 3.6 G/DL (ref 3.4–5)
ALBUMIN/GLOB SERPL: 0.9 {RATIO} (ref 0.8–1.7)
ALP SERPL-CCNC: 98 U/L (ref 45–117)
ALT SERPL-CCNC: 69 U/L (ref 13–56)
ANION GAP SERPL CALC-SCNC: 9 MMOL/L (ref 3–18)
AST SERPL-CCNC: 37 U/L (ref 15–37)
BASOPHILS # BLD: 0 K/UL (ref 0–0.1)
BASOPHILS NFR BLD: 0 % (ref 0–2)
BILIRUB SERPL-MCNC: 0.5 MG/DL (ref 0.2–1)
BNP SERPL-MCNC: <5 PG/ML (ref 0–450)
BUN SERPL-MCNC: 7 MG/DL (ref 7–18)
BUN/CREAT SERPL: 9 (ref 12–20)
CALCIUM SERPL-MCNC: 8.6 MG/DL (ref 8.5–10.1)
CHLORIDE SERPL-SCNC: 102 MMOL/L (ref 100–108)
CK MB CFR SERPL CALC: NORMAL % (ref 0–4)
CK MB SERPL-MCNC: <1 NG/ML (ref 5–25)
CK SERPL-CCNC: 92 U/L (ref 26–192)
CO2 SERPL-SCNC: 27 MMOL/L (ref 21–32)
CREAT SERPL-MCNC: 0.74 MG/DL (ref 0.6–1.3)
DIFFERENTIAL METHOD BLD: ABNORMAL
EOSINOPHIL # BLD: 0.2 K/UL (ref 0–0.4)
EOSINOPHIL NFR BLD: 2 % (ref 0–5)
ERYTHROCYTE [DISTWIDTH] IN BLOOD BY AUTOMATED COUNT: 13.4 % (ref 11.6–14.5)
GLOBULIN SER CALC-MCNC: 4.1 G/DL (ref 2–4)
GLUCOSE SERPL-MCNC: 184 MG/DL (ref 74–99)
HCG SERPL QL: NEGATIVE
HCT VFR BLD AUTO: 37.9 % (ref 35–45)
HGB BLD-MCNC: 13.4 G/DL (ref 12–16)
LYMPHOCYTES # BLD: 4.4 K/UL (ref 0.9–3.6)
LYMPHOCYTES NFR BLD: 52 % (ref 21–52)
MCH RBC QN AUTO: 28.5 PG (ref 24–34)
MCHC RBC AUTO-ENTMCNC: 35.4 G/DL (ref 31–37)
MCV RBC AUTO: 80.6 FL (ref 74–97)
MONOCYTES # BLD: 0.6 K/UL (ref 0.05–1.2)
MONOCYTES NFR BLD: 7 % (ref 3–10)
NEUTS SEG # BLD: 3.4 K/UL (ref 1.8–8)
NEUTS SEG NFR BLD: 39 % (ref 40–73)
PLATELET # BLD AUTO: 510 K/UL (ref 135–420)
PMV BLD AUTO: 10.5 FL (ref 9.2–11.8)
POTASSIUM SERPL-SCNC: 3 MMOL/L (ref 3.5–5.5)
PROT SERPL-MCNC: 7.7 G/DL (ref 6.4–8.2)
RBC # BLD AUTO: 4.7 M/UL (ref 4.2–5.3)
SODIUM SERPL-SCNC: 138 MMOL/L (ref 136–145)
TROPONIN I BLD-MCNC: <0.04 NG/ML (ref 0–0.08)
TROPONIN I SERPL-MCNC: <0.02 NG/ML (ref 0–0.04)
WBC # BLD AUTO: 8.5 K/UL (ref 4.6–13.2)

## 2019-06-17 PROCEDURE — 74011250637 HC RX REV CODE- 250/637: Performed by: EMERGENCY MEDICINE

## 2019-06-17 PROCEDURE — 85025 COMPLETE CBC W/AUTO DIFF WBC: CPT

## 2019-06-17 PROCEDURE — 74011250636 HC RX REV CODE- 250/636: Performed by: EMERGENCY MEDICINE

## 2019-06-17 PROCEDURE — 71045 X-RAY EXAM CHEST 1 VIEW: CPT

## 2019-06-17 PROCEDURE — 99285 EMERGENCY DEPT VISIT HI MDM: CPT

## 2019-06-17 PROCEDURE — 96374 THER/PROPH/DIAG INJ IV PUSH: CPT

## 2019-06-17 PROCEDURE — 82550 ASSAY OF CK (CPK): CPT

## 2019-06-17 PROCEDURE — 84484 ASSAY OF TROPONIN QUANT: CPT

## 2019-06-17 PROCEDURE — 93005 ELECTROCARDIOGRAM TRACING: CPT

## 2019-06-17 PROCEDURE — 80053 COMPREHEN METABOLIC PANEL: CPT

## 2019-06-17 PROCEDURE — 83880 ASSAY OF NATRIURETIC PEPTIDE: CPT

## 2019-06-17 PROCEDURE — 84703 CHORIONIC GONADOTROPIN ASSAY: CPT

## 2019-06-17 RX ORDER — KETOROLAC TROMETHAMINE 30 MG/ML
15 INJECTION, SOLUTION INTRAMUSCULAR; INTRAVENOUS
Status: COMPLETED | OUTPATIENT
Start: 2019-06-17 | End: 2019-06-17

## 2019-06-17 RX ORDER — POTASSIUM CHLORIDE 20 MEQ/1
40 TABLET, EXTENDED RELEASE ORAL
Status: COMPLETED | OUTPATIENT
Start: 2019-06-17 | End: 2019-06-17

## 2019-06-17 RX ADMIN — POTASSIUM CHLORIDE 40 MEQ: 20 TABLET, EXTENDED RELEASE ORAL at 21:20

## 2019-06-17 RX ADMIN — KETOROLAC TROMETHAMINE 15 MG: 30 INJECTION, SOLUTION INTRAMUSCULAR at 21:20

## 2019-06-17 NOTE — ED TRIAGE NOTES
Pt arrived via EMS w/ cp x 45 minutes. Re-producible. Worse w/ movement.  Denies N/V/D. [address] [facility]SA en route

## 2019-06-18 LAB
ATRIAL RATE: 93 BPM
CALCULATED P AXIS, ECG09: 57 DEGREES
CALCULATED R AXIS, ECG10: 14 DEGREES
CALCULATED T AXIS, ECG11: 24 DEGREES
DIAGNOSIS, 93000: NORMAL
P-R INTERVAL, ECG05: 146 MS
Q-T INTERVAL, ECG07: 362 MS
QRS DURATION, ECG06: 96 MS
QTC CALCULATION (BEZET), ECG08: 450 MS
VENTRICULAR RATE, ECG03: 93 BPM

## 2019-06-18 NOTE — ED PROVIDER NOTES
Emergency Department Treatment Report    Patient: Kp Santana Age: 40 y.o. Sex: female    YOB: 1975 Admit Date: 6/17/2019 PCP: Lori Jackson MD   MRN: [id number]  CSN: [id number]     Room: [place] [address] Time Dictated: 8:05 PM      Chief Complaint   Chief Complaint   Patient presents with    Chest Pain       History of Present Illness   40 y.o. female, PMH MS, presents with 1 hour of upper sternal chest wall pain that started while at rest.  She is a  and was lifting a heavy wheelchair earlier today. She received 325 mg aspirin by EMS prior to arrival.  She denies radiation of pain, shortness of breath, leg swelling, nausea, vomiting, rash, numbness, tingling or abdominal pain. Review of Systems   Constitutional: No fever, chills, or weight loss  Eyes: No visual symptoms. ENT: No sore throat, runny nose or ear pain. Respiratory: No cough, dyspnea or wheezing. Cardiovascular: +chest pain  Gastrointestinal: No vomiting, diarrhea or abdominal pain. Genitourinary: No dysuria, frequency, or urgency. Musculoskeletal: No joint pain or swelling. Integumentary: No rashes. Neurological: No headaches, sensory or motor symptoms. Denies complaints in all other systems.     Past Medical/Surgical History     Past Medical History:   Diagnosis Date    Arthropathy, unspecified, site unspecified     Depression     Diabetes ([address]     Hypercholesteremia     Hypertension     history of htn    Incontinence of urine     Insomnia     Migraine     MS (multiple sclerosis) ([address]     Neurogenic bladder     Other ill-defined conditions(799.89)     multiple Sclerosis    Seizures ([address]     6/2013    Wears glasses      Past Surgical History:   Procedure Laterality Date    HX CARPAL TUNNEL RELEASE  11/2008    HX HYSTERECTOMY      HX OTHER SURGICAL         Social History     Social History     Socioeconomic History    Marital status:      Spouse name: Not on file    Number of children: Not on file    Years of education: Not on file    Highest education level: Not on file   Tobacco Use    Smoking status: Never Smoker    Smokeless tobacco: Never Used   Substance and Sexual Activity    Alcohol use: No    Drug use: No       Family History     Family History   Problem Relation Age of Onset    Heart Disease Father     Diabetes Father     Diabetes Sister     Other Other        Home Medications     Prior to Admission Medications   Prescriptions Last Dose Informant Patient Reported? Taking? DULoxetine (CYMBALTA) 60 mg capsule   Yes No   Sig: Take 60 mg by mouth daily. QUEtiapine (SEROQUEL) 300 mg tablet   Yes No   Sig: Take 300 mg by mouth nightly. amLODIPine (NORVASC) 10 mg tablet   Yes No   Sig: Take 10 mg by mouth daily. gabapentin (NEURONTIN) 300 mg capsule   Yes No   Sig: Take 300 mg by mouth nightly. 1-2 tabs   glatiramer (COPAXONE) 40 mg/mL injection   Yes No   Si mg by SubCUTAneous route every Monday, Wednesday, Friday. metFORMIN (GLUCOPHAGE) 500 mg tablet   No No   Sig: Take 1.5 Tabs by mouth two (2) times daily (with meals). methylPREDNISolone (MEDROL, BOGDAN,) 4 mg tablet   No No   Sig: Use as prescribed by the manufacture      Facility-Administered Medications: None       Allergies     Allergies   Allergen Reactions    Levemir [Insulin Detemir] Hives    Oxycontin [Oxycodone] Hives and Other (comments)     intolerance       Physical Exam     ED Triage Vitals   ED Encounter Vitals Group      BP       Pulse       Resp       Temp       Temp src       SpO2       Weight       Height      Constitutional: Patient appears well developed and well nourished. Appearance and behavior are age and situation appropriate. HEENT: Conjunctiva clear. PERRLA. Mucous membranes moist, non-erythematous. Surface of the pharynx, palate, and tongue are pink, moist and without lesions. Neck: supple, non tender, symmetrical, no masses or JVD.    Respiratory: lungs clear to auscultation, nonlabored respirations. No tachypnea or accessory muscle use. Cardiovascular: heart regular rate and rhythm without murmur rubs or gallops. Calves soft and non-tender. Distal pulses 2+ and equal bilaterally. No peripheral edema. Tenderness over upper anterior chest wall. Gastrointestinal:  Abdomen soft, nontender without complaint of pain to palpation  Musculoskeletal: Nail beds pink with prompt capillary refill  Integumentary: warm and dry without rashes or lesions  Neurologic: alert and oriented, Sensation intact, motor strength equal and symmetric. No facial asymmetry or dysarthria. Diagnostic Studies   Lab:   Recent Results (from the past 12 hour(s))   EKG, 12 LEAD, INITIAL    Collection Time: 06/17/19  8:00 PM   Result Value Ref Range    Ventricular Rate 93 BPM    Atrial Rate 93 BPM    P-R Interval 146 ms    QRS Duration 96 ms    Q-T Interval 362 ms    QTC Calculation (Bezet) 450 ms    Calculated P Axis 57 degrees    Calculated R Axis 14 degrees    Calculated T Axis 24 degrees    Diagnosis       Normal sinus rhythm  Nonspecific T wave abnormality  Abnormal ECG     CBC WITH AUTOMATED DIFF    Collection Time: 06/17/19  8:04 PM   Result Value Ref Range    WBC 8.5 4.6 - 13.2 K/uL    RBC 4.70 4.20 - 5.30 M/uL    HGB 13.4 12.0 - 16.0 g/dL    HCT 37.9 35.0 - 45.0 %    MCV 80.6 74.0 - 97.0 FL    MCH 28.5 24.0 - 34.0 PG    MCHC 35.4 31.0 - 37.0 g/dL    RDW 13.4 11.6 - 14.5 %    PLATELET 604 (H) 309 - 420 K/uL    MPV 10.5 9.2 - 11.8 FL    NEUTROPHILS 39 (L) 40 - 73 %    LYMPHOCYTES 52 21 - 52 %    MONOCYTES 7 3 - 10 %    EOSINOPHILS 2 0 - 5 %    BASOPHILS 0 0 - 2 %    ABS. NEUTROPHILS 3.4 1.8 - 8.0 K/UL    ABS. LYMPHOCYTES 4.4 (H) 0.9 - 3.6 K/UL    ABS. MONOCYTES 0.6 0.05 - 1.2 K/UL    ABS. EOSINOPHILS 0.2 0.0 - 0.4 K/UL    ABS.  BASOPHILS 0.0 0.0 - 0.1 K/UL    DF AUTOMATED     METABOLIC PANEL, COMPREHENSIVE    Collection Time: 06/17/19  8:04 PM   Result Value Ref Range    Sodium 138 136 - 145 mmol/L Potassium 3.0 (L) 3.5 - 5.5 mmol/L    Chloride 102 100 - 108 mmol/L    CO2 27 21 - 32 mmol/L    Anion gap 9 3.0 - 18 mmol/L    Glucose 184 (H) 74 - 99 mg/dL    BUN 7 7.0 - 18 MG/DL    Creatinine 0.74 0.6 - 1.3 MG/DL    BUN/Creatinine ratio 9 (L) 12 - 20      GFR est AA >60 >60 ml/min/1.73m2    GFR est non-AA >60 >60 ml/min/1.73m2    Calcium 8.6 8.5 - 10.1 MG/DL    Bilirubin, total 0.5 0.2 - 1.0 MG/DL    ALT (SGPT) 69 (H) 13 - 56 U/L    AST (SGOT) 37 15 - 37 U/L    Alk. phosphatase 98 45 - 117 U/L    Protein, total 7.7 6.4 - 8.2 g/dL    Albumin 3.6 3.4 - 5.0 g/dL    Globulin 4.1 (H) 2.0 - 4.0 g/dL    A-G Ratio 0.9 0.8 - 1.7     NT-PRO BNP    Collection Time: 06/17/19  8:04 PM   Result Value Ref Range    NT pro-BNP <5 0 - 450 PG/ML   CARDIAC PANEL,(CK, CKMB & TROPONIN)    Collection Time: 06/17/19  8:04 PM   Result Value Ref Range    CK 92 26 - 192 U/L    CK - MB <1.0 <3.6 ng/ml    CK-MB Index  0.0 - 4.0 %     CALCULATION NOT PERFORMED WHEN RESULT IS BELOW LINEAR LIMIT    Troponin-I, QT <0.02 0.0 - 0.045 NG/ML   HCG QL SERUM    Collection Time: 06/17/19  8:04 PM   Result Value Ref Range    HCG, Ql. NEGATIVE  NEG     POC TROPONIN-I    Collection Time: 06/17/19 10:50 PM   Result Value Ref Range    Troponin-I (POC) <0.04 0.00 - 0.08 ng/mL       Imaging:    No results found. [unfilled]    CXR (ER Prelim read): NAD    ED Course/Medical Decision Making   Patient appears well and is in no acute distress. Her chest pain is atypical for ACS, PE or aortic dissection at this time. 2 sets of troponins are negative. HEART score is 1 and she is PERC negative. Chest pain has resolved after Toradol. Rest of blood is unremarkable, other than slight hypokalemia, which was repleted in the ER. She is safe for outpatient follow-up with her primary care physician.     Medications   ketorolac (TORADOL) injection 15 mg (15 mg IntraVENous Given 6/17/19 2120)   potassium chloride (K-DUR, KLOR-CON) SR tablet 40 mEq (40 mEq Oral Given 6/17/19 2120)       Final Diagnosis       ICD-10-CM ICD-9-CM   1. Atypical chest pain R07.89 786.59       Disposition   Patient is discharged home in stable condition. Advised to follow with their primary care physician. Patient advised to return to the ER for any new or worsening symptoms. My Medications      CONTINUE taking these medications      Instructions Each Dose to Equal Morning Noon Evening Bedtime   amLODIPine 10 mg tablet  Commonly known as:  NORVASC    Your last dose was: Your next dose is: Take 10 mg by mouth daily. 10 mg                 COPAXONE 40 mg/mL injection  Generic drug:  glatiramer    Your last dose was: Your next dose is:          40 mg by SubCUTAneous route every Monday, Wednesday, Friday. 40 mg                 CYMBALTA 60 mg capsule  Generic drug:  DULoxetine    Your last dose was: Your next dose is: Take 60 mg by mouth daily. 60 mg                 gabapentin 300 mg capsule  Commonly known as:  NEURONTIN    Your last dose was: Your next dose is: Take 300 mg by mouth nightly. 1-2 tabs   300 mg                 metFORMIN 500 mg tablet  Commonly known as:  GLUCOPHAGE    Your last dose was: Your next dose is: Take 1.5 Tabs by mouth two (2) times daily (with meals). 850 mg                 methylPREDNISolone 4 mg tablet  Commonly known as:  MEDROL (BOGDAN)    Your last dose was: Your next dose is:          Use as prescribed by the manufacture                  SEROquel 300 mg tablet  Generic drug:  QUEtiapine    Your last dose was: Your next dose is: Take 300 mg by mouth nightly. 300 mg                          Chau Basurto MD  June 17, 2019    My signature above authenticates this document and my orders, the final    diagnosis (es), discharge prescription (s), and instructions in the Epic    record. If you have any questions please contact [phone].      Nursing notes have been reviewed by the physician/ advanced practice    Clinician. Disclaimer: Sections of this note are dictated using utilizing voice recognition software. Minor typographical errors may be present. If questions arise, please do not hesitate to contact me or call our department.

## 2019-06-18 NOTE — DISCHARGE INSTRUCTIONS

## 2019-08-02 ENCOUNTER — APPOINTMENT (OUTPATIENT)
Dept: CT IMAGING | Age: 44
End: 2019-08-02
Attending: EMERGENCY MEDICINE
Payer: MEDICARE

## 2019-08-02 ENCOUNTER — HOSPITAL ENCOUNTER (EMERGENCY)
Age: 44
Discharge: HOME | End: 2019-08-02
Attending: EMERGENCY MEDICINE
Payer: MEDICARE

## 2019-08-02 VITALS
OXYGEN SATURATION: 100 % | TEMPERATURE: 97 F | HEART RATE: 66 BPM | DIASTOLIC BLOOD PRESSURE: 108 MMHG | RESPIRATION RATE: 18 BRPM | SYSTOLIC BLOOD PRESSURE: 160 MMHG

## 2019-08-02 DIAGNOSIS — S09.90XD CLOSED HEAD INJURY, SUBSEQUENT ENCOUNTER: ICD-10-CM

## 2019-08-02 DIAGNOSIS — R51.9 ACUTE NONINTRACTABLE HEADACHE, UNSPECIFIED HEADACHE TYPE: ICD-10-CM

## 2019-08-02 DIAGNOSIS — H53.9 ABNORMAL VISION: Primary | ICD-10-CM

## 2019-08-02 PROCEDURE — 72125 CT NECK SPINE W/O DYE: CPT

## 2019-08-02 PROCEDURE — 99283 EMERGENCY DEPT VISIT LOW MDM: CPT

## 2019-08-02 PROCEDURE — 70450 CT HEAD/BRAIN W/O DYE: CPT

## 2019-08-02 PROCEDURE — 74011000250 HC RX REV CODE- 250: Performed by: EMERGENCY MEDICINE

## 2019-08-02 PROCEDURE — 74011250637 HC RX REV CODE- 250/637: Performed by: EMERGENCY MEDICINE

## 2019-08-02 RX ORDER — PROPARACAINE HYDROCHLORIDE 5 MG/ML
2 SOLUTION/ DROPS OPHTHALMIC
Status: COMPLETED | OUTPATIENT
Start: 2019-08-02 | End: 2019-08-02

## 2019-08-02 RX ORDER — ACETAMINOPHEN 500 MG
1000 TABLET ORAL
Status: COMPLETED | OUTPATIENT
Start: 2019-08-02 | End: 2019-08-02

## 2019-08-02 RX ADMIN — FLUORESCEIN SODIUM 2 STRIP: 1 STRIP OPHTHALMIC at 13:50

## 2019-08-02 RX ADMIN — PROPARACAINE HYDROCHLORIDE 2 DROP: 5 SOLUTION/ DROPS OPHTHALMIC at 13:49

## 2019-08-02 RX ADMIN — ACETAMINOPHEN 1000 MG: 500 TABLET, FILM COATED ORAL at 13:48
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
## 2019-08-02 NOTE — DISCHARGE INSTRUCTIONS
Patient Education        Learning About a Closed Head Injury  What is a closed head injury? A closed head injury happens when your head gets hit hard. The strong force of the blow causes your brain to shake in your skull. This movement can cause the brain to bruise, swell, or tear. Sometimes nerves or blood vessels also get damaged. This can cause bleeding in or around the brain. A concussion is a type of closed head injury. What are the symptoms? If you have a mild concussion, you may have a mild headache or feel \"not quite right. \" These symptoms are common. They usually go away over a few days to 4 weeks. But sometimes after a concussion, you feel like you can't function as well as before the injury. And you have new symptoms. This is called postconcussive syndrome. You may:  · Find it harder to solve problems, think, concentrate, or remember. · Have headaches. · Have changes in your sleep patterns, such as not being able to sleep or sleeping all the time. · Have changes in your personality. · Not be interested in your usual activities. · Feel angry or anxious without a clear reason. · Lose your sense of taste or smell. · Be dizzy, lightheaded, or unsteady. It may be hard to stand or walk. How is a closed head injury treated? Any person who may have a concussion needs to see a doctor. Some people have to stay in the hospital to be watched. Others can go home safely. If you go home, follow your doctor's instructions. He or she will tell you if you need someone to watch you closely for the next 24 hours or longer. Rest is the best treatment. Get plenty of sleep at night. And try to rest during the day. · Avoid activities that are physically or mentally demanding. These include housework, exercise, and schoolwork. And don't play video games, send text messages, or use the computer. You may need to change your school or work schedule to be able to avoid these activities.   · Ask your doctor when it's okay to drive, ride a bike, or operate machinery. · Take an over-the-counter pain medicine, such as acetaminophen (Tylenol), ibuprofen (Advil, Motrin), or naproxen (Aleve). Be safe with medicines. Read and follow all instructions on the label. · Check with your doctor before you use any other medicines for pain. · Do not drink alcohol or use illegal drugs. They can slow recovery. They can also increase your risk of getting a second head injury. Follow-up care is a key part of your treatment and safety. Be sure to make and go to all appointments, and call your doctor if you are having problems. It's also a good idea to know your test results and keep a list of the medicines you take. Where can you learn more? Go to http://annel-sadie.info/. Enter E235 in the search box to learn more about \"Learning About a Closed Head Injury. \"  Current as of: March 28, 2019  Content Version: 12.1  © 5499-2778 [employer]. Care instructions adapted under license by [employer] (which disclaims liability or warranty for this information). If you have questions about a medical condition or this instruction, always ask your healthcare professional. [address] any warranty or liability for your use of this information. THE VISION IN YOUR RIGHT EYE IS VERY ABNORMAL. I AM VERY CONCERNED ABOUT THIS AS TO WHY YOUR VISION IS SO ABNORMAL. OUR TESTING AT THIS TIME HAS NOT REVEALED A CAUSE BUT YOU NEED TO BE EVALUATED BY AN EYE DOCTOR IMMEDIATELY. I AM CONCERNED YOU COULD PERMANENTLY LOSE VISION IN THIS EYE IF YOU DO NOT GO TO SEE AN EYE DOCTOR IMMEDIATELY. YOU HAVE DECLINED FOR US TO TRANSFER YOU TO [facility] TO BE EVALUATED BY AN EYE DOCTOR. PLEASE HAVE YOUR NURSE TAKE YOU THERE IMMEDIATELY.

## 2019-08-02 NOTE — ED NOTES
I have reviewed discharge instruction and prescriptions with patient. Patient verbalized understanding and has no further questions at this time. Education taught and patient verbalized understanding of education. Teach back method used. Armband removed and shredded per patients request.    Patients pain 8/10. Belongings given to patient. Patient discharged with her personal nurse to home.
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
## 2019-09-03 ENCOUNTER — HOSPITAL ENCOUNTER (EMERGENCY)
Age: 44
Discharge: HOME | End: 2019-09-03
Attending: EMERGENCY MEDICINE | Admitting: EMERGENCY MEDICINE
Payer: MEDICARE

## 2019-09-03 VITALS
HEIGHT: 63 IN | HEART RATE: 89 BPM | SYSTOLIC BLOOD PRESSURE: 141 MMHG | OXYGEN SATURATION: 96 % | RESPIRATION RATE: 19 BRPM | TEMPERATURE: 98.1 F | WEIGHT: 235 LBS | DIASTOLIC BLOOD PRESSURE: 89 MMHG | BODY MASS INDEX: 41.64 KG/M2

## 2019-09-03 DIAGNOSIS — G35 MULTIPLE SCLEROSIS (HCC): ICD-10-CM

## 2019-09-03 DIAGNOSIS — M62.838 MUSCLE SPASM: ICD-10-CM

## 2019-09-03 DIAGNOSIS — R11.0 NAUSEA WITHOUT VOMITING: ICD-10-CM

## 2019-09-03 DIAGNOSIS — R51.9 ACUTE NONINTRACTABLE HEADACHE, UNSPECIFIED HEADACHE TYPE: Primary | ICD-10-CM

## 2019-09-03 DIAGNOSIS — M79.10 MYALGIA: ICD-10-CM

## 2019-09-03 LAB
ALBUMIN SERPL-MCNC: 3.8 G/DL (ref 3.4–5)
ALBUMIN/GLOB SERPL: 0.9 {RATIO} (ref 0.8–1.7)
ALP SERPL-CCNC: 102 U/L (ref 45–117)
ALT SERPL-CCNC: 55 U/L (ref 13–56)
ANION GAP SERPL CALC-SCNC: 9 MMOL/L (ref 3–18)
AST SERPL-CCNC: 23 U/L (ref 10–38)
BASOPHILS # BLD: 0 K/UL (ref 0–0.1)
BASOPHILS NFR BLD: 0 % (ref 0–2)
BILIRUB SERPL-MCNC: 0.4 MG/DL (ref 0.2–1)
BUN SERPL-MCNC: 10 MG/DL (ref 7–18)
BUN/CREAT SERPL: 13 (ref 12–20)
CALCIUM SERPL-MCNC: 10.1 MG/DL (ref 8.5–10.1)
CHLORIDE SERPL-SCNC: 99 MMOL/L (ref 100–111)
CK SERPL-CCNC: 90 U/L (ref 26–192)
CO2 SERPL-SCNC: 29 MMOL/L (ref 21–32)
CREAT SERPL-MCNC: 0.76 MG/DL (ref 0.6–1.3)
DIFFERENTIAL METHOD BLD: ABNORMAL
EOSINOPHIL # BLD: 0.1 K/UL (ref 0–0.4)
EOSINOPHIL NFR BLD: 1 % (ref 0–5)
ERYTHROCYTE [DISTWIDTH] IN BLOOD BY AUTOMATED COUNT: 12.9 % (ref 11.6–14.5)
GLOBULIN SER CALC-MCNC: 4.2 G/DL (ref 2–4)
GLUCOSE SERPL-MCNC: 156 MG/DL (ref 74–99)
HCT VFR BLD AUTO: 39.3 % (ref 35–45)
HGB BLD-MCNC: 13.8 G/DL (ref 12–16)
LYMPHOCYTES # BLD: 4.9 K/UL (ref 0.9–3.6)
LYMPHOCYTES NFR BLD: 47 % (ref 21–52)
MCH RBC QN AUTO: 28.3 PG (ref 24–34)
MCHC RBC AUTO-ENTMCNC: 35.1 G/DL (ref 31–37)
MCV RBC AUTO: 80.7 FL (ref 74–97)
MONOCYTES # BLD: 0.6 K/UL (ref 0.05–1.2)
MONOCYTES NFR BLD: 6 % (ref 3–10)
NEUTS SEG # BLD: 4.9 K/UL (ref 1.8–8)
NEUTS SEG NFR BLD: 46 % (ref 40–73)
PLATELET # BLD AUTO: 569 K/UL (ref 135–420)
PMV BLD AUTO: 9.5 FL (ref 9.2–11.8)
POTASSIUM SERPL-SCNC: 3.7 MMOL/L (ref 3.5–5.5)
PROT SERPL-MCNC: 8 G/DL (ref 6.4–8.2)
RBC # BLD AUTO: 4.87 M/UL (ref 4.2–5.3)
SODIUM SERPL-SCNC: 137 MMOL/L (ref 136–145)
WBC # BLD AUTO: 10.5 K/UL (ref 4.6–13.2)

## 2019-09-03 PROCEDURE — 96374 THER/PROPH/DIAG INJ IV PUSH: CPT

## 2019-09-03 PROCEDURE — 99284 EMERGENCY DEPT VISIT MOD MDM: CPT

## 2019-09-03 PROCEDURE — 85025 COMPLETE CBC W/AUTO DIFF WBC: CPT

## 2019-09-03 PROCEDURE — 96375 TX/PRO/DX INJ NEW DRUG ADDON: CPT

## 2019-09-03 PROCEDURE — 82550 ASSAY OF CK (CPK): CPT

## 2019-09-03 PROCEDURE — 74011250636 HC RX REV CODE- 250/636: Performed by: EMERGENCY MEDICINE

## 2019-09-03 PROCEDURE — 80053 COMPREHEN METABOLIC PANEL: CPT

## 2019-09-03 PROCEDURE — 74011250637 HC RX REV CODE- 250/637: Performed by: EMERGENCY MEDICINE

## 2019-09-03 RX ORDER — DIAZEPAM 5 MG/1
5 TABLET ORAL
Qty: 6 TAB | Refills: 0 | Status: SHIPPED | OUTPATIENT
Start: 2019-09-03

## 2019-09-03 RX ORDER — DIPHENHYDRAMINE HYDROCHLORIDE 50 MG/ML
25 INJECTION, SOLUTION INTRAMUSCULAR; INTRAVENOUS
Status: COMPLETED | OUTPATIENT
Start: 2019-09-03 | End: 2019-09-03

## 2019-09-03 RX ORDER — ONDANSETRON 4 MG/1
4 TABLET, ORALLY DISINTEGRATING ORAL
Qty: 12 TAB | Refills: 0 | Status: SHIPPED | OUTPATIENT
Start: 2019-09-03 | End: 2020-03-30

## 2019-09-03 RX ORDER — DIAZEPAM 5 MG/1
5 TABLET ORAL
Status: COMPLETED | OUTPATIENT
Start: 2019-09-03 | End: 2019-09-03

## 2019-09-03 RX ORDER — KETOROLAC TROMETHAMINE 30 MG/ML
30 INJECTION, SOLUTION INTRAMUSCULAR; INTRAVENOUS
Status: COMPLETED | OUTPATIENT
Start: 2019-09-03 | End: 2019-09-03

## 2019-09-03 RX ORDER — PROCHLORPERAZINE EDISYLATE 5 MG/ML
10 INJECTION INTRAMUSCULAR; INTRAVENOUS
Status: COMPLETED | OUTPATIENT
Start: 2019-09-03 | End: 2019-09-03

## 2019-09-03 RX ADMIN — SODIUM CHLORIDE 1000 ML: 900 INJECTION, SOLUTION INTRAVENOUS at 21:24

## 2019-09-03 RX ADMIN — KETOROLAC TROMETHAMINE 30 MG: 30 INJECTION, SOLUTION INTRAMUSCULAR at 21:26

## 2019-09-03 RX ADMIN — PROCHLORPERAZINE EDISYLATE 10 MG: 5 INJECTION INTRAMUSCULAR; INTRAVENOUS at 21:24

## 2019-09-03 RX ADMIN — DIPHENHYDRAMINE HYDROCHLORIDE 25 MG: 50 INJECTION, SOLUTION INTRAMUSCULAR; INTRAVENOUS at 21:27

## 2019-09-03 RX ADMIN — DIAZEPAM 5 MG: 5 TABLET ORAL at 22:55

## 2019-09-03 RX ADMIN — DIAZEPAM 5 MG: 5 TABLET ORAL at 21:22

## 2019-09-03 NOTE — LETTER
NOTIFICATION RETURN TO WORK / SCHOOL 
 
9/3/2019 10:39 PM 
 
Ms. Avani Warren 
[address] [facility] [address] [id number] To Whom It May Concern: Avani Warren is currently under the care of [facility] EMERGENCY DEPT. She will return to work/school on: 9/6/19 If there are questions or concerns please have the patient contact our office. Sincerely, Juan Manuel Santos MD

## 2019-09-04 ENCOUNTER — HOSPITAL ENCOUNTER (EMERGENCY)
Age: 44
Discharge: HOME | End: 2019-09-04
Attending: EMERGENCY MEDICINE
Payer: MEDICARE

## 2019-09-04 VITALS
HEART RATE: 90 BPM | DIASTOLIC BLOOD PRESSURE: 91 MMHG | RESPIRATION RATE: 20 BRPM | SYSTOLIC BLOOD PRESSURE: 147 MMHG | TEMPERATURE: 97.7 F | BODY MASS INDEX: 41.64 KG/M2 | WEIGHT: 235 LBS | OXYGEN SATURATION: 91 % | HEIGHT: 63 IN

## 2019-09-04 DIAGNOSIS — G35 MULTIPLE SCLEROSIS (HCC): Primary | ICD-10-CM

## 2019-09-04 DIAGNOSIS — M62.838 MUSCLE SPASM: ICD-10-CM

## 2019-09-04 LAB
ANION GAP BLD CALC-SCNC: 18 MMOL/L (ref 10–20)
BUN BLD-MCNC: 10 MG/DL (ref 7–18)
CA-I BLD-MCNC: 1.19 MMOL/L (ref 1.12–1.32)
CHLORIDE BLD-SCNC: 99 MMOL/L (ref 100–108)
CO2 BLD-SCNC: 27 MMOL/L (ref 19–24)
CREAT UR-MCNC: 0.6 MG/DL (ref 0.6–1.3)
GLUCOSE BLD STRIP.AUTO-MCNC: 147 MG/DL (ref 74–106)
HCT VFR BLD CALC: 43 % (ref 36–49)
HGB BLD-MCNC: 14.6 G/DL (ref 12–16)
POTASSIUM BLD-SCNC: 3.3 MMOL/L (ref 3.5–5.5)
SODIUM BLD-SCNC: 138 MMOL/L (ref 136–145)

## 2019-09-04 PROCEDURE — 96375 TX/PRO/DX INJ NEW DRUG ADDON: CPT

## 2019-09-04 PROCEDURE — 80047 BASIC METABLC PNL IONIZED CA: CPT

## 2019-09-04 PROCEDURE — 74011250636 HC RX REV CODE- 250/636: Performed by: EMERGENCY MEDICINE

## 2019-09-04 PROCEDURE — 99285 EMERGENCY DEPT VISIT HI MDM: CPT

## 2019-09-04 PROCEDURE — 96374 THER/PROPH/DIAG INJ IV PUSH: CPT

## 2019-09-04 RX ORDER — CYCLOBENZAPRINE HCL 10 MG
10 TABLET ORAL
Qty: 20 TAB | Refills: 0 | Status: SHIPPED | OUTPATIENT
Start: 2019-09-04 | End: 2020-03-17 | Stop reason: ALTCHOICE

## 2019-09-04 RX ORDER — LORAZEPAM 2 MG/ML
1 INJECTION INTRAMUSCULAR
Status: COMPLETED | OUTPATIENT
Start: 2019-09-04 | End: 2019-09-04

## 2019-09-04 RX ORDER — DEXAMETHASONE SODIUM PHOSPHATE 4 MG/ML
10 INJECTION, SOLUTION INTRA-ARTICULAR; INTRALESIONAL; INTRAMUSCULAR; INTRAVENOUS; SOFT TISSUE
Status: COMPLETED | OUTPATIENT
Start: 2019-09-04 | End: 2019-09-04

## 2019-09-04 RX ORDER — PREDNISONE 20 MG/1
TABLET ORAL
Qty: 20 TAB | Refills: 0 | Status: SHIPPED | OUTPATIENT
Start: 2019-09-04 | End: 2020-03-30

## 2019-09-04 RX ADMIN — DEXAMETHASONE SODIUM PHOSPHATE 10 MG: 4 INJECTION, SOLUTION INTRAMUSCULAR; INTRAVENOUS at 15:00

## 2019-09-04 RX ADMIN — LORAZEPAM 1 MG: 2 INJECTION, SOLUTION INTRAMUSCULAR; INTRAVENOUS at 15:04

## 2019-09-04 NOTE — ED PROVIDER NOTES
EMERGENCY DEPARTMENT HISTORY AND PHYSICAL EXAM      Date: 9/4/2019  Patient Name: Deepti Rose    History of Presenting Illness     Chief Complaint   Patient presents with    Spasms       History Provided By: Patient    Chief Complaint: Muscle spasms and MS flare    Additional History (Context): Deepti Rose is a 40 y.o. female with Multiple sclerosis who presents with a few day history of muscle spasms that feel like a typical multiple sclerosis flare for her. States that she is receiving long-term treatment for MS, is compliant with this, however has a relapsing and remitting course and sometimes has muscle spasm exacerbations that require her oral steroids to control. Has a follow-up appointment with her MS doctor in a couple weeks, however is not able to get into see them any sooner than that. States that she has had 2 to 3 days of severe muscle spasms that are limiting her ability to function. Has tried over-the-counter medications for these, however she usually needs a course of steroids denies any fevers, chills, sweats. Denies any focal weakness, paresthesias, rash, chest pain, shortness of breath. PCP: Padmini Sauceda MD    Current Outpatient Medications   Medication Sig Dispense Refill    cyclobenzaprine (FLEXERIL) 10 mg tablet Take 1 Tab by mouth three (3) times daily as needed for Muscle Spasm(s) for up to 20 doses. 20 Tab 0    predniSONE (DELTASONE) 20 mg tablet 3 tabs every morning for 3 days, then 2 tabs every morning for 3 days, the 1 tab every morning for 3 days, then 1/2 tab every morning for 4 days. 20 Tab 0    ondansetron (ZOFRAN ODT) 4 mg disintegrating tablet Take 1 Tab by mouth every eight (8) hours as needed for Nausea. 12 Tab 0    gabapentin (NEURONTIN) 300 mg capsule Take 300 mg by mouth nightly. 1-2 tabs      amLODIPine (NORVASC) 10 mg tablet Take 10 mg by mouth daily.  metFORMIN (GLUCOPHAGE) 500 mg tablet Take 1.5 Tabs by mouth two (2) times daily (with meals).  60 Tab 0    DULoxetine (CYMBALTA) 60 mg capsule Take 60 mg by mouth daily.  QUEtiapine (SEROQUEL) 300 mg tablet Take 300 mg by mouth nightly.  glatiramer (COPAXONE) 40 mg/mL injection 40 mg by SubCUTAneous route every Monday, Wednesday, Friday.  diazePAM (VALIUM) 5 mg tablet Take 1 Tab by mouth every eight (8) hours as needed (muscle spasm). Max Daily Amount: 15 mg. 6 Tab 0       Past History     Past Medical History:  Past Medical History:   Diagnosis Date    Arthropathy, unspecified, site unspecified     Depression     Diabetes ([address]     Hypercholesteremia     Hypertension     history of htn    Incontinence of urine     Insomnia     Migraine     [facility]) [facility] [address]     Neurogenic bladder     Other ill-defined conditions(799.89)     multiple Sclerosis    Seizures ([facility] [address]     6/2013    Wears glasses        Past Surgical History:  Past Surgical History:   Procedure Laterality Date    HX CARPAL TUNNEL RELEASE  11/2008    HX HYSTERECTOMY      HX OTHER SURGICAL         Family History:  Family History   Problem Relation Age of Onset    Heart Disease Father     Diabetes Father     Diabetes Sister     Other Other        Social History:  Social History     Tobacco Use    Smoking status: Never Smoker    Smokeless tobacco: Never Used   Substance Use Topics    Alcohol use: No    Drug use: No       Allergies: Allergies   Allergen Reactions    Levemir [Insulin Detemir] Hives    Oxycontin [Oxycodone] Hives and Other (comments)     intolerance         Review of Systems   Review of Systems   Constitutional: Positive for fatigue. Negative for chills and fever. HENT: Negative for congestion, rhinorrhea, sore throat and trouble swallowing. Eyes: Negative for discharge, redness and itching. Respiratory: Negative for cough, shortness of breath, wheezing and stridor. Cardiovascular: Negative for chest pain, palpitations and leg swelling.    Gastrointestinal: Negative for abdominal pain, blood in stool, diarrhea, nausea and vomiting. Genitourinary: Negative for difficulty urinating and dysuria. Musculoskeletal: Positive for myalgias. Negative for arthralgias, back pain, gait problem, joint swelling and neck pain. Skin: Negative for rash. Neurological: Negative for seizures, syncope, facial asymmetry, speech difficulty, weakness, light-headedness, numbness and headaches. Psychiatric/Behavioral: Negative for behavioral problems and confusion. All other systems reviewed and are negative. Physical Exam     Vitals:    09/04/19 1317 09/04/19 1328 09/04/19 1330 09/04/19 1400   BP:  149/89 162/90 150/88   Pulse:  90     Resp:  20     Temp:  97.7 °F (36.5 °C)     SpO2: 100% 100% 100% 96%   Weight:  106.6 kg (235 lb)     Height:  5' 3\" (1.6 m)       Physical Exam   Constitutional: She is oriented to person, place, and time. She appears well-developed and well-nourished. She appears distressed. Mild distress complaining of diffuse muscle spasms   HENT:   Head: Normocephalic and atraumatic. Eyes: Pupils are equal, round, and reactive to light. Conjunctivae and EOM are normal.   Neck: Normal range of motion. Neck supple. Cardiovascular: Normal rate, regular rhythm and normal heart sounds. Pulmonary/Chest: Effort normal and breath sounds normal. She has no wheezes. She has no rales. Abdominal: Soft. Bowel sounds are normal. She exhibits no distension. There is no tenderness. Musculoskeletal: Normal range of motion. She exhibits no edema. Lymphadenopathy:     She has no cervical adenopathy. Neurological: She is alert and oriented to person, place, and time. She exhibits normal muscle tone. Normal motor and sensation throughout. Skin: Skin is warm and dry. No rash noted. She is not diaphoretic. No erythema. Nursing note and vitals reviewed.         Diagnostic Study Results     Labs -     Recent Results (from the past 12 hour(s))   POC CHEM8    Collection Time: 09/04/19  2:52 PM Result Value Ref Range    CO2, POC 27 (H) 19 - 24 MMOL/L    Glucose,  (H) 74 - 106 MG/DL    BUN, POC 10 7 - 18 MG/DL    Creatinine, POC 0.6 0.6 - 1.3 MG/DL    GFRAA, POC >60 >60 ml/min/1.73m2    GFRNA, POC >60 >60 ml/min/1.73m2    Sodium,  136 - 145 MMOL/L    Potassium, POC 3.3 (L) 3.5 - 5.5 MMOL/L    Calcium, ionized (POC) 1.19 1.12 - 1.32 mmol/L    Chloride, POC 99 (L) 100 - 108 MMOL/L    Anion gap, POC 18 10 - 20      Hematocrit, POC 43 36 - 49 %    Hemoglobin, POC 14.6 12 - 16 G/DL       Radiologic Studies -   No orders to display     CT Results  (Last 48 hours)    None        CXR Results  (Last 48 hours)    None            Medical Decision Making   I am the first provider for this patient. I reviewed the vital signs, available nursing notes, past medical history, past surgical history, family history and social history. Vital Signs-Reviewed the patient's vital signs. Records Reviewed: Old Medical Records    ED Course:   Felt better after treatment in the emergency department. Disposition:  Discharge    DISCHARGE NOTE:     Pt has been reexamined. Patient has no new complaints, changes, or physical findings. Care plan outlined and precautions discussed. Results of labs were reviewed with the patient. All medications were reviewed with the patient; will d/c home with Flexeril. All of pt's questions and concerns were addressed. Patient was instructed and agrees to follow up with her primary care provider, as well as to return to the ED upon further deterioration. Patient is ready to go home.     Follow-up Information     Follow up With Specialties Details Why Contact Info    your primary care provider  Schedule an appointment as soon as possible for a visit in 3 days      [address] EMERGENCY DEPT Emergency Medicine  As needed, If symptoms worsen [address]  [phone]    with your primary care provider  Schedule an appointment as soon as possible for a visit in 1 week      [facility] EMERGENCY DEPT Emergency Medicine  As needed, If symptoms worsen [address]  [phone]          Current Discharge Medication List      START taking these medications    Details   cyclobenzaprine (FLEXERIL) 10 mg tablet Take 1 Tab by mouth three (3) times daily as needed for Muscle Spasm(s) for up to 20 doses. Qty: 20 Tab, Refills: 0      predniSONE (DELTASONE) 20 mg tablet 3 tabs every morning for 3 days, then 2 tabs every morning for 3 days, the 1 tab every morning for 3 days, then 1/2 tab every morning for 4 days. Qty: 20 Tab, Refills: 0         CONTINUE these medications which have NOT CHANGED    Details   ondansetron (ZOFRAN ODT) 4 mg disintegrating tablet Take 1 Tab by mouth every eight (8) hours as needed for Nausea. Qty: 12 Tab, Refills: 0      gabapentin (NEURONTIN) 300 mg capsule Take 300 mg by mouth nightly. 1-2 tabs      amLODIPine (NORVASC) 10 mg tablet Take 10 mg by mouth daily. metFORMIN (GLUCOPHAGE) 500 mg tablet Take 1.5 Tabs by mouth two (2) times daily (with meals). Qty: 60 Tab, Refills: 0      DULoxetine (CYMBALTA) 60 mg capsule Take 60 mg by mouth daily. QUEtiapine (SEROQUEL) 300 mg tablet Take 300 mg by mouth nightly. glatiramer (COPAXONE) 40 mg/mL injection 40 mg by SubCUTAneous route every Monday, Wednesday, Friday. diazePAM (VALIUM) 5 mg tablet Take 1 Tab by mouth every eight (8) hours as needed (muscle spasm). Max Daily Amount: 15 mg.  Qty: 6 Tab, Refills: 0    Associated Diagnoses: Muscle spasm; Myalgia             Provider Notes (Medical Decision Making):   MS flare. No evidence of acute life threat, no indication for admission. Will treat symptomatically with Flexeril, as well as a short prednisone taper. Patient already has follow-up with her MS doctor scheduled. Diagnosis     Clinical Impression:   1. Multiple sclerosis ([address]    2.  Muscle spasm

## 2019-09-04 NOTE — DISCHARGE INSTRUCTIONS
Patient Education        Multiple Sclerosis (MS): Care Instructions  Your Care Instructions  Multiple sclerosis, also called MS, is a disease that can affect the brain, spinal cord, and nerves to the eyes. MS can cause problems with muscle control and strength, vision, balance, feeling, and thinking. Whatever your symptoms are, taking medicine correctly and following your doctor's advice for home care can help you maintain your quality of life. Follow-up care is a key part of your treatment and safety. Be sure to make and go to all appointments, and call your doctor if you are having problems. It's also a good idea to know your test results and keep a list of the medicines you take. How can you care for yourself at home? General care  · Take your medicines exactly as prescribed. Call your doctor if you think you are having a problem with your medicine. · Use a cane, walker, or scooter if your doctor suggests it. · Keep doing your normal activities as much as you can. · If you have problems urinating, press or tap your bladder area to help start urine flow. If you have trouble controlling your urine, plan your fluid intake and activities so that a toilet will be available when you need it. · Spend time with family and friends. Join a support group for people with MS if you want extra help. · Depression is common with this condition. Tell your doctor if you have trouble sleeping, are eating too much or are not hungry, or feel sad or tearful all the time. Depression can be treated with medicine and counseling. Diet and exercise  · Eat a balanced diet. · If you have problems swallowing, change how and what you eat:  ? Try thick drinks, such as milk shakes. They are easier to swallow than other fluids. ? Do not eat foods that crumble easily. These can cause choking. ? Use a  to prepare food. Soft foods need less chewing.   ? Eat small meals often so that you do not get tired from eating larger meals.  · Get exercise on most days. Work with your doctor to set up a program of walking, swimming, or other exercise that you are able to do. A physical therapist can teach you exercises if you cannot walk but can move your limbs and trunk. Or you can do exercises to help with coordination and balance. You can help improve muscle stiffness by doing exercises while lying in certain positions. When should you call for help? Call your doctor now or seek immediate medical care if:    · You have a change in symptoms.     · You fall or have another injury.     · You have symptoms of a urinary infection. For example:  ? You have blood or pus in your urine. ? You have pain in your back just below your rib cage. This is called flank pain. ? You have a fever, chills, or body aches. ? It hurts to urinate. ? You have groin or belly pain.    Watch closely for changes in your health, and be sure to contact your doctor if:    · You want more information about MS or medicines.     · You have questions about alternative treatments. Do not use any other treatments without talking to your doctor first.   Where can you learn more? Go to http://annel-sadie.info/. Enter P412 in the search box to learn more about \"Multiple Sclerosis (MS): Care Instructions. \"  Current as of: March 28, 2019  Content Version: 12.1  © 9236-9836 [employer]. Care instructions adapted under license by [employer] (which disclaims liability or warranty for this information). If you have questions about a medical condition or this instruction, always ask your healthcare professional. [address] any warranty or liability for your use of this information.

## 2019-09-04 NOTE — ED NOTES
I have reviewed discharge instruction and prescriptions with patient. Patient verbalized understanding and has no further questions at this time. Education taught and patient verbalized understanding of education. Teach back method used. Left  IV removed, catheter tip intact on removal.  Armband removed and shredded per patients request.    Patients pain 3/10. Belongings given to patient. Patient discharged with self to waiting room to awake son.

## 2019-09-04 NOTE — ED TRIAGE NOTES
Pt arrived via EMS AAOx4 with c/o muscle spasms and pain from MS. Pt seen yesterday. Pt states weather change may be contributing to issues and steroids will help.

## 2019-09-04 NOTE — ED PROVIDER NOTES
To Tapia is a 40 y.o. Female with history of multiple sclerosis who states she has been having a multiple sclerosis flareup for the last 2 days symptoms of generalized headache which feels like her previous migraines along with muscle spasms and burning in her nerves. Patient states she has nausea but does not vomit. She is no fever. There is no new swelling. She has no new chest pain or shortness of breath or productive cough. She tried taking her home medications without relief. There is been no syncopal event. She has no new visual changes or difficulty swallowing. There is tingling but no new numbness or weakness. Symptoms are worse with exertion. The history is provided by the patient and medical records.         Past Medical History:   Diagnosis Date    Arthropathy, unspecified, site unspecified     Depression     Diabetes ([address]     Hypercholesteremia     Hypertension     history of htn    Incontinence of urine     Insomnia     Migraine     MS (multiple sclerosis) (HCC)     Neurogenic bladder     Other ill-defined conditions(799.89)     multiple Sclerosis    Se[facility] [address]     6/2013    Wears glasses        Past Surgical History:   Procedure Laterality Date    HX CARPAL TUNNEL RELEASE  11/2008    HX HYSTERECTOMY      HX OTHER SURGICAL           Family History:   Problem Relation Age of Onset    Heart Disease Father     Diabetes Father     Diabetes Sister     Other Other        Social History     Socioeconomic History    Marital status:      Spouse name: Not on file    Number of children: Not on file    Years of education: Not on file    Highest education level: Not on file   Occupational History    Not on file   Social Needs    Financial resource strain: Not on file    Food insecurity:     Worry: Not on file     Inability: Not on file    Transportation needs:     Medical: Not on file     Non-medical: Not on file   Tobacco Use    Smoking status: Never Smoker    Smokeless tobacco: Never Used   Substance and Sexual Activity    Alcohol use: No    Drug use: No    Sexual activity: Not on file     Comment: Hysterectomy   Lifestyle    Physical activity:     Days per week: Not on file     Minutes per session: Not on file    Stress: Not on file   Relationships    Social connections:     Talks on phone: Not on file     Gets together: Not on file     Attends [employer] service: Not on file     Active member of club or organization: Not on file     Attends meetings of clubs or organizations: Not on file     Relationship status: Not on file    Intimate partner violence:     Fear of current or ex partner: Not on file     Emotionally abused: Not on file     Physically abused: Not on file     Forced sexual activity: Not on file   Other Topics Concern    Not on file   Social History Narrative    Not on file         ALLERGIES: Levemir [insulin detemir] and Oxycontin [oxycodone]    Review of Systems   Constitutional: Positive for chills and fatigue. Negative for fever. HENT: Negative for sore throat and trouble swallowing. Eyes: Negative for visual disturbance. Respiratory: Negative for shortness of breath. Cardiovascular: Negative for chest pain. Gastrointestinal: Negative for abdominal pain. Genitourinary: Negative for difficulty urinating and dysuria. Musculoskeletal: Positive for myalgias. Negative for joint swelling. Skin: Negative for rash. Allergic/Immunologic: Negative for immunocompromised state. Neurological: Positive for headaches. Negative for syncope. Psychiatric/Behavioral: Positive for sleep disturbance. The patient is nervous/anxious.         Vitals:    09/03/19 2011 09/03/19 2115 09/03/19 2130 09/03/19 2145   BP: (!) 153/97 (!) 153/99 (!) 117/99 141/89   Pulse: 94 88 94 89   Resp: 19 20 20 19   Temp: 98.1 °F (36.7 °C)      SpO2: 100% 100% 98% 96%   Weight: 106.6 kg (235 lb)      Height: 5' 3\" (1.6 m)               Physical Exam Constitutional: She is oriented to person, place, and time. She appears well-developed and well-nourished. Non-toxic appearance. She does not have a sickly appearance. She appears ill. She appears distressed. HENT:   Head: Normocephalic and atraumatic. Right Ear: External ear normal.   Left Ear: External ear normal.   Nose: Nose normal.   Mouth/Throat: Uvula is midline, oropharynx is clear and moist and mucous membranes are normal.   Eyes: Pupils are equal, round, and reactive to light. Conjunctivae and EOM are normal. No scleral icterus. Neck: Normal range of motion. Neck supple. Cardiovascular: Normal rate, regular rhythm, normal heart sounds and intact distal pulses. Pulmonary/Chest: Effort normal and breath sounds normal.   Abdominal: Soft. There is no tenderness. Musculoskeletal: She exhibits no edema. Neurological: She is alert and oriented to person, place, and time. Gait normal.   Skin: Skin is warm and dry. Capillary refill takes less than 2 seconds. She is not diaphoretic. Psychiatric: Her mood appears anxious. Nursing note and vitals reviewed.        MDM       Procedures    Vitals:  Patient Vitals for the past 12 hrs:   Temp Pulse Resp BP SpO2   09/03/19 2145  89 19 141/89 96 %   09/03/19 2130  94 20 (!) 117/99 98 %   09/03/19 2115  88 20 (!) 153/99 100 %   09/03/19 2011 98.1 °F (36.7 °C) 94 19 (!) 153/97 100 %         Medications ordered:   Medications   diazePAM (VALIUM) tablet 5 mg (has no administration in time range)   sodium chloride 0.9 % bolus infusion 1,000 mL (1,000 mL IntraVENous New Bag 9/3/19 2124)   ketorolac (TORADOL) injection 30 mg (30 mg IntraVENous Given 9/3/19 2126)   prochlorperazine (COMPAZINE) injection 10 mg (10 mg IntraVENous Given 9/3/19 2124)   diphenhydrAMINE (BENADRYL) injection 25 mg (25 mg IntraVENous Given 9/3/19 2127)   diazePAM (VALIUM) tablet 5 mg (5 mg Oral Given 9/3/19 2122)         Lab findings:  Recent Results (from the past 12 hour(s))   CBC WITH AUTOMATED DIFF    Collection Time: 09/03/19  8:36 PM   Result Value Ref Range    WBC 10.5 4.6 - 13.2 K/uL    RBC 4.87 4.20 - 5.30 M/uL    HGB 13.8 12.0 - 16.0 g/dL    HCT 39.3 35.0 - 45.0 %    MCV 80.7 74.0 - 97.0 FL    MCH 28.3 24.0 - 34.0 PG    MCHC 35.1 31.0 - 37.0 g/dL    RDW 12.9 11.6 - 14.5 %    PLATELET 748 (H) 275 - 420 K/uL    MPV 9.5 9.2 - 11.8 FL    NEUTROPHILS 46 40 - 73 %    LYMPHOCYTES 47 21 - 52 %    MONOCYTES 6 3 - 10 %    EOSINOPHILS 1 0 - 5 %    BASOPHILS 0 0 - 2 %    ABS. NEUTROPHILS 4.9 1.8 - 8.0 K/UL    ABS. LYMPHOCYTES 4.9 (H) 0.9 - 3.6 K/UL    ABS. MONOCYTES 0.6 0.05 - 1.2 K/UL    ABS. EOSINOPHILS 0.1 0.0 - 0.4 K/UL    ABS. BASOPHILS 0.0 0.0 - 0.1 K/UL    DF AUTOMATED     METABOLIC PANEL, COMPREHENSIVE    Collection Time: 09/03/19  8:36 PM   Result Value Ref Range    Sodium 137 136 - 145 mmol/L    Potassium 3.7 3.5 - 5.5 mmol/L    Chloride 99 (L) 100 - 111 mmol/L    CO2 29 21 - 32 mmol/L    Anion gap 9 3.0 - 18 mmol/L    Glucose 156 (H) 74 - 99 mg/dL    BUN 10 7.0 - 18 MG/DL    Creatinine 0.76 0.6 - 1.3 MG/DL    BUN/Creatinine ratio 13 12 - 20      GFR est AA >60 >60 ml/min/1.73m2    GFR est non-AA >60 >60 ml/min/1.73m2    Calcium 10.1 8.5 - 10.1 MG/DL    Bilirubin, total 0.4 0.2 - 1.0 MG/DL    ALT (SGPT) 55 13 - 56 U/L    AST (SGOT) 23 10 - 38 U/L    Alk. phosphatase 102 45 - 117 U/L    Protein, total 8.0 6.4 - 8.2 g/dL    Albumin 3.8 3.4 - 5.0 g/dL    Globulin 4.2 (H) 2.0 - 4.0 g/dL    A-G Ratio 0.9 0.8 - 1.7     CK    Collection Time: 09/03/19  8:36 PM   Result Value Ref Range    CK 90 26 - 192 U/L       EKG interpretation by ED Physician:      X-Ray, CT or other radiology findings or impressions:  No orders to display       Progress notes, Consult notes or additional Procedure notes:   Feeling better after medications here. Do not feel patient requires other work-up at this time.   Most consistent with multiple sclerosis flare with muscle spasms    I have discussed with patient and/or family/sig other the results, interpretation of any imaging if performed, suspected diagnosis and treatment plan to include instructions regarding the diagnoses listed to which understanding was expressed with all questions answered      Reevaluation of patient:   stable    Disposition:  Diagnosis:   1. Acute nonintractable headache, unspecified headache type    2. Nausea without vomiting    3. Myalgia    4. Muscle spasm    5. Multiple sclerosis ([address]        Disposition: home    Follow-up Information     Follow up With Specialties Details Why Contact Info    Jose Nath MD [facility] Schedule an appointment as soon as possible for a visit  [address]  [address] [place] [address] Dr Marlo Mejia [address]      [facility] EMERGENCY DEPT Emergency Medicine  If symptoms worsen [address]  [phone]            Patient's Medications   Start Taking    DIAZEPAM (VALIUM) 5 MG TABLET    Take 1 Tab by mouth every eight (8) hours as needed (muscle spasm). Max Daily Amount: 15 mg.    ONDANSETRON (ZOFRAN ODT) 4 MG DISINTEGRATING TABLET    Take 1 Tab by mouth every eight (8) hours as needed for Nausea. Continue Taking    AMLODIPINE (NORVASC) 10 MG TABLET    Take 10 mg by mouth daily. DULOXETINE (CYMBALTA) 60 MG CAPSULE    Take 60 mg by mouth daily. GABAPENTIN (NEURONTIN) 300 MG CAPSULE    Take 300 mg by mouth nightly. 1-2 tabs    GLATIRAMER (COPAXONE) 40 MG/ML INJECTION    40 mg by SubCUTAneous route every Monday, Wednesday, Friday. METFORMIN (GLUCOPHAGE) 500 MG TABLET    Take 1.5 Tabs by mouth two (2) times daily (with meals). QUETIAPINE (SEROQUEL) 300 MG TABLET    Take 300 mg by mouth nightly.    These Medications have changed    No medications on file   Stop Taking    No medications on file

## 2019-09-04 NOTE — DISCHARGE INSTRUCTIONS
Patient Education        Muscle Cramps: Care Instructions  Your Care Instructions    A muscle cramp occurs when a muscle tightens up suddenly. A cramp often happens in the legs. A muscle cramp is also called a muscle spasm or a charley horse. Muscle cramps usually last less than a minute. However, the pain may last for several minutes. Leg cramps that occur at night may wake you up. Heavy exercise, dehydration, and being overweight can increase your risk of getting cramps. An imbalance of certain chemicals in your blood, called electrolytes, can also lead to muscle cramps. Pregnant women sometimes get muscle cramps during sleep. Muscle cramps can be treated by stretching and massaging the muscle. If cramps keep coming back, your doctor may prescribe medicine that relaxes your muscles. Follow-up care is a key part of your treatment and safety. Be sure to make and go to all appointments, and call your doctor if you are having problems. It's also a good idea to know your test results and keep a list of the medicines you take. How can you care for yourself at home? · Drink plenty of fluids to prevent dehydration. Choose water and other caffeine-free clear liquids until you feel better. If you have kidney, heart, or liver disease and have to limit fluids, talk with your doctor before you increase the amount of fluids you drink. · Stretch your muscles every day, especially before and after exercise and at bedtime. Regular stretching can relax your muscles and may prevent cramps. · Do not suddenly increase the amount of exercise you get. Increase your exercise a little each week. · When you get a cramp, stretch and massage the muscle. You can also take a warm shower or bath to relax the muscle. A heating pad placed on the muscle can also help. · Take a daily multivitamin supplement.   · Ask your doctor if you can take an over-the-counter pain medicine, such as acetaminophen (Tylenol), ibuprofen (Advil, Motrin), or naproxen (Aleve). Be safe with medicines. Read and follow all instructions on the label. When should you call for help? Watch closely for changes in your health, and be sure to contact your doctor if:    · You get muscle cramps often that do not go away after home treatment.     · Your muscle cramps often wake you up at night.     · You do not get better as expected. Where can you learn more? Go to http://annel-sadie.info/. Enter E795 in the search box to learn more about \"Muscle Cramps: Care Instructions. \"  Current as of: September 20, 2018  Content Version: 12.1  © 2309-8887 [employer]. Care instructions adapted under license by [employer] (which disclaims liability or warranty for this information). If you have questions about a medical condition or this instruction, always ask your healthcare professional. [place] [address] any warranty or liability for your use of this information. Patient Education        Head or Face Pain: Care Instructions  Your Care Instructions    Common causes of head or face pain are allergies, stress, and injuries. Other causes include tooth problems and sinus infections. Eating certain foods, such as chocolate or cheese, or drinking certain liquids, such as coffee or cola, can cause head pain for some people. If you have mild head pain, you may not need treatment. It is important to watch your symptoms and talk to your doctor if your pain continues or gets worse. Follow-up care is a key part of your treatment and safety. Be sure to make and go to all appointments, and call your doctor if you are having problems. It's also a good idea to know your test results and keep a list of the medicines you take. How can you care for yourself at home? · Take pain medicines exactly as directed. ? If the doctor gave you a prescription medicine for pain, take it as prescribed.   ? If you are not taking a prescription pain medicine, ask your doctor if you can take an over-the-counter pain medicine. · Take it easy for the next few days or longer if you are not feeling well. · Use a warm, moist towel or heating pad set on low to relax tight muscles in your shoulder and neck. Have someone gently massage your neck and shoulders. · Put ice or a cold pack on the area for 10 to 20 minutes at a time. Put a thin cloth between the ice and your skin. When should you call for help? Call 911 anytime you think you may need emergency care. For example, call if:    · You have twitching, jerking, or a seizure.     · You passed out (lost consciousness).     · You have symptoms of a stroke. These may include:  ? Sudden numbness, tingling, weakness, or loss of movement in your face, arm, or leg, especially on only one side of your body. ? Sudden vision changes. ? Sudden trouble speaking. ? Sudden confusion or trouble understanding simple statements. ? Sudden problems with walking or balance. ? A sudden, severe headache that is different from past headaches.     · You have jaw pain and pain in your chest, shoulder, neck, or arm.    Call your doctor now or seek immediate medical care if:    · You have a fever with a stiff neck or a severe headache.     · You have nausea and vomiting, or you cannot keep food or liquids down.    Watch closely for changes in your health, and be sure to contact your doctor if:    · Your head or face pain does not get better as expected. Where can you learn more? Go to http://annel-sadie.info/. Enter P568 in the search box to learn more about \"Head or Face Pain: Care Instructions. \"  Current as of: September 23, 2018  Content Version: 12.1  © 0979-9451 [employer]. Care instructions adapted under license by [employer] (which disclaims liability or warranty for this information).  If you have questions about a medical condition or this instruction, always ask your healthcare professional. [address] any warranty or liability for your use of this information.

## 2019-09-04 NOTE — PROGRESS NOTES
Chart reviewed for multiple ED visits. Met with pt at bedside. She states that her PCP is at [facility]. She's not happy because she sees different doctor everytime she goes. She wants one PCP who she can see for her appointment. Pt agreed for me to assist. Appointment scheduled with Dr Jeremy Riggins on 10/4/19 at [phone]. Information given to pt.

## 2019-09-04 NOTE — ED TRIAGE NOTES
Pt [email or web address] arrived via EMS with c/o headache 10/10 for a day, pt reports \"I have MS and this is related to my MS\"

## 2020-01-31 NOTE — PROGRESS NOTES
Problem: Falls - Risk of  Goal: *Absence of Falls  Document Jordyn Fall Risk and appropriate interventions in the flowsheet.    Outcome: Progressing Towards Goal  Fall Risk Interventions:              Medication Interventions: Teach patient to arise slowly     Elimination Interventions: Call light in reach, Patient to call for help with toileting needs, Toileting schedule/hourly rounds     History of Falls Interventions: Door open when patient unattended General

## 2020-02-28 LAB — HBA1C MFR BLD HPLC: 6.4 %

## 2020-03-17 ENCOUNTER — HOSPITAL ENCOUNTER (EMERGENCY)
Age: 45
Discharge: HOME | End: 2020-03-17
Attending: EMERGENCY MEDICINE
Payer: MEDICARE

## 2020-03-17 ENCOUNTER — APPOINTMENT (OUTPATIENT)
Dept: GENERAL RADIOLOGY | Age: 45
End: 2020-03-17
Attending: PHYSICIAN ASSISTANT
Payer: MEDICARE

## 2020-03-17 VITALS
DIASTOLIC BLOOD PRESSURE: 105 MMHG | BODY MASS INDEX: 45.18 KG/M2 | RESPIRATION RATE: 20 BRPM | OXYGEN SATURATION: 99 % | HEIGHT: 63 IN | SYSTOLIC BLOOD PRESSURE: 156 MMHG | TEMPERATURE: 98 F | WEIGHT: 255 LBS | HEART RATE: 100 BPM
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
## 2020-03-17 DIAGNOSIS — S63.501A SPRAIN OF RIGHT WRIST, INITIAL ENCOUNTER: Primary | ICD-10-CM

## 2020-03-17 PROCEDURE — 74011250637 HC RX REV CODE- 250/637: Performed by: PHYSICIAN ASSISTANT

## 2020-03-17 PROCEDURE — 75810000053 HC SPLINT APPLICATION

## 2020-03-17 PROCEDURE — 73110 X-RAY EXAM OF WRIST: CPT

## 2020-03-17 PROCEDURE — 99283 EMERGENCY DEPT VISIT LOW MDM: CPT

## 2020-03-17 RX ORDER — TRAMADOL HYDROCHLORIDE 50 MG/1
50 TABLET ORAL
Status: DISCONTINUED | OUTPATIENT
Start: 2020-03-17 | End: 2020-03-17 | Stop reason: HOSPADM

## 2020-03-17 RX ADMIN — TRAMADOL HYDROCHLORIDE 50 MG: 50 TABLET, FILM COATED ORAL at 15:26

## 2020-03-17 NOTE — ED PROVIDER NOTES
[facility] EMERGENCY DEPT      Date: 3/17/2020  Patient Name: Kiley Ge    History of Presenting Illness     Chief Complaint   Patient presents with    Wrist Pain       39 y.o. female with noted past medical history presents to the ED complaining of right wrist pain since prior to arrival.  Patient states she slipped in her kitchen, landing on her right wrist.  Describes having a constant moderate aching pain, worse with movement. States she did not have any other injury, including head neck or back injury. She denies any numbness or weakness, bruising, other symptoms. Patient denies any other associated signs or symptoms. Patient denies any other complaints. Nursing notes regarding the HPI and triage nursing notes were reviewed. Prior medical records were reviewed. Current Facility-Administered Medications   Medication Dose Route Frequency Provider Last Rate Last Dose    traMADoL (ULTRAM) tablet 50 mg  50 mg Oral Q6H PRN Neeru Rodrigez PA   50 mg at 03/17/20 1526     Current Outpatient Medications   Medication Sig Dispense Refill    predniSONE (DELTASONE) 20 mg tablet 3 tabs every morning for 3 days, then 2 tabs every morning for 3 days, the 1 tab every morning for 3 days, then 1/2 tab every morning for 4 days. 20 Tab 0    diazePAM (VALIUM) 5 mg tablet Take 1 Tab by mouth every eight (8) hours as needed (muscle spasm). Max Daily Amount: 15 mg. 6 Tab 0    ondansetron (ZOFRAN ODT) 4 mg disintegrating tablet Take 1 Tab by mouth every eight (8) hours as needed for Nausea. 12 Tab 0    gabapentin (NEURONTIN) 300 mg capsule Take 300 mg by mouth nightly. 1-2 tabs      amLODIPine (NORVASC) 10 mg tablet Take 10 mg by mouth daily.  metFORMIN (GLUCOPHAGE) 500 mg tablet Take 1.5 Tabs by mouth two (2) times daily (with meals). 60 Tab 0    DULoxetine (CYMBALTA) 60 mg capsule Take 60 mg by mouth daily.  QUEtiapine (SEROQUEL) 300 mg tablet Take 300 mg by mouth nightly.       glatiramer (COPAXONE) 40 mg/mL injection 40 mg by SubCUTAneous route every Monday, Wednesday, Friday. Past History     Past Medical History:  Past Medical History:   Diagnosis Date    Arthropathy, unspecified, site unspecified     Depression     Diabetes ([address]     Hypercholesteremia     Hypertension     history of htn    Incontinence of urine     Insomnia     Migraine     MS (multiple sclerosis) ([address]     Neurogenic bladder     Other ill-defined conditions(799.89)     multiple Sclerosis    Seizures ([address]     6/2013    Wears glasses        Past Surgical History:  Past Surgical History:   Procedure Laterality Date    HX CARPAL TUNNEL RELEASE  11/2008    HX HYSTERECTOMY      HX OTHER SURGICAL         Family History:  Family History   Problem Relation Age of Onset    Heart Disease Father     Diabetes Father     Diabetes Sister     Other Other        Social History:  Social History     Tobacco Use    Smoking status: Never Smoker    Smokeless tobacco: Never Used   Substance Use Topics    Alcohol use: No    Drug use: No       Allergies: Allergies   Allergen Reactions    Levemir [Insulin Detemir] Hives    Oxycontin [Oxycodone] Hives and Other (comments)     intolerance       Patient's primary care provider (as noted in EPIC):  None    Review of Systems   Constitutional:  Denies malaise, fever, chills. Head:  Denies injury. Neck:  Denies injury or pain. Back:  Denies injury or pain. Pelvis:  Denies injury or pain. Extremity/MS: + right wrist injury/pain. Neuro:  Denies headache, LOC, dizziness, neurologic symptoms/deficits/paresthesias. Skin: Denies injury, rash, itching or skin changes. All other systems negative as reviewed.      Visit Vitals  BP (!) 156/105 (BP 1 Location: Left arm, BP Patient Position: Sitting)   Pulse 100   Temp 98 °F (36.7 °C)   Resp 20   Ht 5' 3\" (1.6 m)   Wt 115.7 kg (255 lb)   SpO2 99%   BMI 45.17 kg/m²       PHYSICAL EXAM:    CONSTITUTIONAL:  Alert, in no apparent distress;  well developed;  well nourished. HEAD:  Normocephalic, atraumatic. EYES:  EOMI. Non-icteric sclera. Normal conjunctiva. ENTM:  Mouth:  mucous membranes moist.  NECK:  Supple  RESPIRATORY:  Chest clear, equal breath sounds, good air movement. CARDIOVASCULAR:  Regular rate and rhythm. No murmurs, rubs, or gallops. UPPER EXT: Right wrist with tenderness to palpation over the radial aspect including the anatomic snuffbox, neurovascularly intact distally with 5 out of 5  strength. LOWER EXT: Ambulating normally. NEURO:  Moves all four extremities, and grossly normal motor exam.  SKIN:  No rashes;  Normal for age. PSYCH:  Alert and normal affect. DIFFERENTIAL DIAGNOSES/ MEDICAL DECISION MAKING:  Contusion, sprain, dislocation, fracture, ligamentous tear/ disruption or a combination of the above. Xr Wrist Rt Ap/lat/obl Min 3v    Result Date: 3/17/2020  EXAM: XR WRIST RT AP/LAT/OBL MIN 3V CLINICAL INDICATION/HISTORY: Pain -Additional: None COMPARISON: 3/17/2019 TECHNIQUE: 3 views of the right wrist _______________ FINDINGS: BONES: Unremarkable. SOFT TISSUES: Unremarkable. _______________     IMPRESSION: No significant abnormality. IMPRESSION AND MEDICAL DECISION MAKING:  Based upon the patients presentation with noted HPI and PE, along with the work up done in the emergency department, I believe that the patient is having noted wrist sprain. No fracture noted on x-ray, however, she has anatomic snuffbox tenderness. Will place in a thumb spica and have her keep it on until she is seen by orthopedics. Patient states she will follow-up without fail. She will take ibuprofen, rest, elevate, return for any worsening. SPLINT NOTE: 4:27 PM 3/17/2020  Splint applied as ordered by: Ganga Ch RN  TYPE of Splint: Thumb spica, velcro  Location: Right   Neurovascular intact prior to application of splint. Neurovascular intact after application of splint.   Splint in acceptable position of comfort. LAURIE Salamanca    Diagnosis:   1. Sprain of right wrist, initial encounter    2. Fall, initial encounter      Disposition: Discharge    Follow-up Information     Follow up With Specialties Details Why [address] [facility], Sarah Mott  In 1 week  Agustin Alvar[address]    [facility] EMERGENCY DEPT Emergency Medicine  If symptoms worsen [address]  [phone]          Discharge Medication List as of 3/17/2020  3:56 PM      CONTINUE these medications which have NOT CHANGED    Details   predniSONE (DELTASONE) 20 mg tablet 3 tabs every morning for 3 days, then 2 tabs every morning for 3 days, the 1 tab every morning for 3 days, then 1/2 tab every morning for 4 days. , Print, Disp-20 Tab, R-0      diazePAM (VALIUM) 5 mg tablet Take 1 Tab by mouth every eight (8) hours as needed (muscle spasm). Max Daily Amount: 15 mg., Print, Disp-6 Tab, R-0      ondansetron (ZOFRAN ODT) 4 mg disintegrating tablet Take 1 Tab by mouth every eight (8) hours as needed for Nausea. , Print, Disp-12 Tab, R-0      gabapentin (NEURONTIN) 300 mg capsule Take 300 mg by mouth nightly. 1-2 tabs, Historical Med      amLODIPine (NORVASC) 10 mg tablet Take 10 mg by mouth daily. , Historical Med      metFORMIN (GLUCOPHAGE) 500 mg tablet Take 1.5 Tabs by mouth two (2) times daily (with meals). , Print, Disp-60 Tab, R-0      DULoxetine (CYMBALTA) 60 mg capsule Take 60 mg by mouth daily. , Historical Med      QUEtiapine (SEROQUEL) 300 mg tablet Take 300 mg by mouth nightly., Historical Med      glatiramer (COPAXONE) 40 mg/mL injection 40 mg by SubCUTAneous route every Monday, Wednesday, Friday., Historical Med         STOP taking these medications       cyclobenzaprine (FLEXERIL) 10 mg tablet Comments:   Reason for Stopping:             LAURIE Salamanca

## 2020-03-17 NOTE — ED TRIAGE NOTES
Pt arrives after slipping on a wet floor and caught herself with the right wrist.and now painful and swollen.

## 2020-03-18 ENCOUNTER — PATIENT OUTREACH (OUTPATIENT)
Dept: CASE MANAGEMENT | Age: 45
End: 2020-03-18

## 2020-03-18 NOTE — PROGRESS NOTES
COVID-19 Screening Initial Follow-up Note    Patient contacted regarding COVID-19  risk. Care Transition Nurse/ Ambulatory Care Manager contacted the patient by telephone to perform post discharge assessment. Verified name and  with patient as identifiers. Provided introduction to self, and explanation of the CTN/ACM role, and reason for call due to risk factors for infection and/or exposure to COVID-19. Patient states that her wrist still feel the same but she has an upcoming appt with her orthopedic first week of April. Patient states that she has MS so she has something to help her with discomfort. Patient denied CP, SOB, cough, fever and chills. No questions, concerns and/pr needs at this time as per Pt. Symptoms reviewed with patient who verbalized the following symptoms:   Fever no    Fatigue no   Pain or aching joints yes wrist .   Cough no  Shortness of breath no    Confusion or unusual change in mental status no    Chills or shaking no    Sweating no    Fast heart rate no    Fast breathing no    Dizziness/lightheadedness no    Less urine output no    Cold, clammy, and pale skin no  Low body temperature no        Patient has following risk factors of: DM CTN/ACM reviewed discharge instructions, medical action plan and red flags such as increased shortness of breath, increasing fever and signs of decompensation with patient who verbalized understanding. Discussed exposure protocols and quarantine with CDC Guidelines What to do if you are sick with coronavirus disease 2019 Patient who was given an opportunity for questions and concerns. The patient agrees to contact the [facility] exposure line, local health department and PCP office for questions related to their healthcare. CTN provided contact information for future reference.      Plan for follow-up call in 14 days based on severity of symptoms and risk factors

## 2020-04-06 ENCOUNTER — PATIENT OUTREACH (OUTPATIENT)
Dept: CASE MANAGEMENT | Age: 45
End: 2020-04-06

## 2020-04-06 NOTE — PROGRESS NOTES
Patient resolved from Transition of Care episode on 4/6/20    Patient/family has been provided the following resources and education related to COVID-19:                         Signs, symptoms and red flags related to COVID-19            CDC exposure and quarantine guidelines            [facility] exposure contact - [phone]            Contact for their local Department of Health               Patient states that she has a broken thumb and she hast cast. Patient states that she attended her appt with Orthopedic and has a follow up appt in two weeks. Strongly advised Pt. To call surgeon for any questions regarding her cast or her thumb. Also advised Pt. To call PCP office for any questions and/or needs regarding her health, Pt. States okay. Patient denied CP, SOB, fever, chills and cough at this time. Patient currently reports that the following symptoms have improved:  no new/worsening symptoms     No further outreach scheduled with this CTN. Episode of Care resolved. Patient has this CTN contact information if future needs arise.

## 2020-09-14 ENCOUNTER — HOSPITAL ENCOUNTER (EMERGENCY)
Age: 45
Discharge: LEFT BEFORE DISCHARGE | End: 2020-09-15
Attending: EMERGENCY MEDICINE
Payer: MEDICARE

## 2020-09-14 VITALS
WEIGHT: 250 LBS | BODY MASS INDEX: 44.29 KG/M2 | DIASTOLIC BLOOD PRESSURE: 114 MMHG | RESPIRATION RATE: 16 BRPM | SYSTOLIC BLOOD PRESSURE: 182 MMHG | OXYGEN SATURATION: 99 % | HEART RATE: 99 BPM | TEMPERATURE: 98.3 F

## 2020-09-14 DIAGNOSIS — M54.9 BACK PAIN, UNSPECIFIED BACK LOCATION, UNSPECIFIED BACK PAIN LATERALITY, UNSPECIFIED CHRONICITY: Primary | ICD-10-CM

## 2020-09-14 LAB
APPEARANCE UR: CLEAR
BILIRUB UR QL: NEGATIVE
COLOR UR: YELLOW
GLUCOSE UR STRIP.AUTO-MCNC: NEGATIVE MG/DL
HGB UR QL STRIP: NEGATIVE
KETONES UR QL STRIP.AUTO: NEGATIVE MG/DL
LEUKOCYTE ESTERASE UR QL STRIP.AUTO: NEGATIVE
NITRITE UR QL STRIP.AUTO: NEGATIVE
PH UR STRIP: 6.5 [PH] (ref 5–8)
PROT UR STRIP-MCNC: NEGATIVE MG/DL
SP GR UR REFRACTOMETRY: <1.005 (ref 1–1.03)
UROBILINOGEN UR QL STRIP.AUTO: 0.2 EU/DL (ref 0.2–1)

## 2020-09-14 PROCEDURE — 96375 TX/PRO/DX INJ NEW DRUG ADDON: CPT

## 2020-09-14 PROCEDURE — 81003 URINALYSIS AUTO W/O SCOPE: CPT

## 2020-09-14 PROCEDURE — 96374 THER/PROPH/DIAG INJ IV PUSH: CPT

## 2020-09-14 PROCEDURE — 99283 EMERGENCY DEPT VISIT LOW MDM: CPT

## 2020-09-15 ENCOUNTER — APPOINTMENT (OUTPATIENT)
Dept: MRI IMAGING | Age: 45
End: 2020-09-15
Attending: EMERGENCY MEDICINE
Payer: MEDICARE

## 2020-09-15 LAB
ALBUMIN SERPL-MCNC: 3.5 G/DL (ref 3.4–5)
ALBUMIN/GLOB SERPL: 0.9 {RATIO} (ref 0.8–1.7)
ALP SERPL-CCNC: 76 U/L (ref 45–117)
ALT SERPL-CCNC: 30 U/L (ref 13–56)
ANION GAP SERPL CALC-SCNC: 7 MMOL/L (ref 3–18)
AST SERPL-CCNC: 14 U/L (ref 10–38)
BASOPHILS # BLD: 0 K/UL (ref 0–0.1)
BASOPHILS NFR BLD: 0 % (ref 0–2)
BILIRUB SERPL-MCNC: 0.4 MG/DL (ref 0.2–1)
BUN SERPL-MCNC: 8 MG/DL (ref 7–18)
BUN/CREAT SERPL: 13 (ref 12–20)
CALCIUM SERPL-MCNC: 9.1 MG/DL (ref 8.5–10.1)
CHLORIDE SERPL-SCNC: 101 MMOL/L (ref 100–111)
CO2 SERPL-SCNC: 28 MMOL/L (ref 21–32)
CREAT SERPL-MCNC: 0.61 MG/DL (ref 0.6–1.3)
DIFFERENTIAL METHOD BLD: ABNORMAL
EOSINOPHIL # BLD: 0.2 K/UL (ref 0–0.4)
EOSINOPHIL NFR BLD: 2 % (ref 0–5)
ERYTHROCYTE [DISTWIDTH] IN BLOOD BY AUTOMATED COUNT: 13.6 % (ref 11.6–14.5)
GLOBULIN SER CALC-MCNC: 4.1 G/DL (ref 2–4)
GLUCOSE SERPL-MCNC: 148 MG/DL (ref 74–99)
HCT VFR BLD AUTO: 35.5 % (ref 35–45)
HGB BLD-MCNC: 12.5 G/DL (ref 12–16)
LIPASE SERPL-CCNC: 133 U/L (ref 73–393)
LYMPHOCYTES # BLD: 4.1 K/UL (ref 0.9–3.6)
LYMPHOCYTES NFR BLD: 44 % (ref 21–52)
MCH RBC QN AUTO: 29.3 PG (ref 24–34)
MCHC RBC AUTO-ENTMCNC: 35.2 G/DL (ref 31–37)
MCV RBC AUTO: 83.1 FL (ref 74–97)
MONOCYTES # BLD: 0.5 K/UL (ref 0.05–1.2)
MONOCYTES NFR BLD: 6 % (ref 3–10)
NEUTS SEG # BLD: 4.4 K/UL (ref 1.8–8)
NEUTS SEG NFR BLD: 48 % (ref 40–73)
PLATELET # BLD AUTO: 543 K/UL (ref 135–420)
PMV BLD AUTO: 10 FL (ref 9.2–11.8)
POTASSIUM SERPL-SCNC: 3.1 MMOL/L (ref 3.5–5.5)
PROT SERPL-MCNC: 7.6 G/DL (ref 6.4–8.2)
RBC # BLD AUTO: 4.27 M/UL (ref 4.2–5.3)
SODIUM SERPL-SCNC: 136 MMOL/L (ref 136–145)
WBC # BLD AUTO: 9.3 K/UL (ref 4.6–13.2)

## 2020-09-15 PROCEDURE — A9575 INJ GADOTERATE MEGLUMI 0.1ML: HCPCS | Performed by: EMERGENCY MEDICINE

## 2020-09-15 PROCEDURE — 80053 COMPREHEN METABOLIC PANEL: CPT

## 2020-09-15 PROCEDURE — 83690 ASSAY OF LIPASE: CPT

## 2020-09-15 PROCEDURE — 72158 MRI LUMBAR SPINE W/O & W/DYE: CPT

## 2020-09-15 PROCEDURE — 85025 COMPLETE CBC W/AUTO DIFF WBC: CPT

## 2020-09-15 PROCEDURE — 74011636320 HC RX REV CODE- 636/320: Performed by: EMERGENCY MEDICINE

## 2020-09-15 PROCEDURE — 74011250636 HC RX REV CODE- 250/636: Performed by: EMERGENCY MEDICINE

## 2020-09-15 RX ORDER — HYDROMORPHONE HYDROCHLORIDE 1 MG/ML
1 INJECTION, SOLUTION INTRAMUSCULAR; INTRAVENOUS; SUBCUTANEOUS ONCE
Status: COMPLETED | OUTPATIENT
Start: 2020-09-15 | End: 2020-09-15

## 2020-09-15 RX ORDER — ONDANSETRON 2 MG/ML
8 INJECTION INTRAMUSCULAR; INTRAVENOUS
Status: COMPLETED | OUTPATIENT
Start: 2020-09-15 | End: 2020-09-15

## 2020-09-15 RX ADMIN — ONDANSETRON 8 MG: 2 INJECTION INTRAMUSCULAR; INTRAVENOUS at 04:54

## 2020-09-15 RX ADMIN — GADOTERATE MEGLUMINE 20 ML: 376.9 INJECTION INTRAVENOUS at 02:32

## 2020-09-15 RX ADMIN — HYDROMORPHONE HYDROCHLORIDE 1 MG: 1 INJECTION, SOLUTION INTRAMUSCULAR; INTRAVENOUS; SUBCUTANEOUS at 01:46

## 2020-09-15 NOTE — ED TRIAGE NOTES
Complaining of suprapubic pain, urinary symptoms and low back pain. Pt states her urine has a sweet/fruity odor.   Started on thursday

## 2020-09-15 NOTE — ED NOTES
7987: Physician spoke to pt, informed her that the MRI looked okay, but we are waiting on official results prior to discharge. 0532: Pt appears to have left ED department. Multiple attempts to find pt have been unsuccessful. Pt called with no answer.

## 2020-09-15 NOTE — ED PROVIDER NOTES
EMERGENCY DEPARTMENT HISTORY AND PHYSICAL EXAM      Date: 9/14/2020  Patient Name: Teresa Hughes    History of Presenting Illness     Chief Complaint   Patient presents with    Back Pain       History (Context): Teresa Hughes is a 39 y.o. woman who has multiple sclerosis, and complicated of comorbid conditions as noted below who presents with subacute onset, persistent, severe bilateral and midline low back pain associated with dysuria, suprapubic pain and urine having a \"fruity odor. \". The symptoms started on Thursday. The patient endorses mild weakness in the right leg. On review of systems, the patient denies fever, chills, rashes, numbness, tingling, incontinence    PCP: Padmini Sauceda MD    Current Outpatient Medications   Medication Sig Dispense Refill    mirabegron ER (Myrbetriq) 50 mg ER tablet Take 1 Tab by mouth daily. 90 Tab 3    diazePAM (VALIUM) 5 mg tablet Take 1 Tab by mouth every eight (8) hours as needed (muscle spasm). Max Daily Amount: 15 mg. 6 Tab 0    gabapentin (NEURONTIN) 300 mg capsule Take 300 mg by mouth nightly. 1-2 tabs      amLODIPine (NORVASC) 10 mg tablet Take 10 mg by mouth daily.  metFORMIN (GLUCOPHAGE) 500 mg tablet Take 1.5 Tabs by mouth two (2) times daily (with meals). 60 Tab 0    DULoxetine (CYMBALTA) 60 mg capsule Take 60 mg by mouth daily.  QUEtiapine (SEROQUEL) 300 mg tablet Take 300 mg by mouth nightly.  glatiramer (COPAXONE) 40 mg/mL injection 40 mg by SubCUTAneous route every Monday, Wednesday, Friday.          Past History     Past Medical History:  Past Medical History:   Diagnosis Date    Arthropathy, unspecified, site unspecified     Depression     [facility] [address]     Hypercholesteremia     Hypertension     history of htn    Incontinence of urine     Insomnia     Migraine     MS (multiple sclerosis) (HCC)     Neurogenic bladder     Other ill-defined conditions(799.89)     multiple Sclerosis    Seizures ([address]     6/2013    Wears glasses Past Surgical History:  Past Surgical History:   Procedure Laterality Date    HX CARPAL TUNNEL RELEASE  11/2008    HX HYSTERECTOMY      HX OTHER SURGICAL         Family History:  Family History   Problem Relation Age of Onset    Heart Disease Father     Diabetes Father     Diabetes Sister     Other Other        Social History:  Social History     Tobacco Use    Smoking status: Never Smoker    Smokeless tobacco: Never Used   Substance Use Topics    Alcohol use: No    Drug use: No       Allergies: Allergies   Allergen Reactions    Atorvastatin Anaphylaxis    Levemir [Insulin Detemir] Hives    Oxycontin [Oxycodone] Hives and Other (comments)     intolerance       PMH, PSH, family history, social history, allergies reviewed with the patient with significant items noted above. Review of Systems   As per HPI, otherwise reviewed and negative. Physical Exam     Vitals:    09/14/20 2302 09/14/20 2305   BP: (!) 182/114    Pulse: 99    Resp: 16    Temp:  98.3 °F (36.8 °C)   SpO2: 99%    Weight: 113.4 kg (250 lb)        Gen: Appears to be in severe pain  HEENT: Normocephalic, sclera anicteric  Cardiovascular: Normal rate, regular rhythm, no murmurs, rubs, gallops. Pulses intact and equal distally. Pulmonary: No respiratory distress. No stridor. Clear lungs. ABD: Soft, nontender, nondistended. Neuro: Alert. Normal speech. Normal mentation. Full strength and sensation throughout the bilateral lower extremities with the exception of mild weakness with leg extension on the right  Psych: Normal thought content and thought processes. : No CVA tenderness  EXT: Moves all extremities well. No cyanosis or clubbing. Skin: Warm and well-perfused. Other:        Diagnostic Study Results     Labs -     No results found for this or any previous visit (from the past 12 hour(s)). Radiologic Studies -   MRI LUMB SPINE W WO CONT   Final Result   IMPRESSION:      1.  Transitional segmental anatomy with a lumbosacral segment labeled S1 for   purposes of this dictation. Thoracolumbar transitional segment labeled T12 does   not convincingly have visualized ribs on this study. Close correlation with   imaging recommended if intervention is to be performed. 2. No abnormal signal or enhancement involving the distal cord/conus medullaris. 3. No high-grade central or foraminal stenosis. 4. Very mild lower lumbar spine degenerative changes , as described in detail in   Findings section. Preliminary report of this study was provided by [facility]. CT Results  (Last 48 hours)    None        CXR Results  (Last 48 hours)    None            Medical Decision Making   I am the first provider for this patient. I reviewed the vital signs, available nursing notes, past medical history, past surgical history, family history and social history. Vital Signs-Reviewed the patient's vital signs. Records Reviewed: Personally, on initial evaluation    MDM:   Patient presents with back pain and urinary symptoms with right-sided leg weakness. Exam significant for mild right-sided leg weakness. DDX considered: MS flare, spinal stenosis, foraminal stenosis, sciatica, malingering, urinary tract infection, pyelonephritis,secondary gain     Patient condition on initial evaluation: Stable    Plan:   Pain Control  Orders as below:  Orders Placed This Encounter    MRI LUMB SPINE W WO CONT    URINALYSIS W/ RFLX MICROSCOPIC    CBC WITH AUTOMATED DIFF    COMPREHENSIVE METABOLIC PANEL    LIPASE    HYDROmorphone (DILAUDID) injection 1 mg    gadoterate meglumine (DOTAREM) 0.5 mmol/mL contrast solution syringe 20 mL    ondansetron (ZOFRAN) injection 8 mg        ED Course:   ED Course as of Sep 23 1906   Tue Sep 15, 2020   0540 Patient eloped. MRI pending. [DT]      ED Course User Index  [DT] Sarath Stauffer MD     Disposition: Eloped    Diagnosis     Clinical Impression:   1.  Back pain, unspecified back location, unspecified back pain laterality, unspecified chronicity        Signed,  Nikhil Spencer MD  Emergency Physician  [facility]    As a voice dictation software was utilized to dictate this note, minor word transpositions can occur. I apologize for confusing wording and typographic errors. Please feel free to contact me for clarification.

## 2020-09-15 NOTE — ED NOTES
Bedside searched, no evidence of IV found. Called non-emergency [employer] as it appears pt eloped from the ED with IV still in her left hand.

## 2020-10-23 ENCOUNTER — VIRTUAL VISIT (OUTPATIENT)
Dept: FAMILY MEDICINE CLINIC | Age: 45
End: 2020-10-23
Payer: MEDICARE

## 2020-10-23 DIAGNOSIS — E55.9 VITAMIN D DEFICIENCY: ICD-10-CM

## 2020-10-23 DIAGNOSIS — F32.5 MAJOR DEPRESSIVE DISORDER IN FULL REMISSION, UNSPECIFIED WHETHER RECURRENT (HCC): Chronic | ICD-10-CM

## 2020-10-23 DIAGNOSIS — R53.82 CHRONIC FATIGUE: ICD-10-CM

## 2020-10-23 DIAGNOSIS — N31.9 NEUROGENIC BLADDER: Chronic | ICD-10-CM

## 2020-10-23 DIAGNOSIS — R68.89 OTHER GENERAL SYMPTOMS AND SIGNS: ICD-10-CM

## 2020-10-23 DIAGNOSIS — I10 ESSENTIAL HYPERTENSION: Primary | ICD-10-CM

## 2020-10-23 DIAGNOSIS — E11.9 TYPE 2 DIABETES MELLITUS WITHOUT COMPLICATION, WITHOUT LONG-TERM CURRENT USE OF INSULIN (HCC): ICD-10-CM

## 2020-10-23 DIAGNOSIS — E78.2 MIXED HYPERLIPIDEMIA: Chronic | ICD-10-CM

## 2020-10-23 PROCEDURE — G0463 HOSPITAL OUTPT CLINIC VISIT: HCPCS | Performed by: NURSE PRACTITIONER

## 2020-10-23 PROCEDURE — 99203 OFFICE O/P NEW LOW 30 MIN: CPT | Performed by: NURSE PRACTITIONER

## 2020-10-23 RX ORDER — INSULIN GLARGINE 100 [IU]/ML
INJECTION, SOLUTION SUBCUTANEOUS
COMMUNITY
Start: 2020-09-30 | End: 2020-10-23

## 2020-10-23 NOTE — PROGRESS NOTES
Chief Complaint   Patient presents with    New Patient    Fatigue     x 1 week / Loss of taste- Covid was negative     Annual Wellness Visit     Is Due         1. Have you been to the ER, urgent care clinic since your last visit? Hospitalized since your last visit? NO    2. Have you seen or consulted any other health care providers outside of the [address] [place] [address] since your last visit? Include any pap smears or colon screening.  Psychiatry and podiatry

## 2020-10-23 NOTE — PROGRESS NOTES
Dave Lobato. [address]      Shabnam Bryant is a 39 y.o. female who was seen by synchronous (real-time) audio-video technology on 10/23/2020. Consent: Shabnam Bryant, who was seen by synchronous (real-time) audio-video technology, and/or her healthcare decision maker, is aware that this patient-initiated, Telehealth encounter on 10/23/2020 is a billable service, with coverage as determined by her insurance carrier. She is aware that she may receive a bill and has provided verbal consent to proceed: Yes. Assessment & Plan:   Diagnoses and all orders for this visit:    1. Essential hypertension  -     METABOLIC PANEL, COMPREHENSIVE; Future  Per her home checks her b/p is 138/98, states her diastolic is always elevated  Will have her come in for b/p check and labs    2. Mixed hyperlipidemia  -     LIPID PANEL; Future  Currently not on medications  Will have her come in for labs    3. Neurogenic bladder  Managed by urology, currently not on myrbetriq is going for a test next week  Defer medications to urology    4. Major depressive disorder in full remission, unspecified whether recurrent [facility])  Denies depression at this time  Managed by psychiatry Dr. Dutch Khan    5. Chronic fatigue  -     CBC W/O DIFF; Future  -     VITAMIN B12; Future  endorses unexplained fatigue although she does know it could be related to her MS  Will check for anemia and b 12 levels    6. Vitamin D deficiency  -     VITAMIN D, 25 HYDROXY; Future  F/u vit d level    7. Type 2 diabetes mellitus without complication, without long-term current use of insulin (HCC)  -     HEMOGLOBIN A1C WITH EAG; Future  -     MICROALBUMIN, UR, RAND W/ MICROALB/CREAT RATIO; Future  Currently on a sliding scale dose of basaglar-will discontinue  Continue metformin  Have her come in for labs    8.  Other general symptoms and signs   -     VITAMIN B12; Future      Follow-up and Dispositions    · Return for ASAP, Labs, b/p check, nurse visit, AND 1 month MAWV, DM, HTN , 30 min, VV.             712  Subjective:   Spring Munguia is a 39 y.o. female who was seen for   New Patient; Fatigue (x 1 week / Loss of taste- Covid was negative ); and Annual Wellness Visit (Is Due)    Prior PCP was Dr. Al Skelton, [facility]    MS/seizures  Started in [address] after having her daughter  Is seeing a neurologist  Last seizure was 1/2020 with ED visit, at that time she was having her medications adjusted  Taking valium 5mg as needed, not on an antieptileptic, seizures happen when her MS is a problem  Her MS meds are glatiramer M,W,F-she administers this herself, gabapentin    DMII-   Patient reports medication compliance Daily  Diabetic diet compliance most of the time  Patient monitors blood sugars regularly BID   Reports am fasting sugars range 120-130   Denies hypoglycemic episodes no  Denies polyuria, polydipsia, paraesthesia, vision changes? yes   Has a sliding scale for basaglar if glucose is >140 for three consecutive days   Last A1C done at [facility] on 2/25/2020 was 6.4  Diabetic Foot and Eye Exam HM Status   Topic Date Due    Diabetic Foot Care  02/06/1985    Eye Exam  02/06/1985     Hemoglobin A1c   Date Value Ref Range Status   03/23/2018 8.6 (H) 4.2 - 5.6 % Final     Comment:     (NOTE)  HbA1C Interpretive Ranges  <5.7              Normal  5.7 - 6.4         Consider Prediabetes  >6.5              Consider Diabetes     ]  No results found for: NOHEMI, RAVENU2, MCACR  Key Antihyperglycemic Medications             metFORMIN (GLUCOPHAGE) 500 mg tablet (Taking) Take 1.5 Tabs by mouth two (2) times daily (with meals). Hypertension:   Patient reports taking medications as instructed. yes   Medication side effects noted. no  Headache upon wakening. no   Home BP monitoring in range of at home today was 130/98    Do you experience chest pain/pressure or SOB with exertion? no  Maintain a low salt diet?  yes  Key CAD CHF Meds amLODIPine (NORVASC) 10 mg tablet (Taking) Take 10 mg by mouth daily. HLD:  Has been compliant with meds  not on medications  Compliant with low-fat diet. most of the time    Denies myalgias or other side effects. N/A  Cholesterol, total   Date Value Ref Range Status   09/19/2017 220 (H) <200 MG/DL Final     Triglyceride   Date Value Ref Range Status   09/19/2017 64 <150 MG/DL Final     Comment:     The drugs N-acetylcysteine (NAC) and  Metamiszole have been found to cause falsely  low results in this chemical assay. Please  be sure to submit blood samples obtained  BEFORE administration of either of these  drugs to assure correct results. HDL Cholesterol   Date Value Ref Range Status   09/19/2017 56 40 - 60 MG/DL Final     LDL, calculated   Date Value Ref Range Status   09/19/2017 151.2 (H) 0 - 100 MG/DL Final   ]  Key Antihyperlipidemia Meds     The patient is on no antihyperlipidemia meds. Fatigue  Onset: two weeks ago  Characteristics: with the weather changes it messes with her MS, increased her protein and B12, has and overwhelming feeling of fatigue  PMH: had total hysterectomy with oophorectomy in 2004  Has had this problem before, this was around 2011/2012,  Did not find a cause for it, she was started on B12    Prior to Admission medications    Medication Sig Start Date End Date Taking? Authorizing Provider   gabapentin (NEURONTIN) 300 mg capsule Take 300 mg by mouth nightly. 1-2 tabs   Yes Provider, Historical   amLODIPine (NORVASC) 10 mg tablet Take 10 mg by mouth daily. Yes Provider, Historical   metFORMIN (GLUCOPHAGE) 500 mg tablet Take 1.5 Tabs by mouth two (2) times daily (with meals). 9/21/17  Yes Willa Suggs MD   DULoxetine (CYMBALTA) 60 mg capsule Take 60 mg by mouth daily. Yes Sohail, MD Padmini   QUEtiapine (SEROQUEL) 300 mg tablet Take 300 mg by mouth nightly.    Yes Other, MD Padmini   glatiramer (COPAXONE) 40 mg/mL injection 40 mg by SubCUTAneous route every Monday, Wednesday, Friday. Yes Other, MD Monika Washington U-100 Insulin 100 unit/mL (3 mL) inpn INJECT 40 UNITS AT BEDTIME INCREASE BY 2 UNITS IF FASTING BLOOD SUGAR IS GREATER THAN 140 FOR 3 DAYS CONSECUTIVELY 9/30/20 10/23/20  Provider, Historical   mirabegron ER (Myrbetriq) 50 mg ER tablet Take 1 Tab by mouth daily. 3/30/20   Omaira Melara MD   diazePAM (VALIUM) 5 mg tablet Take 1 Tab by mouth every eight (8) hours as needed (muscle spasm). Max Daily Amount: 15 mg. 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    Major depression F32.9    Neurogenic bladder N31.9    Hyperlipidemia E78.5    Obesity E66.9    Type II diabetes mellitus (HCC) E11.9    Hypertension I10    Incontinence of urine R32     Past Surgical History:   Procedure Laterality Date    HX CARPAL TUNNEL RELEASE  11/2008    HX HYSTERECTOMY      HX OTHER SURGICAL       Family History   Problem Relation Age of Onset    Heart Disease Father     Diabetes Father     Diabetes Sister     Other Other      Social History     Tobacco Use    Smoking status: Never Smoker    Smokeless tobacco: Never Used   Substance Use Topics    Alcohol use: No       ROS  As stated in HPI, otherwise all others negative. Objective: There were no vitals taken for this visit.    General: alert, cooperative, no distress   Mental  status: normal mood, behavior, speech, dress, motor activity, and thought processes, able to follow commands, somewhat flat affect, slight tremor noted in voice   HENT: NCAT   Neck: no visualized mass   Resp: no respiratory distress   Neuro: no gross deficits   Skin: no discoloration or lesions of concern on visible areas   Psychiatric: normal affect, consistent with stated mood, no evidence of hallucinations     Additional exam findings: We discussed the expected course, resolution and complications of the diagnosis(es) in detail. Medication risks, benefits, costs, interactions, and alternatives were discussed as indicated. I advised her to contact the office if her condition worsens, changes or fails to improve as anticipated. She expressed understanding with the diagnosis(es) and plan. Tiffany Deng is a 39 y.o. female who was evaluated by a video visit encounter for concerns as above. Patient identification was verified prior to start of the visit. A caregiver was present when appropriate. Due to this being a TeleHealth encounter (During [facility] public health emergency), evaluation of the following organ systems was limited: Vitals/Constitutional/EENT/Resp/CV/GI//MS/Neuro/Skin/Heme-Lymph-Imm. Pursuant to the emergency declaration under the [facility], [facility] waiver authority and the [employer] Act, this Virtual  Visit was conducted, with patient's (and/or legal guardian's) consent, to reduce the patient's risk of exposure to COVID-19 and provide necessary medical care. Services were provided through a video synchronous discussion virtually to substitute for in-person clinic visit. Patient and provider were located at their individual homes. An After Visit Summary was printed and given to the patient. All diagnosis have been discussed with the patient and all of the patient's questions have been answered. Follow-up and Dispositions    · Return for ASAP, Labs, b/p check, nurse visit, AND 1 month MAWV, DM, HTN , 30 min, VV. Dominique Lebron, [facility]   Caesar Wagner

## 2020-10-26 ENCOUNTER — HOSPITAL ENCOUNTER (EMERGENCY)
Age: 45
Discharge: HOME | End: 2020-10-26
Attending: STUDENT IN AN ORGANIZED HEALTH CARE EDUCATION/TRAINING PROGRAM
Payer: MEDICARE

## 2020-10-26 VITALS
HEIGHT: 63 IN | BODY MASS INDEX: 41.64 KG/M2 | DIASTOLIC BLOOD PRESSURE: 86 MMHG | RESPIRATION RATE: 18 BRPM | OXYGEN SATURATION: 99 % | SYSTOLIC BLOOD PRESSURE: 125 MMHG | HEART RATE: 98 BPM | WEIGHT: 235 LBS | TEMPERATURE: 97.5 F

## 2020-10-26 DIAGNOSIS — M62.838 MUSCLE SPASMS OF BOTH LOWER EXTREMITIES: Primary | ICD-10-CM

## 2020-10-26 DIAGNOSIS — G35 MS (MULTIPLE SCLEROSIS) (HCC): ICD-10-CM

## 2020-10-26 DIAGNOSIS — E87.6 HYPOKALEMIA: ICD-10-CM

## 2020-10-26 LAB
ANION GAP SERPL CALC-SCNC: 5 MMOL/L (ref 3–18)
BASOPHILS # BLD: 0 K/UL (ref 0–0.1)
BASOPHILS NFR BLD: 0 % (ref 0–2)
BUN SERPL-MCNC: 7 MG/DL (ref 7–18)
BUN/CREAT SERPL: 11 (ref 12–20)
CALCIUM SERPL-MCNC: 8.6 MG/DL (ref 8.5–10.1)
CHLORIDE SERPL-SCNC: 105 MMOL/L (ref 100–111)
CK SERPL-CCNC: 106 U/L (ref 26–192)
CO2 SERPL-SCNC: 27 MMOL/L (ref 21–32)
CREAT SERPL-MCNC: 0.66 MG/DL (ref 0.6–1.3)
DIFFERENTIAL METHOD BLD: ABNORMAL
EOSINOPHIL # BLD: 0.1 K/UL (ref 0–0.4)
EOSINOPHIL NFR BLD: 1 % (ref 0–5)
ERYTHROCYTE [DISTWIDTH] IN BLOOD BY AUTOMATED COUNT: 13 % (ref 11.6–14.5)
GLUCOSE SERPL-MCNC: 105 MG/DL (ref 74–99)
HCT VFR BLD AUTO: 36.5 % (ref 35–45)
HGB BLD-MCNC: 13 G/DL (ref 12–16)
LYMPHOCYTES # BLD: 4.1 K/UL (ref 0.9–3.6)
LYMPHOCYTES NFR BLD: 51 % (ref 21–52)
MCH RBC QN AUTO: 29.2 PG (ref 24–34)
MCHC RBC AUTO-ENTMCNC: 35.6 G/DL (ref 31–37)
MCV RBC AUTO: 82 FL (ref 74–97)
MONOCYTES # BLD: 0.5 K/UL (ref 0.05–1.2)
MONOCYTES NFR BLD: 6 % (ref 3–10)
NEUTS SEG # BLD: 3.4 K/UL (ref 1.8–8)
NEUTS SEG NFR BLD: 42 % (ref 40–73)
PLATELET # BLD AUTO: 531 K/UL (ref 135–420)
PMV BLD AUTO: 9.7 FL (ref 9.2–11.8)
POTASSIUM SERPL-SCNC: 3.4 MMOL/L (ref 3.5–5.5)
RBC # BLD AUTO: 4.45 M/UL (ref 4.2–5.3)
SODIUM SERPL-SCNC: 137 MMOL/L (ref 136–145)
WBC # BLD AUTO: 8.1 K/UL (ref 4.6–13.2)

## 2020-10-26 PROCEDURE — 74011636637 HC RX REV CODE- 636/637: Performed by: PHYSICIAN ASSISTANT

## 2020-10-26 PROCEDURE — 74011250637 HC RX REV CODE- 250/637: Performed by: PHYSICIAN ASSISTANT

## 2020-10-26 PROCEDURE — 85025 COMPLETE CBC W/AUTO DIFF WBC: CPT

## 2020-10-26 PROCEDURE — 96374 THER/PROPH/DIAG INJ IV PUSH: CPT

## 2020-10-26 PROCEDURE — 99283 EMERGENCY DEPT VISIT LOW MDM: CPT

## 2020-10-26 PROCEDURE — 74011250636 HC RX REV CODE- 250/636: Performed by: PHYSICIAN ASSISTANT

## 2020-10-26 PROCEDURE — 80048 BASIC METABOLIC PNL TOTAL CA: CPT

## 2020-10-26 PROCEDURE — 82550 ASSAY OF CK (CPK): CPT

## 2020-10-26 RX ORDER — LORAZEPAM 2 MG/ML
1 INJECTION INTRAMUSCULAR
Status: COMPLETED | OUTPATIENT
Start: 2020-10-26 | End: 2020-10-26

## 2020-10-26 RX ORDER — PREDNISONE 20 MG/1
60 TABLET ORAL
Status: COMPLETED | OUTPATIENT
Start: 2020-10-26 | End: 2020-10-26

## 2020-10-26 RX ORDER — CYCLOBENZAPRINE HCL 10 MG
10 TABLET ORAL
Qty: 15 TAB | Refills: 0 | Status: SHIPPED | OUTPATIENT
Start: 2020-10-26

## 2020-10-26 RX ORDER — PREDNISONE 10 MG/1
TABLET ORAL
Qty: 20 TAB | Refills: 0 | Status: SHIPPED | OUTPATIENT
Start: 2020-10-26

## 2020-10-26 RX ADMIN — LORAZEPAM 1 MG: 2 INJECTION, SOLUTION INTRAMUSCULAR; INTRAVENOUS at 16:26

## 2020-10-26 RX ADMIN — POTASSIUM BICARBONATE 20 MEQ: 782 TABLET, EFFERVESCENT ORAL at 16:26

## 2020-10-26 RX ADMIN — PREDNISONE 60 MG: 20 TABLET ORAL at 16:22

## 2020-10-26 NOTE — DISCHARGE INSTRUCTIONS
Patient Education        Multiple Sclerosis (MS): Care Instructions  Your Care Instructions  Multiple sclerosis, also called MS, is a disease that can affect the brain, spinal cord, and nerves to the eyes. MS can cause problems with muscle control and strength, vision, balance, feeling, and thinking. Whatever your symptoms are, taking medicine correctly and following your doctor's advice for home care can help you maintain your quality of life. Follow-up care is a key part of your treatment and safety. Be sure to make and go to all appointments, and call your doctor if you are having problems. It's also a good idea to know your test results and keep a list of the medicines you take. How can you care for yourself at home? General care  · Take your medicines exactly as prescribed. Call your doctor if you think you are having a problem with your medicine. · Use a cane, walker, or scooter if your doctor suggests it. · Keep doing your normal activities as much as you can. · If you have problems urinating, press or tap your bladder area to help start urine flow. If you have trouble controlling your urine, plan your fluid intake and activities so that a toilet will be available when you need it. · Spend time with family and friends. Join a support group for people with MS if you want extra help. · Depression is common with this condition. Tell your doctor if you have trouble sleeping, are eating too much or are not hungry, or feel sad or tearful all the time. Depression can be treated with medicine and counseling. Diet and exercise  · Eat a balanced diet. · If you have problems swallowing, change how and what you eat:  ? Try thick drinks, such as milk shakes. They are easier to swallow than other fluids. ? Do not eat foods that crumble easily. These can cause choking. ? Use a  to prepare food. Soft foods need less chewing.   ? Eat small meals often so that you do not get tired from eating larger meals.  · Get exercise on most days. Work with your doctor to set up a program of walking, swimming, or other exercise that you are able to do. A physical therapist can teach you exercises if you cannot walk but can move your limbs and trunk. Or you can do exercises to help with coordination and balance. You can help improve muscle stiffness by doing exercises while lying in certain positions. When should you call for help? Call your doctor now or seek immediate medical care if:    · You have a change in symptoms.     · You fall or have another injury.     · You have symptoms of a urinary infection. For example:  ? You have blood or pus in your urine. ? You have pain in your back just below your rib cage. This is called flank pain. ? You have a fever, chills, or body aches. ? It hurts to urinate. ? You have groin or belly pain. Watch closely for changes in your health, and be sure to contact your doctor if:    · You want more information about MS or medicines.     · You have questions about alternative treatments. Do not use any other treatments without talking to your doctor first.   Where can you learn more? Go to http://www.gray.com/  Enter J784 in the search box to learn more about \"Multiple Sclerosis (MS): Care Instructions. \"  Current as of: November 20, 2019               Content Version: 12.6  © 1599-2035 [employer]. Care instructions adapted under license by [employer] (which disclaims liability or warranty for this information). If you have questions about a medical condition or this instruction, always ask your healthcare professional. [place] [address] any warranty or liability for your use of this information. Patient Education        Hypokalemia: Care Instructions  Your Care Instructions     Hypokalemia (say \"wo-bs-fho-GEMINI-lino-uh\") is a low level of potassium.  The heart, muscles, kidneys, and nervous system all need potassium to work well. This problem has many different causes. Kidney problems, diet, and medicines like diuretics and laxatives can cause it. So can vomiting or diarrhea. In some cases, cancer is the cause. Your doctor may do tests to find the cause of your low potassium levels. You may need medicines to bring your potassium levels back to normal. You may also need regular blood tests to check your potassium. If you have very low potassium, you may need intravenous (IV) medicines. You also may need tests to check the electrical activity of your heart. Heart problems caused by low potassium levels can be very serious. Follow-up care is a key part of your treatment and safety. Be sure to make and go to all appointments, and call your doctor if you are having problems. It's also a good idea to know your test results and keep a list of the medicines you take. How can you care for yourself at home? · If your doctor recommends it, eat foods that have a lot of potassium. These include fresh fruits, juices, and vegetables. They also include nuts, beans, and milk. · Be safe with medicines. If your doctor prescribes medicines or potassium supplements, take them exactly as directed. Call your doctor if you have any problems with your medicines. · Get your potassium levels tested as often as your doctor tells you. When should you call for help? Call 911 anytime you think you may need emergency care. For example, call if:    · You feel like your heart is missing beats. Heart problems caused by low potassium can cause death.     · You passed out (lost consciousness).     · You have a seizure.    Call your doctor now or seek immediate medical care if:    · You feel weak or unusually tired.     · You have severe arm or leg cramps.     · You have tingling or numbness.     · You feel sick to your stomach, or you vomit.     · You have belly cramps.     · You feel bloated or constipated.     · You have to urinate a lot.     · You feel very thirsty most of the time.     · You are dizzy or lightheaded, or you feel like you may faint.     · You feel depressed, or you lose touch with reality. Watch closely for changes in your health, and be sure to contact your doctor if:    · You do not get better as expected. Where can you learn more? Go to http://www.gray.com/  Enter G358 in the search box to learn more about \"Hypokalemia: Care Instructions. \"  Current as of: March 31, 2020               Content Version: 12.6  © 2509-1266 [employer]. Care instructions adapted under license by [employer] (which disclaims liability or warranty for this information). If you have questions about a medical condition or this instruction, always ask your healthcare professional. [address] any warranty or liability for your use of this information.

## 2020-10-26 NOTE — ED NOTES
MS flare x 2 weeks. Notes twitching of face, diffuse pain. I performed a brief evaluation, including history and physical, of the patient here in triage and I have determined that pt will need further treatment and evaluation from the main side ER physician. I have placed initial orders to help in expediting patients care. October 26, 2020 at 3:09 PM - LAURIE Mccray        There were no vitals taken for this visit.

## 2020-10-26 NOTE — ED TRIAGE NOTES
Patient diagnosed with MS in 90s. Flare up for last two weeks. Patient having spasms all over body causing pain for 2 weeks. Face, legs, and arms are spasming. Takes all medications as prescribed. Could not get in with neurologist. Patient states she normally ambulates without difficulty but is unsteady right now due to spasms.

## 2020-10-26 NOTE — ED NOTES
I have reviewed prescription, discharge and follow up instructions with the patient. The patient verbalized understanding.  Patient left unit by wheelchair in no apparent distress for discharge to home General

## 2020-10-26 NOTE — ED PROVIDER NOTES
EMERGENCY DEPARTMENT HISTORY AND PHYSICAL EXAM    3:27 PM      Date: 10/26/2020  Patient Name: Rafi Rosales    History of Presenting Illness     Chief Complaint   Patient presents with    Spasms         History Provided By: Patient    Additional History (Context): Rafi Rosales is a 39 y.o. female with diabetes, hypertension, hyperlipidemia and History of migraines, MS who presents with complaints of muscle spasms and facial pain that have been present secondary to an MS flare for the last 2 weeks. Patient states that she typically gets muscle spasms with her MS flare as well as a headache, but states that the facial spasms during this flareup are new. The patient states that she has been taking her medications as prescribed, and has called her neurologist office for an appointment. She states the appointment is 2 weeks out, but could not wait until then to be seen. Patient denies any fevers, chills, chest pain, shortness of breath, abdominal pain, NVD, dysuria, hematuria. PCP: Curt Chong NP    Current Outpatient Medications   Medication Sig Dispense Refill    predniSONE (DELTASONE) 10 mg tablet Days 1 & 2: Take FOUR tabs. Days 3 & 4: Take THREE tabs. Days 5 & 6: Take TWO tabs. Days 7 & 8: Take ONE tab. 20 Tab 0    cyclobenzaprine (FLEXERIL) 10 mg tablet Take 1 Tab by mouth three (3) times daily as needed for Muscle Spasm(s). 15 Tab 0    mirabegron ER (Myrbetriq) 50 mg ER tablet Take 1 Tab by mouth daily. 90 Tab 3    diazePAM (VALIUM) 5 mg tablet Take 1 Tab by mouth every eight (8) hours as needed (muscle spasm). Max Daily Amount: 15 mg. 6 Tab 0    gabapentin (NEURONTIN) 300 mg capsule Take 300 mg by mouth nightly. 1-2 tabs      amLODIPine (NORVASC) 10 mg tablet Take 10 mg by mouth daily.  metFORMIN (GLUCOPHAGE) 500 mg tablet Take 1.5 Tabs by mouth two (2) times daily (with meals). 60 Tab 0    DULoxetine (CYMBALTA) 60 mg capsule Take 60 mg by mouth daily.       QUEtiapine (SEROQUEL) 300 mg tablet Take 300 mg by mouth nightly.  glatiramer (COPAXONE) 40 mg/mL injection 40 mg by SubCUTAneous route every Monday, Wednesday, Friday. Past History     Past Medical History:  Past Medical History:   Diagnosis Date    Arthropathy, unspecified, site unspecified     Depression     Diabetes ([address]     Hypercholesteremia     Hypertension     history of htn    Incontinence of urine     Insomnia     Migraine     [facility]     Neurogenic bladder     Other ill-defined conditions(799.89)     multiple Sclerosis    Seizures ([facility]     6/2013    Wears glasses        Past Surgical History:  Past Surgical History:   Procedure Laterality Date    HX CARPAL TUNNEL RELEASE  11/2008    HX HYSTERECTOMY      HX OTHER SURGICAL         Family History:  Family History   Problem Relation Age of Onset    Heart Disease Father     Diabetes Father     Diabetes Sister     Other Other        Social History:  Social History     Tobacco Use    Smoking status: Never Smoker    Smokeless tobacco: Never Used   Substance Use Topics    Alcohol use: No    Drug use: No       Allergies: Allergies   Allergen Reactions    Atorvastatin Anaphylaxis    Levemir [Insulin Detemir] Hives    Oxycontin [Oxycodone] Hives and Other (comments)     intolerance         Review of Systems       Review of Systems   Constitutional: Negative for chills, diaphoresis, fatigue and fever. HENT:        + Facial pain   Respiratory: Negative. Cardiovascular: Negative. Gastrointestinal: Negative. Genitourinary: Negative. Musculoskeletal: Positive for myalgias.        + Muscle spasms   Skin: Negative. Neurological: Negative. All other systems reviewed and are negative. Physical Exam     Visit Vitals  /86   Pulse 98   Temp 97.5 °F (36.4 °C)   Resp 18   Ht 5' 3\" (1.6 m)   Wt 106.6 kg (235 lb)   SpO2 99%   BMI 41.63 kg/m²         Physical Exam  Vitals signs and nursing note reviewed. Constitutional:       General: She is not in acute distress. Appearance: Normal appearance. She is not ill-appearing, toxic-appearing or diaphoretic. HENT:      Head: Normocephalic and atraumatic. Right Ear: External ear normal.      Left Ear: External ear normal.      Nose: Nose normal.      Mouth/Throat:      Mouth: Mucous membranes are moist.      Pharynx: Oropharynx is clear. Eyes:      Extraocular Movements: Extraocular movements intact. Neck:      Musculoskeletal: Neck supple. Cardiovascular:      Rate and Rhythm: Normal rate and regular rhythm. Pulses: Normal pulses. Heart sounds: Normal heart sounds. No murmur. No gallop. Pulmonary:      Effort: Pulmonary effort is normal.      Breath sounds: Normal breath sounds. No wheezing, rhonchi or rales. Abdominal:      General: Bowel sounds are normal. There is no distension. Palpations: There is no mass. Tenderness: There is no abdominal tenderness. There is no guarding or rebound. Skin:     General: Skin is warm and dry. Capillary Refill: Capillary refill takes less than 2 seconds. Neurological:      General: No focal deficit present. Mental Status: She is alert and oriented to person, place, and time. Cranial Nerves: No cranial nerve deficit. Comments: Twitching evident facial muscles. Diagnostic Study Results     Labs -  Recent Results (from the past 12 hour(s))   CBC WITH AUTOMATED DIFF    Collection Time: 10/26/20  3:31 PM   Result Value Ref Range    WBC 8.1 4.6 - 13.2 K/uL    RBC 4.45 4.20 - 5.30 M/uL    HGB 13.0 12.0 - 16.0 g/dL    HCT 36.5 35.0 - 45.0 %    MCV 82.0 74.0 - 97.0 FL    MCH 29.2 24.0 - 34.0 PG    MCHC 35.6 31.0 - 37.0 g/dL    RDW 13.0 11.6 - 14.5 %    PLATELET 057 (H) 316 - 420 K/uL    MPV 9.7 9.2 - 11.8 FL    NEUTROPHILS 42 40 - 73 %    LYMPHOCYTES 51 21 - 52 %    MONOCYTES 6 3 - 10 %    EOSINOPHILS 1 0 - 5 %    BASOPHILS 0 0 - 2 %    ABS.  NEUTROPHILS 3.4 1.8 - 8.0 K/UL    ABS. LYMPHOCYTES 4.1 (H) 0.9 - 3.6 K/UL    ABS. MONOCYTES 0.5 0.05 - 1.2 K/UL    ABS. EOSINOPHILS 0.1 0.0 - 0.4 K/UL    ABS. BASOPHILS 0.0 0.0 - 0.1 K/UL    DF AUTOMATED     METABOLIC PANEL, BASIC    Collection Time: 10/26/20  3:31 PM   Result Value Ref Range    Sodium 137 136 - 145 mmol/L    Potassium 3.4 (L) 3.5 - 5.5 mmol/L    Chloride 105 100 - 111 mmol/L    CO2 27 21 - 32 mmol/L    Anion gap 5 3.0 - 18 mmol/L    Glucose 105 (H) 74 - 99 mg/dL    BUN 7 7.0 - 18 MG/DL    Creatinine 0.66 0.6 - 1.3 MG/DL    BUN/Creatinine ratio 11 (L) 12 - 20      GFR est AA >60 >60 ml/min/1.73m2    GFR est non-AA >60 >60 ml/min/1.73m2    Calcium 8.6 8.5 - 10.1 MG/DL   CK    Collection Time: 10/26/20  3:31 PM   Result Value Ref Range     26 - 192 U/L       Radiologic Studies -   No orders to display         Medical Decision Making   I am the first provider for this patient. I reviewed the vital signs, available nursing notes, past medical history, past surgical history, family history and social history. Vital Signs-Reviewed the patient's vital signs. Records Reviewed: Nursing Notes and Old Medical Records (Time of Review: 3:27 PM)    ED Course: Progress Notes, Reevaluation, and Consults:  0528: Met with patient, reviewed history, performed physical exam.  Physical exam as noted above. Will check a CBC, BMP, and CK. 1643: Labs show mild hypokalemia of 3.4, will give 20mEq of Effer-K. Thrombocytosis noted, consistent with previous labs. Otherwise largely unremarkable. Patient given 60mg of prednisone and 1mg of IV Ativan for spasms. 1745: Patient reassessed, stating she feels somewhat better, however her muscle spasms are still present. Patient states that in the past, she has been given prednisone tapers, will send a prescription for prednisone taper. We will also send prescription for Flexeril.   Patient was advised to follow-up with her primary care provider as well as her neurologist for further management and reassessment. Provider Notes (Medical Decision Making):   49-year-old female seen in the emergency department for complaints of an MS flare and muscle spasms. Work-up in the emergency department revealed mild hypokalemia of 3.4 which was replenished in the ED. She was given 60 mg of prednisone 1 mg of Ativan with improvement of her symptoms. She will be prescribed a steroid taper as well as a prescription for Flexeril. She was advised to follow-up with her primary care provider and neurologist for further management. She was advised to return to the ED for any worsening or new symptoms. Diagnosis     Clinical Impression:   1. Muscle spasms of both lower extremities    2. MS (multiple sclerosis) ([address]    3. Hypokalemia        Disposition: Home    Follow-up Information     Follow up With Specialties Details Why Contact Info    Ole Mcclendon NP Nurse Practitioner Call in 1 day For follow up regarding ER visit. [address]  [phone]      Your Neurologist  Call in 1 day For follow up regarding ER visit. [facility] EMERGENCY DEPT Emergency Medicine  Immediately if symptoms worsen, As needed. [address] [facility]  [phone]           Patient's Medications   Start Taking    CYCLOBENZAPRINE (FLEXERIL) 10 MG TABLET    Take 1 Tab by mouth three (3) times daily as needed for Muscle Spasm(s). PREDNISONE (DELTASONE) 10 MG TABLET    Days 1 & 2: Take FOUR tabs. Days 3 & 4: Take THREE tabs. Days 5 & 6: Take TWO tabs. Days 7 & 8: Take ONE tab. Continue Taking    AMLODIPINE (NORVASC) 10 MG TABLET    Take 10 mg by mouth daily. DIAZEPAM (VALIUM) 5 MG TABLET    Take 1 Tab by mouth every eight (8) hours as needed (muscle spasm). Max Daily Amount: 15 mg. DULOXETINE (CYMBALTA) 60 MG CAPSULE    Take 60 mg by mouth daily. GABAPENTIN (NEURONTIN) 300 MG CAPSULE    Take 300 mg by mouth nightly.  1-2 tabs    GLATIRAMER (COPAXONE) 40 MG/ML INJECTION    40 mg by SubCUTAneous route every Monday, Wednesday, Friday. METFORMIN (GLUCOPHAGE) 500 MG TABLET    Take 1.5 Tabs by mouth two (2) times daily (with meals). MIRABEGRON ER (MYRBETRIQ) 50 MG ER TABLET    Take 1 Tab by mouth daily. QUETIAPINE (SEROQUEL) 300 MG TABLET    Take 300 mg by mouth nightly. These Medications have changed    No medications on file   Stop Taking    No medications on file       Sepideh Tuttle PA-C    Dictation disclaimer:  Please note that this dictation was completed with [employer], the [employer] voice recognition software. Quite often unanticipated grammatical, syntax, homophones, and other interpretive errors are inadvertently transcribed by the computer software. Please disregard these errors. Please excuse any errors that have escaped final proofreading.

## 2020-10-27 ENCOUNTER — PATIENT OUTREACH (OUTPATIENT)
Dept: CASE MANAGEMENT | Age: 45
End: 2020-10-27

## 2020-10-27 NOTE — PROGRESS NOTES
Date/Time:  10/27/2020 9:44 AM   Call within 2 business days of discharge: Yes   Attempted to reach patient by telephone. Left HIPPA compliant message requesting a return call. Will attempt to reach patient again.

## 2020-12-02 ENCOUNTER — VIRTUAL VISIT (OUTPATIENT)
Dept: FAMILY MEDICINE CLINIC | Age: 45
End: 2020-12-02

## 2020-12-02 DIAGNOSIS — Z91.199 NO-SHOW FOR APPOINTMENT: Primary | ICD-10-CM

## 2020-12-02 RX ORDER — DIAZEPAM 5 MG/1
5 TABLET ORAL
Qty: 6 TAB | Refills: 0 | Status: CANCELLED | OUTPATIENT
Start: 2020-12-02

## 2020-12-02 NOTE — PROGRESS NOTES
Chief Complaint   Patient presents with    Annual Wellness Visit    Hypertension    Diabetes    Other     wants Ibuprofen 800mg- not on med list and wants refill           1. Have you been to the ER, urgent care clinic since your last visit? Hospitalized since your last visit? [facility] 3 weeks ago  Ms flared up    2. Have you seen or consulted any other health care providers outside of the [address] [place] [address] since your last visit? Include any pap smears or colon screening.  Neurology, psych,

## 2020-12-02 NOTE — PROGRESS NOTES
This encounter was created in error - please disregard. Sent text message x3 and called, no response.  No show

## 2021-01-01 ENCOUNTER — HOSPITAL ENCOUNTER (EMERGENCY)
Age: 46
Discharge: HOME | End: 2021-01-01
Attending: EMERGENCY MEDICINE
Payer: MEDICARE

## 2021-01-01 VITALS
WEIGHT: 233 LBS | OXYGEN SATURATION: 99 % | HEART RATE: 98 BPM | SYSTOLIC BLOOD PRESSURE: 138 MMHG | BODY MASS INDEX: 41.29 KG/M2 | RESPIRATION RATE: 14 BRPM | DIASTOLIC BLOOD PRESSURE: 91 MMHG | TEMPERATURE: 98.3 F | HEIGHT: 63 IN

## 2021-01-01 DIAGNOSIS — G35 MULTIPLE SCLEROSIS (HCC): ICD-10-CM

## 2021-01-01 DIAGNOSIS — B37.31 VULVOVAGINAL CANDIDIASIS: ICD-10-CM

## 2021-01-01 DIAGNOSIS — E87.6 HYPOKALEMIA: ICD-10-CM

## 2021-01-01 DIAGNOSIS — E11.65 UNCONTROLLED TYPE 2 DIABETES MELLITUS WITH HYPERGLYCEMIA (HCC): Primary | ICD-10-CM

## 2021-01-01 LAB
ANION GAP SERPL CALC-SCNC: 10 MMOL/L (ref 3–18)
APPEARANCE UR: CLEAR
ATRIAL RATE: 123 BPM
BASOPHILS # BLD: 0 K/UL (ref 0–0.1)
BASOPHILS NFR BLD: 0 % (ref 0–2)
BILIRUB UR QL: NEGATIVE
BUN SERPL-MCNC: 7 MG/DL (ref 7–18)
BUN/CREAT SERPL: 9 (ref 12–20)
CALCIUM SERPL-MCNC: 9 MG/DL (ref 8.5–10.1)
CALCULATED P AXIS, ECG09: 60 DEGREES
CALCULATED R AXIS, ECG10: 29 DEGREES
CALCULATED T AXIS, ECG11: 9 DEGREES
CHLORIDE SERPL-SCNC: 96 MMOL/L (ref 100–111)
CO2 SERPL-SCNC: 25 MMOL/L (ref 21–32)
COLOR UR: YELLOW
CREAT SERPL-MCNC: 0.77 MG/DL (ref 0.6–1.3)
DIAGNOSIS, 93000: NORMAL
DIFFERENTIAL METHOD BLD: ABNORMAL
EOSINOPHIL # BLD: 0.1 K/UL (ref 0–0.4)
EOSINOPHIL NFR BLD: 1 % (ref 0–5)
ERYTHROCYTE [DISTWIDTH] IN BLOOD BY AUTOMATED COUNT: 13.1 % (ref 11.6–14.5)
GLUCOSE BLD STRIP.AUTO-MCNC: 264 MG/DL (ref 70–110)
GLUCOSE BLD STRIP.AUTO-MCNC: 312 MG/DL (ref 70–110)
GLUCOSE SERPL-MCNC: 427 MG/DL (ref 74–99)
GLUCOSE UR STRIP.AUTO-MCNC: >1000 MG/DL
HCG SERPL QL: NEGATIVE
HCT VFR BLD AUTO: 38.1 % (ref 35–45)
HGB BLD-MCNC: 13.1 G/DL (ref 12–16)
HGB UR QL STRIP: NEGATIVE
KETONES UR QL STRIP.AUTO: ABNORMAL MG/DL
LEUKOCYTE ESTERASE UR QL STRIP.AUTO: NEGATIVE
LIPASE SERPL-CCNC: 147 U/L (ref 73–393)
LYMPHOCYTES # BLD: 3.4 K/UL (ref 0.9–3.6)
LYMPHOCYTES NFR BLD: 43 % (ref 21–52)
MCH RBC QN AUTO: 28.7 PG (ref 24–34)
MCHC RBC AUTO-ENTMCNC: 34.4 G/DL (ref 31–37)
MCV RBC AUTO: 83.6 FL (ref 74–97)
MONOCYTES # BLD: 0.6 K/UL (ref 0.05–1.2)
MONOCYTES NFR BLD: 7 % (ref 3–10)
NEUTS SEG # BLD: 3.8 K/UL (ref 1.8–8)
NEUTS SEG NFR BLD: 49 % (ref 40–73)
NITRITE UR QL STRIP.AUTO: NEGATIVE
P-R INTERVAL, ECG05: 164 MS
PH UR STRIP: 6 [PH] (ref 5–8)
PLATELET # BLD AUTO: 491 K/UL (ref 135–420)
PMV BLD AUTO: 10.7 FL (ref 9.2–11.8)
POTASSIUM SERPL-SCNC: 3.1 MMOL/L (ref 3.5–5.5)
PROT UR STRIP-MCNC: NEGATIVE MG/DL
Q-T INTERVAL, ECG07: 316 MS
QRS DURATION, ECG06: 76 MS
QTC CALCULATION (BEZET), ECG08: 452 MS
RBC # BLD AUTO: 4.56 M/UL (ref 4.2–5.3)
SODIUM SERPL-SCNC: 131 MMOL/L (ref 136–145)
SP GR UR REFRACTOMETRY: >1.03 (ref 1–1.03)
UROBILINOGEN UR QL STRIP.AUTO: 1 EU/DL (ref 0.2–1)
VENTRICULAR RATE, ECG03: 123 BPM
WBC # BLD AUTO: 7.9 K/UL (ref 4.6–13.2)

## 2021-01-01 PROCEDURE — 74011250636 HC RX REV CODE- 250/636: Performed by: EMERGENCY MEDICINE

## 2021-01-01 PROCEDURE — 96361 HYDRATE IV INFUSION ADD-ON: CPT

## 2021-01-01 PROCEDURE — 99285 EMERGENCY DEPT VISIT HI MDM: CPT

## 2021-01-01 PROCEDURE — 83690 ASSAY OF LIPASE: CPT

## 2021-01-01 PROCEDURE — 93005 ELECTROCARDIOGRAM TRACING: CPT

## 2021-01-01 PROCEDURE — 96375 TX/PRO/DX INJ NEW DRUG ADDON: CPT

## 2021-01-01 PROCEDURE — 82962 GLUCOSE BLOOD TEST: CPT

## 2021-01-01 PROCEDURE — 96365 THER/PROPH/DIAG IV INF INIT: CPT

## 2021-01-01 PROCEDURE — 80048 BASIC METABOLIC PNL TOTAL CA: CPT

## 2021-01-01 PROCEDURE — 81003 URINALYSIS AUTO W/O SCOPE: CPT

## 2021-01-01 PROCEDURE — 74011636637 HC RX REV CODE- 636/637

## 2021-01-01 PROCEDURE — 85025 COMPLETE CBC W/AUTO DIFF WBC: CPT

## 2021-01-01 PROCEDURE — 84703 CHORIONIC GONADOTROPIN ASSAY: CPT

## 2021-01-01 RX ORDER — CHLORPHENIRAMINE MALEATE 4 MG
TABLET ORAL 2 TIMES DAILY
Qty: 1 TUBE | Refills: 0 | Status: SHIPPED | OUTPATIENT
Start: 2021-01-01 | End: 2021-01-31

## 2021-01-01 RX ORDER — NAPROXEN 500 MG/1
500 TABLET ORAL 2 TIMES DAILY WITH MEALS
Qty: 20 TAB | Refills: 0 | Status: SHIPPED | OUTPATIENT
Start: 2021-01-01 | End: 2021-01-11

## 2021-01-01 RX ORDER — FLUCONAZOLE 150 MG/1
150 TABLET ORAL
Qty: 1 TAB | Refills: 0 | Status: SHIPPED | OUTPATIENT
Start: 2021-01-01 | End: 2021-01-01

## 2021-01-01 RX ORDER — POTASSIUM CHLORIDE 7.45 MG/ML
10 INJECTION INTRAVENOUS
Status: COMPLETED | OUTPATIENT
Start: 2021-01-01 | End: 2021-01-01

## 2021-01-01 RX ADMIN — HUMAN INSULIN 10 UNITS: 100 INJECTION, SOLUTION SUBCUTANEOUS at 06:52

## 2021-01-01 RX ADMIN — POTASSIUM CHLORIDE 10 MEQ: 7.46 INJECTION, SOLUTION INTRAVENOUS at 07:20

## 2021-01-01 RX ADMIN — SODIUM CHLORIDE, SODIUM LACTATE, POTASSIUM CHLORIDE, AND CALCIUM CHLORIDE 1000 ML: 600; 310; 30; 20 INJECTION, SOLUTION INTRAVENOUS at 06:17

## 2021-01-01 RX ADMIN — SODIUM CHLORIDE 1000 ML: 9 INJECTION, SOLUTION INTRAVENOUS at 07:21

## 2021-01-01 RX ADMIN — SODIUM CHLORIDE 1000 ML: 900 INJECTION, SOLUTION INTRAVENOUS at 07:04

## 2021-01-01 NOTE — ED PROVIDER NOTES
[facility]  EMERGENCY DEPARTMENT HISTORY AND PHYSICAL EXAM       Date: 1/1/2021   Patient Name: Davon Miguel   YOB: 1975  Medical Record Number: [id number]    HISTORY OF PRESENTING ILLNESS:     Davon Miguel is a 39 y.o. female presenting with the noted PMH to the ED c/o change in urination. Patient states that she had her Kraft catheter removed by her bladder doctor 3 days ago. She states she been self cathing since. She states she has pain and irritation when she inserts the catheter. She feels like she cannot get everything out. She states is causing her to feel bloated. She denies abdominal pain currently but she does have irritation in her vaginal area. She states that when she puts in the catheter is very tender. Increases when she tries to self cath. No decreasing factors. Is a burning pain. Denies any fevers. Denies any cough or cold symptoms. Denies any sore throat or coughing. Denies any chest pain or shortness of breath. She states her last bowel movement this morning and normal.  She reports nausea after she eats but no vomiting. She states she is had MS for 24 years. She has a nephew with MS as well. No sick contacts. No trauma.     Rest of complete systems reviewed and negative    Primary Care Provider: Elton Serrato NP   Specialist:    Past Medical History:   Past Medical History:   Diagnosis Date    Arthropathy, unspecified, site unspecified     Depression     Diabetes ([address]     Hypercholesteremia     Hypertension     history of htn    Incontinence of urine     Insomnia     Migraine     MS (multiple sclerosis) ([address]     Neurogenic bladder     Other ill-defined conditions(799.89)     multiple Sclerosis    Seizures ([address]     6/2013    Wears glasses         Past Surgical History:   Past Surgical History:   Procedure Laterality Date    HX CARPAL TUNNEL RELEASE  11/2008    HX HYSTERECTOMY      HX OTHER SURGICAL Social History:   Social History     Tobacco Use    Smoking status: Never Smoker    Smokeless tobacco: Never Used   Substance Use Topics    Alcohol use: No    Drug use: No        Allergies: Allergies   Allergen Reactions    Atorvastatin Anaphylaxis    Levemir [Insulin Detemir] Hives    Oxycontin [Oxycodone] Hives and Other (comments)     intolerance        REVIEW OF SYSTEMS:  Review of Systems      PHYSICAL EXAM:  Vitals:    01/01/21 0520 01/01/21 0656   BP: (!) 138/91    Pulse: (!) 116 98   Resp: 18 14   Temp: 98.3 °F (36.8 °C)    SpO2: 99%    Weight: 105.7 kg (233 lb)    Height: 5' 3\" (1.6 m)        Physical Exam   Vital signs reviewed. Alert oriented x 3 in NAD. HEENT: normocephalic atraumatic. Eyes are PERRLA EOMI. Conjunctiva normal.    External ears and nose normal.    Neck: normal external exam. No midline neck or back TTP. Lungs are clear to ascultation bilaterally. normal effort  Heart is regular rate and rhythm with no murmurs. Abdomen soft and nontender. No rebound rigidity or guarding. Extremities: Moves all 4 extremities and no distress. Full range of motion. 2+ pulses and BCR in all 4 extremities. Neuro: Normal gait. 5 out of 5 strength in all 4 extremities. No facial droop. Skin examination: intact. no rashes. No petechia or purpura. Vaginal examination performed with Christian Like RN chaperone. She does have small amount of whitish discharge. Does have tenderness of her urethral opening. No bleeding. Does have mild scaling skin in the intertriginous areas of bilateral inguinal folds.        Medications   potassium chloride 10 mEq in 100 ml IVPB (10 mEq IntraVENous New Bag 1/1/21 0720)   lactated ringers bolus infusion 1,000 mL (0 mL IntraVENous IV Completed 1/1/21 0705)   sodium chloride 0.9 % bolus infusion 1,000 mL (1,000 mL IntraVENous New Bag 1/1/21 0704)   sodium chloride 0.9 % bolus infusion 1,000 mL (1,000 mL IntraVENous New Bag 1/1/21 0721)   insulin regular (NOVOLIN R, HUMULIN R) injection 10 Units (10 Units IntraVENous Given 1/1/21 3958)       RESULTS:    Labs -   Labs Reviewed   CBC WITH AUTOMATED DIFF - Abnormal; Notable for the following components:       Result Value    PLATELET 491 (*)     All other components within normal limits   METABOLIC PANEL, BASIC - Abnormal; Notable for the following components:    Sodium 131 (*)     Potassium 3.1 (*)     Chloride 96 (*)     Glucose 427 (*)     BUN/Creatinine ratio 9 (*)     All other components within normal limits   URINALYSIS W/ RFLX MICROSCOPIC - Abnormal; Notable for the following components:    Specific gravity >1.030 (*)     Glucose >1,000 (*)     Ketone TRACE (*)     All other components within normal limits   GLUCOSE, POC - Abnormal; Notable for the following components:    Glucose (POC) 312 (*)     All other components within normal limits   HCG QL SERUM   LIPASE       Radiologic Studies -  No results found.    EKG interpretation:   Done at 555 read myself as sinus tachycardia 123 bpm.  No ST elevation.  Normal axis.    MEDICAL DECISION MAKING    0 640.  Patient reassessed.  Updated pressure results.  Patient states she can take insulin.  She does not take any at home though.  But she has had in the hospital.  Will give potassium replacement as well as additional fluids.  Suspect the high sugar is contributing to her vulvovaginal candidiasis.    0 740.  Patient reassessed.  Lying on the [facility] bed in no distress.    0808. Patient reassessed.  Results and precautions explained.  Patient states understanding and agrees with plan.    Abdomen soft and nonsurgical.  No signs or symptoms of acute appendicitis cholecystitis or pancreatitis.  No signs of sepsis.  Patient with elevated blood sugar here.  No signs of DKA.  However suspect elevated heart rate with dehydration.  IV fluids given here.  Dehydration secondary to the hyperglycemia.  Insulin given here.  Patient also has hypokalemia.  IV replacement given.  IV  fluids given. Sugar improving. Diet discussed with patient. Patient to follow-up with primary doctor rechecked or come back if symptoms change or worsen. Patient states understanding and agrees with plan. Patient has no new complaints, changes, or physical findings. Results were reviewed with the patient. Pt's questions and concerns were addressed. Care plan was outlined, including follow-up with PCP/specialist and return precautions were discussed. Patient is felt to be stable for discharge at this time. Diagnosis   Clinical Impression:   1. Uncontrolled type 2 diabetes mellitus with hyperglycemia (HCC)    2. Vulvovaginal candidiasis    3. Multiple sclerosis ([address]    4. Hypokalemia     dehydration      Follow-up Information     Follow up With Specialties Details Why Contact Info    Magnus Fothergill, NP Nurse Practitioner Call today  [address] [facility] [place] [facility]  [phone]      [facility] EMERGENCY DEPT Emergency Medicine Go in 2 days If symptoms worsen, As needed [address]  [phone]          Current Discharge Medication List      START taking these medications    Details   fluconazole (Diflucan) 150 mg tablet Take 1 Tab by mouth now for 1 dose. FDA advises cautious prescribing of oral fluconazole in pregnancy. Qty: 1 Tab, Refills: 0      clotrimazole (LOTRIMIN) 1 % topical cream Apply  to affected area two (2) times a day for 30 days. Apply twice a day for 2-4 weeks  Qty: 1 Tube, Refills: 0      naproxen (Naprosyn) 500 mg tablet Take 1 Tab by mouth two (2) times daily (with meals) for 10 days. As needed for pain  Qty: 20 Tab, Refills: 0             Discharged in stable and improved condition. This chart was completed using Dragon, a dictation transcription service. Errors may have resulted from using this device.

## 2021-01-01 NOTE — ED TRIAGE NOTES
Pt arrives with c/c of abdominal pressure. Pt self cath due to incontinent issues. Pt reports not being able to self cath with output since Wednesday.   Hx: MS, DM, HTN

## 2021-01-01 NOTE — ED NOTES
Per dr Monique Ledezma, check poc glucose 30 min after insulin administration and after 1st bag of fluids are completed.

## 2021-01-20 ENCOUNTER — HOSPITAL ENCOUNTER (EMERGENCY)
Age: 46
Discharge: HOME | End: 2021-01-20
Attending: EMERGENCY MEDICINE
Payer: MEDICARE

## 2021-01-20 VITALS
HEART RATE: 89 BPM | SYSTOLIC BLOOD PRESSURE: 154 MMHG | DIASTOLIC BLOOD PRESSURE: 103 MMHG | OXYGEN SATURATION: 100 % | TEMPERATURE: 98.6 F | RESPIRATION RATE: 18 BRPM

## 2021-01-20 DIAGNOSIS — R21 RASH: Primary | ICD-10-CM

## 2021-01-20 PROCEDURE — 99282 EMERGENCY DEPT VISIT SF MDM: CPT

## 2021-01-20 PROCEDURE — 74011000250 HC RX REV CODE- 250: Performed by: PHYSICIAN ASSISTANT

## 2021-01-20 RX ORDER — ACYCLOVIR 800 MG/1
800 TABLET ORAL
Qty: 50 TAB | Refills: 0 | Status: SHIPPED | OUTPATIENT
Start: 2021-01-20 | End: 2021-01-30

## 2021-01-20 RX ORDER — HYDROCORTISONE 1 %
CREAM (GRAM) TOPICAL 2 TIMES DAILY
Qty: 30 G | Refills: 0 | Status: SHIPPED | OUTPATIENT
Start: 2021-01-20

## 2021-01-20 RX ORDER — PROPARACAINE HYDROCHLORIDE 5 MG/ML
2 SOLUTION/ DROPS OPHTHALMIC
Status: COMPLETED | OUTPATIENT
Start: 2021-01-20 | End: 2021-01-20

## 2021-01-20 RX ADMIN — FLUORESCEIN SODIUM 1 STRIP: 1 STRIP OPHTHALMIC at 16:25

## 2021-01-20 RX ADMIN — PROPARACAINE HYDROCHLORIDE 2 DROP: 5 SOLUTION/ DROPS OPHTHALMIC at 16:25

## 2021-01-20 NOTE — ED PROVIDER NOTES
[facility] EMERGENCY DEPT    Date: 1/20/2021  Patient Name: Tiffany Lares    History of Presenting Illness     Chief Complaint   Patient presents with    Rash     39 y.o. female with noted past medical history presents to the ED complaining of a moderately constant painful rash just lateral to her left eye onset 1 week ago. Patient does report having blisters to the rash initially. She also notes some burning in her left eye. She does not wear contacts. Denies any fever, chills, visual changes, other symptoms. Patient denies any other associated signs or symptoms. Patient denies any other complaints. Nursing notes regarding the HPI and triage nursing notes were reviewed. Prior medical records were reviewed. Current Outpatient Medications   Medication Sig Dispense Refill    acyclovir (ZOVIRAX) 800 mg tablet Take 1 Tab by mouth five (5) times daily for 10 days. 50 Tab 0    hydrocortisone (Cortisone) 1 % topical cream Apply  to affected area two (2) times a day. use thin layer. DO NOT GET IN YOUR EYE. 30 g 0    clotrimazole (LOTRIMIN) 1 % topical cream Apply  to affected area two (2) times a day for 30 days. Apply twice a day for 2-4 weeks 1 Tube 0    predniSONE (DELTASONE) 10 mg tablet Days 1 & 2: Take FOUR tabs. Days 3 & 4: Take THREE tabs. Days 5 & 6: Take TWO tabs. Days 7 & 8: Take ONE tab. 20 Tab 0    cyclobenzaprine (FLEXERIL) 10 mg tablet Take 1 Tab by mouth three (3) times daily as needed for Muscle Spasm(s). 15 Tab 0    mirabegron ER (Myrbetriq) 50 mg ER tablet Take 1 Tab by mouth daily. 90 Tab 3    diazePAM (VALIUM) 5 mg tablet Take 1 Tab by mouth every eight (8) hours as needed (muscle spasm). Max Daily Amount: 15 mg. 6 Tab 0    gabapentin (NEURONTIN) 300 mg capsule Take 300 mg by mouth nightly. 1-2 tabs      amLODIPine (NORVASC) 10 mg tablet Take 10 mg by mouth daily.  metFORMIN (GLUCOPHAGE) 500 mg tablet Take 1.5 Tabs by mouth two (2) times daily (with meals).  60 Tab 0  DULoxetine (CYMBALTA) 60 mg capsule Take 60 mg by mouth daily.  QUEtiapine (SEROQUEL) 300 mg tablet Take 300 mg by mouth nightly.  glatiramer (COPAXONE) 40 mg/mL injection 40 mg by SubCUTAneous route every Monday, Wednesday, Friday. Past History     Past Medical History:  Past Medical History:   Diagnosis Date    Arthropathy, unspecified, site unspecified     Depression     [facility]     Hypercholesteremia     Hypertension     history of htn    Incontinence of urine     Insomnia     Migraine     MS (multiple sclerosis) ([facility]     Neurogenic bladder     Other ill-defined conditions(799.89)     multiple Sclerosis    Seizures ([facility]     6/2013    Wears glasses        Past Surgical History:  Past Surgical History:   Procedure Laterality Date    HX CARPAL TUNNEL RELEASE  11/2008    HX HYSTERECTOMY      HX OTHER SURGICAL         Family History:  Family History   Problem Relation Age of Onset    Heart Disease Father     Diabetes Father     Diabetes Sister     Other Other        Social History:  Social History     Tobacco Use    Smoking status: Never Smoker    Smokeless tobacco: Never Used   Substance Use Topics    Alcohol use: No    Drug use: No       Allergies: Allergies   Allergen Reactions    Atorvastatin Anaphylaxis    Levemir [Insulin Detemir] Hives    Oxycontin [Oxycodone] Hives and Other (comments)     intolerance       Patient's primary care provider (as noted in EPIC): Macario Johnson NP    Review of Systems   Constitutional:  Denies malaise, fever, chills. ENMT:  + burning to left eye. Neck:  Denies injury or pain. Neuro:  Denies headache, LOC, dizziness, neurologic symptoms/deficits/paresthesias. Skin: + rash lateral to left eye. All other systems negative as reviewed.      Visit Vitals  BP (!) 154/103   Pulse 89   Temp 98.6 °F (37 °C)   Resp 18   SpO2 100%       PHYSICAL EXAM:    CONSTITUTIONAL:  Alert, in no apparent distress;  well developed; well nourished. HEAD:  Normocephalic, atraumatic. EYES:  EOMI. Non-icteric sclera. Normal conjunctiva. ENTM:  Mouth: mucous membranes moist.  NECK:  Supple  RESPIRATORY:  Chest clear, equal breath sounds, good air movement. Without wheezes, rhonchi or rales. CARDIOVASCULAR:  Regular rate and rhythm. No murmurs, rubs, or gallops. GI:  Normal bowel sounds, abdomen soft and non-tender. No rebound or guarding. BACK:  Non-tender. UPPER EXT:  Normal inspection. LOWER EXT:  No edema, no calf tenderness. Distal pulses intact. NEURO:  Moves all four extremities, and grossly normal motor exam.  SKIN: Adjacent to left lateral eye with erythema with overlying small scabs. PSYCH:  Alert and normal affect. MEDICAL DECISION MAKING:  Shingles versus dermatitis versus other etiologies. No dendritic lesion on fluorescein exam.     IMPRESSION AND MEDICAL DECISION MAKING:  Patient states the rash initially began as blisters to her left lateral eye/superior cheek. It appears to be the V2 distribution. Likely shingles, no lesions noted on fluorescein exam.  Rx provided for acyclovir as well as hydrocortisone cream.  Patient was counseled to not get the hydrocortisone anywhere close to her eye. She will follow-up with ophthalmology and PCP, return for any acute worsening. Diagnosis:   1.  Rash      Disposition: Discharge    Follow-up Information     Follow up With Specialties Details Why Contact Info    Elton Serrato NP Nurse Practitioner In 3 days  [address] [facility] [address]  [facility] [address]      Agustina Pino MD Ophthalmology In 3 days  [address] [facility] EMERGENCY DEPT Emergency Medicine  If symptoms worsen [address]  [phone]          Discharge Medication List as of 1/20/2021  5:04 PM      START taking these medications    Details   acyclovir (ZOVIRAX) 800 mg tablet Take 1 Tab by mouth five (5) times daily for 10 days. , Print, Disp-50 Tab, R-0      hydrocortisone (Cortisone) 1 % topical cream Apply  to affected area two (2) times a day. use thin layer. DO NOT GET IN YOUR EYE., Print, Disp-30 g, R-0         CONTINUE these medications which have NOT CHANGED    Details   clotrimazole (LOTRIMIN) 1 % topical cream Apply  to affected area two (2) times a day for 30 days. Apply twice a day for 2-4 weeks, Normal, Disp-1 Tube, R-0      predniSONE (DELTASONE) 10 mg tablet Days 1 & 2: Take FOUR tabs. Days 3 & 4: Take THREE tabs. Days 5 & 6: Take TWO tabs. Days 7 & 8: Take ONE tab., Normal, Disp-20 Tab,R-0      cyclobenzaprine (FLEXERIL) 10 mg tablet Take 1 Tab by mouth three (3) times daily as needed for Muscle Spasm(s). , Normal, Disp-15 Tab,R-0      mirabegron ER (Myrbetriq) 50 mg ER tablet Take 1 Tab by mouth daily. , Normal, Disp-90 Tab, R-3      diazePAM (VALIUM) 5 mg tablet Take 1 Tab by mouth every eight (8) hours as needed (muscle spasm). Max Daily Amount: 15 mg., Print, Disp-6 Tab, R-0      gabapentin (NEURONTIN) 300 mg capsule Take 300 mg by mouth nightly. 1-2 tabs, Historical Med      amLODIPine (NORVASC) 10 mg tablet Take 10 mg by mouth daily. , Historical Med      metFORMIN (GLUCOPHAGE) 500 mg tablet Take 1.5 Tabs by mouth two (2) times daily (with meals). , Print, Disp-60 Tab, R-0      DULoxetine (CYMBALTA) 60 mg capsule Take 60 mg by mouth daily. , Historical Med      QUEtiapine (SEROQUEL) 300 mg tablet Take 300 mg by mouth nightly., Historical Med      glatiramer (COPAXONE) 40 mg/mL injection 40 mg by SubCUTAneous route every Monday, Wednesday, Friday., Historical Med           LAURIE Dale

## 2021-04-12 NOTE — ED PROVIDER NOTES
HPI Comments: Mattie Shaw is as 43year old female who presents to the ED with a c/o headache that has bernie persistent all day. Pt states she self-medicated with two Advil's earlier today, but nothing seems to be helping her pain. EMS was summoned to her home, and transported her into the ED for evaluation. EMS report they noticed tremors, and the Pt states she has MS, and has tremors when she gets an exacerbation of her symptoms. Patient is a 43 y.o. female presenting with headaches. The history is provided by the patient. History limited by: No communication barrier. Headache    This is a new problem. The current episode started 3 to 5 hours ago. The problem occurs constantly. The problem has not changed since onset. Associated with: pT MAKES NO ASSOCIATION. She has tried nothing for the symptoms. The treatment provided no relief. Past Medical History:   Diagnosis Date    Arthritis     Arthropathy, unspecified, site unspecified     Depression     Diabetes ([address]     Hypercholesteremia     Hypertension     history of htn    Incontinence of urine     Insomnia     Migraine     MS (multiple sclerosis) ([address]     MS (multiple sclerosis) ([address]     Neurogenic bladder     Other ill-defined conditions     multiple Sclerosis    Seizures ([address]     6/2013    Wears glasses        Past Surgical History:   Procedure Laterality Date    HX HYSTERECTOMY      HX ORTHOPAEDIC      1/2005 11/2008    HX OTHER SURGICAL           Family History:   Problem Relation Age of Onset    Heart Disease Father     Diabetes Father     Diabetes Sister     Other Other        Social History     Social History    Marital status:      Spouse name: N/A    Number of children: N/A    Years of education: N/A     Occupational History    Not on file.      Social History Main Topics    Smoking status: Never Smoker    Smokeless tobacco: Never Used    Alcohol use No    Drug use: No    Sexual activity: Not on file      Comment: Hysterectomy     Other Topics Concern    Not on file     Social History Narrative         ALLERGIES: Oxycontin [oxycodone]    Review of Systems   Constitutional: Negative. HENT: Negative. Eyes: Negative. Respiratory: Negative. Cardiovascular: Negative. Gastrointestinal: Negative. Endocrine: Negative. Genitourinary: Negative. Musculoskeletal: Negative. Skin: Negative. Allergic/Immunologic: Negative. Neurological: Positive for tremors and headaches. Hematological: Negative. Psychiatric/Behavioral: Negative. Vitals:    09/18/17 1656   BP: (!) 160/96   Pulse: (!) 117   Resp: 18   Temp: 98 °F (36.7 °C)   SpO2: 98%   Weight: 109.8 kg (242 lb)   Height: 5' 3\" (1.6 m)            Physical Exam   Constitutional: She is oriented to person, place, and time. She appears well-developed and well-nourished. No distress. HENT:   Head: Normocephalic and atraumatic. Eyes: EOM are normal. Pupils are equal, round, and reactive to light. No photophobia. Neck: Normal range of motion. Neck supple. Cardiovascular: Normal rate, regular rhythm, normal heart sounds and intact distal pulses. Pulmonary/Chest: Effort normal. No respiratory distress. She has no wheezes. She has no rales. Abdominal: Soft. Bowel sounds are normal. She exhibits distension (consistent with body habitus. ). There is no tenderness. There is no rebound and no guarding. Genitourinary:   Genitourinary Comments: NE   Musculoskeletal: Normal range of motion. She exhibits no edema, tenderness or deformity. Neurological: She is alert and oriented to person, place, and time. Coarse tremors noted in the upper extremities. Finer and only occasional tremors noted in the bilateral lower extremities. Normal bilateral upper and lower extremity strength. Skin: Skin is warm and dry. Psychiatric: She has a normal mood and affect. Her behavior is normal.   Nursing note and vitals reviewed. MDM  Number of Diagnoses or Management Options  Elevated blood pressure reading:   Multiple sclerosis exacerbation ([address]):   Nonintractable headache, unspecified chronicity pattern, unspecified headache type:   Diagnosis management comments: PROGRESS NOTE:  Care of the Pt has been turned over to EVGENY Yepez PA-C at time of shift change. Nataly Coburn NP  6:05 PM    Head CT shows no acute intracranial abnormalities. No significant interval changes in periventricular white matter lucencies, consistent w/patient's h/o multiple sclerosis. Consulted tele-neurologist and agrees pt should be admitted and during inpatient stay, have MRI w/contrast.  Paging hospitalist.       Amount and/or Complexity of Data Reviewed  Clinical lab tests: ordered and reviewed  Tests in the radiology section of CPT®: ordered and reviewed      ED Course       Procedures      Recent Results (from the past 12 hour(s))   CBC WITH AUTOMATED DIFF    Collection Time: 09/18/17  5:20 PM   Result Value Ref Range    WBC 10.2 4.6 - 13.2 K/uL    RBC 4.72 4.20 - 5.30 M/uL    HGB 13.9 12.0 - 16.0 g/dL    HCT 39.1 35.0 - 45.0 %    MCV 82.8 74.0 - 97.0 FL    MCH 29.4 24.0 - 34.0 PG    MCHC 35.5 31.0 - 37.0 g/dL    RDW 13.9 11.6 - 14.5 %    PLATELET 463 197 - 222 K/uL    MPV 11.2 9.2 - 11.8 FL    NEUTROPHILS 60 40 - 73 %    LYMPHOCYTES 32 21 - 52 %    MONOCYTES 6 3 - 10 %    EOSINOPHILS 2 0 - 5 %    BASOPHILS 0 0 - 2 %    ABS. NEUTROPHILS 6.1 1.8 - 8.0 K/UL    ABS. LYMPHOCYTES 3.3 0.9 - 3.6 K/UL    ABS. MONOCYTES 0.6 0.05 - 1.2 K/UL    ABS. EOSINOPHILS 0.2 0.0 - 0.4 K/UL    ABS.  BASOPHILS 0.0 0.0 - 0.06 K/UL    DF AUTOMATED     METABOLIC PANEL, BASIC    Collection Time: 09/18/17  5:20 PM   Result Value Ref Range    Sodium 136 136 - 145 mmol/L    Potassium 3.6 3.5 - 5.5 mmol/L    Chloride 100 100 - 108 mmol/L    CO2 22 21 - 32 mmol/L    Anion gap 14 3.0 - 18 mmol/L    Glucose 127 (H) 74 - 99 mg/dL    BUN 9 7.0 - 18 MG/DL    Creatinine 0.60 0.6 - 1.3 MG/DL BUN/Creatinine ratio 15 12 - 20      GFR est AA >60 >60 ml/min/1.73m2    GFR est non-AA >60 >60 ml/min/1.73m2    Calcium 8.6 8.5 - 10.1 MG/DL     8:03 PM  Diagnosis:   1. Nonintractable headache, unspecified chronicity pattern, unspecified headache type    2. Multiple sclerosis exacerbation (HCC)    3. Elevated blood pressure reading          Disposition: admit    Follow-up Information     None          Patient's Medications   Start Taking    No medications on file   Continue Taking    CYANOCOBALAMIN 1,000 MCG TABLET    Take 1,000 mcg by mouth daily. GLATIRAMER (COPAXONE) 40 MG/ML INJECTION    40 mg by SubCUTAneous route every Monday, Wednesday, Friday. POTASSIUM CHLORIDE (K-DUR, KLOR-CON) 20 MEQ TABLET    Take 1 Tab by mouth two (2) times a day. These Medications have changed    No medications on file   Stop Taking    DIAZEPAM (VALIUM) 5 MG TABLET    Take 1 Tab by mouth every eight (8) hours as needed for Anxiety. Max Daily Amount: 15 mg. DULOXETINE (CYMBALTA) 30 MG CAPSULE    Take 3 Caps by mouth daily. Indications: CHRONIC MUSCULOSKELETAL PAIN, MAJOR DEPRESSIVE DISORDER    GABAPENTIN (NEURONTIN) 300 MG CAPSULE    Take 300 mg by mouth nightly. HYDROCODONE-ACETAMINOPHEN (NORCO) 5-325 MG PER TABLET    Take 1 Tab by mouth every four (4) hours as needed for Pain. Max Daily Amount: 6 Tabs. QUETIAPINE (SEROQUEL) 200 MG TABLET    Take 300 mg by mouth daily.  at bedtime Airborne/Contact

## 2021-05-25 PROBLEM — H04.123 DRY EYE SYNDROME OF BILATERAL LACRIMAL GLANDS: Status: ACTIVE | Noted: 2021-05-25

## 2021-05-25 PROBLEM — E53.8 VITAMIN B12 DEFICIENCY: Status: ACTIVE | Noted: 2021-05-25
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
## 2021-05-25 PROBLEM — M25.529 PAIN, ELBOW: Status: ACTIVE | Noted: 2021-05-25

## 2021-05-25 PROBLEM — R29.898 LEG WEAKNESS: Status: ACTIVE | Noted: 2019-02-09

## 2021-05-25 PROBLEM — T14.8XXA BRUISING: Status: ACTIVE | Noted: 2021-05-25

## 2021-05-25 PROBLEM — E83.51 HYPOCALCEMIA: Status: ACTIVE | Noted: 2021-05-25

## 2021-05-25 PROBLEM — T50.902A INTENTIONAL OVERDOSE OF DRUG IN TABLET FORM (HCC): Status: ACTIVE | Noted: 2021-05-25

## 2021-05-25 PROBLEM — G56.00 CARPAL TUNNEL SYNDROME: Status: ACTIVE | Noted: 2021-05-25

## 2021-05-25 PROBLEM — R11.2 NAUSEA WITH VOMITING: Status: ACTIVE | Noted: 2021-05-25

## 2021-05-25 PROBLEM — E87.6 HYPOKALEMIA: Status: ACTIVE | Noted: 2021-05-25

## 2021-05-25 PROBLEM — Z09 HOSPITAL DISCHARGE FOLLOW-UP: Status: ACTIVE | Noted: 2021-05-25

## 2021-05-25 PROBLEM — M25.562 LEFT KNEE PAIN: Status: ACTIVE | Noted: 2021-05-25

## 2021-05-25 PROBLEM — N89.8 VAGINAL DISCHARGE: Status: ACTIVE | Noted: 2021-05-25

## 2021-05-25 PROBLEM — G44.209 TENSION TYPE HEADACHE: Status: ACTIVE | Noted: 2021-05-25

## 2021-05-25 PROBLEM — D64.9 ANEMIA: Status: ACTIVE | Noted: 2021-05-25

## 2021-05-25 PROBLEM — L22 DIAPER RASH: Status: ACTIVE | Noted: 2021-05-25

## 2021-05-25 PROBLEM — N39.0 URINARY TRACT INFECTION: Status: ACTIVE | Noted: 2021-05-25

## 2021-05-25 PROBLEM — R53.83 FATIGUE: Status: ACTIVE | Noted: 2021-05-25

## 2021-05-25 PROBLEM — G81.10 SPASTIC HEMIPLEGIA (HCC): Status: ACTIVE | Noted: 2021-05-25

## 2021-05-25 PROBLEM — E55.9 VITAMIN D DEFICIENCY: Status: ACTIVE | Noted: 2021-05-25

## 2021-05-25 PROBLEM — R26.9 GAIT DISTURBANCE: Status: ACTIVE | Noted: 2021-05-25

## 2021-05-25 PROBLEM — T50.901A OVERDOSE: Status: ACTIVE | Noted: 2021-05-25

## 2021-05-25 PROBLEM — E05.90 SUBCLINICAL HYPERTHYROIDISM: Status: ACTIVE | Noted: 2021-05-25

## 2021-05-25 PROBLEM — R25.2 SPASM: Status: ACTIVE | Noted: 2021-05-25

## 2021-05-25 PROBLEM — S62.309A CLOSED FRACTURE OF METACARPAL BONE: Status: ACTIVE | Noted: 2021-05-25

## 2021-05-25 PROBLEM — G47.00 INSOMNIA: Status: ACTIVE | Noted: 2021-05-25

## 2021-05-25 PROBLEM — H25.813 COMBINED FORMS OF AGE-RELATED CATARACT OF BOTH EYES: Status: ACTIVE | Noted: 2020-02-25

## 2021-05-25 PROBLEM — K59.00 CONSTIPATION: Status: ACTIVE | Noted: 2021-05-25

## 2021-05-25 PROBLEM — B34.9 VIRAL INFECTION: Status: ACTIVE | Noted: 2021-05-25

## 2021-05-25 PROBLEM — Z72.51 HIGH-RISK SEXUAL BEHAVIOR: Status: ACTIVE | Noted: 2021-05-25

## 2021-05-25 PROBLEM — Z23 IMMUNIZATION DUE: Status: ACTIVE | Noted: 2021-05-25

## 2021-05-25 PROBLEM — D72.820 LYMPHOCYTOSIS (SYMPTOMATIC): Status: ACTIVE | Noted: 2021-05-25

## 2021-05-25 PROBLEM — B37.31 VAGINAL CANDIDIASIS: Status: ACTIVE | Noted: 2021-05-25

## 2021-05-25 PROBLEM — R60.9 EDEMA: Status: ACTIVE | Noted: 2021-05-25

## 2021-05-25 PROBLEM — Z20.2 CONTACT WITH AND (SUSPECTED) EXPOSURE TO INFECTIONS WITH A PREDOMINANTLY SEXUAL MODE OF TRANSMISSION: Status: ACTIVE | Noted: 2021-05-25

## 2021-05-25 PROBLEM — B37.2 CANDIDAL INTERTRIGO: Status: ACTIVE | Noted: 2021-05-25

## 2021-05-25 PROBLEM — R20.2 TINGLING: Status: ACTIVE | Noted: 2021-05-25

## 2021-05-25 PROBLEM — R42 DIZZINESS: Status: ACTIVE | Noted: 2021-05-25

## 2021-05-25 PROBLEM — H04.123 DRY EYE SYNDROME OF BOTH EYES: Status: ACTIVE | Noted: 2020-02-25

## 2021-06-25 ENCOUNTER — PATIENT OUTREACH (OUTPATIENT)
Dept: CASE MANAGEMENT | Age: 46
End: 2021-06-25

## 2021-06-25 PROBLEM — Z23 IMMUNIZATION DUE: Status: RESOLVED | Noted: 2021-05-25 | Resolved: 2021-06-25

## 2021-06-25 NOTE — PROGRESS NOTES
.Complex Case Management Denial       Date/Time:  2021 1:14 PM    Method of communication with patient:phone, none    [address] [place] [address] ( [facility]) contacted the patient by telephone to perform Ambulatory Care Coordination. Verified name and  with patient as identifiers. Provided introduction to self, and explanation of the Ambulatory Care Manager's role. Reviewed most recent clinic visit w/ patient who verbalized understanding. Patient given an opportunity to ask questions. The patient Declines Care Coordination Services at this time. Patient very abrupt with ACM. Saying \" I need my psych medications\" AC voiced to patient that Her PCP HECTOR Paredes  May not be writing for those medications. Patient said who, [facility] said her primary care doctor. Patient said She has been up non stop for 3 days. She needs her medications. [facility] will route message to her PCP but in the mean time she needs to call her PCP to make a follow up appointment, she has not been seen in over 8 months and all of her medications should be in need of refills. Patient said she has seen a doctor since then, just give her the telephone number and she will call for an appointment. [facility] gave patient PCP office number and then she hung up. The patient agrees to contact the PCP office or the [address] [place] [address] for questions related to their healthcare. [facility] provided contact information for future reference.

## 2021-10-07 ENCOUNTER — OFFICE VISIT (OUTPATIENT)
Dept: SURGERY | Age: 46
End: 2021-10-07
Payer: MEDICARE

## 2021-10-07 VITALS
RESPIRATION RATE: 20 BRPM | HEART RATE: 108 BPM | SYSTOLIC BLOOD PRESSURE: 133 MMHG | BODY MASS INDEX: 46.78 KG/M2 | TEMPERATURE: 97.5 F | DIASTOLIC BLOOD PRESSURE: 82 MMHG | WEIGHT: 264 LBS | HEIGHT: 63 IN

## 2021-10-07 DIAGNOSIS — E66.01 MORBID OBESITY WITH BMI OF 45.0-49.9, ADULT (HCC): Primary | ICD-10-CM

## 2021-10-07 PROCEDURE — G8754 DIAS BP LESS 90: HCPCS | Performed by: SURGERY

## 2021-10-07 PROCEDURE — G8752 SYS BP LESS 140: HCPCS | Performed by: SURGERY

## 2021-10-07 PROCEDURE — 99205 OFFICE O/P NEW HI 60 MIN: CPT | Performed by: SURGERY

## 2021-10-07 PROCEDURE — G8417 CALC BMI ABV UP PARAM F/U: HCPCS | Performed by: SURGERY

## 2021-10-07 PROCEDURE — G8427 DOCREV CUR MEDS BY ELIG CLIN: HCPCS | Performed by: SURGERY

## 2021-10-07 PROCEDURE — G9717 DOC PT DX DEP/BP F/U NT REQ: HCPCS | Performed by: SURGERY

## 2021-10-07 RX ORDER — GLIPIZIDE 5 MG/1
TABLET, FILM COATED, EXTENDED RELEASE ORAL
COMMUNITY
Start: 2021-10-05

## 2021-10-07 RX ORDER — PROCHLORPERAZINE MALEATE 5 MG
5 TABLET ORAL
COMMUNITY

## 2021-10-07 RX ORDER — CIPROFLOXACIN 500 MG/1
TABLET ORAL
COMMUNITY
Start: 2021-08-26

## 2021-10-07 RX ORDER — INSULIN GLARGINE 100 [IU]/ML
INJECTION, SOLUTION SUBCUTANEOUS
COMMUNITY
Start: 2021-08-26

## 2021-10-07 RX ORDER — HYDROXYZINE PAMOATE 50 MG/1
CAPSULE ORAL
COMMUNITY
Start: 2021-09-29

## 2021-10-07 RX ORDER — MECLIZINE HCL 12.5 MG 12.5 MG/1
25 TABLET ORAL
COMMUNITY

## 2021-10-07 RX ORDER — DIPHENHYDRAMINE HYDROCHLORIDE 25 MG/1
CAPSULE ORAL
COMMUNITY
Start: 2021-09-04

## 2021-10-07 NOTE — PROGRESS NOTES
Consult    Patient: Sunil Gama MRN: [id number]  SSN: [id number]    YOB: 1975  Age: 55 y.o. Sex: female      Initial  Consultation for Bariatric Surgery     Sunil Gama is a 80-year-old black female who presents for discussion of the surgical options available for definitive management of her clinically severe obesity. Onset obesity: Age 26 weighing 250 pounds and a 5 foot 3 inch frame  Weight at age 25: 120 pounds  Maximum weight: 280 pounds  Pattern/progression of weight gain: Slowly progressive interrupted by dietary weight loss followed by regain of lost weight as well as additional weight thus exhibiting the yoyo effect. Max medical weight loss attempts: Multiple unsupervised and supervised weight loss trials of maximal loss occurring in  losing 25 pounds with weight watchers  Comorbidities: Hypertension, adult-onset diabetes mellitus, hypercholesterolemia,  Current weight: 264 pounds on a 5 to 3 inch frame with a body mass index of 47  Out of body weight: 128 pounds  Excess body weight: 136 pounds  Estimated postsurgical weight loss based on 80% loss of excess body weight 109 pounds  Postsurgical goal weight: 55 pounds  Allergies: Atorvastatin, codeine, Levemir, OxyContin  Current medications: See medication list  Past medical history:  1. Clinically severe obesity with body mass index of 47 with obesity related comorbidities hypertension, adult-onset diabetes mellitus, hypercholesterolemia,  2. History of multiple sclerosis with neurogenic bladder  3. Migraine cephalgia  4. Vitamin D deficiency  5. Vitamin B12 deficiency  6. Hypothyroidism  7. Depression/anxiety  Past surgical history:  1. Open reduction internal fixation right ankle fracture   2. DARLYN/BSO   3. Urrutia release   Social history: Denies utilization for tobacco and alcohol  Family history:   Mother 80-hypothyroidism  Father  62-complications adult onset diabetes mellitus   Siblings x14 with a  sister at age 54 complications of adult-onset diabetes mellitus with the remainder of the siblings health relatively unknown to the patient    Allergies   Allergen Reactions    Atorvastatin Anaphylaxis    Codeine Not Reported This Time    Levemir [Insulin Detemir] Hives    Oxycontin [Oxycodone] Hives and Other (comments)     intolerance       Current Outpatient Medications on File Prior to Visit   Medication Sig Dispense Refill    glipiZIDE SR (GLUCOTROL XL) 5 mg CR tablet       hydrOXYzine pamoate (VISTARIL) 50 mg capsule TAKE 1 CAPSULE BY MOUTH TWICE DAILY AS NEEDED FOR ANXIETY      Basaglar KwikPen U-100 Insulin 100 unit/mL (3 mL) inpn       meclizine (ANTIVERT) 12.5 mg tablet Take 25 mg by mouth.  prochlorperazine (COMPAZINE) 5 mg tablet Take 5 mg by mouth every six (6) hours as needed.  hydrocortisone (Cortisone) 1 % topical cream Apply  to affected area two (2) times a day. use thin layer. DO NOT GET IN YOUR EYE. 30 g 0    cyclobenzaprine (FLEXERIL) 10 mg tablet Take 1 Tab by mouth three (3) times daily as needed for Muscle Spasm(s). 15 Tab 0    mirabegron ER (Myrbetriq) 50 mg ER tablet Take 1 Tab by mouth daily. 90 Tab 3    gabapentin (NEURONTIN) 300 mg capsule Take 300 mg by mouth nightly. 1-2 tabs      amLODIPine (NORVASC) 10 mg tablet Take 10 mg by mouth daily.  metFORMIN (GLUCOPHAGE) 500 mg tablet Take 1.5 Tabs by mouth two (2) times daily (with meals). 60 Tab 0    DULoxetine (CYMBALTA) 60 mg capsule Take 60 mg by mouth daily.  QUEtiapine (SEROQUEL) 300 mg tablet Take 300 mg by mouth nightly.  glatiramer (COPAXONE) 40 mg/mL injection 40 mg by SubCUTAneous route every Monday, Wednesday, Friday.  ciprofloxacin HCl (CIPRO) 500 mg tablet  (Patient not taking: Reported on 10/7/2021)      Banophen 25 mg capsule TAKE 1 CAPSULE BY MOUTH EVERY 6 HOURS AS NEEDED FOR ITCHING      predniSONE (DELTASONE) 10 mg tablet Days 1 & 2: Take FOUR tabs. Days 3 & 4: Take THREE tabs. Days 5 & 6: Take TWO tabs. Days 7 & 8: Take ONE tab. (Patient not taking: Reported on 10/7/2021) 20 Tab 0    diazePAM (VALIUM) 5 mg tablet Take 1 Tab by mouth every eight (8) hours as needed (muscle spasm). Max Daily Amount: 15 mg. (Patient not taking: Reported on 10/7/2021) 6 Tab 0     No current facility-administered medications on file prior to visit. Past Medical History:   Diagnosis Date    Arthropathy, unspecified, site unspecified     Depression     Diabetes ([address]     Hypercholesteremia     Hypertension     history of htn    Incontinence of urine     Insomnia     Migraine     MS (multiple sclerosis) (HCC)     Neurogenic bladder     Other ill-defined conditions(799.89)     multiple Sclerosis    Seizures ([facility] [address]     6/2013    Wears glasses        Past Surgical History:   Procedure Laterality Date    HX CARPAL TUNNEL RELEASE  11/2008    HX HYSTERECTOMY      HX OTHER SURGICAL         Social History     Tobacco Use    Smoking status: Never Smoker    Smokeless tobacco: Never Used   Substance Use Topics    Alcohol use: No    Drug use: No       Family History   Problem Relation Age of Onset    Heart Disease Father     Diabetes Father     Diabetes Sister     Other Other          Review of Systems:      General: Denies fevers, chills, night sweats, fatigue, weight loss, or weight gain.     HEENT: Denies changes in auditory or visual acuity, recurrent pharyngitis, epistaxis, chronic rhinorrhea, vertigo    Respiratory: Denies increasing shortness of breath, productive cough, hemoptysis    Cardiac: Denies known history of cardiac disease, heart murmur, palpitations    GI: Denies dysphagia, recurrent emesis, hematemesis, changes in bowel habits, hematochezia, melena    : Denies hematuria frequency urgency dysuria    Musculoskeletal: Denies fractures, dislocations    Neurologic: Denies history of CVA, paralysis paresthesias, recurrent cephalgia, seizures    Endocrine: Denies polyuria, polydipsia, polyphagia, heat and cold intolerance    Lymph/heme: Denies a history of malignancy, anemia, bruising, blood transfusions    Integumentary: Negative for dermatitis         Physical Exam    Visit Vitals  /82 (BP 1 Location: Left upper arm, BP Patient Position: At rest, BP Cuff Size: Adult)   Pulse (!) 108   Temp 97.5 °F (36.4 °C) (Oral)   Resp 20   Ht 5' 3\" (1.6 m)   Wt 119.7 kg (264 lb)   BMI 46.77 kg/m²       Nursing note reviewed. General: Clinically severely obese in no acute distress, nontoxic in appearance. Head: Normocephalic, atraumatic  Mouth: Clear, no overt lesions, oral mucosa is pink and moist.  Neck: Supple, no masses, no adenopathy or carotid bruits, trachea midline  Resp: Clear to auscultation bilaterally, no wheezing, rhonchi, or rales, excursions normal and symmetrical.  Cardio: Regular rate and rhythm, no murmurs, clicks, gallops, or rubs. Abdomen: Obese, soft, nontender, nondistended, normoactive bowel sounds, no hernias. Extremities: Warm, well perfused, no tenderness or swelling, normal gait/station, without edema or varicosities  Neuro: Sensation and strength grossly intact and symmetrical.  Psych: Alert and oriented to person, place, and time. Impression/Plan:    59-year-old black female with a body mass index of 47 with obesity related comorbidities hypertension, adult-onset diabetes mellitus, hypercholesterolemia who would benefit from bariatric surgery. We have had an extensive discussion with regard to the risks, benefits and likely outcomes of the operation. We've discussed the restrictive and malabsorptive nature of the gastric bypass and compared and contrasted with the sleeve gastrectomy. The patient understands the likelihood of losing approximately 80% of their excess weight in 12 to 18 months.   The patient also understands the risks including but not limited to bleeding, infection, need for reoperation, ulcers, leaks and strictures, bowel obstruction secondary to adhesions and internal hernias, DVT, PE, heart attack, stroke, and death. Patient also understands risks of inadequate weight loss, excess weight loss, vitamin insufficiency, protein malnutrition, excess skin, and loss of hair. We have reviewed the components of a successful postoperative course including requirement for a high protein, low carbohydrate diet, 60 oz a day of zero calorie liquids, daily vitamin supplementation, daily exercise, regular follow-up, and participation in support groups.  At this time we will enroll the patient in our bariatric program, undertake routine laboratory evaluation, chest X-ray, EKG, possible UGI and evaluation by  nutritionist as well as psychologist and pending their satisfactory completion of the preop evaluation, plan to pursue laparoscopic potentially open Ryan-en-Y gastric bypass if her neurologist can document minimal potential need for steroid utilization, if steroids are perceived to be needed in the future we will pursue sleeve gastrectomy to achieve definitive durable weight loss on a personal level with expected resolution of obesity related comorbidities

## 2021-10-07 NOTE — PROGRESS NOTES
Maile Flores is a 55 y.o. female who presents today with   Chief Complaint   Patient presents with    Morbid Obesity     Consult                Body mass index is 46.77 kg/m². 1. Have you been to the ER, urgent care clinic since your last visit? Hospitalized since your last visit? No    2. Have you seen or consulted any other health care providers outside of the [address] [place] [address] since your last visit? Include any pap smears or colon screening.  No

## 2021-10-28 ENCOUNTER — TELEPHONE (OUTPATIENT)
Dept: BARIATRICS/WEIGHT MGMT | Age: 46
End: 2021-10-28

## 2021-10-28 ENCOUNTER — DOCUMENTATION ONLY (OUTPATIENT)
Dept: BARIATRICS/WEIGHT MGMT | Age: 46
End: 2021-10-28

## 2021-11-04 ENCOUNTER — HOSPITAL ENCOUNTER (OUTPATIENT)
Dept: BARIATRICS/WEIGHT MGMT | Age: 46
Discharge: HOME | End: 2021-11-04

## 2021-11-04 ENCOUNTER — DOCUMENTATION ONLY (OUTPATIENT)
Dept: BARIATRICS/WEIGHT MGMT | Age: 46
End: 2021-11-04

## 2021-11-04 NOTE — PROGRESS NOTES
[address] [place] Loss Magda RADHA Luis [address] [facility], [address]    Patient's Name: Lui Meng   Age: 55 y.o. YOB: 1975   Sex: female      Insurance: [address]         Session: 1 of 6  Surgeon:  Claire York    Height: 63 Weight:    264      Lbs. BMI: 46.8   Pounds Lost since last month: 0               Pounds Gained since last month: 0    Starting Weight: 264   Previous Months Weight: 264  Overall Pounds Lost: 0 Overall Pounds Gained: 0      Do you smoke? No    Alcohol intake:  Number of drinks at a time:  none  Number of times a week: none    Class Guidelines    Guidelines are reviewed with patient at the start of every class. 1. Patient understands that weight loss trial classes must be consecutive. Patient understands if they miss a class, it is their responsibility to contact me to reschedule class. I will reach out to patient after their first no show. 2.  Patient understands the expectations that weight maintenance/weight loss is expected during the classes. Failure to demonstrate changes may result in one extra month of weight loss trial, followed by going back to see the surgeon. 3. Patient is also instructed to be doing their labs, blood work, psych visit, support group and any other test that the surgeon has used while they are working on their weight loss trial.    Non-weight victories she reports is getting more rest      Dietary Instruction    During today's class we continued to focus on the key diet principles. Patient was instructed to follow a low carbohydrate diet, focusing on meat and vegetables. Patient was instructed to stop liquid calories and aim for 64 ounces of water per day. In class, I also gave patient a power point on surviving the holidays.   Some of the tips included survival tips for parties, including bringing their own low carbohydrate dish to a potluck dinner and surveying the buffet line before they start filling up their plate. Patient was given cooking alternatives, including using Splenda for sugar, substituting applesauce for oil in recipes, and using low fat plain yogurt instead of sour cream in dips. Patient was also encouraged to be mindful of calories in alcohol. Patient is eating 2 meals per day. Meals are made up of turkey sub and soda. Patient is encouraged to continue to focus on eating protein first, followed by vegetables, and a small amount of fruit or carbohydrates. Reports struggling with eating 3 meals. We talked about ways to reduce portions by using a smaller plate to fill up. Eating out frequency is most of the time. Carbohydrate intake (including bread, rice, pasta, cereal, crackers, chips, etc.)  Patient is snacking 1 times a day on popcorn and sodas. . Suggestions were made for snacks that focused on protein. Patient is drinking 72 ounces water, 72 ounces sodas. Physical Activity/Exercise    Patient is currently doing exercise every day. Reports walking, parking further/taking stairsmore steps in the day     Today's power point on surviving the holidays also included tips on exercising. This included being creative during the holiday, walking stairs, mall walking, getting resistance bands. Patient was encouraged not to be afraid to excuse themselves from the table to go for a walk after they eat. Behavior Modification    Reinforced behavior changes to make. Patient was encouraged to keep their emotions in check. Try to HALT and focus on whether they are eating out of hunger or if they are eating out of emotions. Other eating behaviors included surveying the buffet line before starting to fill up their plate.   Patient was given a check off list and encouraged to monitor some of their eating behaviors, such as eating slowly, chewing their food thoroughly, and taking 20-30 minutes to eat a meal.    Goals that patient set for next month include:  Meal planning  Taking meals with her  Eating more      Shannan Whyte RDN  11/4/2021

## 2022-01-01 NOTE — ED TRIAGE NOTES
Headache with pressure, seeing dots for 1 1/2 hours. Stood up to get a drink of water and became dizzy with the room spinning. Gait is very unsteady and feels like going to pass out. Vital signs stable. QUE score 1. Bottling adequate amounts every 2-3hrs. Voiding, loose stools. No contact from parents.

## 2022-01-10 ENCOUNTER — DOCUMENTATION ONLY (OUTPATIENT)
Dept: BARIATRICS/WEIGHT MGMT | Age: 47
End: 2022-01-10

## 2022-01-10 ENCOUNTER — HOSPITAL ENCOUNTER (OUTPATIENT)
Dept: BARIATRICS/WEIGHT MGMT | Age: 47
Discharge: HOME | End: 2022-01-10

## 2022-01-10 NOTE — PROGRESS NOTES
[address] [place] Loss Magda Hardinyanelis [address] [facility], [address]    Patient's Name: Swapna Al   Age: 55 y.o. YOB: 1975   Sex: female    Date:   1/10/2022        Session: 1 of 6  Revision:     Surgeon:  Jero Carey  Height: 63 inches   Weight:    261      Lbs. BMI: 46.3   Pounds Lost since last month:3             Pounds Gained since last month: 0    Starting Weight: 264   Previous Months Weight: 264  Overall Pounds Lost: 3   Overall Pounds Gained: 0    Do you smoke? no    Alcohol intake: none    Class Guidelines    Guidelines are reviewed with patient at the start of every class. 1. Patient understands that weight loss trial classes must be consecutive. Patient understands if they miss a class, it is their responsibility to contact me to reschedule class. I will reach out to patient after their first no show. 2.  Patient understands the expectations that weight maintenance/weight loss is expected during the classes. Failure to demonstrate changes may result in one extra month of weight loss trial, followed by going back to see the surgeon. 3. Patient is also instructed to be doing their labs, blood work, psych visit, support group and any other test that the surgeon has used while they are working on their weight loss trial.    Other Pertinent Information: Swapna Al has re-enrolled and has completely her first [address] [facility] [address] and Behaviors      Today we reviewed key diet principles. We talked about snack ideas that would focus more on protein. We also talked about the benefits of filling up on protein first and keeping the daily carbohydrate intake to less than 75 grams per day. Patient was instructed to increase fluid intake to 64 ounces per day and stop all carbonation, caffeine, and sugary drinks.   During class, we talked about the importance of getting on a routine of eating 3 meals a day, eating within one hour of waking up, and not going longer than 4 hours without fueling the body again. I also talked with patient about some meal ideas. Patient's current diet habits include: Patient is eating 2 meals per day. Patient states meals are normally made up of Slim Fast and salads. We have talked about eating protein first, following by vegetables. Patient is encouraged to start eliminating carbohydrates and try to keep less than 50 grams per day. Examples were reviewed. Portion sizes are unknown. Suggestions and tips were reviewed to help decrease portion sizes. Eating out frequency is none. We talked about the importance of planning ahead, so eating out intake can be decreased. Discussed with patient if they need to eat out, healthier options to choose from. Carbohydrate intake (including bread, rice, pasta, cereal, crackers, chips, etc.) is: popcorn,apple. I have talked to patient about the importance of filling up on protein first, which will help with satiety. Patient is snacking 2 times a day on popcorn and a apple. We talked about snacks that would be more protein-based  Fluid intake is:  6 ounces water. Patient is encouraged to stick to non-caloric drinks only. Physical Activity/Exercise    Comments:     Currently for exercise, patient is walking. We talked about activities for patient to do, including walking, swimming, or chair exercises. Goals have been set. Behavior Modification       Comments:   During class, I reviewed a power point with patients called, \"Assessing Your Readiness to Change. \"  During this power point, patient was asked to self-evaluate themselves. At the end, we tallied the scores to determine how ready they are to make changes for the surgery. For the New Year's, I had patient set New Year's resolutions, including a food-related goal, exercise-related goal, and behavior goal.  Patient was encouraged to track the goals on a daily basis using the check off list I provided. Goals should be SMART, specific, measurable, attainable, realistic, and time-orientated. Patient's Goals are:  1. Behavior-Related Goal: none given    2. Food-related goal: Meal prep    3.  Exercise-related goal: add more workouts      Chema Subramanian RDN  1/10/2022

## 2022-02-16 ENCOUNTER — HOSPITAL ENCOUNTER (OUTPATIENT)
Dept: BARIATRICS/WEIGHT MGMT | Age: 47
Discharge: HOME | End: 2022-02-16

## 2022-02-18 ENCOUNTER — DOCUMENTATION ONLY (OUTPATIENT)
Dept: BARIATRICS/WEIGHT MGMT | Age: 47
End: 2022-02-18

## 2022-02-18 NOTE — PROGRESS NOTES
[address] Loss Magda Smith [address] [facility], [address]    Patient's Name: Vadim Rossi   Age: 52 y.o. YOB: 1975   Sex: female    Date:   2/18/2022              Session: 2 of 6  Revision:     Surgeon:  Bela Diaz    Height: 63 inches   Weight:    255      Lbs. BMI: 45.2   Pounds Lost since last month: 6                 Pounds Gained since last month: 0    Starting Weight: 264     Previous Months Weight: 261  Overall Pounds Lost: 9    Overall Pounds Gained: 0    Do you smoke? no    Alcohol intake:  Number of drinks at a time:  none    Class Guidelines    Guidelines are reviewed with patient at the start of every class. 1. Patient understands that weight loss trial classes must be consecutive. Patient understands if they miss a class, it is their responsibility to contact me to reschedule class. I will reach out to patient after their first no show. 2.  Patient understands the expectations that weight maintenance/weight loss is expected during the classes. Failure to demonstrate changes may result in one extra month of weight loss trial, followed by going back to see the surgeon. 3. Patient is also instructed to be doing their labs, blood work, psych visit, support group and any other test that the surgeon has used while they are working on their weight loss trial.   Patient understands that they CAN NOT gain any weight during the weight loss trial.  Gaining weight will result in extra classes    Other Pertinent Information: Has attended Support Group Session    Changes Made Since Last Class: Drink More water and walking more eating less    Eating Habits and Behaviors      Today we started off class talking about the Key Diet Principles. Patient was encouraged to start drinking 64 ounces of fluid per day. Patient was encouraged to start cutting out soda, caffeine, carbonation, sweet tea, fruit juice, and fruit smoothies.  Patient was also instructed to fill up on meat, fish, vegetables, eggs, cheese, and some fruit. We also talked about protein drinks and patient was encouraged to start trying these, using them either for a meal replacement or a substitute for a current meal, which may be higher in carbohydrates. We talked about ways to lower carbohydrates and start trying substitutes, such as zucchini noodles and cauliflower rice. Patient's current diet habits include: Patient is eating 2 meals per day. Patient states meals are normally made up of fish, chicken salads. We have talked about eating protein first, following by vegetables. Patient is encouraged to start eliminating carbohydrates and try to keep less than 75 grams per day. Examples were reviewed. Portion sizes are not given. Suggestions and tips were reviewed to help decrease portion sizes. Eating out frequency is one time a week. We talked about the importance of planning ahead, so eating out intake can be decreased. Discussed with patient if they need to eat out, healthier options to choose from. Carbohydrate intake (including bread, rice, pasta, cereal, crackers, chips, etc.) is: bread. I have talked to patient about the importance of filling up on protein first, which will help with satiety. Patient is snacking 3 times a day on popcorn, chips. We talked about snacks that would be more protein-based. Fluid intake is:  10 bottles of water of water, 2 cans of soda, 1 Diet drink  Patient is encouraged to stick to non-caloric drinks only. Physical Activity/Exercise    Comments:     Currently for exercise, patient is walking. We talked about activities for patient to do, including walking, swimming, or chair exercises. I also talked with patient about doing some strength training, which helps the metabolism, as well. Goals have been set. Behavior Modification       Comments:   I also gave a power point on Behavior Changes and Weight Loss.   Some of the suggestions in the power point included food journaling. Patient was also given some strategies to follow, such as cooking just enough for the meal and not putting serving bowls on the table. Patient was also encouraged to restrict where they are eating to 1-2 locations to avoid mindless eating throughout the day. Patient was given a check off list and encouraged to set 3 goals for the month. I have really stressed to patient the importance of food journaling. We talked about the accountability that this provides. Patient is currently not doing food journaling.      One goal that patient has set for next month is:  Stop eating late    Janelle Jacobs RDN  2/18/2022

## 2022-03-18 PROBLEM — G56.00 CARPAL TUNNEL SYNDROME: Status: ACTIVE | Noted: 2021-05-25

## 2022-03-18 PROBLEM — R26.9 GAIT DISTURBANCE: Status: ACTIVE | Noted: 2021-05-25

## 2022-03-18 PROBLEM — M25.562 LEFT KNEE PAIN: Status: ACTIVE | Noted: 2021-05-25

## 2022-03-18 PROBLEM — D72.820 LYMPHOCYTOSIS (SYMPTOMATIC): Status: ACTIVE | Noted: 2021-05-25

## 2022-03-18 PROBLEM — K59.00 CONSTIPATION: Status: ACTIVE | Noted: 2021-05-25

## 2022-03-18 PROBLEM — L22 DIAPER RASH: Status: ACTIVE | Noted: 2021-05-25

## 2022-03-18 PROBLEM — N89.8 VAGINAL DISCHARGE: Status: ACTIVE | Noted: 2021-05-25

## 2022-03-18 PROBLEM — T14.8XXA BRUISING: Status: ACTIVE | Noted: 2021-05-25

## 2022-03-18 PROBLEM — B37.2 CANDIDAL INTERTRIGO: Status: ACTIVE | Noted: 2021-05-25

## 2022-03-18 PROBLEM — N39.0 URINARY TRACT INFECTION: Status: ACTIVE | Noted: 2021-05-25

## 2022-03-18 PROBLEM — R42 DIZZINESS: Status: ACTIVE | Noted: 2021-05-25

## 2022-03-18 PROBLEM — Z20.2 CONTACT WITH AND (SUSPECTED) EXPOSURE TO INFECTIONS WITH A PREDOMINANTLY SEXUAL MODE OF TRANSMISSION: Status: ACTIVE | Noted: 2021-05-25

## 2022-03-18 PROBLEM — G47.00 INSOMNIA: Status: ACTIVE | Noted: 2021-05-25

## 2022-03-18 PROBLEM — Z09 HOSPITAL DISCHARGE FOLLOW-UP: Status: ACTIVE | Noted: 2021-05-25

## 2022-03-19 PROBLEM — E05.90 SUBCLINICAL HYPERTHYROIDISM: Status: ACTIVE | Noted: 2021-05-25

## 2022-03-19 PROBLEM — S62.309A CLOSED FRACTURE OF METACARPAL BONE: Status: ACTIVE | Noted: 2021-05-25

## 2022-03-19 PROBLEM — G81.10 SPASTIC HEMIPLEGIA (HCC): Status: ACTIVE | Noted: 2021-05-25

## 2022-03-19 PROBLEM — M25.529 PAIN, ELBOW: Status: ACTIVE | Noted: 2021-05-25

## 2022-03-19 PROBLEM — D64.9 ANEMIA: Status: ACTIVE | Noted: 2021-05-25

## 2022-03-19 PROBLEM — B34.9 VIRAL INFECTION: Status: ACTIVE | Noted: 2021-05-25

## 2022-03-19 PROBLEM — Z72.51 HIGH-RISK SEXUAL BEHAVIOR: Status: ACTIVE | Noted: 2021-05-25

## 2022-03-19 PROBLEM — R53.83 FATIGUE: Status: ACTIVE | Noted: 2021-05-25

## 2022-03-19 PROBLEM — E53.8 VITAMIN B12 DEFICIENCY: Status: ACTIVE | Noted: 2021-05-25

## 2022-03-19 PROBLEM — H25.813 COMBINED FORMS OF AGE-RELATED CATARACT OF BOTH EYES: Status: ACTIVE | Noted: 2020-02-25

## 2022-03-19 PROBLEM — H04.123 DRY EYE SYNDROME OF BILATERAL LACRIMAL GLANDS: Status: ACTIVE | Noted: 2021-05-25

## 2022-03-19 PROBLEM — E87.6 HYPOKALEMIA: Status: ACTIVE | Noted: 2021-05-25

## 2022-03-19 PROBLEM — T50.902A INTENTIONAL OVERDOSE OF DRUG IN TABLET FORM (HCC): Status: ACTIVE | Noted: 2021-05-25

## 2022-03-19 PROBLEM — R11.2 NAUSEA WITH VOMITING: Status: ACTIVE | Noted: 2021-05-25

## 2022-03-19 PROBLEM — E11.9 TYPE II DIABETES MELLITUS (HCC): Status: ACTIVE | Noted: 2018-03-24

## 2022-03-19 PROBLEM — R25.2 SPASM: Status: ACTIVE | Noted: 2021-05-25
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
## 2022-03-19 PROBLEM — E66.9 OBESITY: Status: ACTIVE | Noted: 2017-09-19

## 2022-03-19 PROBLEM — H04.123 DRY EYE SYNDROME OF BOTH EYES: Status: ACTIVE | Noted: 2020-02-25

## 2022-03-20 PROBLEM — T50.901A OVERDOSE: Status: ACTIVE | Noted: 2021-05-25

## 2022-03-20 PROBLEM — R20.2 TINGLING: Status: ACTIVE | Noted: 2021-05-25

## 2022-03-20 PROBLEM — R29.898 LEG WEAKNESS: Status: ACTIVE | Noted: 2019-02-09

## 2022-03-20 PROBLEM — E55.9 VITAMIN D DEFICIENCY: Status: ACTIVE | Noted: 2021-05-25

## 2022-03-20 PROBLEM — E83.51 HYPOCALCEMIA: Status: ACTIVE | Noted: 2021-05-25

## 2022-03-20 PROBLEM — G44.209 TENSION TYPE HEADACHE: Status: ACTIVE | Noted: 2021-05-25

## 2022-03-20 PROBLEM — R60.9 EDEMA: Status: ACTIVE | Noted: 2021-05-25

## 2022-03-20 PROBLEM — B37.31 VAGINAL CANDIDIASIS: Status: ACTIVE | Noted: 2021-05-25

## 2022-03-21 ENCOUNTER — DOCUMENTATION ONLY (OUTPATIENT)
Dept: BARIATRICS/WEIGHT MGMT | Age: 47
End: 2022-03-21

## 2022-03-21 NOTE — PROGRESS NOTES
Patient was a No Show for Weight Loss Trial. I have contacted the patient in an attempt to reschedule. Rescheduled for 23rd March Wednesday [facility] Zoom Class.  Patient was confused about attending this class and thought she had attended when she did the Support Group Session March 10 Horace Prather RDN

## 2022-03-23 ENCOUNTER — HOSPITAL ENCOUNTER (OUTPATIENT)
Dept: BARIATRICS/WEIGHT MGMT | Age: 47
Discharge: HOME | End: 2022-03-23

## 2022-04-04 ENCOUNTER — DOCUMENTATION ONLY (OUTPATIENT)
Dept: BARIATRICS/WEIGHT MGMT | Age: 47
End: 2022-04-04

## 2022-04-05 NOTE — PROGRESS NOTES
[address] Jonah Loss Magda Smith [address] [facility], [address]    Patient's Name: Alex Malhotra   Age: 52 y.o. YOB: 1975   Sex: female     Insurance:  [address],[place] [place]        Session: 3 of  6  Revision:   Surgeon: Kevan Fung  Date: 3/23/2022    Height: 63 inches Weight:    255      Lbs. BMI: 45.2   Pounds Lost since last month: 0               Pounds Gained since last month: 0      Starting Weight: 264   Previous Months Weight: 255  Overall Pounds Lost: 9 Overall Pounds Gained: 0      Do you smoke? no    Alcohol intake:  Number of drinks at a time:  none  Number of times a week: none    Patient has  attended a support group. Information was provided. Class Guidelines    Guidelines are reviewed with patient at the start of every class. 1. Patient understands that weight loss trial classes must be consecutive. Patient understands if they miss a class, it is their responsibility to contact me to reschedule class. I will reach out to patient after their first no show. 2.  Patient understands the expectations that weight maintenance/weight loss is expected during the classes. Failure to demonstrate changes may result in one extra month of weight loss trial, followed by going back to see the surgeon. Patient understands that they CAN NOT gain any weight during the weight loss trial.  Gaining weight will result in extra classes. 3. Patient is also instructed to be doing their labs, blood work, psych visit, support group and any other test that the surgeon has used while they are working on their weight loss trial.  4.  Patient was instructed to bring their blue binder to every class and appointment. Changes Made Since Last Class: eating habits and work outs    Eating Habits and Behaviors    Today in class, we reviewed the key diet principles. I have talked to patient about pushing the fluid and working towards 64 ounces per day.     Patient was given ideas of liquids that would be okay. Patient was encouraged to cut out liquid calories, such as soda and sweet tea. We talked about the reasons that sugar sweetened beverages can promote weight gain. Sugar is highly palatable. Excessive consumption of sugar can trigger an exaggerated release of dopamine, which can promote a compulsive drive to consume more sugar sweetened beverages. Also, satiety is not reached with liquid calories the same way it does with solid calories. In class, we also talked about focusing on protein and low carbohydrates. Patient was encouraged during the weight loss trial to keep their carbohydrate less than 75 grams per day and their protein level at 60-80 grams per day. We talked about meal choices and snack ideas. Patient's current diet habits include: Patient is eating 3 meals per day. Patient is eating salad, chicken, boiled eggs at meals. .  Patient is measuring portions out. I have recommended patient to use a smaller plate and to drink water prior to their meal to help fill them up. We talked about eating and drinking post op and stopping 30 minutes before and after. Patient indicates they are eating out some times a week. This includes fast food, carry out, and sit down restaurants. I have talked to patient about starting to plan ahead to cut down on eating out. Patient indicates their indicate of bread, rice, pasta, crackers to be a few times a week. Patient is snacking on popcorn, apple slices. Sweet intake is chocolate. I have talked to patient about the importance of focusing on protein at meals. We talked about trying to limit carbohydrates. Patient is drinking water and 3 1/2 cups of soda. Patient was encouraged to aim for 64 ounces daily. I talked about focusing on zero calorie liquids and not drinking calories and weaning from caffeine. Physical Activity/Exercise    Comments: We talked about exercise.   Patient was given reasons of why exercise is so important and how that can help with their long-term success. I have encouraged patient to get a support system to help with the activity. Patient is currently doing exercis everyday for activity. Patient's plan to incorporate more activity includes: walking      Behavior Modification       Comments:  During today's lesson, I also spent some time talking about behavior changes. I talked to patient about the importance of taking vitamins post op and we reviewed the vitamins that patients will be taking post op. Patient will hear this again at pre op class before surgery. Patient had the opportunity to ask questions about these vitamins that will be lifelong. Goals that patient wants to work on includes:  1. Water extercise  2.  Moving everyday      Gerardo De Los Santos  4/5/2022

## 2022-04-28 ENCOUNTER — DOCUMENTATION ONLY (OUTPATIENT)
Dept: BARIATRICS/WEIGHT MGMT | Age: 47
End: 2022-04-28

## 2022-04-28 NOTE — PROGRESS NOTES
Patient was a No Show for Weight Loss Trial. I have contacted the patient in an attempt to reschedule. WLT class missed was 4/20/2022 never heard from patient to reschedule.  Rae De La Rosa RDN

## 2022-05-10 NOTE — ED TRIAGE NOTES
Patient c/o right sided neck pain, headache and numbness in right hand X2 days. Patient states it feels like someone is hitting her in the neck. Denies any weakness. Detail Level: Detailed Depth Of Biopsy: dermis Was A Bandage Applied: Yes Size Of Lesion In Cm: 0 Accession #: [id number] Biopsy Type: H and E Biopsy Method: double edge Personna blade Anesthesia Type: 1% lidocaine without epinephrine Anesthesia Volume In Cc: 0.8 Hemostasis: Electrocautery Wound Care: Petrolatum Dressing: bandage Destruction After The Procedure: No Type Of Destruction Used: Electrodesiccation and Curettage Curettage Text: The wound bed was treated with curettage after the biopsy was performed. Cryotherapy Text: The wound bed was treated with cryotherapy after the biopsy was performed. Electrodesiccation Text: The wound bed was treated with electrodesiccation after the biopsy was performed. Electrodesiccation And Curettage Text: The wound bed was treated with electrodesiccation and curettage after the biopsy was performed. Silver Nitrate Text: The wound bed was treated with silver nitrate after the biopsy was performed. Consent: Written consent was obtained and risks were reviewed including but not limited to scarring, infection, bleeding, scabbing, incomplete removal, nerve damage and allergy to anesthesia. Post-Care Instructions: I reviewed with the patient in detail post-care instructions. Patient is to keep the biopsy site dry overnight, and then apply vaseline ointment twice daily until healed. Patient may apply hydrogen peroxide soaks to remove any crusting. Notification Instructions: Patient will be notified of biopsy results. However, patient instructed to call the office if not contacted within 2 weeks. Billing Type: Third-Party Bill Information: Selecting Yes will display possible errors in your note based on the variables you have selected. This validation is only offered as a suggestion for you. PLEASE NOTE THAT THE VALIDATION TEXT WILL BE REMOVED WHEN YOU FINALIZE YOUR NOTE. IF YOU WANT TO FAX A PRELIMINARY NOTE YOU WILL NEED TO TOGGLE THIS TO 'NO' IF YOU DO NOT WANT IT IN YOUR FAXED NOTE.

## 2022-05-19 ENCOUNTER — DOCUMENTATION ONLY (OUTPATIENT)
Dept: BARIATRICS/WEIGHT MGMT | Age: 47
End: 2022-05-19

## 2022-05-19 ENCOUNTER — HOSPITAL ENCOUNTER (OUTPATIENT)
Dept: BARIATRICS/WEIGHT MGMT | Age: 47
Discharge: HOME | End: 2022-05-19

## 2022-05-23 NOTE — PROGRESS NOTES
[address] Jonah Loss Magda Smith [address]    Patient's Name: Alex Malhotra   Age: 52 y.o. YOB: 1975   Sex: female    Date:   5/19/2022   Insurance:  [facility]            Session: 1 of 6  Surgeon:  Kevan Fung    Height: 63 inches   Weight:    246.6      Lbs. BMI: 43.7   Pounds Lost since last month: 8.4                  Pounds Gained since last month: 0    Starting Weight: 264     Previous Months Weight: 255  Overall Pounds Lost: 9   Overall Pounds Gained: 0    Patient has been to a support group meeting. Do you smoke? no    Alcohol intake:  Number of drinks at a time:  none      Class Guidelines    Guidelines are reviewed with patient at the start of every class. 1. Patient understands that weight loss trial classes must be consecutive. Patient understands if they miss a class, it is their responsibility to contact me to reschedule class. I will reach out to patient after their first no show. 2.  Patient understands the expectations that weight maintenance/weight loss is expected during the classes. Failure to demonstrate changes may result in one extra month of weight loss trial, followed by going back to see the surgeon. Patient understands that they CAN NOT gain any weight during the weight loss trial.  Gaining weight will result in extra classes. 3. Patient is also instructed to be doing their labs, blood work, psych visit, support group and any other test that the surgeon has used while they are working on their weight loss trial.  4.  Patient was instructed to bring their blue binder to every class and appoin    Changes Made Since Last Class: Patient missed 4/20 class and started over this is her first one    Eating Habits and Behaviors      We started off class today by reviewing key diet principles.   Patient was given a very specific list of foods that they can eat, which included meat, fish, vegetables, eggs, cheese, fats, soy, and berries. Patient was also given a list of foods that should be avoided. These included sweets (candy, soda, baked goods, ice cream), and starches, including pasta, rice, crackers, chips, oatmeal, bread. We talked about appropriate protein-based snacks, including deli meat, low fat cheese, yogurt, hummus, small handful of almonds. We talked about working on sipping fluid throughout the day and working towards 64 ounces. I have recommended water, Crystal light, sugar free drinks, but eliminating soda, sweet tea, and fruit juices. I also gave a power point on ways to help with the metabolism. Some ways that can help boost one's metabolism include healthy snacking. Eating 6 small meals in the pre op phase can help keep the metabolism revved up. It is recommended to focus on protein-based snacks. Exercise is another way to increase resting metabolic rate. The more lean muscle one has, the more calories they will burn at rest.  Dehydration can slow down one's metabolism, as well. Patient is encouraged to keep sipping to work towards 64 ounces of fluid per day. Protein is another booster for metabolism. Foods high in carbohydrates and fat slow down the metabolism. Patient's current diet habits include: Patient is eating 3 meals a day. Patient states their portions are uses a small plate stop eating after 6 pm and shops [employer] have encouraged patient to use a smaller plate and fill up on water before meals to help cut down on portions. Fluid intake:  8 bottles of water, crystal light, 12 oz soda when she wants to [employer]" her day. Patient is encouraged not to drink calories and stick to non-carbonated, non-caffeine, sugar free drinks. The goal is 64 ounces of fluid per day. Physical Activity/Exercise    Comments:     Currently for exercise, patient has MS and not exercising .   We talked about activities for patient to do, including walking, swimming, or chair exercises. I also talked with patient about doing some strength training, which helps the metabolism, as well. Patient understands the importance of establishing an activity routine and that surgery is only a small piece of puzzle, but exercise and diet changes will play a role in long term success. Behavior Modification       Comments: In the power point, Portion Control: The importance of measuring out portions and reading food labels. This  include not skipping meals and being sure to feed and fuel that body rather than going large gaps and putting the body in starvation mode. Patient is encouraged to eat within 1 hour of waking up and have a cut off time in the evening. We talked about night time syndrome where a person may skip breakfast and lunch and then consume the majority of their calories from dinner until bed time. I  have talked about how important it it to get 3 meals in per day, eat breakfast, and BREAK THE FAST. One goal for next month includes:  1.   Start running      Sterling Flash  5/23/2022

## 2022-06-24 ENCOUNTER — HOSPITAL ENCOUNTER (OUTPATIENT)
Dept: BARIATRICS/WEIGHT MGMT | Age: 47
Discharge: HOME | End: 2022-06-24

## 2022-06-29 ENCOUNTER — DOCUMENTATION ONLY (OUTPATIENT)
Dept: BARIATRICS/WEIGHT MGMT | Age: 47
End: 2022-06-29

## 2022-06-29 NOTE — PROGRESS NOTES
[facility] [address] Jonah Loss Magda GARCIA Luis [address], [address]    Patient's Name: Jenn Mcleod   Age: 52 y.o. YOB: 1975   Sex: female    Date:   6/24/2022    Insurance:   [facility]       Session: 2 of 6  Revision:   Surgeon:  fabby    Height: 63 inches Weight:    244      Lbs. BMI: 43.3   Pounds Lost since last month: 10               Pounds Gained since last month: 0    Starting Weight: 264   Previous Months Weight: 255  Overall Pounds Lost: 20 Overall Pounds Gained: 0      Do you smoke? no    Alcohol intake:  Number of drinks at a time:  none      Class Guidelines    Guidelines are reviewed with patient at the start of every class. 1. Patient understands that weight loss trial classes must be consecutive. Patient understands if they miss a class, it is their responsibility to contact me to reschedule class. I will reach out to patient after their first no show. 2.  Patient understands the expectations that weight maintenance/weight loss is expected during the classes. Failure to demonstrate changes may result in one extra month of weight loss trial, followed by going back to see the surgeon. Patient understands that they CAN NOT gain any weight during the weight loss trial.  Gaining weight will result in extra classes. 3. Patient is also instructed to be doing their labs, blood work, psych visit, support group and any other test that the surgeon has used while they are working on their weight loss trial.  4.  Patient was instructed to bring their blue binder to every class and appointment. Changes Made Since Last Class: eating off smaller plate    Eating Habits and Behaviors    Today in class, I reviewed a power point that discussed Nutrition, Behavior, and Exercise changes to start working on.   Some of the eating behaviors that we discussed included the importance of eating breakfast.  We talked about some of the reasons that people don't eat breakfast and food choices that would be appropriate. We also talked about avoiding liquid calories. Patient is encouraged to aim for 64 ounces of fluid per day and trying to get them from water, Crystal Light, sugar free drinks. We also talked about eating out options that are healthier. Patient was encouraged to always request sauces and dressings on the side and request a salad, broth-based soup, or vegetables in place of fries. Patient's current diet habits include: Patient's current diet habits include: Patient is eating 3 meals a day. Patient states their portion sizes are smaller. I have encouraged patient to use a smaller plate and fill up on water before meals to help cut down on portions. Patient's is eating bread, rice, pasta, cereal, and other carbohydrates. I have talked about the importance of getting into the habit now of cutting the sweets out. Fluid intake:  9 cups of water, crystal light, unsweetened tea. 2 cups of soda,  2 cups of caffeine. Patient is encouraged not to drink calories and stick to non-carbonated, non-caffeine, sugar free drinks. The goal is 64 ounces of fluid per day. Physical Activity/Exercise    Comments: We talked about exercise. Patient was given reasons of why exercise is so important and how that can help with their long-term success. I have encouraged patient to get a support system to help with the activity. Currently for activity, patient is doing walking and running. We have talked about goals for activity to incorporated. Behavior Modification       Comments: We also talked about behavior modifications. We talked about eating triggers, such as eating in front of the TV and solutions, such as making the TV a no eating zone. If patient is eating out out of emotion, food will only temporarily solve that.   Patient is encouraged to HALT and assess if they are eating because they are Hungry, or out of emotions: Anxious, Lonely, Tired, which the food will only temporarily solve. We also talked about ways to prevent relapse. Goals that patient wants to work on includes:  1. Eating out  2.  Walk more      Maria De Jesus Jc RDN  6/29/2022

## 2022-07-18 ENCOUNTER — HOSPITAL ENCOUNTER (OUTPATIENT)
Dept: BARIATRICS/WEIGHT MGMT | Age: 47
Discharge: HOME | End: 2022-07-18

## 2022-07-19 ENCOUNTER — DOCUMENTATION ONLY (OUTPATIENT)
Dept: BARIATRICS/WEIGHT MGMT | Age: 47
End: 2022-07-19

## 2022-07-19 NOTE — PROGRESS NOTES
[address] Jonah Loss Magda Smith [address]    Patient's Name: Wilberto Camarillo   Age: 52 y.o. YOB: 1975   Sex: female    Date:   7/18/2022   Insurance:  [facility]         Session: 3 of  6  Revision:     Surgeon:  fabby    Height: 63 inches   Weight:    244      Lbs. BMI: 43.3   Pounds Lost since last month: 0                 Pounds Gained since last month: 0    Starting Weight: 264     Previous Months Weight: 24  Overall Pounds Lost: 20   Overall Pounds Gained: 0      Do you smoke? no    Alcohol intake:  Number of drinks at a time:  none      Class Guidelines    Guidelines are reviewed with patient at the start of every class. 1. Patient understands that weight loss trial classes must be consecutive. Patient understands if they miss a class, it is their responsibility to contact me to reschedule class. I will reach out to patient after their first no show. 2.  Patient understands the expectations that weight maintenance/weight loss is expected during the classes. Failure to demonstrate changes may result in one extra month of weight loss trial, followed by going back to see the surgeon. Patient understands that they CAN NOT gain any weight during the weight loss trial.  Gaining weight will result in extra classes. 3. Patient is also instructed to be doing their labs, blood work, psych visit, support group and any other test that the surgeon has used while they are working on their weight loss trial.  4.  Patient was instructed to bring their blue binder to every class and appointment. Changes Made Since Last Class: look out what she eats    Eating Habits and Behaviors    Today in class, we started talking about the key diet principles. We first focused on stopping liquid calories. Patient was also educated on carbohydrates.   Patient was instructed to start cutting out bread, rice, and pasta from the diet and start focusing more on meat and vegetables. I then gave a power point, which focused on Label Reading. In class, I gave patients a labels and we worked through a series of questions to help patients have a better understanding of label reading. Patient was instructed to review the serving size. Patient was encouraged to focus on protein and carbohydrates. We also did a few label reading activities to help the patient become more familiar with label reading. Patient's current diet habits include: Patient's current diet habits include: Patient is eating 3 meals a day. Patient states their portion sizes are smaller plate. I have encouraged patient to use a smaller plate and fill up on water before meals to help cut down on portions. Patient's is eating bread, rice, pasta, cereal, and other carbohydrates. I have talked about the importance of getting into the habit now of cutting the sweets out. Fluid intake:  gallon of water, crystal light, unsweetened tea. 8 ounces of soda. Patient is encouraged not to drink calories and stick to non-carbonated, non-caffeine, sugar free drinks. The goal is 64 ounces of fluid per day. Physical Activity/Exercise    Comments: We talked about exercise. Patient was given reasons of why exercise is so important and how that can help with their long-term success. I have encouraged patient to get a support system to help with the activity. Currently for activity, patient is doing walking. Behavior Modification       Comments:  Behavior modifications were reinforced. This included not eating in front of the TV, which could lead to bigger portions and eating when one is not hungry. We also talked about the importance of eating 3 meals per day.   Patient was encouraged to food journal to keep their daily carbohydrates less than 30 grams per meal.      Goals that patient wants to work on includes:  1.no goals given      Yumiko Zaman RDN  7/19/2022

## 2022-08-26 ENCOUNTER — HOSPITAL ENCOUNTER (OUTPATIENT)
Dept: BARIATRICS/WEIGHT MGMT | Age: 47
Discharge: HOME | End: 2022-08-26

## 2022-08-31 ENCOUNTER — DOCUMENTATION ONLY (OUTPATIENT)
Dept: BARIATRICS/WEIGHT MGMT | Age: 47
End: 2022-08-31

## 2022-08-31 NOTE — PROGRESS NOTES
[address] Jonah Loss Magda RADHA Luis [address] [facility], [address]    Patient's Name: Chaitanya Saleh   Age: 52 y.o. YOB: 1975   Sex: female  Date: 8/26/2022        Insurance:  [facility]            Session: 4 of 6  Surgeon:  Herminia Lees    Height: 63 inches   Current Weight:    245.8      Lbs. BMI: 43.6   Pounds Lost since last month: 1                 Pounds Gained since last month: 0      Starting Weight: 264     Previous Months Weight: 244  Overall Pounds Lost: 18.2   Overall Pounds Gained: 0    Do you smoke? no    Alcohol intake:  Number of drinks at a time:  non    Class Guidelines    Guidelines are reviewed with patient at the start of every class. 1. Patient understands that weight loss trial classes must be consecutive. Patient understands if they miss a class, it is their responsibility to contact me to reschedule class. I will reach out to patient after their first no show. 2.  Patient understands the expectations that weight maintenance/weight loss is expected during the classes. Failure to demonstrate changes may result in one extra month of weight loss trial, followed by going back to see the surgeon. Patient understands that they CAN NOT gain any weight during the weight loss trial.  Gaining weight will result in extra classes. 3. Patient is also instructed to be doing their labs, blood work, psych visit, support group and any other test that the surgeon has used while they are working on their weight loss trial.  4.  Patient was instructed to bring their blue binder to every class and appointment. Changes Made Since Last Class: no soda    Eating Habits and Behaviors    Today in class, we reviewed the key diet principles. I have talked to patient about pushing the fluid and working towards 64 ounces per day. We focused on following a low-calorie diet.   Patient was instructed to count their carbohydrates and try to keep their daily intake under 75 grams per day and try to keep their daily protein at 80 grams per day. Patient was given examples of carbohydrates in starches. Patient was encouraged to focus on meat and vegetables and begin cutting carbohydrates out. We talked about foods that are protein-based and how to incorporate those into their meals. I also reviewed with patient the importance of eating 3 meals per day and suggestions were made for breakfast items. Patient's current diet habits include: Patient is eating 3 meals per day. Patient is encouraged to fill up on protein first, then vegetables, and work on cutting back on the carbohydrates. Portions are: being weighed. Patient is encouraged to use a smaller plate to help cut down on portion sizes. Patient is drinking 64 ounces of water, ginger ale soda, red bull  caffeine. Patient is encouraged to continue to cut out liquid calories and aim for achieving 64 ounces of fluid per day. Physical Activity/Exercise    Comments: We talked about exercise. Patient was given reasons of why exercise is so important and how that can help with their long-term success. I have encouraged patient to get a support system to help with the activity. For activity, patient is walking/stairs. We have talked about goals, which include starting off walking and building upon that. Patient is also encouraged to add in some light weights to get some strength training. Behavior Modification       Comments:  During today's lesson, I gave a presentation called The 100-200 Calorie \"Mindless Margin. \"  The goal is to make modest daily 100-200 calorie reductions in certain things that the body won't notice. One, 100-200 calorie change and would will look 10-20 pounds in one year. An example could be cutting soda. Patient was given a check off list and was encouraged to come up with 1-3 100 calories changes they could make.   The check off list is a daily tracker to see if these goals are being met. Goals that patient wants to work on includes:  1. No caffeine  2.  Join gym      Lima Chapman RD  8/31/2022

## 2022-09-26 ENCOUNTER — HOSPITAL ENCOUNTER (OUTPATIENT)
Dept: BARIATRICS/WEIGHT MGMT | Age: 47
Discharge: HOME | End: 2022-09-26

## 2022-09-28 ENCOUNTER — DOCUMENTATION ONLY (OUTPATIENT)
Dept: BARIATRICS/WEIGHT MGMT | Age: 47
End: 2022-09-28

## 2022-09-28 NOTE — PROGRESS NOTES
[address] [place] RADHA Luis [address] [facility], [address]    Patient's Name: Bee Zhu   Age: 52 y.o. YOB: 1975   Sex: female    Date:   9/26/2022    Insurance:  [facility]       Session: 5/6   Surgeon:  Pedrito Akhtar    Height: 63 inches   Weight:    251.6      Lbs. BMI: 44.6   Pounds Lost since last month: 0                Pounds Gained since last month: 2.8    Starting Weight: 264     Previous Months Weight: 248.8  Overall Pounds Lost: 15.2   Overall Pounds Gained: 0    Smoking:  no    Alcohol intake:  Number of drinks at a time:  none      Patient has attended the required bariatric support group. Class Guidelines    Guidelines are reviewed with patient at the start of every class. 1. Patient understands that weight loss trial classes must be consecutive. Patient understands if they miss a class, it is their responsibility to contact me to reschedule class. I will reach out to patient after their first no show. 2.  Patient understands the expectations that weight maintenance/weight loss is expected during the classes. Failure to demonstrate changes may result in one extra month of weight loss trial, followed by going back to see the surgeon. 3. Patient is also instructed to be doing their labs, blood work, psych visit, support group and any other test that the surgeon has used while they are working on their weight loss trial.        Changes Made Since Last Class: small plates    Eating Habits and Behaviors      During today's class, we continued to focus on the key diet principles. Patient was instructed to follow a low carbohydrate diet, focusing on meat and vegetables. Patient was instructed to stop liquid calories and aim for 64 ounces of water per day.  We focused on focusing in on bigger problem areas to start making changes to, such as reducing fast food intake, reducing carbonated beverages/soda intake, decreasing carbohydrates intake daily, etc. We reviewed protein shakes and high protein yogurts to chose, as well. During today's class, we also talked about how to read a label. Patient was given information on: The benefits of reading a label, which allowed one to compare the nutritional value of similar products and make healthy food decisions. The ingredient list, which can help to determine if the food is heathy or something that fits into the diet. The importance of reading the serving size and making sure to apply that to the portion size that they are consuming. Patient was also educated on carbohydrates. I talked to patient about: The function of carbohydrates. Foods that carbohydrate-heavy. Patient was given the guidelines to keep their carbohydrates less than 75 grams per day in the pre-op phase. Patient was also given ideas of low carb swaps, which include zucchini noodles, spaghetti squash, or cauliflower rice. Lower carbohydrate fruit options were discussed. Discussed lower carb swaps to use instead of potato chips. Patient's dietary habits include: Patient is eating 3 meals per day. I have talked to patient about some lower carbohydrate snack choices that focused more protein. Patient is drinking 24  ounces of fluid per day. Fluid intake is make up of: water and soda. Patient is encouraged to focus on non-caloric, non-caffeine, non-carbonated liquids. Physical Activity/Exercise     Comments:     Currently for exercise, patient walking. I have talked to patient about some suggestions to start an exercise routine. Patient is encouraged to purchase a pedometer and use this to track her steps. I have made some suggestions to patient of ways to incorporate exercise in with a busy lifestyle. We also talked about You Tube videos that can be used for an exercise routine. Behavior Modification  Comments:  Behavior modifications were discussed with the patient. Some of those behavior modifications include:  Emphasized the importance of eating slowly, not eating and drinking meals at the same time. I have encouraged patient to follow journal, which may be done by paper or tracking it an luis enrique, such as My Fitness Pal or [employer]. #5 Elizabeth Monae. Patient understands the importance of following through with these behaviors following surgery to aid in long term weight loss. Tips and advice were given on how to start implementing these into the patient's life.           Goal patient has set for next month:  Going to work out more    Delio Hagan RDN  9/28/2022

## 2022-10-26 ENCOUNTER — HOSPITAL ENCOUNTER (OUTPATIENT)
Dept: BARIATRICS/WEIGHT MGMT | Age: 47
Discharge: HOME | End: 2022-10-26

## 2022-10-27 ENCOUNTER — DOCUMENTATION ONLY (OUTPATIENT)
Dept: BARIATRICS/WEIGHT MGMT | Age: 47
End: 2022-10-27

## 2022-10-27 NOTE — PROGRESS NOTES
[facility] [address] [facility], [address]    Patient's Name: Veena Dykes     YOB: 1975       Insurance:  [facility]            Session: 6 of 6  Surgeon:  fabby  Date:10/26/2022    Height: 63 inches Weight:    244     Lbs. BMI: 44.5   Pounds Lost since last month: 0               Pounds Gained since last month: 0    Starting Weight: 264   Previous Months Weight: 251  Overall Pounds Lost: 20 Overall Pounds Gained: 0      Do you smoke? no    Alcohol intake:  Number of drinks at a time:  none      Class Guidelines    Guidelines are reviewed with patient at the start of every class. 1. Patient understands that weight loss trial classes must be consecutive. Patient understands if they miss a class, it is their responsibility to contact me to reschedule class. I will reach out to patient after their first no show. 2.  Patient understands the expectations that weight maintenance/weight loss is expected during the classes. Failure to demonstrate changes may result in one extra month of weight loss trial, followed by going back to see the surgeon. 3. Patient is also instructed to be doing their labs, blood work, psych visit, support group and any other test that the surgeon has used while they are working on their weight loss trial.  4. Patient is instructed to bring their education binder to all classes. Changes Made Since Last Class: smaller plates     Eating Habits and Behaviors      Today we reviewed key diet principles. We talked about patient following a low calorie/low carbohydrate diet while they are in weight loss trials. To achieve this, patient is encouraged to avoid liquid calories, including alcohol, soda, sweet tea, and fruit juices. Patient can cut carbohydrates by trying to stick to meat and vegetables.   Patient can also eat eggs, cheese, and good fat, while trying to eliminate starches, such as pasta, rice, crackers, chips, popcorn. I also gave a power point that included 19 Ways to Stay on Track with a Healthy Lifestyle. Some of the food-related suggestions included drinking plenty of water or calorie-free beverages prior to their meal.  Patient is encouraged to to fill up on protein first, which is the ultimate fill-me up food. We talked about the importance of eating breakfast and the effects that can happen if one skips meals, which includes eating larger portions, snacking more, and decreasing their metabolism. With the suggestions in the power point, patient will be able to decrease their calories and carbohydrate intake. Patient's dietary habits include: Patient is eating 4 meals per day. I have talked to patient about some lower carbohydrate snack choices that focused more protein. Patient is drinking 56 ounces of fluid per day. Fluid intake is make up of: water,soda        Physical Activity/Exercise    Comments: We talked about the importance of establishing a work out routine. Patient is currently walking for activity. Goals have been set. Behavior Modification       Comments:   Some of the behavior tips that were included in the power point, include being choosy about night time snacking. Patient was encouraged to make the TV a no eating zone and not eat after 7 pm.  Patient is also encouraged to keep a food journal.      One of the other things we talked about during class is whether or not patient has a support system. Patient has  been to a support group.       Goals set by Patient    zaki Rosario RDN  10/27/2022

## 2022-11-01 ENCOUNTER — TELEPHONE (OUTPATIENT)
Dept: SURGERY | Age: 47
End: 2022-11-01

## 2022-11-01 DIAGNOSIS — E66.01 MORBID OBESITY WITH BMI OF 45.0-49.9, ADULT (HCC): Primary | ICD-10-CM

## 2022-11-01 DIAGNOSIS — E11.9 TYPE 2 DIABETES MELLITUS WITHOUT COMPLICATION, WITHOUT LONG-TERM CURRENT USE OF INSULIN (HCC): ICD-10-CM

## 2022-11-01 DIAGNOSIS — Z01.818 PRE-OP TESTING: ICD-10-CM

## 2022-11-01 DIAGNOSIS — I10 PRIMARY HYPERTENSION: ICD-10-CM

## 2022-11-01 NOTE — TELEPHONE ENCOUNTER
Patient returned phone call. Spoke to patient about remaining requirements for bariatric surgery. Patient is scheduled for h. Pylori at [facility] on 11/8 - patient states she will get labs, EKG done same day and  CXR order to bring to [facility]. Patient states she is scheduled for mammo on Thursday 11/3. Patient states she will call Ar Coffey, for clearance.

## 2022-11-08 ENCOUNTER — DOCUMENTATION ONLY (OUTPATIENT)
Dept: BARIATRICS/WEIGHT MGMT | Age: 47
End: 2022-11-08

## 2022-11-08 ENCOUNTER — HOSPITAL ENCOUNTER (OUTPATIENT)
Dept: LAB | Age: 47
Discharge: HOME | End: 2022-11-08

## 2022-11-08 ENCOUNTER — HOSPITAL ENCOUNTER (OUTPATIENT)
Dept: LAB | Age: 47
Discharge: HOME | End: 2022-11-08
Payer: MEDICARE

## 2022-11-08 ENCOUNTER — CLINICAL SUPPORT (OUTPATIENT)
Dept: SURGERY | Age: 47
End: 2022-11-08

## 2022-11-08 VITALS — BODY MASS INDEX: 43.77 KG/M2 | HEIGHT: 63 IN | WEIGHT: 247 LBS

## 2022-11-08 DIAGNOSIS — Z01.818 PRE-OP EXAMINATION: ICD-10-CM

## 2022-11-08 DIAGNOSIS — Z01.818 PRE-OP EXAMINATION: Primary | ICD-10-CM

## 2022-11-08 DIAGNOSIS — I10 PRIMARY HYPERTENSION: ICD-10-CM

## 2022-11-08 DIAGNOSIS — E66.01 MORBID OBESITY WITH BMI OF 45.0-49.9, ADULT (HCC): ICD-10-CM

## 2022-11-08 DIAGNOSIS — Z01.818 PRE-OP TESTING: ICD-10-CM

## 2022-11-08 DIAGNOSIS — E11.9 TYPE 2 DIABETES MELLITUS WITHOUT COMPLICATION, WITHOUT LONG-TERM CURRENT USE OF INSULIN (HCC): ICD-10-CM

## 2022-11-08 LAB
25(OH)D3 SERPL-MCNC: 18.8 NG/ML (ref 32–100)
A-G RATIO,AGRAT: 1.8 RATIO (ref 1.1–2.6)
ALBUMIN SERPL-MCNC: 4.2 G/DL (ref 3.5–5)
ALP SERPL-CCNC: 61 U/L (ref 25–115)
ALT SERPL-CCNC: 14 U/L (ref 5–40)
ANION GAP SERPL CALC-SCNC: 14 MMOL/L (ref 3–15)
AST SERPL W P-5'-P-CCNC: 13 U/L (ref 10–37)
AVG GLU, 10930: 122 MG/DL (ref 91–123)
BACTERIA,BACTU: PRESENT
BILIRUB SERPL-MCNC: 0.5 MG/DL (ref 0.2–1.2)
BILIRUB UR QL: NEGATIVE
BUN SERPL-MCNC: 8 MG/DL (ref 6–22)
CALCIUM SERPL-MCNC: 9.2 MG/DL (ref 8.4–10.5)
CHLORIDE SERPL-SCNC: 100 MMOL/L (ref 98–110)
CLARITY: ABNORMAL
CO2 SERPL-SCNC: 24 MMOL/L (ref 20–32)
COLOR UR: YELLOW
CREAT SERPL-MCNC: 0.6 MG/DL (ref 0.5–1.2)
EPITHELIAL,EPSU: ABNORMAL /HPF (ref 0–2)
ERYTHROCYTE [DISTWIDTH] IN BLOOD BY AUTOMATED COUNT: 13.5 % (ref 10–15.5)
FERRITIN SERPL-MCNC: 211 NG/ML (ref 10–291)
GLOBULIN,GLOB: 2.4 G/DL (ref 2–4)
GLOMERULAR FILTRATION RATE: >60 ML/MIN/1.73 SQ.M.
GLUCOSE SERPL-MCNC: 186 MG/DL (ref 70–99)
GLUCOSE UR QL: NEGATIVE MG/DL
HBA1C MFR BLD HPLC: 5.9 % (ref 4.8–5.6)
HCT VFR BLD AUTO: 36 % (ref 35.1–48)
HGB BLD-MCNC: 12.3 G/DL (ref 11.7–16)
HGB UR QL STRIP: NEGATIVE
HYLINE CAST, 6014: ABNORMAL /LPF (ref 0–2)
IRON,IRN: 70 MCG/DL (ref 30–160)
KETONES UR QL STRIP.AUTO: NEGATIVE MG/DL
LEUKOCYTE ESTERASE: NEGATIVE
MCH RBC QN AUTO: 29 PG (ref 26–34)
MCHC RBC AUTO-ENTMCNC: 34 G/DL (ref 31–36)
MCV RBC AUTO: 84 FL (ref 80–99)
NITRITE UR QL STRIP.AUTO: POSITIVE
PH UR STRIP: 6 PH (ref 5–8)
PLATELET # BLD AUTO: 494 K/UL (ref 140–440)
PMV BLD AUTO: 9.8 FL (ref 9–13)
POTASSIUM SERPL-SCNC: 4 MMOL/L (ref 3.5–5.5)
PROT SERPL-MCNC: 6.6 G/DL (ref 6.4–8.3)
PROT UR QL STRIP: NEGATIVE MG/DL
RBC # BLD AUTO: 4.27 M/UL (ref 3.8–5.2)
RBC #/AREA URNS HPF: ABNORMAL /HPF
SODIUM SERPL-SCNC: 138 MMOL/L (ref 133–145)
SP GR UR: 1.02 (ref 1–1.03)
TSH SERPL DL<=0.005 MIU/L-ACNC: 0.32 MCU/ML (ref 0.27–4.2)
UROBILINOGEN UR STRIP-MCNC: 1 MG/DL
VIT B12 SERPL-MCNC: 774 PG/ML (ref 211–911)
WBC # BLD AUTO: 5.2 K/UL (ref 4–11)
WBC URNS QL MICRO: ABNORMAL /HPF (ref 0–5)

## 2022-11-08 PROCEDURE — 93005 ELECTROCARDIOGRAM TRACING: CPT

## 2022-11-08 NOTE — PROGRESS NOTES
Patient arrived for h pylori pre op testing. Patient tolerated procedure well. Patient also requested order for labs, EKG, CXR. Orders printed for patient. Patient current weight is 247.

## 2022-11-09 LAB
ATRIAL RATE: 81 BPM
CALCULATED P AXIS, ECG09: 59 DEGREES
CALCULATED R AXIS, ECG10: 19 DEGREES
CALCULATED T AXIS, ECG11: 35 DEGREES
DIAGNOSIS, 93000: NORMAL
P-R INTERVAL, ECG05: 172 MS
Q-T INTERVAL, ECG07: 372 MS
QRS DURATION, ECG06: 78 MS
QTC CALCULATION (BEZET), ECG08: 432 MS
VENTRICULAR RATE, ECG03: 81 BPM

## 2022-11-10 LAB — VITAMIN B1, WHOLE BLOOD, 66250: 125.7 NMOL/L (ref 66.5–200)

## 2022-11-11 DIAGNOSIS — Z01.818 PREOP EXAMINATION: ICD-10-CM

## 2022-11-11 DIAGNOSIS — R94.31 ABNORMAL EKG: Primary | ICD-10-CM

## 2022-11-12 LAB — UREA BREATH TEST QL: NEGATIVE

## 2022-11-15 ENCOUNTER — OFFICE VISIT (OUTPATIENT)
Dept: SURGERY | Age: 47
End: 2022-11-15
Payer: MEDICARE

## 2022-11-15 VITALS
SYSTOLIC BLOOD PRESSURE: 140 MMHG | HEIGHT: 63 IN | WEIGHT: 247 LBS | TEMPERATURE: 97.5 F | RESPIRATION RATE: 16 BRPM | DIASTOLIC BLOOD PRESSURE: 89 MMHG | BODY MASS INDEX: 43.77 KG/M2 | HEART RATE: 94 BPM | OXYGEN SATURATION: 97 %

## 2022-11-15 DIAGNOSIS — K21.9 GASTROESOPHAGEAL REFLUX DISEASE, UNSPECIFIED WHETHER ESOPHAGITIS PRESENT: ICD-10-CM

## 2022-11-15 DIAGNOSIS — Z01.818 PREOP EXAMINATION: ICD-10-CM

## 2022-11-15 DIAGNOSIS — I10 PRIMARY HYPERTENSION: ICD-10-CM

## 2022-11-15 DIAGNOSIS — G35 MULTIPLE SCLEROSIS (HCC): ICD-10-CM

## 2022-11-15 DIAGNOSIS — E78.2 MIXED HYPERLIPIDEMIA: ICD-10-CM

## 2022-11-15 DIAGNOSIS — R60.9 EDEMA, UNSPECIFIED TYPE: ICD-10-CM

## 2022-11-15 DIAGNOSIS — E11.9 TYPE 2 DIABETES MELLITUS WITHOUT COMPLICATION, WITHOUT LONG-TERM CURRENT USE OF INSULIN (HCC): ICD-10-CM

## 2022-11-15 DIAGNOSIS — E66.01 MORBID OBESITY (HCC): Primary | ICD-10-CM

## 2022-11-15 PROCEDURE — 3074F SYST BP LT 130 MM HG: CPT | Performed by: SURGERY

## 2022-11-15 PROCEDURE — 3044F HG A1C LEVEL LT 7.0%: CPT | Performed by: SURGERY

## 2022-11-15 PROCEDURE — 99213 OFFICE O/P EST LOW 20 MIN: CPT | Performed by: SURGERY

## 2022-11-15 PROCEDURE — 3078F DIAST BP <80 MM HG: CPT | Performed by: SURGERY

## 2022-11-15 RX ORDER — ASPIRIN 325 MG
50000 TABLET, DELAYED RELEASE (ENTERIC COATED) ORAL
Qty: 12 CAPSULE | Refills: 0 | Status: SHIPPED | OUTPATIENT
Start: 2022-11-15

## 2022-11-15 NOTE — PROGRESS NOTES
Baljinder Hartman is a 52 y.o. female (: 1975) presenting to address:    Chief Complaint   Patient presents with    Follow-up     Mid trial       Medication list and allergies have been reviewed with Baljinder Hartman and updated as of today's date. I have gone over all Medical, Surgical and Social History with Baljinder Hartman and updated/added the information accordingly. 1. Have you been to the ER, urgent care clinic since your last visit? Hospitalized since your last visit? Yes When: 10/22/22 Where: [facility] Reason for visit: flu    2. Have you seen or consulted any other health care providers outside of the [address] [place] [address] since your last visit? Include any pap smears or colon screening.  No
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
## 2022-11-17 ENCOUNTER — TELEPHONE (OUTPATIENT)
Dept: SURGERY | Age: 47
End: 2022-11-17

## 2022-11-17 ENCOUNTER — HOSPITAL ENCOUNTER (OUTPATIENT)
Dept: GENERAL RADIOLOGY | Age: 47
Discharge: HOME | End: 2022-11-17
Attending: SURGERY
Payer: MEDICARE

## 2022-11-17 DIAGNOSIS — Z01.818 PRE-OP EXAMINATION: ICD-10-CM

## 2022-11-17 DIAGNOSIS — K21.9 GASTROESOPHAGEAL REFLUX DISEASE, UNSPECIFIED WHETHER ESOPHAGITIS PRESENT: ICD-10-CM

## 2022-11-17 PROCEDURE — 74011000250 HC RX REV CODE- 250: Performed by: SURGERY

## 2022-11-17 PROCEDURE — 71046 X-RAY EXAM CHEST 2 VIEWS: CPT

## 2022-11-17 PROCEDURE — 74246 X-RAY XM UPR GI TRC 2CNTRST: CPT

## 2022-11-17 RX ADMIN — ANTACID/ANTIFLATULENT 8 G: 380; 550; 10; 10 GRANULE, EFFERVESCENT ORAL at 08:03

## 2022-11-17 RX ADMIN — BARIUM SULFATE 176 G: 960 POWDER, FOR SUSPENSION ORAL at 08:03

## 2022-11-17 RX ADMIN — BARIUM SULFATE 135 ML: 980 POWDER, FOR SUSPENSION ORAL at 08:03

## 2022-12-09 ENCOUNTER — OFFICE VISIT (OUTPATIENT)
Dept: CARDIOLOGY CLINIC | Age: 47
End: 2022-12-09
Payer: MEDICARE

## 2022-12-09 VITALS
TEMPERATURE: 97.7 F | HEART RATE: 95 BPM | OXYGEN SATURATION: 98 % | SYSTOLIC BLOOD PRESSURE: 128 MMHG | RESPIRATION RATE: 16 BRPM | DIASTOLIC BLOOD PRESSURE: 80 MMHG | BODY MASS INDEX: 44.46 KG/M2 | WEIGHT: 251 LBS

## 2022-12-09 DIAGNOSIS — R94.31 ABNORMAL EKG: Primary | ICD-10-CM

## 2022-12-09 RX ORDER — AMLODIPINE BESYLATE 10 MG/1
10 TABLET ORAL DAILY
COMMUNITY

## 2022-12-09 NOTE — LETTER
12/9/2022    Patient: Elvia rGiffiths   YOB: 1975   Date of Visit: 12/9/2022     Vasu Vann NP  [address] [facility] [id number]  [address] [id number]  Via Fax: [phone]    Dear FLY Angel PA-C,      Thank you for referring Ms. Elvia Griffiths to [facility] for evaluation. My notes for this consultation are attached. If you have questions, please do not hesitate to call me. I look forward to following your patient along with you.       Sincerely,    Enrique Granados MD

## 2022-12-09 NOTE — PROGRESS NOTES
Identified pt with two pt identifiers(name and ). Reviewed record in preparation for visit and have obtained necessary documentation. Catherine Ellis presents today for   Chief Complaint   Patient presents with    New Patient       Pt c/o  BLE SWELLING. Catherine Ellis preferred language for health care discussion is english/other. Personal Protective Equipment:   Personal Protective Equipment was used including: mask-surgical and hands-gloves. Patient was placed on no precaution(s). Patient was masked. Precautions:   Patient currently on None  Patient currently roomed with door closed. Is someone accompanying this pt? daughter    Is the patient using any DME equipment during [address]? no    Depression Screening:  3 most recent PHQ Screens 2022   Little interest or pleasure in doing things Not at all   Feeling down, depressed, irritable, or hopeless Not at all   Total Score PHQ 2 0       Learning Assessment:  Learning Assessment 2014   PRIMARY LEARNER Patient   PRIMARY LANGUAGE ENGLISH   LEARNER PREFERENCE PRIMARY LISTENING   ANSWERED BY Patient   RELATIONSHIP SELF       Abuse Screening:  Abuse Screening Questionnaire 2020   Do you ever feel afraid of your partner? N   Are you in a relationship with someone who physically or mentally threatens you? N   Is it safe for you to go home? Y       Fall Risk  Fall Risk Assessment, last 12 mths 2020   Able to walk? Yes   Fall in past 12 months? No       Pt currently taking Anticoagulant therapy? no  Pt currently taking Antiplatelet therapy? no    Coordination of Care:  1. Have you been to the ER, urgent care clinic since your last visit? Hospitalized since your last visit? no    2. Have you seen or consulted any other health care providers outside of the [address] [place] [address] since your last visit? Include any pap smears or colon screening. no      Please see Red banners under Allergies and Med Rec to remove outside inquires.  All correct information has been verified with patient and added to chart.      Medication's patient's would liked removed has been marked not taking to be removed per Verbal order and read back per Haydee Castellanos MD

## 2022-12-09 NOTE — PROGRESS NOTES
[facility]    Ms. Jazmyn Kellogg is 80-year-old female with a history of diabetes, hyperlipidemia anemia, hypertension, obesity, MS    Patient is here today for cardiac evaluation  She denies prior history of MI or CHF  Patient was asked to come see me prior to undergoing gastric bypass surgery for morbid obesity. Patient had EKG which showed abnormality  Patient currently works as a . She is able to perform her duties and day-to-day activity without any significant chest pain or chest tightness. She is able to go through grocery store and push her cart throughout the [employer] without any limitations. She denies PND or lower extremity swelling. She has no significant shortness of breath. Denies any nausea, vomiting, abdominal pain, fever, chills, sputum production. No hematuria or other bleeding complaints    Past Medical History:   Diagnosis Date    Arthropathy, unspecified, site unspecified     Depression     Diabetes ([address]     Hypercholesteremia     Hypertension     history of htn    Insomnia     Migraine     MS (multiple sclerosis) ([facility]     Neurogenic bladder     Seizures ([facility]     6/2013       Review of Systems:  Cardiac symptoms as noted above in HPI. All others negative. Denies fatigue, malaise, skin rash, blurring vision, photophobia, neck pain, hemoptysis, chronic cough, nausea, vomiting, hematuria, burning micturition, BRBPR, chronic headaches. Current Outpatient Medications   Medication Sig    cholecalciferol (VITAMIN D3) (50,000 UNITS /1250 MCG) capsule Take 1 Capsule by mouth every seven (7) days. Indications: vitamin D deficiency (high dose therapy)    glipiZIDE SR (GLUCOTROL XL) 5 mg CR tablet     Basaglar KwikPen U-100 Insulin 100 unit/mL (3 mL) inpn     diazePAM (VALIUM) 5 mg tablet Take 1 Tab by mouth every eight (8) hours as needed (muscle spasm).  Max Daily Amount: 15 mg.    metFORMIN (GLUCOPHAGE) 500 mg tablet Take 1.5 Tabs by mouth two (2) times daily (with meals). QUEtiapine (SEROquel) 300 mg tablet Take 300 mg by mouth nightly. No current facility-administered medications for this visit. Past Surgical History:   Procedure Laterality Date    HX CARPAL TUNNEL RELEASE  11/2008    HX HYSTERECTOMY      open via pf    HX OTHER SURGICAL         Allergies and Sensitivities:  Allergies   Allergen Reactions    Atorvastatin Anaphylaxis    Codeine Not Reported This Time     Patient reports not allergic 11/08/22    Levemir [Insulin Detemir] Hives    Oxycontin [Oxycodone] Hives and Other (comments)     intolerance       Family History:  Family History   Problem Relation Age of Onset    Heart Disease Father     Diabetes Father     Diabetes Sister     Other Other        Social History:  Social History     Tobacco Use    Smoking status: Never    Smokeless tobacco: Never   Substance Use Topics    Alcohol use: No    Drug use: No     She  reports that she has never smoked. She has never used smokeless tobacco.  She  reports no history of alcohol use. Physical Exam:  BP Readings from Last 3 Encounters:   12/09/22 128/80   11/15/22 (!) 140/89   10/07/21 133/82         Pulse Readings from Last 3 Encounters:   12/09/22 95   11/15/22 94   10/07/21 (!) 108          Wt Readings from Last 3 Encounters:   12/09/22 113.9 kg (251 lb)   11/15/22 112 kg (247 lb)   11/08/22 112 kg (247 lb)       Constitutional: Oriented to person, place, and time. HENT: Head: Normocephalic and atraumatic. Eyes: Conjunctivae and extraocular motions are normal.   Neck: No JVD present. Carotid bruit is not appreciated. Cardiovascular: Regular rhythm. No murmur, gallop or rubs appreciated  Lung: Breath sounds normal. No respiratory distress. No ronchi or rales appreciated  Abdominal: No tenderness. No rebound and no guarding. Musculoskeletal: There is no lower extremity edema.  No cynosis  Lymphadenopathy:  No cervical or supraclavicular adenopathy appriciated. Neurological: No gross motor deficit noted. Skin: No visible skin rash noted. No Ear discharge noted  Psychiatric: Normal mood and affect. LABS:   @  Lab Results   Component Value Date/Time    WBC 5.2 2022 11:42 AM    Hemoglobin, POC 14.6 2019 02:52 PM    HGB 12.3 2022 11:42 AM    Hematocrit, POC 43 2019 02:52 PM    HCT 36.0 2022 11:42 AM    PLATELET 142 (H)  11:42 AM    MCV 84 2022 11:42 AM     Lab Results   Component Value Date/Time    Sodium 138 2022 11:42 AM    Potassium 4.0 2022 11:42 AM    Chloride 100 2022 11:42 AM    CO2 24 2022 11:42 AM    Glucose 186 (H) 2022 11:42 AM    BUN 8 2022 11:42 AM    Creatinine 0.6 2022 11:42 AM     No flowsheet data found. Lab Results   Component Value Date/Time    ALT (SGPT) 14 2022 11:42 AM     Lab Results   Component Value Date/Time    Hemoglobin A1c 5.9 (H) 2022 11:42 AM    Hemoglobin A1c, External 6.4 2020 12:00 AM     Lab Results   Component Value Date/Time    TSH 0.32 2022 11:42 AM     EK2022: Sinus rhythm at 81 bpm.  Q wave in lead V1 and V2. Normal HI and QRS interval.  No ST changes of ischemia. STRESS TEST (EST, PHARM, NUC, ECHO etc)    CATHETERIZATION    IMPRESSION & PLAN:  Ms. Rod Ace is 26-year-old female with multiple medical problem    Hypertension: /80. According to patient she is taking amlodipine. She is not sure of the name. 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 Last hemoglobin A1c 5.9. Defer to PCP    Obesity: Current weight is 251 pounds with BMI of 44.   Patient is considering gastric bypass surgery    Preoperative evaluation:  Patient is considering gastric bypass surgery for morbid obesity and complication  EKG showed Q-wave in lead V1 and V2. Based on history, patient does not appear to have any unstable coronary symptoms or decompensated heart failure. No evidence of fluid overload on exam.  Denies prior history of MI, CHF, CKD or CVA  Because of abnormal EKG finding, will proceed with echocardiogram.  If echocardiogram has no wall motion abnormality, do not suspect she will need any further cardiac work-up. She will be acceptable candidate depending on echo finding. We will make final recommendation based on echo finding. This plan was discussed with patient who is in agreement. Thank you for allowing me to participate in patient care. Please feel free to call me if you have any question or concern. Phillip Morris MD  Please note: This document has been produced using voice recognition software. Unrecognized errors in transcription may be present.

## 2022-12-09 NOTE — PATIENT INSTRUCTIONS
Testing   Echo  **call office 3-5 days after testing is completed for results**     Dr. Mayda Min will clear you for surgery based on your echo results.

## 2023-01-05 ENCOUNTER — TELEPHONE (OUTPATIENT)
Dept: CARDIOLOGY CLINIC | Age: 48
End: 2023-01-05

## 2023-03-02 ENCOUNTER — TELEPHONE (OUTPATIENT)
Age: 48
End: 2023-03-02

## 2023-03-09 ENCOUNTER — TELEPHONE (OUTPATIENT)
Age: 48
End: 2023-03-09

## 2023-03-24 ENCOUNTER — OFFICE VISIT (OUTPATIENT)
Age: 48
End: 2023-03-24

## 2023-03-24 VITALS
RESPIRATION RATE: 16 BRPM | SYSTOLIC BLOOD PRESSURE: 135 MMHG | HEART RATE: 99 BPM | OXYGEN SATURATION: 98 % | WEIGHT: 252 LBS | HEIGHT: 63 IN | DIASTOLIC BLOOD PRESSURE: 69 MMHG | BODY MASS INDEX: 44.65 KG/M2 | TEMPERATURE: 97.7 F

## 2023-03-24 DIAGNOSIS — E11.9 TYPE 2 DIABETES MELLITUS WITHOUT COMPLICATION, UNSPECIFIED WHETHER LONG TERM INSULIN USE (HCC): ICD-10-CM

## 2023-03-24 DIAGNOSIS — Z71.89 ENCOUNTER FOR PRE-BARIATRIC SURGERY COUNSELING AND EDUCATION: ICD-10-CM

## 2023-03-24 DIAGNOSIS — E78.5 HYPERLIPIDEMIA, UNSPECIFIED HYPERLIPIDEMIA TYPE: ICD-10-CM

## 2023-03-24 DIAGNOSIS — I10 HYPERTENSION, UNSPECIFIED TYPE: ICD-10-CM

## 2023-03-24 DIAGNOSIS — E66.01 MORBID OBESITY (HCC): Primary | ICD-10-CM

## 2023-03-24 DIAGNOSIS — G35 MULTIPLE SCLEROSIS (HCC): ICD-10-CM

## 2023-03-24 NOTE — PROGRESS NOTES
Jaquan Cloud is a 50 y.o. female (: 1975) presenting to address:pre op     Chief Complaint   Patient presents with    Pre-op Exam     Bariatric        Medication list and allergies have been reviewed with Jaquan Cloud and updated as of today's date. I have gone over all Medical, Surgical and Social History with Jaquan Cloud and updated/added the information accordingly. 1. Have you been to the ER, urgent care clinic since your last visit? Hospitalized since your last visit? [facility] ER for MS flare up. 2. Have you seen or consulted any other health care providers outside of the [address] [place] [address] since your last visit? Include any pap smears or colon screening.  No
nutrition and program compliance. Visits and follow up will be in compliance with the guidelines set forth by [facility]. I have specifically mentioned the need to avoid all personal and second-hand tobacco exposure, systemic steroids, and NSAIDS after any bariatric surgery to help avoid the above listed complications. The patient has expressed understanding of the above and would like to proceed with surgery.       Signed By: Corry Mcduffie MD [facility]  Bariatric and General Surgeon  [facility]    March 24, 2023

## 2023-03-28 ENCOUNTER — CLINICAL DOCUMENTATION (OUTPATIENT)
Facility: HOSPITAL | Age: 48
End: 2023-03-28

## 2023-03-28 ENCOUNTER — FOLLOWUP TELEPHONE ENCOUNTER (OUTPATIENT)
Facility: HOSPITAL | Age: 48
End: 2023-03-28

## 2023-03-28 NOTE — TELEPHONE ENCOUNTER
preoperative diet phase. Clear liquids:   Water. Sugar free, non-carbonated beverages  (crystal light, propel). Sugar free popsicles. Sugar free Jell-O. Fat free, reduced sodium broth . Decaffeinated coffee or decaffeinated tea  with artificial sweeteners. Protein:   60 grams of protein daily  (in liquid supplements). Pre-made protein drinks. Protein powder added to water. 3 gram rule -- limit sugar and fat to less  than 3 grams per 8 ounces. 4 to 6 ounces of low fat/low sugar yogurt  OR cottage cheese three to four times  during the week. [facility] Gastric Bypass [facility] Sleeve Dietary Progression Quick Review     Date of Surgery: _      __________Clear liquid diet. Begin bariatric clear liquid diet on: ______________________________________________   64 ounces of fluid per day. Low calorie, low sugar, non-carbonated beverages:  -- Water, Crystal Light, Propel Water, Sugar Free Jell-O, Sugar Free Popsicles, bouillon. -- Start protein supplement during this stage (60 to 70 grams per day). -- Start all vitamin supplements during this stage (see pages 57-58). -- Getting your fluid in and staying hydrated is your number one priority! The clear liquid diet will last for seven days. ____________________________________________________     t. Begin bariatric soft and moist diet on: _____________________________________________   This stage of the diet will last for five weeks, unless otherwise instructed by your surgeon. Begin -- one week after surgery. End -- six weeks after surgery (or when you follow up with the registered dietitian). Soft, moist, high protein foods -- three meals per day plus protein supplements. -- Portions should emphasize on soft protein. -- Portions will be a MAXIMUM of 1 ounce of solid food and 2 to 3 ounces of cottage cheese and yogurt. -- Protein supplements should be between meals and provide 30 to 40 grams per day during  soft protein diet.   --

## 2023-03-28 NOTE — PROGRESS NOTES
they are non-compliant with the nutritional protocol and non-compliant with the vitamins. Patient has also been educated on the pre-op liquid diet for 1 week. Patient understands that failure to comply in pre-op liquid diet could result in surgery being canceled. Patient's 6 week post-op nutrition appointment has been scheduled. At this 6 week visit, RD will assess how patient is tolerating soft protein and advance to vegetables, if tolerating soft protein without difficulty. Patient will also see RD again at 9 months post-op. This visit will assess patient's compliance with current protocol, including diet, vitamins, protein shakes, and exercise. Post-op diet guidelines will be reinforced. RD is available for questions and to meet with patient outside of the 6 week and 9 month post-op visit. Ok to proceed.      Candidate for surgery: Yes  Re-evaluation Date:     Fredricka Holstein, RD    3/28/2023

## 2023-04-05 RX ORDER — CARBOXYMETHYLCELLULOSE SODIUM 5 MG/ML
1 SOLUTION/ DROPS OPHTHALMIC 4 TIMES DAILY
Status: ON HOLD | COMMUNITY
Start: 2020-02-27 | End: 2023-04-14 | Stop reason: HOSPADM

## 2023-04-05 RX ORDER — CYCLOBENZAPRINE HCL 5 MG
5 TABLET ORAL EVERY 8 HOURS PRN
COMMUNITY
Start: 2023-02-16

## 2023-04-05 RX ORDER — BACLOFEN 10 MG/1
TABLET ORAL
COMMUNITY
Start: 2023-03-17

## 2023-04-05 RX ORDER — DULOXETIN HYDROCHLORIDE 30 MG/1
CAPSULE, DELAYED RELEASE ORAL
COMMUNITY

## 2023-04-11 ENCOUNTER — TELEPHONE (OUTPATIENT)
Age: 48
End: 2023-04-11

## 2023-04-13 ENCOUNTER — HOSPITAL ENCOUNTER (INPATIENT)
Facility: HOSPITAL | Age: 48
LOS: 1 days | Discharge: HOME | DRG: 620 | End: 2023-04-14
Attending: SURGERY | Admitting: SURGERY
Payer: MEDICARE

## 2023-04-13 DIAGNOSIS — G89.18 ACUTE POST-OPERATIVE PAIN: Primary | ICD-10-CM

## 2023-04-13 LAB
ABO + RH BLD: NORMAL
BLOOD GROUP ANTIBODIES SERPL: NORMAL
EST. AVERAGE GLUCOSE BLD GHB EST-MCNC: 120 MG/DL
GLUCOSE BLD STRIP.AUTO-MCNC: 163 MG/DL (ref 70–110)
GLUCOSE BLD STRIP.AUTO-MCNC: 179 MG/DL (ref 70–110)
GLUCOSE BLD STRIP.AUTO-MCNC: 98 MG/DL (ref 70–110)
HBA1C MFR BLD: 5.8 % (ref 4.2–5.6)
SPECIMEN EXP DATE BLD: NORMAL

## 2023-04-13 PROCEDURE — 86900 BLOOD TYPING SEROLOGIC ABO: CPT

## 2023-04-13 PROCEDURE — 2500000003 HC RX 250 WO HCPCS: Performed by: SURGERY

## 2023-04-13 PROCEDURE — 3600000012 HC SURGERY LEVEL 2 ADDTL 15MIN: Performed by: SURGERY

## 2023-04-13 PROCEDURE — 88313 SPECIAL STAINS GROUP 2: CPT

## 2023-04-13 PROCEDURE — 83036 HEMOGLOBIN GLYCOSYLATED A1C: CPT

## 2023-04-13 PROCEDURE — 2580000003 HC RX 258: Performed by: SURGERY

## 2023-04-13 PROCEDURE — 3600000002 HC SURGERY LEVEL 2 BASE: Performed by: SURGERY

## 2023-04-13 PROCEDURE — 6370000000 HC RX 637 (ALT 250 FOR IP): Performed by: SURGERY

## 2023-04-13 PROCEDURE — 2720000010 HC SURG SUPPLY STERILE: Performed by: SURGERY

## 2023-04-13 PROCEDURE — 6360000002 HC RX W HCPCS

## 2023-04-13 PROCEDURE — 6360000002 HC RX W HCPCS: Performed by: SURGERY

## 2023-04-13 PROCEDURE — 47379 UNLISTED LAPS PX LIVER: CPT | Performed by: SURGERY

## 2023-04-13 PROCEDURE — 0DB64Z3 EXCISION OF STOMACH, PERCUTANEOUS ENDOSCOPIC APPROACH, VERTICAL: ICD-10-PCS | Performed by: SURGERY

## 2023-04-13 PROCEDURE — 6360000002 HC RX W HCPCS: Performed by: ANESTHESIOLOGY

## 2023-04-13 PROCEDURE — A4216 STERILE WATER/SALINE, 10 ML: HCPCS | Performed by: SURGERY

## 2023-04-13 PROCEDURE — 3700000001 HC ADD 15 MINUTES (ANESTHESIA): Performed by: SURGERY

## 2023-04-13 PROCEDURE — 0FB24ZX EXCISION OF LEFT LOBE LIVER, PERCUTANEOUS ENDOSCOPIC APPROACH, DIAGNOSTIC: ICD-10-PCS | Performed by: SURGERY

## 2023-04-13 PROCEDURE — 88307 TISSUE EXAM BY PATHOLOGIST: CPT

## 2023-04-13 PROCEDURE — 2709999900 HC NON-CHARGEABLE SUPPLY: Performed by: SURGERY

## 2023-04-13 PROCEDURE — 43775 LAP SLEEVE GASTRECTOMY: CPT | Performed by: SURGERY

## 2023-04-13 PROCEDURE — 1100000000 HC RM PRIVATE

## 2023-04-13 PROCEDURE — 7100000000 HC PACU RECOVERY - FIRST 15 MIN: Performed by: SURGERY

## 2023-04-13 PROCEDURE — 7100000001 HC PACU RECOVERY - ADDTL 15 MIN: Performed by: SURGERY

## 2023-04-13 PROCEDURE — C9290 INJ, BUPIVACAINE LIPOSOME: HCPCS | Performed by: SURGERY

## 2023-04-13 PROCEDURE — 3700000000 HC ANESTHESIA ATTENDED CARE: Performed by: SURGERY

## 2023-04-13 PROCEDURE — 6360000002 HC RX W HCPCS: Performed by: NURSE ANESTHETIST, CERTIFIED REGISTERED

## 2023-04-13 PROCEDURE — 82962 GLUCOSE BLOOD TEST: CPT

## 2023-04-13 RX ORDER — ENOXAPARIN SODIUM 100 MG/ML
30 INJECTION SUBCUTANEOUS EVERY 12 HOURS
Status: DISCONTINUED | OUTPATIENT
Start: 2023-04-14 | End: 2023-04-14 | Stop reason: HOSPADM

## 2023-04-13 RX ORDER — INSULIN LISPRO 100 [IU]/ML
0-16 INJECTION, SOLUTION INTRAVENOUS; SUBCUTANEOUS AS NEEDED
Status: DISCONTINUED | OUTPATIENT
Start: 2023-04-13 | End: 2023-04-13 | Stop reason: HOSPADM

## 2023-04-13 RX ORDER — IPRATROPIUM BROMIDE AND ALBUTEROL SULFATE 2.5; .5 MG/3ML; MG/3ML
1 SOLUTION RESPIRATORY (INHALATION)
Status: DISCONTINUED | OUTPATIENT
Start: 2023-04-13 | End: 2023-04-13 | Stop reason: HOSPADM

## 2023-04-13 RX ORDER — SCOLOPAMINE TRANSDERMAL SYSTEM 1 MG/1
1 PATCH, EXTENDED RELEASE TRANSDERMAL
Status: DISCONTINUED | OUTPATIENT
Start: 2023-04-13 | End: 2023-04-14 | Stop reason: HOSPADM

## 2023-04-13 RX ORDER — LIDOCAINE HYDROCHLORIDE 10 MG/ML
1 INJECTION, SOLUTION EPIDURAL; INFILTRATION; INTRACAUDAL; PERINEURAL
Status: DISCONTINUED | OUTPATIENT
Start: 2023-04-13 | End: 2023-04-13 | Stop reason: HOSPADM

## 2023-04-13 RX ORDER — DEXTROSE MONOHYDRATE 100 MG/ML
INJECTION, SOLUTION INTRAVENOUS CONTINUOUS PRN
Status: DISCONTINUED | OUTPATIENT
Start: 2023-04-13 | End: 2023-04-14 | Stop reason: HOSPADM

## 2023-04-13 RX ORDER — ENOXAPARIN SODIUM 100 MG/ML
40 INJECTION SUBCUTANEOUS ONCE
Status: COMPLETED | OUTPATIENT
Start: 2023-04-13 | End: 2023-04-13

## 2023-04-13 RX ORDER — SODIUM CHLORIDE 9 MG/ML
INJECTION, SOLUTION INTRAVENOUS PRN
Status: DISCONTINUED | OUTPATIENT
Start: 2023-04-13 | End: 2023-04-14 | Stop reason: HOSPADM

## 2023-04-13 RX ORDER — SODIUM CHLORIDE 0.9 % (FLUSH) 0.9 %
5-40 SYRINGE (ML) INJECTION EVERY 12 HOURS SCHEDULED
Status: DISCONTINUED | OUTPATIENT
Start: 2023-04-13 | End: 2023-04-13 | Stop reason: HOSPADM

## 2023-04-13 RX ORDER — KETOROLAC TROMETHAMINE 15 MG/ML
15 INJECTION, SOLUTION INTRAMUSCULAR; INTRAVENOUS EVERY 6 HOURS PRN
Status: DISCONTINUED | OUTPATIENT
Start: 2023-04-13 | End: 2023-04-14 | Stop reason: HOSPADM

## 2023-04-13 RX ORDER — BUPIVACAINE HYDROCHLORIDE 2.5 MG/ML
INJECTION, SOLUTION EPIDURAL; INFILTRATION; INTRACAUDAL PRN
Status: DISCONTINUED | OUTPATIENT
Start: 2023-04-13 | End: 2023-04-13 | Stop reason: HOSPADM

## 2023-04-13 RX ORDER — SODIUM CHLORIDE 9 MG/ML
INJECTION, SOLUTION INTRAVENOUS PRN
Status: DISCONTINUED | OUTPATIENT
Start: 2023-04-13 | End: 2023-04-13 | Stop reason: HOSPADM

## 2023-04-13 RX ORDER — SODIUM CHLORIDE, SODIUM LACTATE, POTASSIUM CHLORIDE, CALCIUM CHLORIDE 600; 310; 30; 20 MG/100ML; MG/100ML; MG/100ML; MG/100ML
INJECTION, SOLUTION INTRAVENOUS CONTINUOUS
Status: DISCONTINUED | OUTPATIENT
Start: 2023-04-13 | End: 2023-04-13 | Stop reason: HOSPADM

## 2023-04-13 RX ORDER — DIPHENHYDRAMINE HCL 25 MG
25 CAPSULE ORAL EVERY 6 HOURS PRN
Status: DISCONTINUED | OUTPATIENT
Start: 2023-04-13 | End: 2023-04-14 | Stop reason: HOSPADM

## 2023-04-13 RX ORDER — KETOROLAC TROMETHAMINE 15 MG/ML
INJECTION, SOLUTION INTRAMUSCULAR; INTRAVENOUS
Status: COMPLETED
Start: 2023-04-13 | End: 2023-04-13

## 2023-04-13 RX ORDER — APREPITANT 40 MG/1
40 CAPSULE ORAL ONCE
Status: COMPLETED | OUTPATIENT
Start: 2023-04-13 | End: 2023-04-13

## 2023-04-13 RX ORDER — FENTANYL CITRATE 50 UG/ML
50 INJECTION, SOLUTION INTRAMUSCULAR; INTRAVENOUS EVERY 5 MIN PRN
Status: COMPLETED | OUTPATIENT
Start: 2023-04-13 | End: 2023-04-13

## 2023-04-13 RX ORDER — INSULIN LISPRO 100 [IU]/ML
0-4 INJECTION, SOLUTION INTRAVENOUS; SUBCUTANEOUS NIGHTLY
Status: DISCONTINUED | OUTPATIENT
Start: 2023-04-13 | End: 2023-04-14 | Stop reason: HOSPADM

## 2023-04-13 RX ORDER — DEXTROSE MONOHYDRATE 100 MG/ML
INJECTION, SOLUTION INTRAVENOUS CONTINUOUS PRN
Status: DISCONTINUED | OUTPATIENT
Start: 2023-04-13 | End: 2023-04-13 | Stop reason: HOSPADM

## 2023-04-13 RX ORDER — ACETAMINOPHEN 325 MG/1
650 TABLET ORAL EVERY 4 HOURS
Status: DISCONTINUED | OUTPATIENT
Start: 2023-04-13 | End: 2023-04-14 | Stop reason: HOSPADM

## 2023-04-13 RX ORDER — INSULIN LISPRO 100 [IU]/ML
0-4 INJECTION, SOLUTION INTRAVENOUS; SUBCUTANEOUS
Status: DISCONTINUED | OUTPATIENT
Start: 2023-04-14 | End: 2023-04-14 | Stop reason: HOSPADM

## 2023-04-13 RX ORDER — PROCHLORPERAZINE EDISYLATE 5 MG/ML
10 INJECTION INTRAMUSCULAR; INTRAVENOUS EVERY 6 HOURS PRN
Status: DISCONTINUED | OUTPATIENT
Start: 2023-04-13 | End: 2023-04-14 | Stop reason: HOSPADM

## 2023-04-13 RX ORDER — SODIUM CHLORIDE 0.9 % (FLUSH) 0.9 %
5-40 SYRINGE (ML) INJECTION PRN
Status: DISCONTINUED | OUTPATIENT
Start: 2023-04-13 | End: 2023-04-13 | Stop reason: HOSPADM

## 2023-04-13 RX ORDER — SODIUM CHLORIDE, SODIUM LACTATE, POTASSIUM CHLORIDE, CALCIUM CHLORIDE 600; 310; 30; 20 MG/100ML; MG/100ML; MG/100ML; MG/100ML
INJECTION, SOLUTION INTRAVENOUS CONTINUOUS
Status: DISCONTINUED | OUTPATIENT
Start: 2023-04-13 | End: 2023-04-13

## 2023-04-13 RX ORDER — SODIUM CHLORIDE, SODIUM LACTATE, POTASSIUM CHLORIDE, CALCIUM CHLORIDE 600; 310; 30; 20 MG/100ML; MG/100ML; MG/100ML; MG/100ML
INJECTION, SOLUTION INTRAVENOUS CONTINUOUS
Status: DISCONTINUED | OUTPATIENT
Start: 2023-04-13 | End: 2023-04-14 | Stop reason: HOSPADM

## 2023-04-13 RX ORDER — PROCHLORPERAZINE EDISYLATE 5 MG/ML
5 INJECTION INTRAMUSCULAR; INTRAVENOUS ONCE
Status: COMPLETED | OUTPATIENT
Start: 2023-04-13 | End: 2023-04-13

## 2023-04-13 RX ORDER — SODIUM CHLORIDE 0.9 % (FLUSH) 0.9 %
5-40 SYRINGE (ML) INJECTION PRN
Status: DISCONTINUED | OUTPATIENT
Start: 2023-04-13 | End: 2023-04-14 | Stop reason: HOSPADM

## 2023-04-13 RX ORDER — MEPERIDINE HYDROCHLORIDE 25 MG/ML
12.5 INJECTION INTRAMUSCULAR; INTRAVENOUS; SUBCUTANEOUS EVERY 5 MIN PRN
Status: DISCONTINUED | OUTPATIENT
Start: 2023-04-13 | End: 2023-04-13 | Stop reason: HOSPADM

## 2023-04-13 RX ORDER — FAMOTIDINE 20 MG/1
20 TABLET, FILM COATED ORAL ONCE
Status: DISCONTINUED | OUTPATIENT
Start: 2023-04-13 | End: 2023-04-13 | Stop reason: HOSPADM

## 2023-04-13 RX ORDER — DIPHENHYDRAMINE HYDROCHLORIDE 50 MG/ML
25 INJECTION INTRAMUSCULAR; INTRAVENOUS EVERY 6 HOURS PRN
Status: DISCONTINUED | OUTPATIENT
Start: 2023-04-13 | End: 2023-04-14 | Stop reason: HOSPADM

## 2023-04-13 RX ORDER — ONDANSETRON 2 MG/ML
4 INJECTION INTRAMUSCULAR; INTRAVENOUS EVERY 6 HOURS
Status: DISCONTINUED | OUTPATIENT
Start: 2023-04-13 | End: 2023-04-14 | Stop reason: HOSPADM

## 2023-04-13 RX ORDER — SODIUM CHLORIDE 0.9 % (FLUSH) 0.9 %
5-40 SYRINGE (ML) INJECTION EVERY 12 HOURS SCHEDULED
Status: DISCONTINUED | OUTPATIENT
Start: 2023-04-13 | End: 2023-04-14 | Stop reason: HOSPADM

## 2023-04-13 RX ORDER — FENTANYL CITRATE 50 UG/ML
25 INJECTION, SOLUTION INTRAMUSCULAR; INTRAVENOUS EVERY 5 MIN PRN
Status: DISCONTINUED | OUTPATIENT
Start: 2023-04-13 | End: 2023-04-13 | Stop reason: HOSPADM

## 2023-04-13 RX ADMIN — SODIUM CHLORIDE, SODIUM LACTATE, POTASSIUM CHLORIDE, AND CALCIUM CHLORIDE: 600; 310; 30; 20 INJECTION, SOLUTION INTRAVENOUS at 13:29

## 2023-04-13 RX ADMIN — SODIUM CHLORIDE, POTASSIUM CHLORIDE, SODIUM LACTATE AND CALCIUM CHLORIDE: 600; 310; 30; 20 INJECTION, SOLUTION INTRAVENOUS at 21:45

## 2023-04-13 RX ADMIN — KETOROLAC TROMETHAMINE 15 MG: 15 INJECTION, SOLUTION INTRAMUSCULAR; INTRAVENOUS at 18:47

## 2023-04-13 RX ADMIN — FENTANYL CITRATE 50 MCG: 50 INJECTION INTRAMUSCULAR; INTRAVENOUS at 18:35

## 2023-04-13 RX ADMIN — PROCHLORPERAZINE EDISYLATE 5 MG: 5 INJECTION INTRAMUSCULAR; INTRAVENOUS at 18:35

## 2023-04-13 RX ADMIN — SODIUM CHLORIDE, PRESERVATIVE FREE 10 ML: 5 INJECTION INTRAVENOUS at 21:37

## 2023-04-13 RX ADMIN — HYDROMORPHONE HYDROCHLORIDE 0.5 MG: 1 INJECTION, SOLUTION INTRAMUSCULAR; INTRAVENOUS; SUBCUTANEOUS at 19:25

## 2023-04-13 RX ADMIN — ACETAMINOPHEN 650 MG: 325 TABLET, FILM COATED ORAL at 21:00

## 2023-04-13 RX ADMIN — ENOXAPARIN SODIUM 40 MG: 100 INJECTION SUBCUTANEOUS at 13:48

## 2023-04-13 RX ADMIN — FAMOTIDINE 20 MG: 10 INJECTION, SOLUTION INTRAVENOUS at 13:50

## 2023-04-13 RX ADMIN — HYDROMORPHONE HYDROCHLORIDE 0.5 MG: 1 INJECTION, SOLUTION INTRAMUSCULAR; INTRAVENOUS; SUBCUTANEOUS at 19:15

## 2023-04-13 RX ADMIN — ONDANSETRON 4 MG: 2 INJECTION INTRAMUSCULAR; INTRAVENOUS at 21:36

## 2023-04-13 RX ADMIN — FENTANYL CITRATE 50 MCG: 50 INJECTION INTRAMUSCULAR; INTRAVENOUS at 18:50

## 2023-04-13 RX ADMIN — APREPITANT 40 MG: 40 CAPSULE ORAL at 13:51

## 2023-04-13 ASSESSMENT — PAIN SCALES - GENERAL
PAINLEVEL_OUTOF10: 7
PAINLEVEL_OUTOF10: 9
PAINLEVEL_OUTOF10: 7
PAINLEVEL_OUTOF10: 6
PAINLEVEL_OUTOF10: 6
PAINLEVEL_OUTOF10: 10
PAINLEVEL_OUTOF10: 5

## 2023-04-13 ASSESSMENT — PAIN DESCRIPTION - PAIN TYPE
TYPE: SURGICAL PAIN

## 2023-04-13 ASSESSMENT — PAIN DESCRIPTION - DESCRIPTORS
DESCRIPTORS: PRESSURE

## 2023-04-13 ASSESSMENT — PAIN DESCRIPTION - LOCATION
LOCATION: ABDOMEN;CHEST
LOCATION: ABDOMEN
LOCATION: ABDOMEN;CHEST
LOCATION: ABDOMEN
LOCATION: CHEST
LOCATION: CHEST

## 2023-04-13 ASSESSMENT — PAIN DESCRIPTION - ORIENTATION
ORIENTATION: MID

## 2023-04-13 NOTE — PERIOP NOTE
200 [facility]. Patient not on insulin. No insulin coverage  ordered. 1930 BP consistently 003 systolic. Cuff change to long cuff. Repeat /82.

## 2023-04-13 NOTE — OP NOTE
Operative Report    Patient: Allie Diana MRN: [id number]  SSN: [id number]    YOB: 1975  Age: 50 y.o. Sex: female       Date of Surgery: 04/13/23     Preoperative Diagnosis:      * Morbid obesity ([address] [E66.01]     * Essential hypertension [I10]     * Type 2 diabetes mellitus without complication, unspecified whether long term insulin use (HCC) [E11.9]     * Hyperlipidemia, unspecified hyperlipidemia type [E78.5]     * Multiple sclerosis (HCC) [G35]    Postoperative Diagnosis:     * Morbid obesity ([address] [E66.01]     * Essential hypertension [I10]     * Type 2 diabetes mellitus without complication, unspecified whether long term insulin use ([facility] [E11.9]     * Hyperlipidemia, unspecified hyperlipidemia type [E78.5]     * Multiple sclerosis ([facility] [G35]  NAFLD  Hepatomegaly    Surgeon(s) and Role:     * Jaquan Ascencio MD - Primary    Anesthesia: General     Procedure:   Laparoscopic sleeve gastrectomy  Laparoscopic wedge biopsy of the left lobe of the liver    Findings:   Uneventful LSG. Hepatomegaly and morphologic appearance of NAFLD, biopsied. Procedure in Detail: Allie Diana was identified in the pre-operative holding area. Informed consent was obtained after a complete discussion of risks, benefits and alternatives to surgery were had with the patient. The patient was brought back to the operating room and placed under general endotracheal anesthesia in the supine position on the operating room table. SCDs were applied. Preop VTE prophylaxis as well as all other multimodal analgesia, GI prophylaxis, etc were verified. The patient was then prepped and draped in the usual sterile fashion after being appropriately padded and secured to the table. A timeout was performed verifying patient identity, planned procedure, medications, and all other pertinent aspects of the case. An incision was made at Dominguez's point and a Verress needle was introduced into the peritoneal cavity.  Saline drop

## 2023-04-13 NOTE — BRIEF OP NOTE
Brief Postoperative Note      Patient: Tia Atkins  YOB: 1975  MRN: [id number]    Date of Procedure: 4/13/2023    Preop diagnosis:     * Morbid obesity ([address] [E66.01]     * Essential hypertension [I10]     * Type 2 diabetes mellitus without complication, unspecified whether long term insulin use ([address] [E11.9]     * Hyperlipidemia, unspecified hyperlipidemia type [E78.5]     * Multiple sclerosis ([address] [G35]  NAFLD  Hepatomegaly    Post-Op Diagnosis:      * Morbid obesity ([address] [E66.01]     * Essential hypertension [I10]     * Type 2 diabetes mellitus without complication, unspecified whether long term insulin use ([address] [E11.9]     * Hyperlipidemia, unspecified hyperlipidemia type [E78.5]     * Multiple sclerosis ([address] [G35]  NAFLD  Hepatomegaly       Procedure:  Laparoscopic sleeve gastrectomy  Laparoscopic wedge biopsy of the left lobe of the liver    Surgeon(s):  Carmen Orosco MD    Assistant:  Surgical Assistant: Mikaela Gusman    Anesthesia: General    Estimated Blood Loss (mL): Minimal    Complications: None    Specimens:   ID Type Source Tests Collected by Time Destination   A : portion of stomach  Tissue Stomach SURGICAL PATHOLOGY Carmen Orosco MD 4/13/2023 1651    B : liver wedge biopsy  Tissue Liver SURGICAL PATHOLOGY Carmen Orosco MD 4/13/2023 1729        Implants:  * No implants in log *      Drains:   [REMOVED] NG/OG/NJ/NE Tube Orogastric (Removed)       Findings:   Uneventful LSG. Hepatomegaly and morphologic appearance of NAFLD, biopsied.       Electronically signed by Carmen Orosco MD on 4/13/2023 at 5:32 PM

## 2023-04-13 NOTE — INTERVAL H&P NOTE
Update History & Physical    The patient's History and Physical of March 24, history and procedure was reviewed with the patient and I examined the patient. There was no change. The surgical site was confirmed by the patient and me. Plan: The risks, benefits, expected outcome, and alternative to the recommended procedure have been discussed with the patient. Patient understands and wants to proceed with the procedure.      Electronically signed by Drew Jiménez MD on 4/13/2023 at 3:32 PM

## 2023-04-14 VITALS
BODY MASS INDEX: 44.65 KG/M2 | DIASTOLIC BLOOD PRESSURE: 70 MMHG | HEIGHT: 63 IN | HEART RATE: 89 BPM | RESPIRATION RATE: 18 BRPM | SYSTOLIC BLOOD PRESSURE: 137 MMHG | OXYGEN SATURATION: 97 % | TEMPERATURE: 98.2 F | WEIGHT: 252 LBS

## 2023-04-14 LAB
BASOPHILS # BLD: 0 K/UL (ref 0–0.1)
BASOPHILS NFR BLD: 0 % (ref 0–2)
DIFFERENTIAL METHOD BLD: ABNORMAL
EOSINOPHIL # BLD: 0 K/UL (ref 0–0.4)
EOSINOPHIL NFR BLD: 0 % (ref 0–5)
ERYTHROCYTE [DISTWIDTH] IN BLOOD BY AUTOMATED COUNT: 13.2 % (ref 11.6–14.5)
GLUCOSE BLD STRIP.AUTO-MCNC: 102 MG/DL (ref 70–110)
GLUCOSE BLD STRIP.AUTO-MCNC: 102 MG/DL (ref 70–110)
GLUCOSE BLD STRIP.AUTO-MCNC: 108 MG/DL (ref 70–110)
HCT VFR BLD AUTO: 30.8 % (ref 35–45)
HCT VFR BLD AUTO: 32.5 % (ref 35–45)
HGB BLD-MCNC: 10.6 G/DL (ref 12–16)
HGB BLD-MCNC: 11.1 G/DL (ref 12–16)
IMM GRANULOCYTES # BLD AUTO: 0 K/UL (ref 0–0.04)
IMM GRANULOCYTES NFR BLD AUTO: 0 % (ref 0–0.5)
LYMPHOCYTES # BLD: 3 K/UL (ref 0.9–3.6)
LYMPHOCYTES NFR BLD: 24 % (ref 21–52)
MCH RBC QN AUTO: 28.7 PG (ref 24–34)
MCHC RBC AUTO-ENTMCNC: 34.4 G/DL (ref 31–37)
MCV RBC AUTO: 83.5 FL (ref 78–100)
MONOCYTES # BLD: 0.7 K/UL (ref 0.05–1.2)
MONOCYTES NFR BLD: 6 % (ref 3–10)
NEUTS SEG # BLD: 8.9 K/UL (ref 1.8–8)
NEUTS SEG NFR BLD: 70 % (ref 40–73)
NRBC # BLD: 0 K/UL (ref 0–0.01)
NRBC BLD-RTO: 0 PER 100 WBC
PLATELET # BLD AUTO: 477 K/UL (ref 135–420)
PMV BLD AUTO: 10.1 FL (ref 9.2–11.8)
RBC # BLD AUTO: 3.69 M/UL (ref 4.2–5.3)
WBC # BLD AUTO: 12.7 K/UL (ref 4.6–13.2)

## 2023-04-14 PROCEDURE — 36415 COLL VENOUS BLD VENIPUNCTURE: CPT

## 2023-04-14 PROCEDURE — 2580000003 HC RX 258: Performed by: SURGERY

## 2023-04-14 PROCEDURE — 6360000002 HC RX W HCPCS: Performed by: SURGERY

## 2023-04-14 PROCEDURE — 85014 HEMATOCRIT: CPT

## 2023-04-14 PROCEDURE — 6370000000 HC RX 637 (ALT 250 FOR IP): Performed by: SURGERY

## 2023-04-14 PROCEDURE — 85025 COMPLETE CBC W/AUTO DIFF WBC: CPT

## 2023-04-14 PROCEDURE — 6370000000 HC RX 637 (ALT 250 FOR IP): Performed by: REGISTERED NURSE

## 2023-04-14 PROCEDURE — 82962 GLUCOSE BLOOD TEST: CPT

## 2023-04-14 PROCEDURE — 99024 POSTOP FOLLOW-UP VISIT: CPT | Performed by: REGISTERED NURSE

## 2023-04-14 RX ORDER — HYDROMORPHONE HYDROCHLORIDE 2 MG/1
2 TABLET ORAL EVERY 12 HOURS PRN
Qty: 6 TABLET | Refills: 0 | Status: SHIPPED | OUTPATIENT
Start: 2023-04-14 | End: 2023-04-17

## 2023-04-14 RX ORDER — QUETIAPINE FUMARATE 100 MG/1
300 TABLET, FILM COATED ORAL ONCE
Status: COMPLETED | OUTPATIENT
Start: 2023-04-14 | End: 2023-04-14

## 2023-04-14 RX ORDER — AMLODIPINE BESYLATE 10 MG/1
5 TABLET ORAL DAILY
Qty: 30 TABLET | Refills: 0 | Status: SHIPPED | OUTPATIENT
Start: 2023-04-14

## 2023-04-14 RX ORDER — GLATIRAMER 40 MG/ML
40 INJECTION, SOLUTION SUBCUTANEOUS
Qty: 1 EACH | Refills: 0 | Status: SHIPPED | OUTPATIENT
Start: 2023-04-28

## 2023-04-14 RX ORDER — ONDANSETRON 4 MG/1
4 TABLET, ORALLY DISINTEGRATING ORAL EVERY 8 HOURS PRN
Qty: 15 TABLET | Refills: 0 | Status: SHIPPED | OUTPATIENT
Start: 2023-04-14

## 2023-04-14 RX ADMIN — ACETAMINOPHEN 650 MG: 325 TABLET, FILM COATED ORAL at 13:33

## 2023-04-14 RX ADMIN — KETOROLAC TROMETHAMINE 15 MG: 15 INJECTION, SOLUTION INTRAMUSCULAR; INTRAVENOUS at 05:52

## 2023-04-14 RX ADMIN — ACETAMINOPHEN 650 MG: 325 TABLET, FILM COATED ORAL at 01:00

## 2023-04-14 RX ADMIN — QUETIAPINE FUMARATE 300 MG: 100 TABLET ORAL at 13:33

## 2023-04-14 RX ADMIN — SODIUM CHLORIDE, POTASSIUM CHLORIDE, SODIUM LACTATE AND CALCIUM CHLORIDE: 600; 310; 30; 20 INJECTION, SOLUTION INTRAVENOUS at 03:51

## 2023-04-14 RX ADMIN — PROCHLORPERAZINE EDISYLATE 10 MG: 5 INJECTION INTRAMUSCULAR; INTRAVENOUS at 11:28

## 2023-04-14 RX ADMIN — ONDANSETRON 4 MG: 2 INJECTION INTRAMUSCULAR; INTRAVENOUS at 03:53

## 2023-04-14 RX ADMIN — ONDANSETRON 4 MG: 2 INJECTION INTRAMUSCULAR; INTRAVENOUS at 08:36

## 2023-04-14 RX ADMIN — ACETAMINOPHEN 650 MG: 325 TABLET, FILM COATED ORAL at 05:00

## 2023-04-14 RX ADMIN — ACETAMINOPHEN 650 MG: 325 TABLET, FILM COATED ORAL at 08:36

## 2023-04-14 RX ADMIN — ENOXAPARIN SODIUM 30 MG: 100 INJECTION SUBCUTANEOUS at 05:55

## 2023-04-14 RX ADMIN — HYDROMORPHONE HYDROCHLORIDE 0.5 MG: 1 INJECTION, SOLUTION INTRAMUSCULAR; INTRAVENOUS; SUBCUTANEOUS at 00:22

## 2023-04-14 ASSESSMENT — PAIN DESCRIPTION - DESCRIPTORS
DESCRIPTORS: ACHING

## 2023-04-14 ASSESSMENT — PAIN DESCRIPTION - PAIN TYPE: TYPE: SURGICAL PAIN

## 2023-04-14 ASSESSMENT — PAIN DESCRIPTION - LOCATION
LOCATION: ABDOMEN
LOCATION: ABDOMEN;INCISION
LOCATION: ABDOMEN

## 2023-04-14 ASSESSMENT — PAIN SCALES - GENERAL
PAINLEVEL_OUTOF10: 4
PAINLEVEL_OUTOF10: 8
PAINLEVEL_OUTOF10: 4
PAINLEVEL_OUTOF10: 4
PAINLEVEL_OUTOF10: 10

## 2023-04-14 ASSESSMENT — PAIN DESCRIPTION - ORIENTATION: ORIENTATION: ANTERIOR;MID

## 2023-04-14 NOTE — DISCHARGE SUMMARY
CHANGE how you take these medications      amLODIPine 10 MG tablet  Commonly known as: NORVASC  Take 0.5 tablets by mouth daily  What changed: how much to take     glatiramer 40 MG/ML injection  Commonly known as: Copaxone  Inject 1 mL into the skin three times a week Mon-wed-fri. Do not restart until cleared by bariatric surgeon at 2 week follow up  Start taking on: April 28, 2023  What changed:   medication strength  when to take this  additional instructions  These instructions start on April 28, 2023. If you are unsure what to do until then, ask your doctor or other care provider.             CONTINUE taking these medications      baclofen 10 MG tablet  Commonly known as: LIORESAL     cyclobenzaprine 5 MG tablet  Commonly known as: FLEXERIL     DULoxetine 30 MG extended release capsule  Commonly known as: CYMBALTA     magnesium hydroxide 400 MG/5ML suspension  Commonly known as: MILK OF MAGNESIA     metFORMIN 500 MG tablet  Commonly known as: GLUCOPHAGE     QUEtiapine 300 MG tablet  Commonly known as: SEROQUEL            STOP taking these medications      carboxymethylcellulose 0.5 % Soln ophthalmic solution  Commonly known as: REFRESH PLUS     diazePAM 5 MG tablet  Commonly known as: VALIUM     glipiZIDE 5 MG extended release tablet  Commonly known as: GLUCOTROL XL     vitamin D 60612 UNIT Caps  Commonly known as: CHOLECALCIFEROL               Where to Get Your Medications        These medications were sent to [address] [facility] [phone]  [address] [place] [address]      Phone: [phone]   amLODIPine 10 MG tablet  glatiramer 40 MG/ML injection  HYDROmorphone 2 MG tablet  ondansetron 4 MG disintegrating tablet       Must Take:   Bariatric Chewable vitamins, 2 orally daily for life  Calcium Citrate 1500 mg orally daily for life  Vitamin B12 1000 micrograms sublingual daily for life  Vitamin D3 5,000 units orally daily for life

## 2023-04-14 NOTE — CARE COORDINATION
Case Management Assessment  Initial Evaluation    Date/Time of Evaluation: 4/14/2023 8:58 AM  Assessment Completed by: Jt Joe    If patient is discharged prior to next notation, then this note serves as note for discharge by case management. Patient Name: Jaswinder Park                   YOB: 1975  Diagnosis: Morbid obesity ([facility] [E66.01]  Essential hypertension [I10]  Type 2 diabetes mellitus without complication, unspecified whether long term insulin use ([facility] [E11.9]  Hyperlipidemia, unspecified hyperlipidemia type [E78.5]  Multiple [facility] ([facility]  Encounter for pre-bariatric surgery counseling and education [Z71.89]                   Date / Time: 4/13/2023 12:46 PM    Patient Admission Status: Inpatient   Readmission Risk (Low < 19, Mod (19-27), High > 27): Readmission Risk Score: 6.9    Current PCP: ROEL Christina CNP  PCP verified by CM? (P) Yes    Chart Reviewed: Yes      History Provided by: Patient  Patient Orientation: Alert and Oriented    Patient Cognition: Alert    Hospitalization in the last 30 days (Readmission):  No    If yes, Readmission Assessment in CM Navigator will be completed.     Advance Directives:      Code Status: Full Code   Patient's Primary Decision Maker is: Legal Next of Kin      Discharge Planning:    Patient lives with: Spouse/Significant Other Type of Home: House  Primary Care Giver: Self  Patient Support Systems include: Spouse/Significant Other, Family Members   Current Financial resources: (P) Medicare  Current community resources: (P) None  Current services prior to admission: None            Current DME:              Type of Home Care services:  None    ADLS  Prior functional level: (P) Independent in ADLs/IADLs  Current functional level: (P) Independent in ADLs/IADLs    PT AM-PAC:   /24  OT AM-PAC:   /24    Family can provide assistance at DC: (P) Yes  Would you like Case Management to discuss the discharge plan with any other family

## 2023-04-14 NOTE — CARE COORDINATION
[facility] Case Management  [phone]     Note  Patient Name: Ilana Tamayo  YOB: 1975  Age: 50 y.o. Date of Visit: 04/14/23  Patient Room: 2206/01     Attending: Karly Benedict MD   Diagnosis: Morbid obesity ([facility] [E66.01]  Essential hypertension [I10]  Type 2 diabetes mellitus without complication, unspecified whether long term insulin use ([facility] [E11.9]  Hyperlipidemia, unspecified hyperlipidemia type [E78.5]  Multiple sclerosis ([facility] Isiah Renteria  Encounter for pre-bariatric surgery counseling and education [Z71.89]      D/C order noted for today. Orders reviewed. No needs identified at this time. SW remains available if needed.       Rita Barney MSW    Care Management Group

## 2023-04-14 NOTE — PERIOP NOTE
TRANSFER - OUT REPORT:    Verbal report given to [facility] on Ariel Murphy  being transferred to room [phone] for routine progression of patient care       Report consisted of patient's Situation, Background, Assessment and   Recommendations(SBAR). Information from the following report(s) Nurse Handoff Report and Surgery Report was reviewed with the receiving nurse. [place] Assessment: No data recorded  Lines:   Peripheral IV 04/13/23 Right Hand (Active)   Site Assessment Clean, dry & intact 04/13/23 1806   Line Status Infusing 04/13/23 1806   Line Care Connections checked and tightened 04/13/23 1340   Phlebitis Assessment No symptoms 04/13/23 1806   Infiltration Assessment 0 04/13/23 1806   Dressing Status Clean;Dry; Intact 04/13/23 1806   Dressing Type Transparent 04/13/23 1806        Opportunity for questions and clarification was provided.       Patient transported with:  O2 @ 3lpm and Tech

## 2023-04-14 NOTE — DISCHARGE INSTRUCTIONS
DISCHARGE SUMMARY from Nurse    PATIENT INSTRUCTIONS:    After general anesthesia or intravenous sedation, for 24 hours or while taking prescription Narcotics:  Limit your activities  Do not drive and operate hazardous machinery  Do not make important personal or business decisions  Do  not drink alcoholic beverages  If you have not urinated within 8 hours after discharge, please contact your surgeon on call. Report the following to your surgeon:  Excessive pain, swelling, redness or odor of or around the surgical area  Temperature over 100.5  Nausea and vomiting lasting longer than 4 hours or if unable to take medications  Any signs of decreased circulation or nerve impairment to extremity: change in color, persistent  numbness, tingling, coldness or increase pain  Any questions    What to do at Home:  Recommended activity: activity as tolerated,     If you experience any of the following symptoms Refer to [facility], [facility], please follow up with Dr. Kaylin Fonseca. *  Please give a list of your current medications to your Primary Care Provider. *  Please update this list whenever your medications are discontinued, doses are      changed, or new medications (including over-the-counter products) are added. *  Please carry medication information at all times in case of emergency situations. These are general instructions for a healthy lifestyle:    No smoking/ No tobacco products/ Avoid exposure to second hand smoke  Surgeon General's Warning:  Quitting smoking now greatly reduces serious risk to your health.     Obesity, smoking, and sedentary lifestyle greatly increases your risk for illness    A healthy diet, regular physical exercise & weight monitoring are important for maintaining a healthy lifestyle    You may be retaining fluid if you have a history of heart failure or if you experience any of the following symptoms:  Weight gain of 3 pounds or more overnight or 5 pounds in a week, increased swelling in our

## 2023-04-17 NOTE — PROGRESS NOTES
Advance Care Planning     Advance Care Planning Inpatient Note  Spiritual Care Department    Today's Date: 4/14/2023  Unit: [facility] 2 SURGICAL    Received request from patient. Upon review of chart and communication with care team, patient's decision making abilities are not in question. . Patient was/were present in the room during visit. Goals of ACP Conversation:  Discuss advance care planning documents  Facilitate a discussion related to patient's goals of care as they align with the patient's values and beliefs. Health Care Decision Makers:     No healthcare decision makers have been documented. Click here to complete [address] including selection of the Healthcare Decision Maker Relationship (ie \"Primary\")  Summary:  Completed [address]    Advance Care Planning Documents (Patient Wishes):  Healthcare Power of /Advance Directive Appointment of [address]  Living Will/Advance Directive     Assessment:     04/14/23 1238   Encounter Summary   Encounter Overview/Reason  Advance Care Planning   Service Provided For: Patient   Referral/Consult From: Patient   Support System Family members   Last Encounter  04/14/23  (IV-SA-KP)   Complexity of Encounter Moderate   Begin Time 1207   End Time  1239   Total Time Calculated 32 min   Encounter    Type Initial Screen/Assessment   Spiritual/Emotional needs   Type Spiritual Support   Advance Care Planning   Type ACP conversation; Completed AD/ACP document(s); Care Preferences Addressed         Interventions:  Assisted in the completion of documents according to patient's wishes at this time    Care Preferences Communicated:   No    Outcomes/Plan:  ACP Discussion: Completed  New advance directive completed.   Returned original document(s) to patient, as well as copies for distribution to appointed agents  Copy of advance directive given to staff to scan into medical
Patient called out for help. Noted to have shaking of her limbs. Patient asked if she was in pain and she responded,\" just a small amount in my stomach area. Blood sugar=102. VS-98. 2-HR-89-O2 SAT=97%-/70. EDISON Pizarro notified. Patient condition stable. Discharge instructions given and she verbalized understanding.
Pharmacist Review and Automatic Dose Adjustment of Prophylactic Enoxaparin    *Review reason for admission/hospital problem list*    The reviewing pharmacist has made an adjustment to the ordered enoxaparin dose or converted to UFH per the approved [facility] protocol and table as identified below. Ezequiel Villegas is a 50 y.o. female. No results for input(s): CREATININE in the last 72 hours. CrCl cannot be calculated (Patient's most recent lab result is older than the maximum 30 days allowed. ).     Height:   Ht Readings from Last 1 Encounters:   04/13/23 5' 3\" (1.6 m)     Weight:  Wt Readings from Last 1 Encounters:   04/13/23 252 lb (114.3 kg)                   Estimated CrCl  (ml/min) Patient Weight (kg)     50.9and below .9 101-150.9 151-174.9 175 or greater    30 or greater 30 mg SUBQ daily 40 mg SUBQ daily (or 30mg BID for orthopedic cases)     30 mg SUBQ BID 40 mg SUBQ BID 60mg SUBQ BID    15-29.9 UFH 5000 units SUBQ BID 30 mg SUBQ daily 30 mg SUBQ daily 40 mg SUBQ daily 60 mg SUBQ daily    Less than 15or dialysis UFH 5000 units SUBQ BID UFH 5000 units SUBQ TID UFH 7500 units SUBQ TID        Plan: Based upon the patient's weight and renal function, the ordered enoxaparin dose of 40mg q12h has been changed/converted to 30 mg q12h      Thank you,  Marianne Austin, [facility]
Physician Progress Note      PATIENT:               Shoaib Jensen  CSN #:                  [id number]  :                       1975  ADMIT DATE:       2023 12:46 PM  Debbie Cardona DATE:        2023 2:54 PM  RESPONDING  PROVIDER #:        Queta Toney MD          QUERY TEXT:    Pt admitted for Laparoscopic sleeve gastrectomy. Noted documentation of   DiabetesType II , HTN , BMI 44.64 , NAFLD ,Hepatomegaly in H+P and OP note   23. If possible, please document in progress notes and discharge summary:    The medical record reflects the following:  Risk Factors: Hx Hyperlipidemia,,MS  Clinical Indicators: DiabetesType II , HTN , BMI 44.64 , NAFLD ,Hepatomegaly  ? Treatment: ss insulin ,blood sugar accu checks ,liver biopsy, labs  and VS   monitored carefully    Thank you  Jessica Collado RN CRCR ZAHIRA  , [facility] /[facility]/[email or web address]  Options provided:  -- Metabolic syndrome  -- Metabolic syndrome ruled out  -- Other - I will add my own diagnosis  -- Disagree - Not applicable / Not valid  -- Disagree - Clinically unable to determine / Unknown  -- Refer to Clinical Documentation Reviewer    PROVIDER RESPONSE TEXT:    This patient has Metabolic syndrome. Query created by:  Fabio Hearn on 2023 7:18 PM      Electronically signed by:  Queta Toney MD 2023 9:11 PM
Pt assisted to the chair. Pt started on a liquid diet.
Received discharge prescriptions for patient at outpatient pharmacy. Patient has no copay. Patient Norvasc 10mg prescription is refill too soon until 4/18/23.
Surgery Progress Note    4/14/2023    Admit Date: 4/13/2023    Subjective:     Ms. Josefa Cali has complaints of mild nausea. Pain is controlled with current plan. Patient has been ambulating in halls. Denies dizziness, chest pain, shortness of breath. Bowel Movements: None. Tolerating initial drinking trial without vomiting and discomfort. Allergies verified. Objective:     Blood pressure (!) 122/93, pulse 78, temperature 97.9 °F (36.6 °C), temperature source Oral, resp. rate 18, height 5' 3\" (1.6 m), weight 252 lb (114.3 kg), SpO2 97 %. No intake/output data recorded. 04/12 1901 - 04/14 0700  In: 2647.1 [P.O.:120;  I.V.:2527.1]  Out: 800 [Urine:800]    EXAM: GENERAL: alert, oriented x4, no distress   HEART: regular rate and rhythm   LUNGS: clear to auscultation   ABDOMEN:  Soft, obese, appropriate incisional tenderness, +BS, non-distended, surgical incisions clean, dry, no erythema or drainage   EXTREMITIES: warm, well perfused    Data Review    Recent Results (from the past 24 hour(s))   POCT Glucose    Collection Time: 04/13/23  1:17 PM   Result Value Ref Range    POC Glucose 98 70 - 110 mg/dL   TYPE AND SCREEN    Collection Time: 04/13/23  1:29 PM   Result Value Ref Range    Crossmatch expiration date 04/16/2023,2359     ABO/Rh O POSITIVE     Antibody Screen NEG    Hemoglobin A1C    Collection Time: 04/13/23  1:29 PM   Result Value Ref Range    Hemoglobin A1C 5.8 (H) 4.2 - 5.6 %    eAG 120 mg/dL   POCT Glucose    Collection Time: 04/13/23  6:33 PM   Result Value Ref Range    POC Glucose 179 (H) 70 - 110 mg/dL   POCT Glucose    Collection Time: 04/13/23  9:35 PM   Result Value Ref Range    POC Glucose 163 (H) 70 - 110 mg/dL   CBC with Auto Differential    Collection Time: 04/14/23  4:33 AM   Result Value Ref Range    WBC 12.7 4.6 - 13.2 K/uL    RBC 3.69 (L) 4.20 - 5.30 M/uL    Hemoglobin 10.6 (L) 12.0 - 16.0 g/dL    Hematocrit 30.8 (L) 35.0 - 45.0 %    MCV 83.5 78.0 - 100.0 FL    MCH 28.7 24.0 - 34.0 PG
separately. Sublingual Vitamin B-12: 1,000 micrograms daily. Iron for menstruating women or patients with a history of low iron: 65 milligrams daily. We recommend going to www.bariatricadHermes IQage. [employer] and purchasing iron from there. The lemon-lime has 60 milligrams. This iron is better absorbed than over-the-counter iron. Clear Liquid Log  Getting your fluid in is top priority during this week. Fluids (MINIUM of 64 ounces per day):  ? 8 oz. ? 8 oz. ? 8 oz. ? 8 oz. ? 8 oz. ? 8 oz. ? 8 oz. ? 8 oz. ? 8 oz. ? 8 oz. ? 8 oz. ? 8 oz. Flintstones Complete Chewable: ? a.m. ? p.m. Calcium Citrate (1,500 milligrams/day):  Pill form  ? Two crushed pills (morning) ? Two crushed pills (afternoon) ? Two crushed pills (evening)  OR Upcal D (powder)  ? One pack/scoop ? One pack/scoop ? One pack/scoop  OR Celebrate Chewable Vitamins (500 mg each) or Bariatric Advantage Chewables (500 mg)  ? One chewable (morning) ? One chewable (afternoon) ? One chewable (evening)  OR Liquid Calcium Citrate  ? 1 tbsp. Calcium Citrate ? 1 tbsp. Calcium Citrate ? 1 tbsp. Calcium Citrate  Vitamin D3: ? 5,000 IU daily. Vitamin B-12: ? 1,000 micrograms per day. Iron (menstruating women or patients with a history of low iron):  ? 60 milligrams per day from Bariatric Advantage. Protein drinks (protein drinks should be under 3 grams of sugar and 3 grams of fat). Protein shake (60 grams per day): ? a.m. ? p.m. Exercise: ? 30 to 40 minutes per day. Bariatric Soft and Moist Diet Shopping List   alcium Citrate (1,500 milligrams/day):  Pill form  ? Two crushed pills (morning) ? Two crushed pills (afternoon) ? Two crushed pills (evening)  OR Upcal D (powder)  ? One pack/scoop ? One pack/scoop ? One pack/scoop  OR Celebrate Chewable Vitamins (500 mg each) or Bariatric Advantage Chewables (500 mg)  ? One chewable (morning) ? One chewable (afternoon) ? One chewable (evening)  OR Liquid Calcium Citrate  ? 1 tbsp. Calcium Citrate ? 1 tbsp.  Calcium

## 2023-04-28 ENCOUNTER — OFFICE VISIT (OUTPATIENT)
Age: 48
End: 2023-04-28

## 2023-04-28 VITALS
TEMPERATURE: 98.2 F | BODY MASS INDEX: 41.46 KG/M2 | HEART RATE: 102 BPM | OXYGEN SATURATION: 97 % | DIASTOLIC BLOOD PRESSURE: 71 MMHG | RESPIRATION RATE: 16 BRPM | HEIGHT: 63 IN | WEIGHT: 234 LBS | SYSTOLIC BLOOD PRESSURE: 112 MMHG

## 2023-04-28 DIAGNOSIS — Z98.84 BARIATRIC SURGERY STATUS: Primary | ICD-10-CM

## 2023-04-28 DIAGNOSIS — E66.01 MORBID OBESITY (HCC): ICD-10-CM

## 2023-04-28 DIAGNOSIS — E11.9 TYPE 2 DIABETES MELLITUS WITHOUT COMPLICATION, UNSPECIFIED WHETHER LONG TERM INSULIN USE (HCC): ICD-10-CM

## 2023-04-28 DIAGNOSIS — I10 HYPERTENSION, UNSPECIFIED TYPE: ICD-10-CM

## 2023-04-28 DIAGNOSIS — G35 MULTIPLE SCLEROSIS (HCC): ICD-10-CM

## 2023-04-28 DIAGNOSIS — K91.2 POSTSURGICAL MALABSORPTION: ICD-10-CM

## 2023-04-28 DIAGNOSIS — K21.9 GASTRO-ESOPHAGEAL REFLUX DISEASE WITHOUT ESOPHAGITIS: ICD-10-CM

## 2023-04-28 DIAGNOSIS — E78.5 HYPERLIPIDEMIA, UNSPECIFIED HYPERLIPIDEMIA TYPE: ICD-10-CM

## 2023-04-28 DIAGNOSIS — Z13.21 MALNUTRITION SCREEN: ICD-10-CM

## 2023-04-28 RX ORDER — OMEPRAZOLE 40 MG/1
40 CAPSULE, DELAYED RELEASE ORAL 2 TIMES DAILY
Qty: 90 CAPSULE | Refills: 2 | Status: SHIPPED | OUTPATIENT
Start: 2023-04-28

## 2023-04-28 RX ORDER — MULTIVIT WITH IRON,MINERALS
1 TABLET,CHEWABLE ORAL DAILY
COMMUNITY

## 2023-04-28 RX ORDER — THIAMINE MONONITRATE (VIT B1) 100 MG
100 TABLET ORAL DAILY
COMMUNITY

## 2023-04-28 RX ORDER — UBIDECARENONE 75 MG
50 CAPSULE ORAL DAILY
COMMUNITY

## 2023-04-28 RX ORDER — URSODIOL 200 MG/1
200 CAPSULE ORAL SEE ADMIN INSTRUCTIONS
Qty: 90 CAPSULE | Refills: 5 | Status: SHIPPED | OUTPATIENT
Start: 2023-04-28

## 2023-04-28 RX ORDER — IBUPROFEN 200 MG
1 CAPSULE ORAL 3 TIMES DAILY
COMMUNITY

## 2023-04-28 RX ORDER — CHOLECALCIFEROL (VITAMIN D3) 1250 MCG
CAPSULE ORAL
COMMUNITY

## 2023-04-28 NOTE — PROGRESS NOTES
Nadia Childers is a 50 y.o. female (: 1975) presenting to address: post op    Chief Complaint   Patient presents with    Post-Op Check     Sleeve 23       Medication list and allergies have been reviewed with Nadia Childers and updated as of today's date. I have gone over all Medical, Surgical and Social History with Nadia Childers and updated/added the information accordingly. 1. Have you been to the ER, urgent care clinic since your last visit? Hospitalized since your last visit? No    2. Have you seen or consulted any other health care providers outside of the [address] [place] [address] since your last visit? Include any pap smears or colon screening.  No
prescribed    Beth Moore MD [facility]  Bariatric and General Surgeon  [facility]

## 2023-05-31 ENCOUNTER — HOSPITAL ENCOUNTER (OUTPATIENT)
Facility: HOSPITAL | Age: 48
Discharge: HOME | End: 2023-06-03

## 2023-05-31 ENCOUNTER — CLINICAL DOCUMENTATION (OUTPATIENT)
Facility: HOSPITAL | Age: 48
End: 2023-05-31

## 2023-05-31 NOTE — PROGRESS NOTES
[facility]  POST-OP NUTRITION FOLLOW UP    Patient's Name: Bettina Other  YOB: 1975  Surgery Date: 2023      Procedure: LGBP    Surgeon: DR. Danette Osborne    Height: 5'3\"     Pre-Op Weight: 260     Current Weight: 218  Weight Lost: 42      Attendance of support group: yes       Complications  Readmittance: no  Reoperations: no  Complications: no  IV Fluids: no  ER Trips: no    Problem Areas:   Nausea: no   Vomiting: yes   Dumping Syndrome: yes, ate something too sweet  Inadequate Protein: no  Inadequate Fluids: no  Food Intolerance: tuna fish  Hunger: no  Constipation: yes but getting better. Eating 3 Meals/Day:5  Portion Size at Meals: 1 ounce     Protein from Food: 30    Foods being consumed:  7:00 wake up  Breakfast: Time:9:00  grilled cheese sandwich, wheat bread. Lunch: Time: 12:00   chicken breast, grilled chicken. Dinner:  Time: 7:00 grilled chicken. In-between eating: protein drink, yogurt or jello    Length of time for meals: 30    food/fluids: yes    Fluids: >64 oz/day   Types of Fluids: water, strawberry or banana smoothie. Ones that are already made in the store. MVI: [email or web address]it    Number/Day: 2   Taken Separately: yes    Vitamin B1: 100 mg  Dosing: daily    Calcium: calcium citrate    Calcium Dosin    Taken Separately: yes    Vitamin B12: 1000 mcg   Vitamin B12 Dosing: daily    Vitamin D: 5000 iu     Vitamin D dosing: daily     Protein Supplement: [employer] brand and Premier     Grams of Protein: 30 grams   Patient is taking 7 days a week. Exercise: walking and climbing stairs. Comments:    Patient is doing very well. Patient was educated on the importance of eating meat and vegetables only. I have talked with patient about the effects of carbohydrates, not only from a weight management perspective, but also what effects it could have on their blood sugar and what reactive hypoglycemia is.         Diet Follow Up:  9 month class

## 2023-06-26 RX ORDER — METHOCARBAMOL 750 MG/1
TABLET ORAL
Qty: 12 CAPSULE | Refills: 0 | Status: SHIPPED | OUTPATIENT
Start: 2023-06-26

## 2023-07-12 RX ORDER — OMEPRAZOLE 40 MG/1
40 CAPSULE, DELAYED RELEASE ORAL 2 TIMES DAILY
Qty: 90 CAPSULE | Refills: 2 | Status: SHIPPED | OUTPATIENT
Start: 2023-07-12

## 2023-07-12 NOTE — TELEPHONE ENCOUNTER
----- Message from Laci Tam LPN sent at 8/11/9761 11:10 AM EDT -----  Regarding: Labwork/reflux med refill  Dharmesh Law and Dr. Trinity Jackson saw patient on 4/28/23 at the [facility] office. She is calling for a refill of her reflux med and a copy of her lab slip. She is wanting to pick that up at the office. Could you give her a call. Thanks.

## 2023-07-12 NOTE — TELEPHONE ENCOUNTER
Spoke to the patient and let her know that the labs can be picked up Monday thru Friday from 8-430 and the medication has been sent to a provider to refill.

## 2023-08-05 LAB
25(OH)D3+25(OH)D2 SERPL-MCNC: 87.7 NG/ML (ref 30–100)
ALBUMIN SERPL-MCNC: 4.1 G/DL (ref 3.9–4.9)
ALBUMIN/GLOB SERPL: 1.5 {RATIO} (ref 1.2–2.2)
ALP SERPL-CCNC: 60 IU/L (ref 44–121)
ALT SERPL-CCNC: 7 IU/L (ref 0–32)
AST SERPL-CCNC: 9 IU/L (ref 0–40)
BASOPHILS # BLD AUTO: 0 X10E3/UL (ref 0–0.2)
BASOPHILS NFR BLD AUTO: 1 %
BILIRUB SERPL-MCNC: 0.7 MG/DL (ref 0–1.2)
BUN SERPL-MCNC: 7 MG/DL (ref 6–24)
BUN/CREAT SERPL: 12 (ref 9–23)
CALCIUM SERPL-MCNC: 9.1 MG/DL (ref 8.7–10.2)
CHLORIDE SERPL-SCNC: 101 MMOL/L (ref 96–106)
CHOLEST SERPL-MCNC: 199 MG/DL (ref 100–199)
CO2 SERPL-SCNC: 24 MMOL/L (ref 20–29)
CREAT SERPL-MCNC: 0.57 MG/DL (ref 0.57–1)
EGFRCR SERPLBLD CKD-EPI 2021: 112 ML/MIN/1.73
EOSINOPHIL # BLD AUTO: 0.1 X10E3/UL (ref 0–0.4)
EOSINOPHIL NFR BLD AUTO: 3 %
ERYTHROCYTE [DISTWIDTH] IN BLOOD BY AUTOMATED COUNT: 13.5 % (ref 11.7–15.4)
FERRITIN SERPL-MCNC: 216 NG/ML (ref 15–150)
FOLATE SERPL-MCNC: 12.9 NG/ML
GLOBULIN SER CALC-MCNC: 2.8 G/DL (ref 1.5–4.5)
GLUCOSE SERPL-MCNC: 87 MG/DL (ref 70–99)
HCT VFR BLD AUTO: 34.5 % (ref 34–46.6)
HDLC SERPL-MCNC: 54 MG/DL
HGB BLD-MCNC: 11.5 G/DL (ref 11.1–15.9)
IMM GRANULOCYTES # BLD AUTO: 0 X10E3/UL (ref 0–0.1)
IMM GRANULOCYTES NFR BLD AUTO: 0 %
IRON SERPL-MCNC: 47 UG/DL (ref 27–159)
LDLC SERPL CALC-MCNC: 131 MG/DL (ref 0–99)
LYMPHOCYTES # BLD AUTO: 2.5 X10E3/UL (ref 0.7–3.1)
LYMPHOCYTES NFR BLD AUTO: 57 %
MCH RBC QN AUTO: 27.6 PG (ref 26.6–33)
MCHC RBC AUTO-ENTMCNC: 33.3 G/DL (ref 31.5–35.7)
MCV RBC AUTO: 83 FL (ref 79–97)
MONOCYTES # BLD AUTO: 0.3 X10E3/UL (ref 0.1–0.9)
MONOCYTES NFR BLD AUTO: 7 %
NEUTROPHILS # BLD AUTO: 1.4 X10E3/UL (ref 1.4–7)
NEUTROPHILS NFR BLD AUTO: 32 %
PLATELET # BLD AUTO: 477 X10E3/UL (ref 150–450)
POTASSIUM SERPL-SCNC: 3.5 MMOL/L (ref 3.5–5.2)
PROT SERPL-MCNC: 6.9 G/DL (ref 6–8.5)
RBC # BLD AUTO: 4.16 X10E6/UL (ref 3.77–5.28)
SODIUM SERPL-SCNC: 141 MMOL/L (ref 134–144)
SPECIMEN STATUS REPORT: NORMAL
TRIGL SERPL-MCNC: 79 MG/DL (ref 0–149)
VIT B12 SERPL-MCNC: 1130 PG/ML (ref 232–1245)
VLDLC SERPL CALC-MCNC: 14 MG/DL (ref 5–40)
WBC # BLD AUTO: 4.4 X10E3/UL (ref 3.4–10.8)

## 2023-08-09 LAB — VIT B1 BLD-SCNC: 160.5 NMOL/L (ref 66.5–200)

## 2023-08-18 ENCOUNTER — OFFICE VISIT (OUTPATIENT)
Age: 48
End: 2023-08-18
Payer: MEDICARE

## 2023-08-18 VITALS
HEART RATE: 93 BPM | HEIGHT: 63 IN | OXYGEN SATURATION: 100 % | TEMPERATURE: 98 F | DIASTOLIC BLOOD PRESSURE: 75 MMHG | SYSTOLIC BLOOD PRESSURE: 122 MMHG | WEIGHT: 190 LBS | BODY MASS INDEX: 33.66 KG/M2

## 2023-08-18 DIAGNOSIS — K21.9 GASTROESOPHAGEAL REFLUX DISEASE, UNSPECIFIED WHETHER ESOPHAGITIS PRESENT: ICD-10-CM

## 2023-08-18 DIAGNOSIS — Z86.39 HX OF TYPE 2 DIABETES MELLITUS: ICD-10-CM

## 2023-08-18 DIAGNOSIS — Z90.3 S/P GASTRIC SLEEVE PROCEDURE: ICD-10-CM

## 2023-08-18 DIAGNOSIS — E66.9 OBESITY (BMI 30.0-34.9): ICD-10-CM

## 2023-08-18 DIAGNOSIS — Z90.3 POSTGASTRECTOMY MALABSORPTION: Primary | ICD-10-CM

## 2023-08-18 DIAGNOSIS — K91.2 POSTGASTRECTOMY MALABSORPTION: Primary | ICD-10-CM

## 2023-08-18 DIAGNOSIS — I15.8 OTHER SECONDARY HYPERTENSION: ICD-10-CM

## 2023-08-18 PROCEDURE — 3074F SYST BP LT 130 MM HG: CPT | Performed by: REGISTERED NURSE

## 2023-08-18 PROCEDURE — G8417 CALC BMI ABV UP PARAM F/U: HCPCS | Performed by: REGISTERED NURSE

## 2023-08-18 PROCEDURE — G8428 CUR MEDS NOT DOCUMENT: HCPCS | Performed by: REGISTERED NURSE

## 2023-08-18 PROCEDURE — 3078F DIAST BP <80 MM HG: CPT | Performed by: REGISTERED NURSE

## 2023-08-18 PROCEDURE — 1036F TOBACCO NON-USER: CPT | Performed by: REGISTERED NURSE

## 2023-08-18 PROCEDURE — 99214 OFFICE O/P EST MOD 30 MIN: CPT | Performed by: REGISTERED NURSE

## 2023-08-18 ASSESSMENT — ENCOUNTER SYMPTOMS
SHORTNESS OF BREATH: 0
CHEST TIGHTNESS: 0
GASTROINTESTINAL NEGATIVE: 1

## 2023-08-18 NOTE — PROGRESS NOTES
Bariatric Postoperative Progress Note    Chief Complaint   Patient presents with    Follow-up     3 month follow up, Sleeve gastrectomy 4/13/23     Abbie Rock is a 50 y.o. female now 4 months status post laparoscopic sleeve gastrectomy performed on 4/13/23. Doing well overall. Pleased with weight loss journey thus far. Diet consists of protein shakes, chicken, beef, fish, watermelon, rice. Pt did not realize she was able to eat vegetables. Taking in 64 oz sugar free fluids, 30 g protein. 30 min of activity 3 days a week. Bowel movements are regular. She is compliant with multivitamins, calcium, Vit D, B1, and B12 supplements. Denies ETOH, NSAID, and tobacco use  Was taken off DM medications. Upcoming follow up with PCP regarding BP status. Weight Loss Metrics 8/18/2023 4/28/2023 4/13/2023 4/5/2023 3/24/2023 1/9/2023 12/9/2022   Pre-op weight (manual entry) 252 lbs - - - 252 lbs - -   Height 5' 3\" 5' 3\" 5' 3\" 5' 3\" 5' 3\" 5' 3\" -   Weight 190 lbs 234 lbs 252 lbs 252 lbs 252 lbs 251 lbs 251 lbs   BMI (Calculated) 33.7 kg/m2 41.5 kg/m2 44.7 kg/m2 44.7 kg/m2 44.7 kg/m2 44.6 kg/m2 0 kg/m2   Ideal body weight (manual entry) 128 lbs - - - 128 lbs - -   EBW in lbs. 124 - - - 124 - -   Weight loss to date in lbs.  62 - - - 0 - -   Percent weight loss (%) 24.6 % - - - 0 % - -   Percent EBW loss (%) 50 % - - - 0 % - -   Some recent data might be hidden      Comorbidities:  Hypertension: improved  Diabetes: resolved  Obstructive Sleep Apnea: not applicable  Hyperlipidemia: not applicable  Stress Urinary Incontinence: not applicable  Gastroesophageal Reflux: Present, denies hx  Weight related arthropathy:not applicable      Past Medical History:   Diagnosis Date    Arthropathy, unspecified, site unspecified     Depression     Diabetes ([address])     Hypercholesteremia     Hypertension     history of htn    Insomnia     Migraine     MS (multiple sclerosis) ([address])     Neurogenic bladder     Seizures ([address])     6/2013       Past

## 2023-09-13 NOTE — PROGRESS NOTES
Nutrition Evaluation    Patient's Name: Geno Siddiqi   Age: 52 y.o. YOB: 1975   Sex: female    Height: 63 inches Weight: 247.2 BMI:  43.8  Starting Weight:  264        Smoking Status:  none  Alcohol Intake:  Number of Drinks at a Time: none      Changes made during classes include:  Smaller meals        Two things that patient learned during this weight loss trial:  Increased working out  Increased water intake    Summary:  I feel that Geno Siddiqi has demonstrated appropriate diet changes and is ready to move forward with surgery. Patient has been briefed on the importance of the protein drinks, vitamins, and the transition of the diet stages. Patient understands that the long-term diet will focus on protein and vegetables. Patient understand the effects of carbohydrates after surgery and what reactive hypoglycemia is. Patient is aware that they will be attending pre-op class 2 weeks before surgery and will get more detailed information on the post-op diet guidelines. Patient will see me again at 6 weeks post-op. At this 6 week visit, RD will assess how patient is tolerating soft protein and advance to vegetables, if tolerating soft protein without difficulty. Patient will also see RD again at 9 months post-op. This visit will assess patient's compliance with current protocol, including diet, vitamins, protein shakes, and exercise. Post-op diet guidelines will be reinforced. RD is available for questions and to meet with patient outside of the 6 week and 9 month post-op visit. We spent a lot of time talking about the vitamins. Patient understands the importance of being compliant with the diet protocol and the complications and risks that can occur if they are non-compliant with the nutritional protocol. Patient has attended at least one support group.     Candidate for surgery: Yes  Re-evaluation Date:     Procedure:  Gastric Bypass       Uriellg Clementsromel RDN  11/8/2022
normal...

## 2023-10-04 ENCOUNTER — HOSPITAL ENCOUNTER (EMERGENCY)
Facility: HOSPITAL | Age: 48
Discharge: HOME | End: 2023-10-04
Attending: EMERGENCY MEDICINE
Payer: MEDICARE

## 2023-10-04 VITALS
WEIGHT: 160 LBS | HEART RATE: 78 BPM | HEIGHT: 63 IN | DIASTOLIC BLOOD PRESSURE: 95 MMHG | BODY MASS INDEX: 28.35 KG/M2 | RESPIRATION RATE: 18 BRPM | TEMPERATURE: 98.1 F | OXYGEN SATURATION: 100 % | SYSTOLIC BLOOD PRESSURE: 156 MMHG

## 2023-10-04 DIAGNOSIS — T50.905A ADVERSE EFFECT OF DRUG, INITIAL ENCOUNTER: Primary | ICD-10-CM

## 2023-10-04 PROCEDURE — 99283 EMERGENCY DEPT VISIT LOW MDM: CPT

## 2023-10-04 PROCEDURE — 6370000000 HC RX 637 (ALT 250 FOR IP): Performed by: EMERGENCY MEDICINE

## 2023-10-04 RX ORDER — MECLIZINE HYDROCHLORIDE 25 MG/1
25 TABLET ORAL 3 TIMES DAILY PRN
Qty: 15 TABLET | Refills: 0 | Status: SHIPPED | OUTPATIENT
Start: 2023-10-04 | End: 2023-10-09

## 2023-10-04 RX ORDER — PREDNISONE 20 MG/1
60 TABLET ORAL
Status: COMPLETED | OUTPATIENT
Start: 2023-10-04 | End: 2023-10-04

## 2023-10-04 RX ORDER — MECLIZINE HCL 12.5 MG/1
25 TABLET ORAL
Status: COMPLETED | OUTPATIENT
Start: 2023-10-04 | End: 2023-10-04

## 2023-10-04 RX ADMIN — PREDNISONE 60 MG: 20 TABLET ORAL at 03:41

## 2023-10-04 RX ADMIN — MECLIZINE 25 MG: 12.5 TABLET ORAL at 03:41

## 2023-10-04 ASSESSMENT — LIFESTYLE VARIABLES
HOW MANY STANDARD DRINKS CONTAINING ALCOHOL DO YOU HAVE ON A TYPICAL DAY: PATIENT DOES NOT DRINK
HOW OFTEN DO YOU HAVE A DRINK CONTAINING ALCOHOL: NEVER

## 2023-10-04 ASSESSMENT — PAIN - FUNCTIONAL ASSESSMENT: PAIN_FUNCTIONAL_ASSESSMENT: NONE - DENIES PAIN

## 2023-10-04 NOTE — ED TRIAGE NOTES
Pt took first dose of Neurontin 300 mg tonight that she was prescribed for her seizure from her MS doctor. Now feeling off, dizziness, very sleepily.

## 2023-11-14 ENCOUNTER — TELEPHONE (OUTPATIENT)
Age: 48
End: 2023-11-14

## 2023-11-14 NOTE — TELEPHONE ENCOUNTER
Patient LVM on nurse line regardin lab orders. I contacted patient and she stated she misplaced lab orders and would like to  replacement at the . I advised patient lab order will be at . Patient verbalized understanding.

## 2023-11-15 RX ORDER — OMEPRAZOLE 40 MG/1
CAPSULE, DELAYED RELEASE ORAL
Qty: 90 CAPSULE | Refills: 2 | Status: SHIPPED | OUTPATIENT
Start: 2023-11-15

## 2023-12-08 ENCOUNTER — OFFICE VISIT (OUTPATIENT)
Age: 48
End: 2023-12-08

## 2023-12-08 VITALS
TEMPERATURE: 98 F | HEIGHT: 63 IN | OXYGEN SATURATION: 99 % | WEIGHT: 163 LBS | HEART RATE: 88 BPM | SYSTOLIC BLOOD PRESSURE: 132 MMHG | DIASTOLIC BLOOD PRESSURE: 87 MMHG | BODY MASS INDEX: 28.88 KG/M2

## 2023-12-08 DIAGNOSIS — Z90.3 S/P GASTRIC SLEEVE PROCEDURE: Primary | ICD-10-CM

## 2023-12-08 DIAGNOSIS — K91.2 POSTGASTRECTOMY MALABSORPTION: ICD-10-CM

## 2023-12-08 DIAGNOSIS — R11.11 VOMITING WITHOUT NAUSEA, UNSPECIFIED VOMITING TYPE: ICD-10-CM

## 2023-12-08 DIAGNOSIS — Z90.3 POSTGASTRECTOMY MALABSORPTION: ICD-10-CM

## 2023-12-08 DIAGNOSIS — K21.9 GASTRO-ESOPHAGEAL REFLUX DISEASE WITHOUT ESOPHAGITIS: ICD-10-CM

## 2023-12-08 DIAGNOSIS — Z86.39 HX OF TYPE 2 DIABETES MELLITUS: ICD-10-CM

## 2023-12-08 RX ORDER — SUCRALFATE 1 G/1
1 TABLET ORAL 4 TIMES DAILY
Qty: 120 TABLET | Refills: 1 | Status: SHIPPED | OUTPATIENT
Start: 2023-12-08 | End: 2024-02-06

## 2023-12-08 RX ORDER — FAMOTIDINE 20 MG/1
20 TABLET, FILM COATED ORAL 2 TIMES DAILY
Qty: 60 TABLET | Refills: 2 | Status: SHIPPED | OUTPATIENT
Start: 2023-12-08 | End: 2024-03-07

## 2023-12-08 RX ORDER — HYOSCYAMINE SULFATE 0.12 MG/1
0.12 TABLET SUBLINGUAL EVERY 8 HOURS PRN
Qty: 120 EACH | Refills: 1 | Status: SHIPPED | OUTPATIENT
Start: 2023-12-08

## 2023-12-08 ASSESSMENT — ENCOUNTER SYMPTOMS
VOMITING: 1
CHEST TIGHTNESS: 0
SHORTNESS OF BREATH: 0
NAUSEA: 0
ABDOMINAL PAIN: 0

## 2023-12-11 DIAGNOSIS — K21.9 GASTRO-ESOPHAGEAL REFLUX DISEASE WITHOUT ESOPHAGITIS: ICD-10-CM

## 2023-12-11 DIAGNOSIS — R11.11 VOMITING WITHOUT NAUSEA, UNSPECIFIED VOMITING TYPE: ICD-10-CM

## 2023-12-12 RX ORDER — SUCRALFATE 1 G/1
1 TABLET ORAL 4 TIMES DAILY
Qty: 120 TABLET | Refills: 1 | Status: SHIPPED | OUTPATIENT
Start: 2023-12-12 | End: 2024-02-10

## 2023-12-13 ENCOUNTER — CLINICAL DOCUMENTATION (OUTPATIENT)
Facility: HOSPITAL | Age: 48
End: 2023-12-13

## 2023-12-13 ENCOUNTER — HOSPITAL ENCOUNTER (OUTPATIENT)
Facility: HOSPITAL | Age: 48
Discharge: HOME | End: 2023-12-16

## 2023-12-13 NOTE — PROGRESS NOTES
[address] [facility] [address] [facility], [address]      Patient's Name: Ryan Jacinto     Age: 50 y.o. YOB: 1975     Sex: female    Date:   12/13/2023    Height: 5' 3\"   Weight:    160      Lbs. BMI: 28     Surgery Date: 04/13/2023    Surgeon: Dr. Kevon Storm    Starting Weight: 298     Overall Pounds Lost: 138   Personal goal weight: 150    Procedure: LGBP    Lowest Weight patient has achieved since surgery: 160          Diet History (reported by patient on diet history form)    Do you smoke: no    Alcohol Intake: None    Meals per day: 4-5    Diet History:    Wake up at 4:50 am  Protein drink. 5:30  am [employer]  9:00 smoothie banana Strawberry  with ice chips. Greek yogurt. 1 scoop protein entire banana and 4 strawberries. Working at a [employer] job.    1:00 64 ounces of water. Start drinking. 3:00 finish the water. 5:00 pm Lunchable with crackers and turkey. Chobani:  Yogurt before bedtime at 8:00     Intake of Eating out:  Not eating out. Carbohydrate intake: < 50 grams    Ounces of fluid consumed per day: > 64 ounces    Fluids being consumed: water. Patient is drinking protein drinks    Exercise History    Patient is doing walking for exercise. Gym for 30 minutes. Behavior:      Patient is taking 30 minutes to eat a meal.    Patient is stopping fluid 30 minutes before and after a meal and no fluid with her meal.    Vitamins    Patient is taking crushing Arneta Nam made  Multivitamins per day    Patient is taking Calcium in the form of calcium citrate Patient is taking 1500 mg per day. Patient is getting 20 mg of B1 and 1000 mcg of B12 with multivitamin and 4500 IU of Vitamin D with calcium and multivitamin. Summary: Weight loss is at 138 pounds. I have reinforced the key diet principles to the patient. Patient was instructed to follow a 3 meal a day routine.   I have reinforced the importance of filling up

## 2024-01-05 RX ORDER — HYDROXYZINE PAMOATE 50 MG/1
CAPSULE ORAL
COMMUNITY
Start: 2023-11-12

## 2024-01-05 RX ORDER — ACYCLOVIR 800 MG/1
TABLET ORAL
COMMUNITY
Start: 2021-01-20

## 2024-01-05 RX ORDER — GABAPENTIN 300 MG/1
300 CAPSULE ORAL 3 TIMES DAILY
COMMUNITY
Start: 2023-11-12

## 2024-01-05 NOTE — PROGRESS NOTES
Instructions for your surgery at [facility]      Today's Date:  1/5/2024      Patient's Name:  Little Siddiqui           Surgery Date:  01/15/2024              Please enter the main entrance of the hospital and check-in at the  located in the lobby. Once checked in at the , you will take the elevators to the second floor, and report to the waiting room on the left. The room will say Procedure Registration.    Do NOT eat or drink anything, including candy, gum, or ice chips after midnight prior to your surgery, unless you have specific instructions from your surgeon or anesthesia provider to do so.  Brush your teeth before coming to the hospital. You may swish with water, but do not swallow.  No smoking/Vaping/E-Cigarettes 24 hours prior to the day of surgery.  No alcohol 24 hours prior to the day of surgery.  No recreational drugs for one week prior to the day of surgery.  Bring Photo ID, Insurance information, and Co-pay if required on day of surgery.  Bring in pertinent legal documents, such as, Medical Power of , DNR, Advance Directive, etc.  Leave all valuables, including money/purse, at home.  Remove all jewelry, including ALL body piercings, nail polish, acrylic nails, and makeup (including mascara); no lotions, powders, deodorant, or perfume/cologne/after shave on the skin.  Follow instruction for Hibiclens washes and CHG wipes from surgeon's office.   Glasses and dentures may be worn to the hospital. They must be removed prior to surgery. Please bring case/container for glasses or dentures.   Contact lenses should not be worn on day of surgery.   Call your doctor's office if symptoms of a cold or illness develop within 24-48 hours prior to your surgery.  Call your doctor's office if you have any questions concerning insurance or co-pays.  15. AN ADULT (relative or friend 18 years or older) MUST DRIVE YOU HOME AFTER YOUR SURGERY.  16. Please make arrangements

## 2024-01-10 ENCOUNTER — TELEPHONE (OUTPATIENT)
Age: 49
End: 2024-01-10

## 2024-01-10 NOTE — TELEPHONE ENCOUNTER
I called and I confirmed with the patient arrival time @ 11:00 am @ [facility] for EGD jabier/Mode on 1/15/2024.    Alejandra

## 2024-01-12 ENCOUNTER — ANESTHESIA EVENT (OUTPATIENT)
Facility: HOSPITAL | Age: 49
End: 2024-01-12
Payer: MEDICARE

## 2024-01-15 ENCOUNTER — ANESTHESIA (OUTPATIENT)
Facility: HOSPITAL | Age: 49
End: 2024-01-15
Payer: MEDICARE

## 2024-01-15 ENCOUNTER — HOSPITAL ENCOUNTER (OUTPATIENT)
Facility: HOSPITAL | Age: 49
Setting detail: OUTPATIENT SURGERY
Discharge: HOME | End: 2024-01-15
Attending: SURGERY | Admitting: SURGERY
Payer: MEDICARE

## 2024-01-15 VITALS
SYSTOLIC BLOOD PRESSURE: 132 MMHG | HEIGHT: 63 IN | TEMPERATURE: 98 F | OXYGEN SATURATION: 100 % | HEART RATE: 70 BPM | BODY MASS INDEX: 28.63 KG/M2 | WEIGHT: 161.6 LBS | RESPIRATION RATE: 14 BRPM | DIASTOLIC BLOOD PRESSURE: 84 MMHG

## 2024-01-15 DIAGNOSIS — Z90.3 S/P GASTRIC SLEEVE PROCEDURE: ICD-10-CM

## 2024-01-15 DIAGNOSIS — R11.11 VOMITING WITHOUT NAUSEA, UNSPECIFIED VOMITING TYPE: ICD-10-CM

## 2024-01-15 DIAGNOSIS — K21.9 GASTRO-ESOPHAGEAL REFLUX DISEASE WITHOUT ESOPHAGITIS: Primary | ICD-10-CM

## 2024-01-15 DIAGNOSIS — K31.2: ICD-10-CM

## 2024-01-15 DIAGNOSIS — K44.9 PARAESOPHAGEAL HERNIA: ICD-10-CM

## 2024-01-15 LAB — GLUCOSE BLD STRIP.AUTO-MCNC: 82 MG/DL (ref 70–110)

## 2024-01-15 PROCEDURE — 2709999900 HC NON-CHARGEABLE SUPPLY: Performed by: SURGERY

## 2024-01-15 PROCEDURE — 82962 GLUCOSE BLOOD TEST: CPT

## 2024-01-15 PROCEDURE — 7100000001 HC PACU RECOVERY - ADDTL 15 MIN: Performed by: SURGERY

## 2024-01-15 PROCEDURE — 6360000002 HC RX W HCPCS: Performed by: NURSE ANESTHETIST, CERTIFIED REGISTERED

## 2024-01-15 PROCEDURE — 3700000000 HC ANESTHESIA ATTENDED CARE: Performed by: SURGERY

## 2024-01-15 PROCEDURE — 3700000001 HC ADD 15 MINUTES (ANESTHESIA): Performed by: SURGERY

## 2024-01-15 PROCEDURE — A4216 STERILE WATER/SALINE, 10 ML: HCPCS | Performed by: NURSE ANESTHETIST, CERTIFIED REGISTERED

## 2024-01-15 PROCEDURE — 2500000003 HC RX 250 WO HCPCS: Performed by: NURSE ANESTHETIST, CERTIFIED REGISTERED

## 2024-01-15 PROCEDURE — 7100000010 HC PHASE II RECOVERY - FIRST 15 MIN: Performed by: SURGERY

## 2024-01-15 PROCEDURE — 3600007502: Performed by: SURGERY

## 2024-01-15 PROCEDURE — 88305 TISSUE EXAM BY PATHOLOGIST: CPT

## 2024-01-15 PROCEDURE — 43239 EGD BIOPSY SINGLE/MULTIPLE: CPT | Performed by: SURGERY

## 2024-01-15 PROCEDURE — 2580000003 HC RX 258: Performed by: NURSE ANESTHETIST, CERTIFIED REGISTERED

## 2024-01-15 PROCEDURE — 7100000000 HC PACU RECOVERY - FIRST 15 MIN: Performed by: SURGERY

## 2024-01-15 PROCEDURE — 3600007512: Performed by: SURGERY

## 2024-01-15 RX ORDER — SODIUM CHLORIDE, SODIUM LACTATE, POTASSIUM CHLORIDE, CALCIUM CHLORIDE 600; 310; 30; 20 MG/100ML; MG/100ML; MG/100ML; MG/100ML
INJECTION, SOLUTION INTRAVENOUS CONTINUOUS
Status: DISCONTINUED | OUTPATIENT
Start: 2024-01-15 | End: 2024-01-15 | Stop reason: HOSPADM

## 2024-01-15 RX ORDER — SODIUM CHLORIDE, SODIUM LACTATE, POTASSIUM CHLORIDE, CALCIUM CHLORIDE 600; 310; 30; 20 MG/100ML; MG/100ML; MG/100ML; MG/100ML
INJECTION, SOLUTION INTRAVENOUS CONTINUOUS
Status: CANCELLED | OUTPATIENT
Start: 2024-01-15

## 2024-01-15 RX ORDER — PROPOFOL 10 MG/ML
INJECTION, EMULSION INTRAVENOUS PRN
Status: DISCONTINUED | OUTPATIENT
Start: 2024-01-15 | End: 2024-01-15 | Stop reason: SDUPTHER

## 2024-01-15 RX ORDER — LIDOCAINE HYDROCHLORIDE 10 MG/ML
1 INJECTION, SOLUTION EPIDURAL; INFILTRATION; INTRACAUDAL; PERINEURAL
Status: DISCONTINUED | OUTPATIENT
Start: 2024-01-15 | End: 2024-01-15 | Stop reason: HOSPADM

## 2024-01-15 RX ORDER — ONDANSETRON 2 MG/ML
4 INJECTION INTRAMUSCULAR; INTRAVENOUS
Status: CANCELLED | OUTPATIENT
Start: 2024-01-15 | End: 2024-01-16

## 2024-01-15 RX ORDER — DEXTROSE MONOHYDRATE 100 MG/ML
INJECTION, SOLUTION INTRAVENOUS CONTINUOUS PRN
Status: DISCONTINUED | OUTPATIENT
Start: 2024-01-15 | End: 2024-01-15 | Stop reason: HOSPADM

## 2024-01-15 RX ADMIN — FAMOTIDINE 20 MG: 10 INJECTION, SOLUTION INTRAVENOUS at 12:43

## 2024-01-15 RX ADMIN — PROPOFOL 150 MG: 10 INJECTION, EMULSION INTRAVENOUS at 12:59

## 2024-01-15 RX ADMIN — PROPOFOL 50 MG: 10 INJECTION, EMULSION INTRAVENOUS at 13:04

## 2024-01-15 RX ADMIN — SODIUM CHLORIDE, POTASSIUM CHLORIDE, SODIUM LACTATE AND CALCIUM CHLORIDE: 600; 310; 30; 20 INJECTION, SOLUTION INTRAVENOUS at 12:41

## 2024-01-15 ASSESSMENT — PAIN - FUNCTIONAL ASSESSMENT: PAIN_FUNCTIONAL_ASSESSMENT: 0-10

## 2024-01-15 NOTE — ANESTHESIA PRE PROCEDURE
Department of Anesthesiology  Preprocedure Note       Name:  Little Siddiqui   Age:  48 y.o.  :  1975                                          MRN:  [id number]         Date:  1/15/2024      Surgeon: Surgeon(s):  Alvarado Coello MD    Procedure: Procedure(s):  ESOPHAGOGASTRODUODENOSCOPY    Medications prior to admission:   Prior to Admission medications    Medication Sig Start Date End Date Taking? Authorizing Provider   acyclovir (ZOVIRAX) 800 MG tablet Take by mouth 21  Yes Adele Phan MD   gabapentin (NEURONTIN) 300 MG capsule Take 1 capsule by mouth 3 times daily. Max Daily Amount: 900 mg 23   Adele Phan MD   hydrOXYzine pamoate (VISTARIL) 50 MG capsule TAKE 1 CAPSULE BY MOUTH TWICE DAILY AS NEEDED FOR ANXIETY 23   dAele Phan MD   sucralfate (CARAFATE) 1 GM tablet Take 1 tablet by mouth 4 times daily Discuss  with your pharmacist crushing tab and mixing with a small amount of water to make a slurry 12/12/23 2/10/24  Moira Swanson APRN - NP   famotidine (PEPCID) 20 MG tablet Take 1 tablet by mouth 2 times daily 12/8/23 3/7/24  Moira Swanson APRN - NP   Hyoscyamine Sulfate SL (LEVSIN/SL) 0.125 MG SUBL Place 0.125 mg under the tongue every 8 hours as needed (excessive spitting) 23   Moira Swanson APRN - NP   omeprazole (PRILOSEC) 40 MG delayed release capsule TAKE 1 CAPSULE BY MOUTH EVERY MORNING AND EVERY NIGHT AT BEDTIME, OPEN CAPSULE AND MIX WITH SOFT FOOD FOR FIRST 4 WEEKS AFTER SURGERY 11/15/23   Moira Swanson APRN - NP   D3-50 1.25 MG (36293 UT) CAPS TAKE 1 CAPSULE BY MOUTH EVERY WEEK 23   Alvarado Coello MD   vitamin B-1 (THIAMINE) 100 MG tablet Take 1 tablet by mouth daily    Adele Phan MD   vitamin B-12 (CYANOCOBALAMIN) 100 MCG tablet Take 0.5 tablets by mouth daily    Adele Phan MD   Pediatric Multivitamins-Iron (FLINTSTONES COMPLETE) 10 MG CHEW tablet Take 1 tablet by mouth daily

## 2024-01-15 NOTE — OP NOTE
Operative Report    Patient: Little Siddiqui MRN: [id number]  SSN: [id number]    YOB: 1975  Age: 48 y.o.  Sex: female       Date of Surgery: 01/15/24     Preoperative Diagnosis:   History of sleeve gastrectomy  Vomiting  GERD    Postoperative Diagnosis:   History of sleeve gastrectomy  Vomiting  GERD  Hiatal/paraesophageal hernia  Bile reflux with gastritis and esophageal ulcer/erosion   Hourglass stenosis of the stomach    Surgeon(s) and Role:     * Alvarado Coello MD - Primary    Anesthesia: Monitor Anesthesia Care     Procedure:   Esophagogastroduodenoscopy with biopsy    Procedure in Detail: Little Siddiqui was identified in the pre-operative holding area. Informed consent was obtained after a complete discussion of risks, benefits and alternatives to surgery were had with the patient.    The patient was brought back to the endoscopy room and placed under MAC in the supine position on the operating room table. A timeout was performed verifying patient identity, planned procedure, medications, and all other pertinent aspects of the case.  The patient was appropriately padded and secured to the table. A bite block was applied.     Once adequate sedation was achieved, the gastroscope was introduced transorally into the esophagus. The esophagus was traversed.     FINDINGS:   1. Irregular Z line, small GEJ ulcer/erosion, biopsied  2. Bile reflux noted to the level of GEJ, with gastritis, biopsied antrum  3. Relative stenosis at the incisura (patent and easily traversed without resistance, but angulation noted)  4. Hourglass stenosis of the stomach with mild to moderate proximal dilatation of the sleeve.   5. Paraesophageal hernia noted, with proximal sleeve conduit noted within hiatal hernia and diaphragmatic pinch noted around 3cm distal to Z line. Unable to adequately retroflex in sleeve due to narrow caliber, unable to evaluate \"Hill grade\"  6. Normal appearing duodenal bulb/D1    The pylorus was traversed

## 2024-01-15 NOTE — ANESTHESIA POSTPROCEDURE EVALUATION
Department of Anesthesiology  Postprocedure Note    Patient: Little Siddiqui  MRN: [id number]  YOB: 1975  Date of evaluation: 1/15/2024    Procedure Summary       Date: 01/15/24 Room / Location: [facility] ENDO 01 / [facility] ENDOSCOPY    Anesthesia Start: 1246 Anesthesia Stop: 1319    Procedure: ESOPHAGOGASTRODUODENOSCOPY w/ bxs (Upper GI Region) Diagnosis:       Gastroesophageal reflux disease, unspecified whether esophagitis present      Vomiting without nausea, unspecified vomiting type      Post gastrectomy syndrome      Status post laparoscopic sleeve gastrectomy      History of type 2 diabetes mellitus      BMI 28.0-28.9,adult      (Gastroesophageal reflux disease, unspecified whether esophagitis present [K21.9])      (Vomiting without nausea, unspecified vomiting type [R11.11])      (Post gastrectomy syndrome [K91.1])      (Status post laparoscopic sleeve gastrectomy [Z98.84])      (History of type 2 diabetes mellitus [Z86.39])      (BMI 28.0-28.9,adult [Z68.28])    Surgeons: Alvarado Coello MD Responsible Provider: Yun Swan MD    Anesthesia Type: MAC ASA Status: 3            Anesthesia Type: MAC    Felipe Phase I: Felipe Score: 8    Felipe Phase II: Felipe Score: 10    Anesthesia Post Evaluation    Patient location during evaluation: PACU  Patient participation: complete - patient participated  Level of consciousness: awake and alert  Pain score: 0  Airway patency: patent  Nausea & Vomiting: no nausea and no vomiting  Cardiovascular status: hemodynamically stable  Respiratory status: acceptable  Hydration status: euvolemic  Multimodal analgesia pain management approach  Pain management: adequate    No notable events documented.

## 2024-01-15 NOTE — H&P
Follow-up       8 month follow up, Sleeve gastrectomy 4/13/23       Little Siddiqui is a 48 y.o. female now 8 months status post laparoscopic sleeve gastrectomy performed on 4/13/23.     Doing well but still experiencing severe reflux symptoms. Has had to miss work days. Vomiting about 3-4 times a day due to acid sensation, taste. Reports excessive spitting, denies abdominal pain and nausea. Has been doing Tums and omeprazole 40 mg open capsule for the last 3 months without relief. Diet consists of protein shakes, smoothies, chicken, beef, watermelon. Pt did not realize she was able to eat vegetables. Taking in 72 oz sugar free fluids, 42 g protein. 30 min of activity 2 days a week. Bowel movements are regular. She is compliant with multivitamins, calcium, Vit D, B1, and B12 supplements.  Denies ETOH, NSAID, and tobacco use          12/8/2023     1:00 PM 10/4/2023     2:19 AM 8/18/2023     2:20 PM 4/28/2023     8:53 AM 4/13/2023     1:20 PM 4/5/2023     2:53 PM 3/24/2023    10:23 AM   Weight Loss Metrics   Pre-op weight (manual entry) 252 lbs   252 lbs       252 lbs   Height 5' 3\" 5' 3\" 5' 3\" 5' 3\" 5' 3\" 5' 3\" 5' 3\"   Weight - Scale 163 lbs 160 lbs 190 lbs 234 lbs 252 lbs 252 lbs 252 lbs   BMI (Calculated) 28.9 kg/m2 28.4 kg/m2 33.7 kg/m2 41.5 kg/m2 44.7 kg/m2 44.7 kg/m2 44.7 kg/m2   Ideal body weight (manual entry) 128 lbs   128 lbs       128 lbs   EBW in lbs. 124   124       124   Weight loss to date in lbs. 89   62       0   Percent weight loss (%) 35.32 %   24.6 %       0 %   Percent EBW loss (%) 71.8 %   50 %       0 %      Comorbidities:  Hypertension: resolved  Diabetes: resolved  Obstructive Sleep Apnea: not applicable  Hyperlipidemia: not applicable  Stress Urinary Incontinence: not applicable  Gastroesophageal Reflux: worsened  Weight related arthropathy:not applicable      Past Medical History        Past Medical History:   Diagnosis Date    Arthropathy, unspecified, site unspecified      Depression

## 2024-01-15 NOTE — PERIOP NOTE
Patient /Family /Designee has been informed that [facility] is not responsible for patient belongings per policy and the signed [facility] Patient Agreement document.  Personal items should be sent home or checked in with security.  Patient /Family /Designee selected the following action:                            [x]  Send personal items home with a family member or friend                                                 []  Check in personal items with security, excluding clothing                            []  Maintain personal items at the bedside, against recommendation                                 by Sam Sanders [facility]                                   ** If patient /family /designee chooses to maintain personal items at the bedside,                                      Complete the patient belongings inventory in the EMR.   RN brought belongings to caregiver, Laila Lopez.  Contact number: [phone]

## 2024-01-18 ENCOUNTER — TELEPHONE (OUTPATIENT)
Age: 49
End: 2024-01-18

## 2024-01-18 NOTE — TELEPHONE ENCOUNTER
I called the patient and I informed her that she had been scheduled for UGI w/marshmallow and myles (to bring these with her to [facility]).  On 1/24/2024.  Patient also informed, per Dr. Coello to schedule an appt for a f/u to discuss possible conversion of Sleeve/to LGBP.     The patient verbalized understanding.    Alejandra

## 2024-01-22 DIAGNOSIS — K21.9 GASTRO-ESOPHAGEAL REFLUX DISEASE WITHOUT ESOPHAGITIS: ICD-10-CM

## 2024-01-22 DIAGNOSIS — R11.11 VOMITING WITHOUT NAUSEA, UNSPECIFIED VOMITING TYPE: ICD-10-CM

## 2024-01-23 RX ORDER — FAMOTIDINE 20 MG/1
20 TABLET, FILM COATED ORAL 2 TIMES DAILY
Qty: 60 TABLET | Refills: 2 | Status: SHIPPED | OUTPATIENT
Start: 2024-01-23

## 2024-01-24 ENCOUNTER — HOSPITAL ENCOUNTER (OUTPATIENT)
Facility: HOSPITAL | Age: 49
Discharge: HOME | End: 2024-01-27

## 2024-01-24 ENCOUNTER — HOSPITAL ENCOUNTER (OUTPATIENT)
Facility: HOSPITAL | Age: 49
Discharge: HOME | End: 2024-01-27
Attending: SURGERY
Payer: MEDICARE

## 2024-01-24 DIAGNOSIS — Z90.3 S/P GASTRIC SLEEVE PROCEDURE: ICD-10-CM

## 2024-01-24 DIAGNOSIS — R11.11 VOMITING WITHOUT NAUSEA, UNSPECIFIED VOMITING TYPE: ICD-10-CM

## 2024-01-24 DIAGNOSIS — K44.9 PARAESOPHAGEAL HERNIA: ICD-10-CM

## 2024-01-24 DIAGNOSIS — K21.9 GASTRO-ESOPHAGEAL REFLUX DISEASE WITHOUT ESOPHAGITIS: ICD-10-CM

## 2024-01-24 DIAGNOSIS — K31.2: ICD-10-CM

## 2024-01-24 LAB — LABCORP SPECIMEN COLLECTION: NORMAL

## 2024-01-24 PROCEDURE — 74220 X-RAY XM ESOPHAGUS 1CNTRST: CPT

## 2024-01-24 PROCEDURE — 2500000003 HC RX 250 WO HCPCS: Performed by: SURGERY

## 2024-01-24 PROCEDURE — 74240 X-RAY XM UPR GI TRC 1CNTRST: CPT

## 2024-01-24 RX ADMIN — BARIUM SULFATE 320 ML: 960 POWDER, FOR SUSPENSION ORAL at 10:17

## 2024-01-25 LAB
25(OH)D3+25(OH)D2 SERPL-MCNC: 61.3 NG/ML (ref 30–100)
ALBUMIN SERPL-MCNC: 3.7 G/DL (ref 3.9–4.9)
ALBUMIN/GLOB SERPL: 1.6 {RATIO} (ref 1.2–2.2)
ALP SERPL-CCNC: 68 IU/L (ref 44–121)
ALT SERPL-CCNC: 5 IU/L (ref 0–32)
AST SERPL-CCNC: 8 IU/L (ref 0–40)
BASOPHILS # BLD AUTO: 0 X10E3/UL (ref 0–0.2)
BASOPHILS NFR BLD AUTO: 0 %
BILIRUB SERPL-MCNC: 0.5 MG/DL (ref 0–1.2)
BUN SERPL-MCNC: 6 MG/DL (ref 6–24)
BUN/CREAT SERPL: 10 (ref 9–23)
CALCIUM SERPL-MCNC: 8.7 MG/DL (ref 8.7–10.2)
CHLORIDE SERPL-SCNC: 107 MMOL/L (ref 96–106)
CO2 SERPL-SCNC: 23 MMOL/L (ref 20–29)
CREAT SERPL-MCNC: 0.59 MG/DL (ref 0.57–1)
EGFRCR SERPLBLD CKD-EPI 2021: 111 ML/MIN/1.73
EOSINOPHIL # BLD AUTO: 0 X10E3/UL (ref 0–0.4)
EOSINOPHIL NFR BLD AUTO: 1 %
ERYTHROCYTE [DISTWIDTH] IN BLOOD BY AUTOMATED COUNT: 12.8 % (ref 11.7–15.4)
FERRITIN SERPL-MCNC: 169 NG/ML (ref 15–150)
GLOBULIN SER CALC-MCNC: 2.3 G/DL (ref 1.5–4.5)
GLUCOSE SERPL-MCNC: 85 MG/DL (ref 70–99)
HBA1C MFR BLD: 5.1 % (ref 4.8–5.6)
HCT VFR BLD AUTO: 31 % (ref 34–46.6)
HGB BLD-MCNC: 10.2 G/DL (ref 11.1–15.9)
IMM GRANULOCYTES # BLD AUTO: 0 X10E3/UL (ref 0–0.1)
IMM GRANULOCYTES NFR BLD AUTO: 0 %
IRON SERPL-MCNC: 49 UG/DL (ref 27–159)
LYMPHOCYTES # BLD AUTO: 2.5 X10E3/UL (ref 0.7–3.1)
LYMPHOCYTES NFR BLD AUTO: 55 %
MCH RBC QN AUTO: 28.1 PG (ref 26.6–33)
MCHC RBC AUTO-ENTMCNC: 32.9 G/DL (ref 31.5–35.7)
MCV RBC AUTO: 85 FL (ref 79–97)
MONOCYTES # BLD AUTO: 0.3 X10E3/UL (ref 0.1–0.9)
MONOCYTES NFR BLD AUTO: 7 %
NEUTROPHILS # BLD AUTO: 1.7 X10E3/UL (ref 1.4–7)
NEUTROPHILS NFR BLD AUTO: 37 %
PLATELET # BLD AUTO: 413 X10E3/UL (ref 150–450)
POTASSIUM SERPL-SCNC: 3.6 MMOL/L (ref 3.5–5.2)
PROT SERPL-MCNC: 6 G/DL (ref 6–8.5)
RBC # BLD AUTO: 3.63 X10E6/UL (ref 3.77–5.28)
SODIUM SERPL-SCNC: 145 MMOL/L (ref 134–144)
SPECIMEN STATUS REPORT: NORMAL
VIT B12 SERPL-MCNC: 420 PG/ML (ref 232–1245)
WBC # BLD AUTO: 4.5 X10E3/UL (ref 3.4–10.8)

## 2024-01-29 LAB — VIT B1 BLD-SCNC: 87.2 NMOL/L (ref 66.5–200)

## 2024-02-05 ENCOUNTER — TELEPHONE (OUTPATIENT)
Age: 49
End: 2024-02-05

## 2024-02-05 NOTE — TELEPHONE ENCOUNTER
Spoke with pt regarding lab results done on 1/24/2024. Improved fluid intake but still experiencing acid reflux, nausea, and vomiting. Expressed interest in moving forward with conversion to bypass if surgeon is agreeable.     UGI 1/24/2024:  IMPRESSION-     1.  Postoperative changes of  sleeve gastrectomy.  2. Gastroesophageal reflux to the level of thoracic inlet.  3. Small hiatal hernia.  4. Small outpouching at the gastric fundus. Query gastric diverticulum versus  postsurgical change.    Marshmallow/Bagel:  IMPRESSION-  Dilated esophagus. Hiatal hernia. Upper GI study also performed and dictated  separately.  Normal esophageal motility was observed with adequate clearing of the mechanical  soft and solid food boluses with two or fewer stripping motions.     EGD 1/15/2024:  FINDINGS-  1. Irregular Z line, small GEJ ulcer/erosion, biopsied  2. Bile reflux noted to the level of GEJ, with gastritis, biopsied antrum  3. Relative stenosis at the incisura (patent and easily traversed without resistance, but angulation noted)  4. Hourglass stenosis of the stomach with mild to moderate proximal dilatation of the sleeve.   5. Paraesophageal hernia noted, with proximal sleeve conduit noted within hiatal hernia and diaphragmatic pinch noted around 3cm distal to Z line. Unable to adequately retroflex in sleeve due to narrow caliber, unable to evaluate \"Hill grade\"  6. Normal appearing duodenal bulb/D1

## 2024-02-13 ENCOUNTER — HOSPITAL ENCOUNTER (EMERGENCY)
Facility: HOSPITAL | Age: 49
Discharge: HOME | End: 2024-02-14
Attending: STUDENT IN AN ORGANIZED HEALTH CARE EDUCATION/TRAINING PROGRAM
Payer: MEDICARE

## 2024-02-13 DIAGNOSIS — R51.9 ACUTE NONINTRACTABLE HEADACHE, UNSPECIFIED HEADACHE TYPE: Primary | ICD-10-CM

## 2024-02-13 DIAGNOSIS — E87.6 HYPOKALEMIA: ICD-10-CM

## 2024-02-13 PROCEDURE — 99284 EMERGENCY DEPT VISIT MOD MDM: CPT

## 2024-02-13 RX ORDER — PROCHLORPERAZINE EDISYLATE 5 MG/ML
10 INJECTION INTRAMUSCULAR; INTRAVENOUS
Status: COMPLETED | OUTPATIENT
Start: 2024-02-14 | End: 2024-02-14

## 2024-02-13 RX ORDER — KETOROLAC TROMETHAMINE 15 MG/ML
15 INJECTION, SOLUTION INTRAMUSCULAR; INTRAVENOUS ONCE
Status: COMPLETED | OUTPATIENT
Start: 2024-02-14 | End: 2024-02-14

## 2024-02-13 RX ORDER — SODIUM CHLORIDE, SODIUM LACTATE, POTASSIUM CHLORIDE, AND CALCIUM CHLORIDE .6; .31; .03; .02 G/100ML; G/100ML; G/100ML; G/100ML
1000 INJECTION, SOLUTION INTRAVENOUS ONCE
Status: COMPLETED | OUTPATIENT
Start: 2024-02-14 | End: 2024-02-14

## 2024-02-13 RX ORDER — ACETAMINOPHEN 500 MG
1000 TABLET ORAL
Status: COMPLETED | OUTPATIENT
Start: 2024-02-14 | End: 2024-02-14

## 2024-02-13 RX ORDER — DIPHENHYDRAMINE HYDROCHLORIDE 50 MG/ML
25 INJECTION INTRAMUSCULAR; INTRAVENOUS
Status: COMPLETED | OUTPATIENT
Start: 2024-02-14 | End: 2024-02-14

## 2024-02-14 VITALS
HEIGHT: 63 IN | RESPIRATION RATE: 11 BRPM | WEIGHT: 151 LBS | OXYGEN SATURATION: 100 % | SYSTOLIC BLOOD PRESSURE: 108 MMHG | TEMPERATURE: 97.9 F | BODY MASS INDEX: 26.75 KG/M2 | DIASTOLIC BLOOD PRESSURE: 67 MMHG | HEART RATE: 96 BPM

## 2024-02-14 LAB
ANION GAP SERPL CALC-SCNC: 1 MMOL/L (ref 3–18)
ANION GAP SERPL CALC-SCNC: 4 MMOL/L (ref 3–18)
BUN SERPL-MCNC: 7 MG/DL (ref 7–18)
BUN SERPL-MCNC: 8 MG/DL (ref 7–18)
BUN/CREAT SERPL: 14 (ref 12–20)
BUN/CREAT SERPL: 15 (ref 12–20)
CALCIUM SERPL-MCNC: 7.9 MG/DL (ref 8.5–10.1)
CALCIUM SERPL-MCNC: 8.2 MG/DL (ref 8.5–10.1)
CHLORIDE SERPL-SCNC: 107 MMOL/L (ref 100–111)
CHLORIDE SERPL-SCNC: 113 MMOL/L (ref 100–111)
CO2 SERPL-SCNC: 27 MMOL/L (ref 21–32)
CO2 SERPL-SCNC: 28 MMOL/L (ref 21–32)
CREAT SERPL-MCNC: 0.5 MG/DL (ref 0.6–1.3)
CREAT SERPL-MCNC: 0.55 MG/DL (ref 0.6–1.3)
EKG ATRIAL RATE: 110 BPM
EKG DIAGNOSIS: NORMAL
EKG P AXIS: 69 DEGREES
EKG P-R INTERVAL: 184 MS
EKG Q-T INTERVAL: 338 MS
EKG QRS DURATION: 74 MS
EKG QTC CALCULATION (BAZETT): 457 MS
EKG R AXIS: 35 DEGREES
EKG T AXIS: 60 DEGREES
EKG VENTRICULAR RATE: 110 BPM
GLUCOSE SERPL-MCNC: 110 MG/DL (ref 74–99)
GLUCOSE SERPL-MCNC: 203 MG/DL (ref 74–99)
MAGNESIUM SERPL-MCNC: 1.6 MG/DL (ref 1.6–2.6)
POTASSIUM SERPL-SCNC: 2.7 MMOL/L (ref 3.5–5.5)
POTASSIUM SERPL-SCNC: 3.1 MMOL/L (ref 3.5–5.5)
SODIUM SERPL-SCNC: 138 MMOL/L (ref 136–145)
SODIUM SERPL-SCNC: 142 MMOL/L (ref 136–145)

## 2024-02-14 PROCEDURE — 93005 ELECTROCARDIOGRAM TRACING: CPT | Performed by: STUDENT IN AN ORGANIZED HEALTH CARE EDUCATION/TRAINING PROGRAM

## 2024-02-14 PROCEDURE — 2580000003 HC RX 258: Performed by: STUDENT IN AN ORGANIZED HEALTH CARE EDUCATION/TRAINING PROGRAM

## 2024-02-14 PROCEDURE — 6360000002 HC RX W HCPCS: Performed by: STUDENT IN AN ORGANIZED HEALTH CARE EDUCATION/TRAINING PROGRAM

## 2024-02-14 PROCEDURE — 80048 BASIC METABOLIC PNL TOTAL CA: CPT

## 2024-02-14 PROCEDURE — 96361 HYDRATE IV INFUSION ADD-ON: CPT

## 2024-02-14 PROCEDURE — 96365 THER/PROPH/DIAG IV INF INIT: CPT

## 2024-02-14 PROCEDURE — 6370000000 HC RX 637 (ALT 250 FOR IP): Performed by: STUDENT IN AN ORGANIZED HEALTH CARE EDUCATION/TRAINING PROGRAM

## 2024-02-14 PROCEDURE — 83735 ASSAY OF MAGNESIUM: CPT

## 2024-02-14 PROCEDURE — 96375 TX/PRO/DX INJ NEW DRUG ADDON: CPT

## 2024-02-14 PROCEDURE — 93010 ELECTROCARDIOGRAM REPORT: CPT | Performed by: INTERNAL MEDICINE

## 2024-02-14 RX ORDER — POTASSIUM CHLORIDE 7.45 MG/ML
10 INJECTION INTRAVENOUS
Status: COMPLETED | OUTPATIENT
Start: 2024-02-14 | End: 2024-02-14

## 2024-02-14 RX ORDER — HYDROXYZINE HYDROCHLORIDE 25 MG/1
25 TABLET, FILM COATED ORAL
Status: COMPLETED | OUTPATIENT
Start: 2024-02-14 | End: 2024-02-14

## 2024-02-14 RX ORDER — POTASSIUM CHLORIDE 20 MEQ/1
40 TABLET, EXTENDED RELEASE ORAL
Status: COMPLETED | OUTPATIENT
Start: 2024-02-14 | End: 2024-02-14

## 2024-02-14 RX ADMIN — ACETAMINOPHEN 1000 MG: 500 TABLET ORAL at 00:14

## 2024-02-14 RX ADMIN — HYDROXYZINE HYDROCHLORIDE 25 MG: 25 TABLET, FILM COATED ORAL at 01:15

## 2024-02-14 RX ADMIN — DIPHENHYDRAMINE HYDROCHLORIDE 25 MG: 50 INJECTION INTRAMUSCULAR; INTRAVENOUS at 00:09

## 2024-02-14 RX ADMIN — SODIUM CHLORIDE, POTASSIUM CHLORIDE, SODIUM LACTATE AND CALCIUM CHLORIDE 1000 ML: 600; 310; 30; 20 INJECTION, SOLUTION INTRAVENOUS at 00:04

## 2024-02-14 RX ADMIN — PROCHLORPERAZINE EDISYLATE 10 MG: 5 INJECTION INTRAMUSCULAR; INTRAVENOUS at 00:13

## 2024-02-14 RX ADMIN — POTASSIUM CHLORIDE 10 MEQ: 7.46 INJECTION, SOLUTION INTRAVENOUS at 01:06

## 2024-02-14 RX ADMIN — POTASSIUM CHLORIDE 40 MEQ: 1500 TABLET, EXTENDED RELEASE ORAL at 01:02

## 2024-02-14 RX ADMIN — KETOROLAC TROMETHAMINE 15 MG: 15 INJECTION, SOLUTION INTRAMUSCULAR; INTRAVENOUS at 00:11

## 2024-02-14 NOTE — ED NOTES
Provider at bedside. Provider NIH 0. Pt reports with headache started yesterday, HX of MS. Pupils sluggish, pt tachycardiac EKG complete. Pt denies N+V or injury. Line placed, labs drawn, and taken to lab. Room air.

## 2024-02-14 NOTE — ED TRIAGE NOTES
Pt to ED reports occipital headache onset yesterday hx of migraines and MS . Denies injury, nausea, vomiting

## 2024-02-14 NOTE — ED NOTES
Provider at bedside. Pt states she is super anxious and having trouble sitting still. Provider ordered medications. Cardiac monitor in place. Pt easy to arouse, alert and orientated. Allergies verified. Medicated as per MAR. Resperations even and unlabored. Call bell within reach. Pt ambulated to the bathroom no retention reported. Pt potasium 2.7. Medication given.

## 2024-02-14 NOTE — DISCHARGE INSTRUCTIONS
Thank you for allowing us to care for you in the Emergency Department.  You have been evaluated for headache.  Your evaluation including exam and other diagnostics was reassuring and not suggestive of any emergent condition requiring additional medical intervention or admission at this time.  However, some problems may take more time to appear.  Therefore, it is important for you to watch for any new or worsening symptoms of your current condition.     Please return immediately for any new or worsening symptoms, specifically please return for episodes of fainting, chest pain with sweating or shortness of breath or tingling in your shoulders/arms, difficulty or worsening breathing, sudden onset severe headache, confusion or abnormal behavior, severe abdominal or pelvic pain, black or blood in your stool, numbness or difficulty moving your legs or weakness in your arms causing you to drop things, persistent vomiting where your cannot keep down food or water, or persistent fever >102.2.

## 2024-02-14 NOTE — ED PROVIDER NOTES
Anion Gap 1(!)   Glucose, Random 110(!)   BUN,BUNPL 7   Creatinine 0.50(!)   Bun/Cre Ratio 14   Est, Glom Filt Rate >60   CALCIUM, SERUM, 317023 7.9(!)  Repeat BMP with improved potassium.  Patient is able to replete orally.  Patient counseled to supplement potassium and follow up with primary.  Patient understands to return with chest pain, palpitations, syncope.  [SV]   0245 Patient resting comfortably.  Headache resolved. [SV]      ED Course User Index  [KS] Lupillo Wing MD  [SV] Vi Moser MD       Medications   lactated ringers bolus bolus 1,000 mL (0 mLs IntraVENous Stopped 2/14/24 0205)   acetaminophen (TYLENOL) tablet 1,000 mg (1,000 mg Oral Given 2/14/24 0014)   ketorolac (TORADOL) injection 15 mg (15 mg IntraVENous Given 2/14/24 0011)   prochlorperazine (COMPAZINE) injection 10 mg (10 mg IntraVENous Given 2/14/24 0013)   diphenhydrAMINE (BENADRYL) injection 25 mg (25 mg IntraVENous Given 2/14/24 0009)   potassium chloride (KLOR-CON M) extended release tablet 40 mEq (40 mEq Oral Given 2/14/24 0102)   potassium chloride 10 mEq/100 mL IVPB (Peripheral Line) (0 mEq IntraVENous Stopped 2/14/24 0233)   hydrOXYzine HCl (ATARAX) tablet 25 mg (25 mg Oral Given 2/14/24 0115)           RECORDS REVIEWED:  I reviewed the patient's previous records here at [facility] and available outside facilities via care everywhere and note that     Previously seen by Antoinette Rod from Jan 2024 mentions MS medication, Copaxone.     Pt seen 1/9/23 by cardiology for echo  [facility] CARDIOLOGY PACS - 01/09/2023 1:39 PM EST     Left Ventricle: Normal left ventricular systolic function with a  visually estimated EF of 60 - 65%. Left ventricle size is normal. Moderate  septal thickening. Normal wall motion. Diastolic dysfunction present with  normal LV EF.    Right Ventricle: Right ventricle size is normal.    Aortic Valve: No regurgitation. No stenosis.    Mitral Valve: Trace regurgitation. No stenosis noted.

## 2024-02-21 ENCOUNTER — TELEPHONE (OUTPATIENT)
Age: 49
End: 2024-02-21

## 2024-02-21 NOTE — TELEPHONE ENCOUNTER
Per Dr. Coello, to have the patient update Cardiac, PCP and Neuro clearances, to proceed w/surgery.     The patient was informed.  I had scheduled a CV appt for 3/7/2024    The patient verbalized understanding.     Alejandra

## 2024-03-01 ENCOUNTER — OFFICE VISIT (OUTPATIENT)
Age: 49
End: 2024-03-01
Payer: MEDICARE

## 2024-03-01 VITALS
HEIGHT: 63 IN | WEIGHT: 161 LBS | HEART RATE: 80 BPM | OXYGEN SATURATION: 100 % | DIASTOLIC BLOOD PRESSURE: 90 MMHG | SYSTOLIC BLOOD PRESSURE: 146 MMHG | TEMPERATURE: 98.7 F | RESPIRATION RATE: 16 BRPM | BODY MASS INDEX: 28.53 KG/M2

## 2024-03-01 DIAGNOSIS — Z86.39 HX OF TYPE 2 DIABETES MELLITUS: ICD-10-CM

## 2024-03-01 DIAGNOSIS — K44.9 PARAESOPHAGEAL HERNIA: ICD-10-CM

## 2024-03-01 DIAGNOSIS — R11.11 VOMITING WITHOUT NAUSEA, UNSPECIFIED VOMITING TYPE: Primary | ICD-10-CM

## 2024-03-01 DIAGNOSIS — Z90.3 S/P GASTRIC SLEEVE PROCEDURE: ICD-10-CM

## 2024-03-01 DIAGNOSIS — K31.2 HOURGLASS STRICTURE AND STENOSIS OF STOMACH: ICD-10-CM

## 2024-03-01 DIAGNOSIS — Z01.818 PREOP EXAMINATION: ICD-10-CM

## 2024-03-01 DIAGNOSIS — K21.00 GASTROESOPHAGEAL REFLUX DISEASE WITH ESOPHAGITIS WITHOUT HEMORRHAGE: ICD-10-CM

## 2024-03-01 DIAGNOSIS — K21.00 BILE REFLUX ESOPHAGITIS: ICD-10-CM

## 2024-03-01 DIAGNOSIS — Z71.89 ENCOUNTER FOR PRE-BARIATRIC SURGERY COUNSELING AND EDUCATION: ICD-10-CM

## 2024-03-01 PROCEDURE — 3077F SYST BP >= 140 MM HG: CPT | Performed by: SURGERY

## 2024-03-01 PROCEDURE — 99214 OFFICE O/P EST MOD 30 MIN: CPT | Performed by: SURGERY

## 2024-03-01 PROCEDURE — G8484 FLU IMMUNIZE NO ADMIN: HCPCS | Performed by: SURGERY

## 2024-03-01 PROCEDURE — 3079F DIAST BP 80-89 MM HG: CPT | Performed by: SURGERY

## 2024-03-01 PROCEDURE — G8417 CALC BMI ABV UP PARAM F/U: HCPCS | Performed by: SURGERY

## 2024-03-01 PROCEDURE — 1036F TOBACCO NON-USER: CPT | Performed by: SURGERY

## 2024-03-01 PROCEDURE — G8427 DOCREV CUR MEDS BY ELIG CLIN: HCPCS | Performed by: SURGERY
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
## 2024-03-05 DIAGNOSIS — R11.11 VOMITING WITHOUT NAUSEA, UNSPECIFIED VOMITING TYPE: ICD-10-CM

## 2024-03-05 DIAGNOSIS — K21.9 GASTRO-ESOPHAGEAL REFLUX DISEASE WITHOUT ESOPHAGITIS: ICD-10-CM

## 2024-03-05 RX ORDER — SUCRALFATE 1 G/1
TABLET ORAL
Qty: 120 TABLET | Refills: 1 | Status: SHIPPED | OUTPATIENT
Start: 2024-03-05

## 2024-03-06 RX ORDER — GLATIRAMER ACETATE 40 MG/ML
INJECTION, SOLUTION SUBCUTANEOUS
Qty: 12 ML | OUTPATIENT
Start: 2024-03-06

## 2024-03-06 NOTE — TELEPHONE ENCOUNTER
Last office visit3/15/2023 with tera Curtis  Next office visit  Last med refill this is not our patient

## 2024-04-02 ENCOUNTER — TELEPHONE (OUTPATIENT)
Age: 49
End: 2024-04-02

## 2024-04-02 NOTE — TELEPHONE ENCOUNTER
I called and I spoke to the patient, today.     Back on 2/28/2024 per Dr. Coello's  instructions to schedule the patient for a conversion from sleeve to lgbp, due to complications of SLEEVE surgery previously done.   I stated to the patient and I informed her that Dr. Coello is requesting CV clearance and PCP clearance prior to conversion of surgery.     CV was scheduled for 3/28/2024 (no show) per patient was sick with the flu.    On today's conversation the patient stated that she will call to rescheduled that appt.     PCP clearance form, I e-mailed the form, today.  Previously; I had left the form at the [facility]  but patient stated that she lives in [place], and she wasn't able to pick it up.     Also, I stated that since I am using previous notes and medical records from 1st surgery I wouldn't like to delay the conversion surgery, because I wouldn't know if I will encounter any issues with the insurance.     The patient verbalized understanding.     Alejandra

## 2024-04-20 ENCOUNTER — HOSPITAL ENCOUNTER (EMERGENCY)
Facility: HOSPITAL | Age: 49
Discharge: HOME | End: 2024-04-20
Attending: STUDENT IN AN ORGANIZED HEALTH CARE EDUCATION/TRAINING PROGRAM
Payer: MEDICARE

## 2024-04-20 ENCOUNTER — APPOINTMENT (OUTPATIENT)
Facility: HOSPITAL | Age: 49
End: 2024-04-20
Payer: MEDICARE

## 2024-04-20 VITALS
OXYGEN SATURATION: 100 % | HEART RATE: 81 BPM | DIASTOLIC BLOOD PRESSURE: 84 MMHG | HEIGHT: 63 IN | BODY MASS INDEX: 28.53 KG/M2 | SYSTOLIC BLOOD PRESSURE: 133 MMHG | WEIGHT: 161 LBS | TEMPERATURE: 98.2 F | RESPIRATION RATE: 20 BRPM

## 2024-04-20 DIAGNOSIS — R25.3 FASCICULATIONS: ICD-10-CM

## 2024-04-20 DIAGNOSIS — E87.6 HYPOKALEMIA: ICD-10-CM
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
## 2024-04-20 DIAGNOSIS — D64.9 ANEMIA, UNSPECIFIED TYPE: ICD-10-CM

## 2024-04-20 LAB
ANION GAP SERPL CALC-SCNC: 5 MMOL/L (ref 3–18)
BASOPHILS # BLD: 0 K/UL (ref 0–0.1)
BASOPHILS NFR BLD: 0 % (ref 0–2)
BUN SERPL-MCNC: 4 MG/DL (ref 7–18)
BUN/CREAT SERPL: 7 (ref 12–20)
CALCIUM SERPL-MCNC: 8.4 MG/DL (ref 8.5–10.1)
CHLORIDE SERPL-SCNC: 108 MMOL/L (ref 100–111)
CO2 SERPL-SCNC: 27 MMOL/L (ref 21–32)
CREAT SERPL-MCNC: 0.55 MG/DL (ref 0.6–1.3)
DIFFERENTIAL METHOD BLD: ABNORMAL
EOSINOPHIL # BLD: 0.1 K/UL (ref 0–0.4)
EOSINOPHIL NFR BLD: 1 % (ref 0–5)
ERYTHROCYTE [DISTWIDTH] IN BLOOD BY AUTOMATED COUNT: 13.4 % (ref 11.6–14.5)
GLUCOSE SERPL-MCNC: 79 MG/DL (ref 74–99)
HCT VFR BLD AUTO: 31.3 % (ref 35–45)
HGB BLD-MCNC: 10.9 G/DL (ref 12–16)
IMM GRANULOCYTES # BLD AUTO: 0 K/UL (ref 0–0.04)
IMM GRANULOCYTES NFR BLD AUTO: 0 % (ref 0–0.5)
LYMPHOCYTES # BLD: 3.7 K/UL (ref 0.9–3.6)
LYMPHOCYTES NFR BLD: 53 % (ref 21–52)
MAGNESIUM SERPL-MCNC: 1.9 MG/DL (ref 1.6–2.6)
MCH RBC QN AUTO: 28.8 PG (ref 24–34)
MCHC RBC AUTO-ENTMCNC: 34.8 G/DL (ref 31–37)
MCV RBC AUTO: 82.6 FL (ref 78–100)
MONOCYTES # BLD: 0.4 K/UL (ref 0.05–1.2)
MONOCYTES NFR BLD: 6 % (ref 3–10)
NEUTS SEG # BLD: 2.7 K/UL (ref 1.8–8)
NEUTS SEG NFR BLD: 40 % (ref 40–73)
NRBC # BLD: 0 K/UL (ref 0–0.01)
NRBC BLD-RTO: 0 PER 100 WBC
PLATELET # BLD AUTO: 340 K/UL (ref 135–420)
PMV BLD AUTO: 9.5 FL (ref 9.2–11.8)
POTASSIUM SERPL-SCNC: 3.2 MMOL/L (ref 3.5–5.5)
RBC # BLD AUTO: 3.79 M/UL (ref 4.2–5.3)
SODIUM SERPL-SCNC: 140 MMOL/L (ref 136–145)
WBC # BLD AUTO: 6.9 K/UL (ref 4.6–13.2)

## 2024-04-20 PROCEDURE — 80048 BASIC METABOLIC PNL TOTAL CA: CPT

## 2024-04-20 PROCEDURE — 85025 COMPLETE CBC W/AUTO DIFF WBC: CPT

## 2024-04-20 PROCEDURE — 94761 N-INVAS EAR/PLS OXIMETRY MLT: CPT

## 2024-04-20 PROCEDURE — 83735 ASSAY OF MAGNESIUM: CPT

## 2024-04-20 PROCEDURE — 72125 CT NECK SPINE W/O DYE: CPT

## 2024-04-20 PROCEDURE — 71045 X-RAY EXAM CHEST 1 VIEW: CPT

## 2024-04-20 PROCEDURE — 6360000002 HC RX W HCPCS: Performed by: EMERGENCY MEDICINE

## 2024-04-20 PROCEDURE — 72100 X-RAY EXAM L-S SPINE 2/3 VWS: CPT

## 2024-04-20 PROCEDURE — 70450 CT HEAD/BRAIN W/O DYE: CPT

## 2024-04-20 PROCEDURE — 96374 THER/PROPH/DIAG INJ IV PUSH: CPT

## 2024-04-20 PROCEDURE — 99284 EMERGENCY DEPT VISIT MOD MDM: CPT

## 2024-04-20 RX ORDER — MORPHINE SULFATE 4 MG/ML
4 INJECTION, SOLUTION INTRAMUSCULAR; INTRAVENOUS
Status: COMPLETED | OUTPATIENT
Start: 2024-04-20 | End: 2024-04-20

## 2024-04-20 RX ADMIN — MORPHINE SULFATE 4 MG: 4 INJECTION, SOLUTION INTRAMUSCULAR; INTRAVENOUS at 19:54

## 2024-04-20 ASSESSMENT — PAIN SCALES - GENERAL
PAINLEVEL_OUTOF10: 10
PAINLEVEL_OUTOF10: 8
PAINLEVEL_OUTOF10: 2

## 2024-04-20 ASSESSMENT — ENCOUNTER SYMPTOMS
CHEST TIGHTNESS: 0
BACK PAIN: 1
ABDOMINAL PAIN: 0
SHORTNESS OF BREATH: 0

## 2024-04-20 ASSESSMENT — PAIN DESCRIPTION - DESCRIPTORS: DESCRIPTORS: POUNDING

## 2024-04-20 ASSESSMENT — PAIN DESCRIPTION - LOCATION
LOCATION: HEAD
LOCATION: HEAD

## 2024-04-20 NOTE — ED PROVIDER NOTES
1. Degenerative disease with no acute cervical spine injuries.      XR LUMBAR SPINE (2-3 VIEWS)    (Results Pending)   XR CHEST 1 VIEW    (Results Pending)         Medical Decision Making   I am the first provider for this patient.    I reviewed the vital signs, available nursing notes, past medical history, past surgical history, family history and social history.    Vital Signs-Reviewed the patient's vital signs.      EKG: na       ED Course: Progress Notes, Reevaluation, and Consults:    Provider Notes (Medical Decision Making):   [facility]      ED Course as of 04/20/24 2043   Sat Apr 20, 2024 1938 CT of head and cervical spine negative for any acute process. []   2016 CT head & cervical spine, chest/lumbar x-rays and lab findings discussed with patient.  C -collar was removed and patient was able to perform bilateral rotation of neck and cervical flexion with return to midline with no pain, also no tenderness to palpation of the neck or spine upon examination. []   2035 Patient stable to be discharged home independently.  All workup has been negative for acute pathology.  Patient is agreeable to be sent home, she is not complaining of any pain at this time.  Patient will be sent home with close primary follow-up and return precautions.    Jack Smith DO  TY PGY-1 []      ED Course User Index  [] Jack Smith DO       Patient was given the following medications:  Medications - No data to display    CONSULTS: (Who and What was discussed)  None    Chronic Conditions: Multiple sclerosis, chronic pain    Social Determinants affecting Dx or Tx: None    Records Reviewed (source and summary of external notes): Nursing Notes, Old Medical Records, Previous Radiology Studies, and Previous Laboratory Studies    Procedures    Is this patient to be included in the SEP-1 core measure? No Exclusion criteria - the patient is NOT to be included for SEP-1 Core Measure due to: Infection is not  suspected      Diagnosis     Clinical Impression: No diagnosis found.    Disposition: DC home w/family and close PCM follow up and return precautions    No follow-up provider specified.     Disclaimer: Sections of this note are dictated using utilizing voice recognition software.  Minor typographical errors may be present. If questions arise, please do not hesitate to contact me or call our department.

## 2024-04-20 NOTE — ED NOTES
Another nurse attempted IV access/labs, unable to obtain, will inform resident will need ultrasound guidance.
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
## 2024-05-14 RX ORDER — OMEPRAZOLE 40 MG/1
40 CAPSULE, DELAYED RELEASE ORAL DAILY
Qty: 30 CAPSULE | Refills: 1 | Status: SHIPPED | OUTPATIENT
Start: 2024-05-14

## 2024-05-21 ENCOUNTER — OFFICE VISIT (OUTPATIENT)
Age: 49
End: 2024-05-21
Payer: MEDICARE

## 2024-05-21 VITALS
OXYGEN SATURATION: 98 % | HEART RATE: 90 BPM | BODY MASS INDEX: 28.14 KG/M2 | SYSTOLIC BLOOD PRESSURE: 136 MMHG | WEIGHT: 158.8 LBS | RESPIRATION RATE: 14 BRPM | TEMPERATURE: 97.3 F | HEIGHT: 63 IN | DIASTOLIC BLOOD PRESSURE: 82 MMHG

## 2024-05-21 DIAGNOSIS — K21.9 GASTROESOPHAGEAL REFLUX DISEASE, UNSPECIFIED WHETHER ESOPHAGITIS PRESENT: ICD-10-CM

## 2024-05-21 DIAGNOSIS — R11.11 VOMITING WITHOUT NAUSEA, UNSPECIFIED VOMITING TYPE: ICD-10-CM

## 2024-05-21 DIAGNOSIS — K44.9 PARAESOPHAGEAL HERNIA: ICD-10-CM

## 2024-05-21 DIAGNOSIS — K31.2 HOURGLASS STRICTURE AND STENOSIS OF STOMACH: ICD-10-CM

## 2024-05-21 DIAGNOSIS — Z98.84 BARIATRIC SURGERY STATUS: Primary | ICD-10-CM

## 2024-05-21 DIAGNOSIS — K21.00 BILE REFLUX ESOPHAGITIS: ICD-10-CM

## 2024-05-21 PROCEDURE — 99214 OFFICE O/P EST MOD 30 MIN: CPT | Performed by: SURGERY

## 2024-05-21 PROCEDURE — 1036F TOBACCO NON-USER: CPT | Performed by: SURGERY

## 2024-05-21 PROCEDURE — G8417 CALC BMI ABV UP PARAM F/U: HCPCS | Performed by: SURGERY

## 2024-05-21 PROCEDURE — 3079F DIAST BP 80-89 MM HG: CPT | Performed by: SURGERY

## 2024-05-21 PROCEDURE — G8427 DOCREV CUR MEDS BY ELIG CLIN: HCPCS | Performed by: SURGERY

## 2024-05-21 PROCEDURE — 3075F SYST BP GE 130 - 139MM HG: CPT | Performed by: SURGERY

## 2024-05-21 RX ORDER — FAMOTIDINE 20 MG/1
20 TABLET, FILM COATED ORAL NIGHTLY
Qty: 30 TABLET | Refills: 2 | Status: SHIPPED | OUTPATIENT
Start: 2024-05-21 | End: 2024-08-19

## 2024-05-21 RX ORDER — HYOSCYAMINE SULFATE 0.12 MG/1
125 TABLET SUBLINGUAL EVERY 4 HOURS PRN
Qty: 120 EACH | Refills: 0 | Status: SHIPPED | OUTPATIENT
Start: 2024-05-21 | End: 2024-06-04

## 2024-05-21 RX ORDER — OMEPRAZOLE 40 MG/1
40 CAPSULE, DELAYED RELEASE ORAL 2 TIMES DAILY
Qty: 60 CAPSULE | Refills: 2 | Status: SHIPPED | OUTPATIENT
Start: 2024-05-21 | End: 2024-08-19

## 2024-05-21 NOTE — PROGRESS NOTES
Bariatric Postoperative Nurse Note      Little Sdidiqui is a 49 y.o. female status post laparoscopic sleeve gastrectomy performed on 04/13/23.      All Post-Ops (including two weeks)  -# of grams of protein daily? 70  -sources of protein? Chicken, protein powder in smoothie, fish, turkey  -# of oz of sugar free fluids from all sources daily?  64oz  -Nausea? No  -Vomiting? No  -Difficulty swallow/food sticking? No  -Heartburn/regurgitation? Yes  -Character of bowel movements (diarrhea/constipation/bloody stools?) alternating between constipation and regularity  -Which multivitamin product are you taking? Flintstones   -What dose and how frequently are you taking calcium citrate? 1000 once a day  - from any iron-containing multivitamin by 2 hours? No  -Ulcer risk exposures:   NSAID No  Tobacco No  Alcohol No  Steroids No  -Minutes of physical activity and what type? 30 mins gym time, 8 hours of walking      
97.3 °F (36.3 °C)   SpO2: 98%   Weight: 72 kg (158 lb 12.8 oz)   Height: 1.6 m (5' 3\")     Body mass index is 28.13 kg/m².    General: No acute distress, conversant  Eyes: PERRLA, no scleral icterus  HENT: Normocephalic without oral lesions  Neck: Trachea midline without LAD  Cardiac: Normal pulse rate and rhythm  Pulmonary: Symmetric chest rise with normal effort  GI: Soft, NT, ND, no hernia, no splenomegaly  Skin: Warm without rash  Extremities: No edema or joint stiffness  Psych: Appropriate mood and affect    Assessment:     Encounter Diagnoses   Name Primary?    Bariatric surgery status Yes    Gastroesophageal reflux disease, unspecified whether esophagitis present     Paraesophageal hernia     Bile reflux esophagitis     Hourglass stricture and stenosis of stomach     Vomiting without nausea, unspecified vomiting type      Plan:   The patient presents today with complications of sleeve gastrectomy as detailed above.      They have attended the bariatric seminar. We also discussed non operative alternatives. The patient is currently interested in the paraesophageal hernia repair with possible mesh placement and conversion of sleeve gastrectomy to gastric bypass. I believe they are a good candidate for this procedure.     Due to the paraesophageal hernia and development of hourglass stenosis of the sleeve conduit, I believe she requires a paraesophageal hernia repair with possible mesh placement and conversion to gastric bypass as a salvage procedure for intractable reflux, vomiting, and esophageal erosion/dilatation. She is also considering proceeding with the paraesophageal hernia repair without conversion to bypass, knowing the variable reflux control associated with that approach.      We have discussed the possible complications of bariatric surgery which include but are not limited to failed weight loss, weight regain, malnutrition, leak, bleed, stricture, gastric ulcer, gastric fistula, gastric bleed,

## 2024-06-04 DIAGNOSIS — K21.9 GASTRO-ESOPHAGEAL REFLUX DISEASE WITHOUT ESOPHAGITIS: ICD-10-CM

## 2024-06-04 DIAGNOSIS — R11.11 VOMITING WITHOUT NAUSEA, UNSPECIFIED VOMITING TYPE: ICD-10-CM

## 2024-06-04 RX ORDER — SUCRALFATE 1 G/1
TABLET ORAL
Qty: 120 TABLET | Refills: 1 | Status: SHIPPED | OUTPATIENT
Start: 2024-06-04

## 2024-06-13 DIAGNOSIS — K21.9 GASTRO-ESOPHAGEAL REFLUX DISEASE WITHOUT ESOPHAGITIS: ICD-10-CM

## 2024-06-13 DIAGNOSIS — R11.11 VOMITING WITHOUT NAUSEA, UNSPECIFIED VOMITING TYPE: ICD-10-CM

## 2024-06-20 RX ORDER — FAMOTIDINE 20 MG/1
20 TABLET, FILM COATED ORAL NIGHTLY
Qty: 30 TABLET | Refills: 2 | Status: SHIPPED | OUTPATIENT
Start: 2024-06-20 | End: 2024-09-18

## 2024-06-26 ENCOUNTER — APPOINTMENT (OUTPATIENT)
Facility: HOSPITAL | Age: 49
End: 2024-06-26
Payer: MEDICARE

## 2024-06-26 ENCOUNTER — HOSPITAL ENCOUNTER (INPATIENT)
Facility: HOSPITAL | Age: 49
LOS: 2 days | Discharge: HOME | End: 2024-06-28
Attending: EMERGENCY MEDICINE | Admitting: FAMILY MEDICINE
Payer: MEDICARE

## 2024-06-26 DIAGNOSIS — R53.1 LEFT-SIDED WEAKNESS: ICD-10-CM

## 2024-06-26 DIAGNOSIS — I63.411 CEREBROVASCULAR ACCIDENT (CVA) DUE TO EMBOLISM OF RIGHT MIDDLE CEREBRAL ARTERY (HCC): Primary | ICD-10-CM

## 2024-06-26 LAB
ALBUMIN SERPL-MCNC: 3.4 G/DL (ref 3.4–5)
ALBUMIN/GLOB SERPL: 1.1 (ref 0.8–1.7)
ALP SERPL-CCNC: 63 U/L (ref 45–117)
ALT SERPL-CCNC: 32 U/L (ref 13–56)
ANION GAP BLD CALC-SCNC: 5 MMOL/L (ref 10–20)
ANION GAP SERPL CALC-SCNC: 2 MMOL/L (ref 3–18)
ANION GAP SERPL CALC-SCNC: 5 MMOL/L (ref 3–18)
APTT PPP: 33.6 SEC (ref 23–36.4)
AST SERPL-CCNC: 24 U/L (ref 10–38)
BASOPHILS # BLD: 0 K/UL (ref 0–0.1)
BASOPHILS NFR BLD: 0 % (ref 0–2)
BILIRUB SERPL-MCNC: 0.4 MG/DL (ref 0.2–1)
BUN SERPL-MCNC: 4 MG/DL (ref 7–18)
BUN SERPL-MCNC: 5 MG/DL (ref 7–18)
BUN/CREAT SERPL: 10 (ref 12–20)
BUN/CREAT SERPL: 10 (ref 12–20)
CA-I BLD-MCNC: 1.18 MMOL/L (ref 1.12–1.32)
CALCIUM SERPL-MCNC: 8.6 MG/DL (ref 8.5–10.1)
CALCIUM SERPL-MCNC: 8.6 MG/DL (ref 8.5–10.1)
CHLORIDE BLD-SCNC: 105 MMOL/L (ref 98–107)
CHLORIDE SERPL-SCNC: 105 MMOL/L (ref 100–111)
CHLORIDE SERPL-SCNC: 106 MMOL/L (ref 100–111)
CHOLEST SERPL-MCNC: 155 MG/DL
CO2 BLD-SCNC: 31 MMOL/L (ref 21–32)
CO2 SERPL-SCNC: 28 MMOL/L (ref 21–32)
CO2 SERPL-SCNC: 30 MMOL/L (ref 21–32)
CREAT BLD-MCNC: 0.58 MG/DL (ref 0.6–1.3)
CREAT SERPL-MCNC: 0.42 MG/DL (ref 0.6–1.3)
CREAT SERPL-MCNC: 0.52 MG/DL (ref 0.6–1.3)
DIFFERENTIAL METHOD BLD: ABNORMAL
EKG ATRIAL RATE: 82 BPM
EKG DIAGNOSIS: NORMAL
EKG P AXIS: 71 DEGREES
EKG P-R INTERVAL: 180 MS
EKG Q-T INTERVAL: 382 MS
EKG QRS DURATION: 74 MS
EKG QTC CALCULATION (BAZETT): 446 MS
EKG R AXIS: 26 DEGREES
EKG T AXIS: 54 DEGREES
EKG VENTRICULAR RATE: 82 BPM
EOSINOPHIL # BLD: 0.1 K/UL (ref 0–0.4)
EOSINOPHIL NFR BLD: 2 % (ref 0–5)
ERYTHROCYTE [DISTWIDTH] IN BLOOD BY AUTOMATED COUNT: 13.2 % (ref 11.6–14.5)
EST. AVERAGE GLUCOSE BLD GHB EST-MCNC: 94 MG/DL
GLOBULIN SER CALC-MCNC: 3.1 G/DL (ref 2–4)
GLUCOSE BLD-MCNC: 83 MG/DL (ref 70–110)
GLUCOSE SERPL-MCNC: 84 MG/DL (ref 74–99)
GLUCOSE SERPL-MCNC: 89 MG/DL (ref 74–99)
HBA1C MFR BLD: 4.9 % (ref 4.2–5.6)
HCG SERPL QL: NEGATIVE
HCT VFR BLD AUTO: 29.3 % (ref 35–45)
HDLC SERPL-MCNC: 70 MG/DL (ref 40–60)
HDLC SERPL: 2.2 (ref 0–5)
HGB BLD-MCNC: 10.3 G/DL (ref 12–16)
IMM GRANULOCYTES # BLD AUTO: 0 K/UL (ref 0–0.04)
IMM GRANULOCYTES NFR BLD AUTO: 0 % (ref 0–0.5)
INR PPP: 1.1 (ref 0.9–1.1)
LACTATE BLD-SCNC: 0.92 MMOL/L (ref 0.4–2)
LDLC SERPL CALC-MCNC: 75.6 MG/DL (ref 0–100)
LIPID PANEL: ABNORMAL
LYMPHOCYTES # BLD: 3.3 K/UL (ref 0.9–3.6)
LYMPHOCYTES NFR BLD: 59 % (ref 21–52)
MAGNESIUM SERPL-MCNC: 1.8 MG/DL (ref 1.6–2.6)
MAGNESIUM SERPL-MCNC: 1.8 MG/DL (ref 1.6–2.6)
MCH RBC QN AUTO: 29.3 PG (ref 24–34)
MCHC RBC AUTO-ENTMCNC: 35.2 G/DL (ref 31–37)
MCV RBC AUTO: 83.2 FL (ref 78–100)
MONOCYTES # BLD: 0.5 K/UL (ref 0.05–1.2)
MONOCYTES NFR BLD: 9 % (ref 3–10)
NEUTS SEG # BLD: 1.6 K/UL (ref 1.8–8)
NEUTS SEG NFR BLD: 30 % (ref 40–73)
NRBC # BLD: 0 K/UL (ref 0–0.01)
NRBC BLD-RTO: 0 PER 100 WBC
PLATELET # BLD AUTO: 347 K/UL (ref 135–420)
PMV BLD AUTO: 9.3 FL (ref 9.2–11.8)
POTASSIUM BLD-SCNC: 3.4 MMOL/L (ref 3.5–5.1)
POTASSIUM SERPL-SCNC: 3.4 MMOL/L (ref 3.5–5.5)
POTASSIUM SERPL-SCNC: 3.6 MMOL/L (ref 3.5–5.5)
PROT SERPL-MCNC: 6.5 G/DL (ref 6.4–8.2)
PROTHROMBIN TIME: 14 SEC (ref 11.9–14.9)
RBC # BLD AUTO: 3.52 M/UL (ref 4.2–5.3)
SERVICE CMNT-IMP: ABNORMAL
SODIUM BLD-SCNC: 141 MMOL/L (ref 136–145)
SODIUM SERPL-SCNC: 138 MMOL/L (ref 136–145)
SODIUM SERPL-SCNC: 138 MMOL/L (ref 136–145)
SPECIMEN SITE: ABNORMAL
TRIGL SERPL-MCNC: 47 MG/DL
TROPONIN I SERPL HS-MCNC: <3 NG/L (ref 0–54)
VLDLC SERPL CALC-MCNC: 9.4 MG/DL
WBC # BLD AUTO: 5.5 K/UL (ref 4.6–13.2)

## 2024-06-26 PROCEDURE — 2580000003 HC RX 258

## 2024-06-26 PROCEDURE — 84484 ASSAY OF TROPONIN QUANT: CPT

## 2024-06-26 PROCEDURE — 85025 COMPLETE CBC W/AUTO DIFF WBC: CPT

## 2024-06-26 PROCEDURE — A9577 INJ MULTIHANCE: HCPCS | Performed by: EMERGENCY MEDICINE

## 2024-06-26 PROCEDURE — 92610 EVALUATE SWALLOWING FUNCTION: CPT

## 2024-06-26 PROCEDURE — 97535 SELF CARE MNGMENT TRAINING: CPT

## 2024-06-26 PROCEDURE — 93005 ELECTROCARDIOGRAM TRACING: CPT | Performed by: PHYSICIAN ASSISTANT

## 2024-06-26 PROCEDURE — 97166 OT EVAL MOD COMPLEX 45 MIN: CPT

## 2024-06-26 PROCEDURE — 85610 PROTHROMBIN TIME: CPT

## 2024-06-26 PROCEDURE — 6360000002 HC RX W HCPCS

## 2024-06-26 PROCEDURE — 70553 MRI BRAIN STEM W/O & W/DYE: CPT

## 2024-06-26 PROCEDURE — 80047 BASIC METABLC PNL IONIZED CA: CPT

## 2024-06-26 PROCEDURE — 6360000004 HC RX CONTRAST MEDICATION: Performed by: EMERGENCY MEDICINE

## 2024-06-26 PROCEDURE — 70496 CT ANGIOGRAPHY HEAD: CPT

## 2024-06-26 PROCEDURE — 85730 THROMBOPLASTIN TIME PARTIAL: CPT

## 2024-06-26 PROCEDURE — 92507 TX SP LANG VOICE COMM INDIV: CPT

## 2024-06-26 PROCEDURE — 93010 ELECTROCARDIOGRAM REPORT: CPT | Performed by: INTERNAL MEDICINE

## 2024-06-26 PROCEDURE — 6370000000 HC RX 637 (ALT 250 FOR IP)

## 2024-06-26 PROCEDURE — 92522 EVALUATE SPEECH PRODUCTION: CPT

## 2024-06-26 PROCEDURE — 97162 PT EVAL MOD COMPLEX 30 MIN: CPT

## 2024-06-26 PROCEDURE — 97116 GAIT TRAINING THERAPY: CPT

## 2024-06-26 PROCEDURE — 99285 EMERGENCY DEPT VISIT HI MDM: CPT

## 2024-06-26 PROCEDURE — 1100000003 HC PRIVATE W/ TELEMETRY

## 2024-06-26 PROCEDURE — 94761 N-INVAS EAR/PLS OXIMETRY MLT: CPT

## 2024-06-26 PROCEDURE — 83735 ASSAY OF MAGNESIUM: CPT

## 2024-06-26 PROCEDURE — 80061 LIPID PANEL: CPT

## 2024-06-26 PROCEDURE — 70450 CT HEAD/BRAIN W/O DYE: CPT

## 2024-06-26 PROCEDURE — 84703 CHORIONIC GONADOTROPIN ASSAY: CPT

## 2024-06-26 PROCEDURE — 80053 COMPREHEN METABOLIC PANEL: CPT

## 2024-06-26 PROCEDURE — 99221 1ST HOSP IP/OBS SF/LOW 40: CPT | Performed by: STUDENT IN AN ORGANIZED HEALTH CARE EDUCATION/TRAINING PROGRAM

## 2024-06-26 PROCEDURE — 71045 X-RAY EXAM CHEST 1 VIEW: CPT

## 2024-06-26 PROCEDURE — 83605 ASSAY OF LACTIC ACID: CPT

## 2024-06-26 PROCEDURE — 83036 HEMOGLOBIN GLYCOSYLATED A1C: CPT

## 2024-06-26 RX ORDER — PANTOPRAZOLE SODIUM 40 MG/1
40 TABLET, DELAYED RELEASE ORAL
Status: DISCONTINUED | OUTPATIENT
Start: 2024-06-26 | End: 2024-06-28 | Stop reason: HOSPADM

## 2024-06-26 RX ORDER — MAGNESIUM SULFATE IN WATER 40 MG/ML
2000 INJECTION, SOLUTION INTRAVENOUS PRN
Status: DISCONTINUED | OUTPATIENT
Start: 2024-06-26 | End: 2024-06-28 | Stop reason: HOSPADM

## 2024-06-26 RX ORDER — LANOLIN ALCOHOL/MO/W.PET/CERES
1000 CREAM (GRAM) TOPICAL DAILY
Status: DISCONTINUED | OUTPATIENT
Start: 2024-06-26 | End: 2024-06-28 | Stop reason: HOSPADM

## 2024-06-26 RX ORDER — ACETAMINOPHEN 650 MG/1
650 SUPPOSITORY RECTAL EVERY 6 HOURS PRN
Status: DISCONTINUED | OUTPATIENT
Start: 2024-06-26 | End: 2024-06-26

## 2024-06-26 RX ORDER — POTASSIUM CHLORIDE 20 MEQ/1
40 TABLET, EXTENDED RELEASE ORAL PRN
Status: DISCONTINUED | OUTPATIENT
Start: 2024-06-26 | End: 2024-06-28 | Stop reason: HOSPADM

## 2024-06-26 RX ORDER — SODIUM CHLORIDE 0.9 % (FLUSH) 0.9 %
5-40 SYRINGE (ML) INJECTION EVERY 12 HOURS SCHEDULED
Status: DISCONTINUED | OUTPATIENT
Start: 2024-06-26 | End: 2024-06-28 | Stop reason: HOSPADM

## 2024-06-26 RX ORDER — ASPIRIN 81 MG/1
81 TABLET, CHEWABLE ORAL DAILY
Status: DISCONTINUED | OUTPATIENT
Start: 2024-06-26 | End: 2024-06-28 | Stop reason: HOSPADM

## 2024-06-26 RX ORDER — DULOXETIN HYDROCHLORIDE 60 MG/1
120 CAPSULE, DELAYED RELEASE ORAL
Status: DISCONTINUED | OUTPATIENT
Start: 2024-06-26 | End: 2024-06-26

## 2024-06-26 RX ORDER — SODIUM CHLORIDE 9 MG/ML
INJECTION, SOLUTION INTRAVENOUS PRN
Status: DISCONTINUED | OUTPATIENT
Start: 2024-06-26 | End: 2024-06-28 | Stop reason: HOSPADM

## 2024-06-26 RX ORDER — GABAPENTIN 300 MG/1
600 CAPSULE ORAL NIGHTLY
Status: DISCONTINUED | OUTPATIENT
Start: 2024-06-26 | End: 2024-06-28 | Stop reason: HOSPADM

## 2024-06-26 RX ORDER — CALCIUM CARBONATE 500 MG/1
1000 TABLET, CHEWABLE ORAL DAILY
Status: DISCONTINUED | OUTPATIENT
Start: 2024-06-26 | End: 2024-06-28 | Stop reason: HOSPADM

## 2024-06-26 RX ORDER — HYDROMORPHONE HYDROCHLORIDE 2 MG/1
1 TABLET ORAL EVERY 4 HOURS PRN
Status: DISCONTINUED | OUTPATIENT
Start: 2024-06-26 | End: 2024-06-28 | Stop reason: HOSPADM

## 2024-06-26 RX ORDER — ACETAMINOPHEN 500 MG
1000 TABLET ORAL EVERY 8 HOURS PRN
Status: DISCONTINUED | OUTPATIENT
Start: 2024-06-26 | End: 2024-06-28 | Stop reason: HOSPADM

## 2024-06-26 RX ORDER — SODIUM CHLORIDE 0.9 % (FLUSH) 0.9 %
5-40 SYRINGE (ML) INJECTION PRN
Status: DISCONTINUED | OUTPATIENT
Start: 2024-06-26 | End: 2024-06-28 | Stop reason: HOSPADM

## 2024-06-26 RX ORDER — ENOXAPARIN SODIUM 100 MG/ML
40 INJECTION SUBCUTANEOUS DAILY
Status: DISCONTINUED | OUTPATIENT
Start: 2024-06-26 | End: 2024-06-28 | Stop reason: HOSPADM

## 2024-06-26 RX ORDER — GAUZE BANDAGE 2" X 2"
100 BANDAGE TOPICAL DAILY
Status: DISCONTINUED | OUTPATIENT
Start: 2024-06-26 | End: 2024-06-28 | Stop reason: HOSPADM

## 2024-06-26 RX ORDER — LORAZEPAM 2 MG/ML
0.5 INJECTION INTRAMUSCULAR ONCE
Status: COMPLETED | OUTPATIENT
Start: 2024-06-26 | End: 2024-06-26

## 2024-06-26 RX ORDER — ACETAMINOPHEN 325 MG/1
650 TABLET ORAL EVERY 6 HOURS PRN
Status: DISCONTINUED | OUTPATIENT
Start: 2024-06-26 | End: 2024-06-26

## 2024-06-26 RX ORDER — FAMOTIDINE 20 MG/1
20 TABLET, FILM COATED ORAL 2 TIMES DAILY
Status: DISCONTINUED | OUTPATIENT
Start: 2024-06-26 | End: 2024-06-28 | Stop reason: HOSPADM

## 2024-06-26 RX ORDER — DULOXETIN HYDROCHLORIDE 60 MG/1
120 CAPSULE, DELAYED RELEASE ORAL
Status: DISCONTINUED | OUTPATIENT
Start: 2024-06-26 | End: 2024-06-28 | Stop reason: HOSPADM

## 2024-06-26 RX ORDER — MULTIVITAMIN WITH IRON
1 TABLET ORAL DAILY
Status: DISCONTINUED | OUTPATIENT
Start: 2024-06-26 | End: 2024-06-28 | Stop reason: HOSPADM

## 2024-06-26 RX ORDER — MECLIZINE HCL 12.5 MG/1
25 TABLET ORAL NIGHTLY PRN
Status: DISCONTINUED | OUTPATIENT
Start: 2024-06-26 | End: 2024-06-28 | Stop reason: HOSPADM

## 2024-06-26 RX ORDER — POTASSIUM CHLORIDE 7.45 MG/ML
10 INJECTION INTRAVENOUS PRN
Status: DISCONTINUED | OUTPATIENT
Start: 2024-06-26 | End: 2024-06-28 | Stop reason: HOSPADM

## 2024-06-26 RX ORDER — QUETIAPINE FUMARATE 300 MG/1
300 TABLET, FILM COATED ORAL NIGHTLY
Status: DISCONTINUED | OUTPATIENT
Start: 2024-06-26 | End: 2024-06-28 | Stop reason: HOSPADM

## 2024-06-26 RX ORDER — LIDOCAINE 4 G/G
1 PATCH TOPICAL DAILY
Status: DISCONTINUED | OUTPATIENT
Start: 2024-06-26 | End: 2024-06-28 | Stop reason: HOSPADM

## 2024-06-26 RX ORDER — KETOROLAC TROMETHAMINE 15 MG/ML
15 INJECTION, SOLUTION INTRAMUSCULAR; INTRAVENOUS EVERY 6 HOURS PRN
Status: DISCONTINUED | OUTPATIENT
Start: 2024-06-26 | End: 2024-06-28 | Stop reason: HOSPADM

## 2024-06-26 RX ORDER — BACLOFEN 10 MG/1
10 TABLET ORAL 2 TIMES DAILY PRN
Status: DISCONTINUED | OUTPATIENT
Start: 2024-06-26 | End: 2024-06-28 | Stop reason: HOSPADM

## 2024-06-26 RX ORDER — ACETAMINOPHEN 500 MG
1000 TABLET ORAL EVERY 8 HOURS PRN
Status: DISCONTINUED | OUTPATIENT
Start: 2024-06-26 | End: 2024-06-26

## 2024-06-26 RX ORDER — ONDANSETRON 2 MG/ML
4 INJECTION INTRAMUSCULAR; INTRAVENOUS EVERY 6 HOURS PRN
Status: DISCONTINUED | OUTPATIENT
Start: 2024-06-26 | End: 2024-06-28 | Stop reason: HOSPADM

## 2024-06-26 RX ORDER — ONDANSETRON 4 MG/1
4 TABLET, ORALLY DISINTEGRATING ORAL EVERY 8 HOURS PRN
Status: DISCONTINUED | OUTPATIENT
Start: 2024-06-26 | End: 2024-06-28 | Stop reason: HOSPADM

## 2024-06-26 RX ORDER — POLYETHYLENE GLYCOL 3350 17 G/17G
17 POWDER, FOR SOLUTION ORAL DAILY PRN
Status: DISCONTINUED | OUTPATIENT
Start: 2024-06-26 | End: 2024-06-28 | Stop reason: HOSPADM

## 2024-06-26 RX ADMIN — POTASSIUM BICARBONATE 20 MEQ: 782 TABLET, EFFERVESCENT ORAL at 16:34

## 2024-06-26 RX ADMIN — DULOXETINE HYDROCHLORIDE 120 MG: 60 CAPSULE, DELAYED RELEASE ORAL at 16:34

## 2024-06-26 RX ADMIN — BACLOFEN 10 MG: 10 TABLET ORAL at 22:28

## 2024-06-26 RX ADMIN — KETOROLAC TROMETHAMINE 15 MG: 15 INJECTION, SOLUTION INTRAMUSCULAR; INTRAVENOUS at 18:59

## 2024-06-26 RX ADMIN — HYDROMORPHONE HYDROCHLORIDE 1 MG: 2 TABLET ORAL at 16:33

## 2024-06-26 RX ADMIN — GABAPENTIN 600 MG: 300 CAPSULE ORAL at 21:14

## 2024-06-26 RX ADMIN — IOPAMIDOL 90 ML: 755 INJECTION, SOLUTION INTRAVENOUS at 02:39

## 2024-06-26 RX ADMIN — LORAZEPAM 0.5 MG: 2 INJECTION, SOLUTION INTRAMUSCULAR; INTRAVENOUS at 09:09

## 2024-06-26 RX ADMIN — KETOROLAC TROMETHAMINE 15 MG: 15 INJECTION, SOLUTION INTRAMUSCULAR; INTRAVENOUS at 11:45

## 2024-06-26 RX ADMIN — ACETAMINOPHEN 1000 MG: 500 TABLET ORAL at 22:28

## 2024-06-26 RX ADMIN — HYDROMORPHONE HYDROCHLORIDE 1 MG: 2 TABLET ORAL at 21:14

## 2024-06-26 RX ADMIN — FAMOTIDINE 20 MG: 20 TABLET ORAL at 21:14

## 2024-06-26 RX ADMIN — QUETIAPINE FUMARATE 300 MG: 300 TABLET ORAL at 21:15

## 2024-06-26 RX ADMIN — ACETAMINOPHEN 1000 MG: 500 TABLET ORAL at 15:03

## 2024-06-26 RX ADMIN — SODIUM CHLORIDE, PRESERVATIVE FREE 10 ML: 5 INJECTION INTRAVENOUS at 21:15

## 2024-06-26 RX ADMIN — PANTOPRAZOLE SODIUM 40 MG: 40 TABLET, DELAYED RELEASE ORAL at 16:34

## 2024-06-26 RX ADMIN — GADOBENATE DIMEGLUMINE 15 ML: 529 INJECTION, SOLUTION INTRAVENOUS at 12:47

## 2024-06-26 ASSESSMENT — PAIN DESCRIPTION - ONSET
ONSET: ON-GOING

## 2024-06-26 ASSESSMENT — PAIN SCALES - GENERAL
PAINLEVEL_OUTOF10: 10
PAINLEVEL_OUTOF10: 4
PAINLEVEL_OUTOF10: 10
PAINLEVEL_OUTOF10: 10
PAINLEVEL_OUTOF10: 9
PAINLEVEL_OUTOF10: 10
PAINLEVEL_OUTOF10: 5
PAINLEVEL_OUTOF10: 10
PAINLEVEL_OUTOF10: 6
PAINLEVEL_OUTOF10: 8

## 2024-06-26 ASSESSMENT — PAIN DESCRIPTION - LOCATION
LOCATION: BACK
LOCATION: BACK;SHOULDER
LOCATION: BACK
LOCATION: SHOULDER;BACK

## 2024-06-26 ASSESSMENT — PAIN DESCRIPTION - FREQUENCY
FREQUENCY: CONTINUOUS

## 2024-06-26 ASSESSMENT — PAIN DESCRIPTION - ORIENTATION
ORIENTATION: LEFT

## 2024-06-26 ASSESSMENT — PAIN - FUNCTIONAL ASSESSMENT
PAIN_FUNCTIONAL_ASSESSMENT: ACTIVITIES ARE NOT PREVENTED
PAIN_FUNCTIONAL_ASSESSMENT: 0-10
PAIN_FUNCTIONAL_ASSESSMENT: ACTIVITIES ARE NOT PREVENTED
PAIN_FUNCTIONAL_ASSESSMENT: ACTIVITIES ARE NOT PREVENTED

## 2024-06-26 ASSESSMENT — PAIN DESCRIPTION - PAIN TYPE
TYPE: ACUTE PAIN

## 2024-06-26 ASSESSMENT — PAIN DESCRIPTION - DESCRIPTORS
DESCRIPTORS: ACHING;THROBBING
DESCRIPTORS: SQUEEZING
DESCRIPTORS: ACHING
DESCRIPTORS: SORE;THROBBING

## 2024-06-26 NOTE — ED NOTES
Pt ambulated to bath room with assistence of 2.  Pt dragging left leg as she walked.  Pt returned to stretcher and instructed not to get up with out help

## 2024-06-26 NOTE — H&P
[facility] Medicine  Admission History and Physical      Patient:    Little Siddiqui      49 y.o. female            MRN:       [id number]                                                                                    Admission Date:         6/26/2024  Code status:                FULL    ASSESSMENT AND PLAN  Little Siddiqui is a 49 y.o. year old female with PMH of MS, nonepileptiform seizures,  neurogenic bladder, HTN, type II diabetes, depression, muscle spasms, right foot drop, GERD, gastric sleeve procedure/2023 admitted for Left-sided weakness [R53.1].       Left-sided weakness with slurred speech, new onset  Syncopal Episode   MS  Hx nonepileptiform seizures  Neurogenic bladder  Muscle spasms  Hx. Right foot drop  DDx: TIA vs CVA vs seizure vs arrhythmia vs orthostatic hypotension vs MS flare  Patient reports that yesterday she had sudden left-sided weakness followed by worsened left lower extremity weakness and slurred speech.  Noticed she was dragging her left foot as she walked. Patient's friend also reports that he went to the bathroom and a few minutes later came back to find patient tilted back on the floor in the chair unconscious.  Patient came to a few minutes later.  -Unwitnessed syncopal episode could be d/t arrhythmia or seizure. Cannot r/o orthostatic hypotension.   -sudden onset L sided weakness w/ slurred speech possibly d/t TIA vs. CVA vs. MS flare  -Last MS flare was in April/May 2023, per patient required steroid therapy  -Follows with neurologist Dr. Curtis outpatient  -Home med: copaxone 40 mg/mL injection Monday/Wednesday/Friday, baclofen 10 mg as needed, Tylenol 1000 mg every 8 hours as needed, gabapentin 600 mg nightly, hyoscyamine .125 mg PRN   -CT Head: no acute intracranial processes  -CTA Head/Neck: no large vessel occlusion   -Hypertensive in ED 140s-160s/90s-100s  -Reports history of rash with atorvastatin  Plan:  -Admit to [facility]  -Follow-up brain MRI  -Neurology Consult  6/26/2024.  The clinical situation is suggestive of MS flare given her history however she does have multiple risk factors for stroke including hypertension, hyperlipidemia, and type 2 diabetes.  Therefore, teleneurology recommended admission for stroke rule out and for evaluation of MS.      Left-sided weakness and numbness  -Differential diagnosis to include MS flare or CVA, most likely MS flare but teleneurology recommended an MRI head  -Stroke risk factors include T2DM, HLD, HTN  Plan:  -Admit to [facility]  -PT/OT, case management consult  -Pending MRI head with and without contrast  -Consult neurology  -Permissive hypertension  -Defer antiplatelets and echo with bubble study until after MRI  -Defer glucocorticoid treatment to urology recommendations after MRI head is complete  -N.p.o. until bedside swallow eval by nursing    Seizure disorder  -Continue home meds    T2DM  -Diet controlled after gastric bypass    HTN  -Diet controlled after gastric bypass    Vitals, labs and imaging reviewed     For additional problem list, assessment, and plan see intern note.    Abdirahman Petersen MD, PGY-2   [facility]   June 26, 2024, 6:26 AM

## 2024-06-26 NOTE — PLAN OF CARE
Problem: Physical Therapy - Adult  Goal: By Discharge: Performs mobility at highest level of function for planned discharge setting.  See evaluation for individualized goals.  Description: Physical Therapy Goals:  Initiated 6/26/2024 to be met within 7-10 days.    1.  Patient will move from supine to sit and sit to supine  in bed with modified independence.    2.  Patient will transfer from bed to chair and chair to bed with modified independence using the least restrictive device.  3.  Patient will perform sit to stand with modified independence.  4.  Patient will ambulate with modified independence for 150 feet with the least restrictive device.   5.  Patient will ascend/descend 12 stairs with handrail(s) with modified independence.    PLOF: Patient was independent with ADLs and functional mobility. She lives alone in story home.       Outcome: Progressing     PHYSICAL THERAPY EVALUATION    Patient: Little Siddiqui (49 y.o. female)  Date: 6/26/2024  Primary Diagnosis: Left-sided weakness [R53.1]       Precautions: Fall Risk,  ,  ,  ,  ,  ,  ,      ASSESSMENT :  Patient resting in bed upon entry and agreeable to PT evaluation. She reports left sided LE weakness. SBA supine to sit transfer. She has good sitting balance. CGA sit to stand transfer to RW. She ambulates 25 ft with CGA and compensates for left foot drop by using hip flexors to avoid dragging her feet. Patient returns to bed at end of session. All needs left within reach.       DEFICITS/IMPAIRMENTS:    , Body Structures, Functions, Activity Limitations Requiring Skilled Therapeutic Intervention: Decreased functional mobility ;Decreased endurance;Decreased sensation;Decreased balance;Decreased coordination;Decreased strength    Patient will benefit from skilled intervention to address the above impairments.  Patient's rehabilitation potential/Therapy Prognosis: Good.  Factors which may influence rehabilitation potential include:   []         None noted  []         Past Surgical History:   Procedure Laterality Date    CARPAL TUNNEL RELEASE  2008     SECTION      COLONOSCOPY      HYSTERECTOMY (CERVIX STATUS UNKNOWN)  2014    open via pf    OTHER SURGICAL HISTORY      SLEEVE GASTRECTOMY N/A 2023    LAPAROSCOPIC SLEEVE GASTRECTOMY;  LIVER WEDGE BIOPSY, performed by Alvarado Coello MD at [facility] MAIN OR    UPPER GASTROINTESTINAL ENDOSCOPY N/A 01/15/2024    ESOPHAGOGASTRODUODENOSCOPY w/ bxs performed by Alvarado Coello MD at [facility] ENDOSCOPY       Home Situation:  Social/Functional History  Lives With: Family  Type of Home: House  Home Layout: Two level  Home Access: Stairs to enter with rails  Home Equipment: None  ADL Assistance: Independent  Ambulation Assistance: Independent  Transfer Assistance: Independent  Critical Behavior:  Orientation  Overall Orientation Status: Within Functional Limits  Orientation Level: Oriented X4  Cognition  Overall Cognitive Status: Exceptions  Arousal/Alertness: Delayed responses to stimuli  Insights: Decreased awareness of deficits  Initiation: Requires cues for some  Sequencing: Requires cues for some    Strength:    Strength: Generally decreased, functional (left LE 3/5, right LE WFL grossly 5/5)    Tone & Sensation:      Sensation: Impaired    Range Of Motion:  AROM: Generally decreased, functional       Functional Mobility:  Bed Mobility:     Bed Mobility Training  Bed Mobility Training: Yes  Supine to Sit: Stand-by assistance  Sit to Supine: Stand-by assistance  Scooting: Supervision  Transfers:     Transfer Training  Transfer Training: Yes  Sit to Stand: Contact-guard assistance  Stand to Sit: Contact-guard assistance  Balance:        Balance  Sitting: Intact  Standing: Impaired;With support  Standing - Static: Fair  Standing - Dynamic: Fair    Ambulation/Gait Training:       Gait  Gait Training: Yes  Overall Level of Assistance: Contact-guard assistance  Distance (ft): 25 Feet  Assistive Device: Walker, rolling  Step

## 2024-06-26 NOTE — ED TRIAGE NOTES
Pt arrives via EMS from home after onset of WEAKNESS to her left extremities   Pt reports some symptoms of a possible MS flair up 6/25/24 at 1000am  Reports weakness getting worse about 2 hours prior to EMS arrival

## 2024-06-26 NOTE — PROGRESS NOTES
[facility] Family Medicine  POST-ROUNDING NOTE    Assessment & Plan:    Left-sided weakness & paresthesia  - MRI without evidence for MS flare but does show a small right frontal focus of signal abnormality that may represent a subacute infarct. Per neuro, this lesion is unlikely to be causing current symptoms.  - Neurology consulted, appreciate recs - continue aspirin 81 mg daily as stroke ppx  - Obtain TTE w/ bubble study  - Encourage PT/OT participation for strengthening  - continue permissive HTN for now    Left lateral chest and back pain after fall  - Pain regimen: ketorolac 15 mg IV q6h PRN, acetaminophen 1000 mg PO q6h PRN, hydromorphone 1 mg PO q4h PRN  - CTA head & neck showed normal-appearing bone for age (no fracture seen in c-spine)  - Pt endorses hx of multiple bone fractures in the past; takes calcium supplement  - Will discuss on rounds tomorrow; consider CT chest to rule out non-displaced rib fracture given tenderness on exam      The above patient and plan were discussed with my supervising physician.     See daily progress note for full assessment/plan.      Ritika Tinsley-Alviner, MD, PGY-1  [facility]  6/26/2024, 5:42 PM

## 2024-06-26 NOTE — PLAN OF CARE
Problem: SLP Adult - Impaired Swallowing  Goal: By Discharge: Advance to least restrictive diet without signs or symptoms of aspiration for planned discharge setting.  See evaluation for individualized goals.  Description:   Patient will:  1. Tolerate PO trials with 0 s/s overt distress in 4/5 trials  2. Utilize compensatory swallow strategies/maneuvers (decrease bite/sip, size/rate, alt. liq/sol) with min cues in 4/5 trials  3. Perform oral-motor/laryngeal exercises to increase oropharyngeal swallow function with min cues  4. Complete an objective swallow study (i.e., MBSS) to assess swallow integrity, r/o aspiration, and determine of safest LRD, min A as indicated/ordered by MD     Rec:     Easy to chew diet with thin liquids  Aspiration precautions  HOB >45 during po intake, remain >30 for 30-45 minutes after po   Small bites/sips; alternate liquid/solid with slow feeding rate   Oral care TID  Meds per pt preference  MBS to further assess oropharyngeal swallow fxn    6/26/2024 1317 by Rose Joe  Outcome: Progressing     Problem: SLP Adult - Impaired Communication  Goal: By Discharge: Demonstrates communication skills at highest level of function for planned discharge setting.  See evaluation for individualized goals.  Description:  Dysarthria Goals:   1. Utilize compensatory strategies (decrease rate, overarticulate, increase intensity) to increase intelligibility to >90% at word level with mod A visual/verbal cues in 4/5 trials.  2. Perform oral motor exercises (with and without resistance) in therapy and at home to increase oral motor strength/range-of-motion for articulation tasks with mod A with visual/verbal cues in 4/5 trials.  3. Complete articulatory agility tasks (reading-conversation) with mod A with visual/verbal cues in 4/5 trials.  4. Demonstrate functional increase in articulation skills for effective participation in communication ADLs with mod A.     Expressive Language Goals:  Patient  No impairment  Laryngeal Elevation: Functional  Aspiration Signs/Symptoms: None  Pharyngeal Phase Characteristics: No impairment, issues, or problems  Effective Modifications: Alternate liquids/solids, Small sips and bites  Cues for Modifications: Minimal        Motor Speech:  Motor Speech  Apraxic Characteristics: None  Dysarthric Characteristics: Blended word boundaries;Imprecise  Intelligibility: Impaired  Conversation Intelligibility (%): 80 %  Oral/Motor  Oral Hygiene: Moist;Clean     Language Comprehension and Expression:     Expression  Primary Mode of Expression: Verbal  Verbal Expression  Verbal Expression: Exceptions to functional limits  Initiation: Mild   Interfering Components:  (word finding difficulties)  Effective Techniques: Word retrived strategies;Overarticulate       DYSPHAGIA DIAGNOSIS: Dysphagia Diagnosis: Mild oral stage dysphagia  SPEECH THERAPY DIAGNOSIS:  Mild dysarthria, Mild expressive language deficits    PAIN:  Start of Eval: 0  End of Eval: 0     After treatment:   []            Patient left in no apparent distress sitting up in chair  [x]            Patient left in no apparent distress in bed  [x]            Call bell left within reach  []            Nursing notified  []            Family present  []            Caregiver present  []            Bed alarm activated    COMMUNICATION/EDUCATION:   [x]            Aspiration precautions; swallow safety; as well as compensatory speech/language/comprehension techniques provided via demonstration, verbalization and teach back of comprehension  [x]         Patient/family have participated as able in goal setting and plan of care.  []            Patient/family agree to work toward stated goals and plan of care.  []            Patient understands intent and goals of therapy, neutral about participation.  []            Patient unable to participate in goal setting/plan of care secondary to cognition, hearing/vision deficits; education ongoing with

## 2024-06-26 NOTE — CONSULTS
A 49 years old female patient with medical history of multiple sclerosis on Copaxone, diabetes, depression, hypertension, status post bariatric surgery  was admitted to the hospital for left-sided weakness.  She mentioned yesterday morning, felt tired.  Unable to go to work.  Has difficulty walking subsequently.  Felt her left lower extremity was weak.  Had a fall hitting the left shoulder and left side of the chest.  No loss of consciousness.  She also has weakness on the left upper extremity on exam.  No facial drooping.  Some changes in her speech; difficulty with some words.  No changes in her vision.  Patient was diagnosed with multiple sclerosis in 2004.  On Copaxone for a long time.  No recent exacerbations of her symptoms.  Following at [facility] neurology.  But was not seen for about a year.  Patient had diabetes, hyperlipidemia, hypertension; most have resolved after her bariatric surgery.  She states she is not taking any medications for all of them now.    Social History     Socioeconomic History    Marital status:      Spouse name: Not on file    Number of children: Not on file    Years of education: Not on file    Highest education level: Not on file   Occupational History    Not on file   Tobacco Use    Smoking status: Never    Smokeless tobacco: Never   Vaping Use    Vaping Use: Never used   Substance and Sexual Activity    Alcohol use: Never    Drug use: Never    Sexual activity: Yes     Partners: Male     Comment: Hysterectomy   Other Topics Concern    Not on file   Social History Narrative    Not on file     Social Determinants of Health     Financial Resource Strain: Not on file   Food Insecurity: No Food Insecurity (6/26/2024)    Hunger Vital Sign     Worried About Running Out of Food in the Last Year: Never true     Ran Out of Food in the Last Year: Never true   Transportation Needs: No Transportation Needs (6/26/2024)    PRAPARE - Transportation     Lack of Transportation (Medical): No

## 2024-06-26 NOTE — ED PROVIDER NOTES
EMERGENCY DEPARTMENT HISTORY AND PHYSICAL EXAM      Patient Name: Little Siddiqui  MRN: [id number]  YOB: 1975  Provider: Kandy Willoughby MD  PCP: Lizeth Pcp   Time/Date of evaluation: 2:07 AM EDT on 24    History of Presenting Illness     No chief complaint on file.      History Provided By: EMS and Patient     History (Narative):   Little Siddiqui is a 49 y.o. female with a PMHX of diabetes, depression, hyperlipidemia, hypertension, migraines, MS, seizures and neurogenic bladder  who presents to the emergency department  by EMS C/O began feeling numbness and tingling on her left side earlier today, but then abruptly had onset of paralysis of her left upper and lower extremity at 10 PM.  She was brought in by EMS as a code stroke.        Past History     Past Medical History:  Past Medical History:   Diagnosis Date    Anticoagulant long-term use     Arthropathy, unspecified, site unspecified     Depression     Diabetes (HCC)     Hypercholesteremia     Hypertension     history of htn    Insomnia     Migraine     MS (multiple sclerosis) (HCC)     Neurogenic bladder     Seizures (HCC)     2013       Past Surgical History:  Past Surgical History:   Procedure Laterality Date    CARPAL TUNNEL RELEASE  2008     SECTION  1992    COLONOSCOPY      HYSTERECTOMY (CERVIX STATUS UNKNOWN)      open via pf    OTHER SURGICAL HISTORY      SLEEVE GASTRECTOMY N/A 2023    LAPAROSCOPIC SLEEVE GASTRECTOMY;  LIVER WEDGE BIOPSY, performed by Alvarado Coello MD at [facility] MAIN OR    UPPER GASTROINTESTINAL ENDOSCOPY N/A 01/15/2024    ESOPHAGOGASTRODUODENOSCOPY w/ bxs performed by Alvarado Coello MD at [facility] ENDOSCOPY       Family History:  Family History   Problem Relation Age of Onset    Other Other     Heart Disease Father     Diabetes Father     Diabetes Sister        Social History:  Social History     Tobacco Use    Smoking status: Never    Smokeless tobacco: Never   Vaping Use    Vaping Use: Never used   Substance Use  providers.  This critical care time was performed to assess and manage the high probability of imminent, life-threatening deterioration that could result in multi-organ failure. It was exclusive of separately billable procedures and treating other patients and teaching time.  Please see MDM section and the rest of the note for further information on patient assessment and treatment.      Kandy Willoughby MD    Diagnosis and Disposition     I Kandy Willoughby MD am the primary clinician of record.        DIAGNOSIS:   1. Cerebrovascular accident (CVA) due to embolism of right middle cerebral artery (HCC)    2. Left-sided weakness        DISPOSITION        PATIENT REFERRED TO:  No follow-up provider specified.    DISCHARGE MEDICATIONS:  New Prescriptions    No medications on file       DISCONTINUED MEDICATIONS:  Discontinued Medications    No medications on file              (Please note that portions of this note were completed with a voice recognition program.  Efforts were made to edit the dictations but occasionally words are mis-transcribed.)    Kandy Willoughby MD (electronically signed)        Dragon Disclaimer     Please note that this dictation was completed with Drik, the computer voice recognition software.  Quite often unanticipated grammatical, syntax, homophones, and other interpretive errors are inadvertently transcribed by the computer software.  Please disregard these errors.  Please excuse any errors that have escaped final proofreading.        Kandy Willoughby MD  07/09/24 0024

## 2024-06-26 NOTE — ACP (ADVANCE CARE PLANNING)
Advance Care Planning   Healthcare Decision Maker:    Primary Decision Maker: Luis Miguel Siddiqui - Vikas - [phone]    Secondary Decision Maker: Jaqui Luo - Other - [phone]     completed the initial Spiritual Assessment of the patient, and offered Pastoral Care support to the patient in bed 5 of the emergency room where the patient will be admitted to the hospital due to left sided weakness., There is an advance directive on  file for this patient. She is involved with a local [employer]  Patient does not have any Nondenominational/cultural needs that will affect patient’s preferences in health care. Chaplains will continue to follow and will provide pastoral care on an as needed/requested basis.    Chaplain Sher Montesinos  Board Certified   Spiritual Care Department  [phone]

## 2024-06-26 NOTE — ED NOTES
..TRANSFER - OUT REPORT:    Verbal report given to MIKKI Pizarro on Little Siddiqui  being transferred to [address] [place] [address] for routine progression of patient care       Report consisted of patient's Situation, Background, Assessment and   Recommendations(SBAR).     Information from the following report(s) ED SBAR was reviewed with the receiving nurse.    [place] Fall Assessment:                           Lines:   Peripheral IV 06/26/24 Left Antecubital (Active)   Site Assessment Clean, dry & intact 06/26/24 0505        Opportunity for questions and clarification was provided.      Patient transported with:  Registered Nurse

## 2024-06-26 NOTE — PLAN OF CARE
Problem: Occupational Therapy - Adult  Goal: By Discharge: Performs self-care activities at highest level of function for planned discharge setting.  See evaluation for individualized goals.  Description: Occupational Therapy Goals:  Initiated 6/26/2024 to be met within 7-10 days.    1.  Patient will perform bilateral grooming task with supervision/set-up while standing at the sink for > 2 min with Good balance.   2.  Patient will perform bathing with supervision/set-up.  3.  Patient will perform lower body dressing with supervision/set-up for seated and standing aspects.  4.  Patient will perform toilet transfers with supervision/set-up.  5.  Patient will perform all aspects of toileting with supervision/set-up.  6.  Patient will participate in Left upper extremity therapeutic exercise/activities/NMRE with supervision/set-up for 8 minutes to improve functional use of LUE needed for ADLs    7.  Patient will utilize energy conservation techniques during functional activities with verbal cues.    PLOF: Pt lives with family in [place], independent w/o AD, works.  Outcome: Progressing  OCCUPATIONAL THERAPY EVALUATION    Patient: Little Siddiqui (49 y.o. female)  Date: 6/26/2024  Primary Diagnosis: Left-sided weakness [R53.1]       Precautions: Fall Risk    ASSESSMENT :    Pt is agreeable to OT this pm. Pt required additional time to complete all tasks and overall demos delayed responses. SBA to maneuver to EOB in prep for ADLs and functional txfrs. Pt c/o tingling and weakness in LUE and pain in L scapula, neck and back. Pt demos generally decreased but functional LUE shd AROM, strength grossly 3 to 3+/5 distally in LUE, pressure not applied proximally due to pain in L scapula. Noted decreased coordination and mildly decreased proprioception on L, additional time required to complete all FMC tasks. CGA for functional mobility in room with FWW in prep for BR mobility and toilet txfrs, noted L foot drop. Pt returned to bed at

## 2024-06-27 ENCOUNTER — APPOINTMENT (OUTPATIENT)
Facility: HOSPITAL | Age: 49
End: 2024-06-27
Attending: STUDENT IN AN ORGANIZED HEALTH CARE EDUCATION/TRAINING PROGRAM
Payer: MEDICARE

## 2024-06-27 ENCOUNTER — APPOINTMENT (OUTPATIENT)
Facility: HOSPITAL | Age: 49
End: 2024-06-27
Payer: MEDICARE

## 2024-06-27 PROBLEM — I63.9 ACUTE CVA (CEREBROVASCULAR ACCIDENT) (HCC): Status: ACTIVE | Noted: 2024-06-27

## 2024-06-27 LAB
ANION GAP SERPL CALC-SCNC: 7 MMOL/L (ref 3–18)
BASOPHILS # BLD: 0 K/UL (ref 0–0.1)
BASOPHILS NFR BLD: 0 % (ref 0–2)
BUN SERPL-MCNC: 7 MG/DL (ref 7–18)
BUN/CREAT SERPL: 13 (ref 12–20)
CALCIUM SERPL-MCNC: 9.1 MG/DL (ref 8.5–10.1)
CHLORIDE SERPL-SCNC: 104 MMOL/L (ref 100–111)
CO2 SERPL-SCNC: 25 MMOL/L (ref 21–32)
CREAT SERPL-MCNC: 0.56 MG/DL (ref 0.6–1.3)
DIFFERENTIAL METHOD BLD: ABNORMAL
ECHO AO ASC DIAM: 2.8 CM
ECHO AO ASCENDING AORTA INDEX: 1.58 CM/M2
ECHO AO ROOT DIAM: 2.7 CM
ECHO AO ROOT INDEX: 1.53 CM/M2
ECHO AV AREA PEAK VELOCITY: 2.3 CM2
ECHO AV AREA VTI: 2.5 CM2
ECHO AV AREA/BSA PEAK VELOCITY: 1.3 CM2/M2
ECHO AV AREA/BSA VTI: 1.4 CM2/M2
ECHO AV MEAN GRADIENT: 4 MMHG
ECHO AV MEAN VELOCITY: 0.9 M/S
ECHO AV PEAK GRADIENT: 6 MMHG
ECHO AV PEAK VELOCITY: 1.2 M/S
ECHO AV VELOCITY RATIO: 0.75
ECHO AV VTI: 20.7 CM
ECHO BSA: 1.81 M2
ECHO LA DIAMETER INDEX: 1.02 CM/M2
ECHO LA DIAMETER: 1.8 CM
ECHO LA TO AORTIC ROOT RATIO: 0.67
ECHO LA VOL A-L A2C: 28 ML (ref 22–52)
ECHO LA VOL A-L A4C: 20 ML (ref 22–52)
ECHO LA VOL BP: 23 ML (ref 22–52)
ECHO LA VOL MOD A2C: 27 ML (ref 22–52)
ECHO LA VOL MOD A4C: 18 ML (ref 22–52)
ECHO LA VOL/BSA BIPLANE: 13 ML/M2 (ref 16–34)
ECHO LA VOLUME AREA LENGTH: 24 ML
ECHO LA VOLUME INDEX A-L A2C: 16 ML/M2 (ref 16–34)
ECHO LA VOLUME INDEX A-L A4C: 11 ML/M2 (ref 16–34)
ECHO LA VOLUME INDEX AREA LENGTH: 14 ML/M2 (ref 16–34)
ECHO LA VOLUME INDEX MOD A2C: 15 ML/M2 (ref 16–34)
ECHO LA VOLUME INDEX MOD A4C: 10 ML/M2 (ref 16–34)
ECHO LV E' LATERAL VELOCITY: 9 CM/S
ECHO LV E' SEPTAL VELOCITY: 11 CM/S
ECHO LV EDV A2C: 74 ML
ECHO LV EDV A4C: 65 ML
ECHO LV EDV BP: 70 ML (ref 56–104)
ECHO LV EDV INDEX A4C: 37 ML/M2
ECHO LV EDV INDEX BP: 40 ML/M2
ECHO LV EDV NDEX A2C: 42 ML/M2
ECHO LV EJECTION FRACTION A2C: 62 %
ECHO LV EJECTION FRACTION A4C: 76 %
ECHO LV EJECTION FRACTION BIPLANE: 70 % (ref 55–100)
ECHO LV ESV A2C: 28 ML
ECHO LV ESV A4C: 15 ML
ECHO LV ESV BP: 21 ML (ref 19–49)
ECHO LV ESV INDEX A2C: 16 ML/M2
ECHO LV ESV INDEX A4C: 8 ML/M2
ECHO LV ESV INDEX BP: 12 ML/M2
ECHO LV FRACTIONAL SHORTENING: 32 % (ref 28–44)
ECHO LV INTERNAL DIMENSION DIASTOLE INDEX: 2.15 CM/M2
ECHO LV INTERNAL DIMENSION DIASTOLIC: 3.8 CM (ref 3.9–5.3)
ECHO LV INTERNAL DIMENSION SYSTOLIC INDEX: 1.47 CM/M2
ECHO LV INTERNAL DIMENSION SYSTOLIC: 2.6 CM
ECHO LV IVSD: 1 CM (ref 0.6–0.9)
ECHO LV MASS 2D: 117.3 G (ref 67–162)
ECHO LV MASS INDEX 2D: 66.3 G/M2 (ref 43–95)
ECHO LV POSTERIOR WALL DIASTOLIC: 1 CM (ref 0.6–0.9)
ECHO LV RELATIVE WALL THICKNESS RATIO: 0.53
ECHO LVOT AREA: 3.1 CM2
ECHO LVOT AV VTI INDEX: 0.83
ECHO LVOT DIAM: 2 CM
ECHO LVOT MEAN GRADIENT: 2 MMHG
ECHO LVOT PEAK GRADIENT: 3 MMHG
ECHO LVOT PEAK VELOCITY: 0.9 M/S
ECHO LVOT STROKE VOLUME INDEX: 30.5 ML/M2
ECHO LVOT SV: 54 ML
ECHO LVOT VTI: 17.2 CM
ECHO MV A VELOCITY: 0.66 M/S
ECHO MV AREA PHT: 4.3 CM2
ECHO MV AREA VTI: 2.3 CM2
ECHO MV E DECELERATION TIME (DT): 276.7 MS
ECHO MV E VELOCITY: 0.7 M/S
ECHO MV E/A RATIO: 1.06
ECHO MV E/E' LATERAL: 7.78
ECHO MV E/E' RATIO (AVERAGED): 7.07
ECHO MV E/E' SEPTAL: 6.36
ECHO MV LVOT VTI INDEX: 1.37
ECHO MV MAX VELOCITY: 1 M/S
ECHO MV MEAN GRADIENT: 2 MMHG
ECHO MV MEAN VELOCITY: 0.7 M/S
ECHO MV PEAK GRADIENT: 4 MMHG
ECHO MV PRESSURE HALF TIME (PHT): 50.9 MS
ECHO MV VTI: 23.6 CM
ECHO PV MAX VELOCITY: 1 M/S
ECHO PV PEAK GRADIENT: 4 MMHG
ECHO RA VOLUME: 25 ML
ECHO RA VOLUME: 25 ML
ECHO RV BASAL DIMENSION: 2.6 CM
ECHO RV FREE WALL PEAK S': 13 CM/S
ECHO RV MID DIMENSION: 2.2 CM
ECHO RV TAPSE: 1.8 CM (ref 1.7–?)
ECHO RVOT PEAK GRADIENT: 4 MMHG
ECHO RVOT PEAK VELOCITY: 0.9 M/S
EOSINOPHIL # BLD: 0 K/UL (ref 0–0.4)
EOSINOPHIL NFR BLD: 1 % (ref 0–5)
ERYTHROCYTE [DISTWIDTH] IN BLOOD BY AUTOMATED COUNT: 13.1 % (ref 11.6–14.5)
GLUCOSE SERPL-MCNC: 104 MG/DL (ref 74–99)
HCT VFR BLD AUTO: 32.8 % (ref 35–45)
HGB BLD-MCNC: 11.3 G/DL (ref 12–16)
IMM GRANULOCYTES # BLD AUTO: 0 K/UL (ref 0–0.04)
IMM GRANULOCYTES NFR BLD AUTO: 0 % (ref 0–0.5)
LYMPHOCYTES # BLD: 1.9 K/UL (ref 0.9–3.6)
LYMPHOCYTES NFR BLD: 30 % (ref 21–52)
MAGNESIUM SERPL-MCNC: 2 MG/DL (ref 1.6–2.6)
MCH RBC QN AUTO: 28.5 PG (ref 24–34)
MCHC RBC AUTO-ENTMCNC: 34.5 G/DL (ref 31–37)
MCV RBC AUTO: 82.6 FL (ref 78–100)
MONOCYTES # BLD: 0.4 K/UL (ref 0.05–1.2)
MONOCYTES NFR BLD: 7 % (ref 3–10)
NEUTS SEG # BLD: 3.8 K/UL (ref 1.8–8)
NEUTS SEG NFR BLD: 62 % (ref 40–73)
NRBC # BLD: 0 K/UL (ref 0–0.01)
NRBC BLD-RTO: 0 PER 100 WBC
PLATELET # BLD AUTO: 367 K/UL (ref 135–420)
PMV BLD AUTO: 9.1 FL (ref 9.2–11.8)
POTASSIUM SERPL-SCNC: 3.5 MMOL/L (ref 3.5–5.5)
RBC # BLD AUTO: 3.97 M/UL (ref 4.2–5.3)
SODIUM SERPL-SCNC: 136 MMOL/L (ref 136–145)
WBC # BLD AUTO: 6.2 K/UL (ref 4.6–13.2)

## 2024-06-27 PROCEDURE — 97535 SELF CARE MNGMENT TRAINING: CPT

## 2024-06-27 PROCEDURE — 1100000003 HC PRIVATE W/ TELEMETRY

## 2024-06-27 PROCEDURE — 36415 COLL VENOUS BLD VENIPUNCTURE: CPT

## 2024-06-27 PROCEDURE — 92611 MOTION FLUOROSCOPY/SWALLOW: CPT

## 2024-06-27 PROCEDURE — 83735 ASSAY OF MAGNESIUM: CPT

## 2024-06-27 PROCEDURE — 94761 N-INVAS EAR/PLS OXIMETRY MLT: CPT

## 2024-06-27 PROCEDURE — 6360000002 HC RX W HCPCS

## 2024-06-27 PROCEDURE — 97116 GAIT TRAINING THERAPY: CPT

## 2024-06-27 PROCEDURE — 2500000003 HC RX 250 WO HCPCS: Performed by: STUDENT IN AN ORGANIZED HEALTH CARE EDUCATION/TRAINING PROGRAM

## 2024-06-27 PROCEDURE — 2580000003 HC RX 258

## 2024-06-27 PROCEDURE — 93306 TTE W/DOPPLER COMPLETE: CPT | Performed by: INTERNAL MEDICINE

## 2024-06-27 PROCEDURE — 93306 TTE W/DOPPLER COMPLETE: CPT

## 2024-06-27 PROCEDURE — 80048 BASIC METABOLIC PNL TOTAL CA: CPT

## 2024-06-27 PROCEDURE — 6370000000 HC RX 637 (ALT 250 FOR IP)

## 2024-06-27 PROCEDURE — 2500000003 HC RX 250 WO HCPCS: Performed by: FAMILY MEDICINE

## 2024-06-27 PROCEDURE — 85025 COMPLETE CBC W/AUTO DIFF WBC: CPT

## 2024-06-27 PROCEDURE — 92526 ORAL FUNCTION THERAPY: CPT

## 2024-06-27 PROCEDURE — 99232 SBSQ HOSP IP/OBS MODERATE 35: CPT | Performed by: STUDENT IN AN ORGANIZED HEALTH CARE EDUCATION/TRAINING PROGRAM

## 2024-06-27 PROCEDURE — 74230 X-RAY XM SWLNG FUNCJ C+: CPT

## 2024-06-27 RX ORDER — ACYCLOVIR 200 MG/1
10 CAPSULE ORAL ONCE
Status: COMPLETED | OUTPATIENT
Start: 2024-06-27 | End: 2024-06-27

## 2024-06-27 RX ADMIN — CALCIUM CARBONATE 1000 MG: 500 TABLET, CHEWABLE ORAL at 11:51

## 2024-06-27 RX ADMIN — ASPIRIN 81 MG CHEWABLE TABLET 81 MG: 81 TABLET CHEWABLE at 11:51

## 2024-06-27 RX ADMIN — SODIUM CHLORIDE, PRESERVATIVE FREE 10 ML: 5 INJECTION INTRAVENOUS at 21:28

## 2024-06-27 RX ADMIN — BARIUM SULFATE 60 ML: 960 POWDER, FOR SUSPENSION ORAL at 13:31

## 2024-06-27 RX ADMIN — QUETIAPINE FUMARATE 300 MG: 300 TABLET ORAL at 21:28

## 2024-06-27 RX ADMIN — BARIUM SULFATE 1 TABLET: 700 TABLET ORAL at 13:31

## 2024-06-27 RX ADMIN — ENOXAPARIN SODIUM 40 MG: 40 INJECTION SUBCUTANEOUS at 11:52

## 2024-06-27 RX ADMIN — CYANOCOBALAMIN TAB 1000 MCG 1000 MCG: 1000 TAB at 11:51

## 2024-06-27 RX ADMIN — BARIUM SULFATE 15 ML: 400 PASTE ORAL at 13:31

## 2024-06-27 RX ADMIN — PANTOPRAZOLE SODIUM 40 MG: 40 TABLET, DELAYED RELEASE ORAL at 17:04

## 2024-06-27 RX ADMIN — SODIUM CHLORIDE, PRESERVATIVE FREE 10 ML: 5 INJECTION INTRAVENOUS at 12:00

## 2024-06-27 RX ADMIN — THERA TABS 1 TABLET: TAB at 11:52

## 2024-06-27 RX ADMIN — FAMOTIDINE 20 MG: 20 TABLET ORAL at 11:51

## 2024-06-27 RX ADMIN — HYDROMORPHONE HYDROCHLORIDE 1 MG: 2 TABLET ORAL at 17:11

## 2024-06-27 RX ADMIN — PANTOPRAZOLE SODIUM 40 MG: 40 TABLET, DELAYED RELEASE ORAL at 06:14

## 2024-06-27 RX ADMIN — SODIUM CHLORIDE 10 ML: 9 INJECTION INTRAMUSCULAR; INTRAVENOUS; SUBCUTANEOUS at 11:34

## 2024-06-27 RX ADMIN — HYDROMORPHONE HYDROCHLORIDE 1 MG: 2 TABLET ORAL at 06:17

## 2024-06-27 RX ADMIN — FAMOTIDINE 20 MG: 20 TABLET ORAL at 21:28

## 2024-06-27 RX ADMIN — ACETAMINOPHEN 1000 MG: 500 TABLET ORAL at 06:17

## 2024-06-27 RX ADMIN — DULOXETINE HYDROCHLORIDE 120 MG: 60 CAPSULE, DELAYED RELEASE ORAL at 11:51

## 2024-06-27 RX ADMIN — HYDROMORPHONE HYDROCHLORIDE 1 MG: 2 TABLET ORAL at 11:54

## 2024-06-27 RX ADMIN — Medication 100 MG: at 11:51

## 2024-06-27 RX ADMIN — KETOROLAC TROMETHAMINE 15 MG: 15 INJECTION, SOLUTION INTRAMUSCULAR; INTRAVENOUS at 21:39

## 2024-06-27 RX ADMIN — GABAPENTIN 600 MG: 300 CAPSULE ORAL at 21:28

## 2024-06-27 ASSESSMENT — PAIN DESCRIPTION - FREQUENCY
FREQUENCY: CONTINUOUS

## 2024-06-27 ASSESSMENT — PAIN DESCRIPTION - DESCRIPTORS
DESCRIPTORS: THROBBING;ACHING
DESCRIPTORS: THROBBING;ACHING
DESCRIPTORS: ACHING;THROBBING
DESCRIPTORS: ACHING;THROBBING
DESCRIPTORS: ACHING

## 2024-06-27 ASSESSMENT — PAIN DESCRIPTION - LOCATION
LOCATION: SHOULDER;BACK
LOCATION: SHOULDER
LOCATION: BACK;SHOULDER
LOCATION: SHOULDER;BACK
LOCATION: SHOULDER
LOCATION: SHOULDER

## 2024-06-27 ASSESSMENT — PAIN DESCRIPTION - ORIENTATION
ORIENTATION: LEFT

## 2024-06-27 ASSESSMENT — PAIN DESCRIPTION - PAIN TYPE
TYPE: ACUTE PAIN

## 2024-06-27 ASSESSMENT — PAIN SCALES - GENERAL
PAINLEVEL_OUTOF10: 8
PAINLEVEL_OUTOF10: 6
PAINLEVEL_OUTOF10: 10
PAINLEVEL_OUTOF10: 6
PAINLEVEL_OUTOF10: 7
PAINLEVEL_OUTOF10: 0
PAINLEVEL_OUTOF10: 10
PAINLEVEL_OUTOF10: 6
PAINLEVEL_OUTOF10: 0

## 2024-06-27 ASSESSMENT — PAIN - FUNCTIONAL ASSESSMENT
PAIN_FUNCTIONAL_ASSESSMENT: ACTIVITIES ARE NOT PREVENTED

## 2024-06-27 ASSESSMENT — PAIN DESCRIPTION - ONSET
ONSET: ON-GOING

## 2024-06-27 ASSESSMENT — PAIN SCALES - WONG BAKER
WONGBAKER_NUMERICALRESPONSE: NO HURT
WONGBAKER_NUMERICALRESPONSE: HURTS WHOLE LOT
WONGBAKER_NUMERICALRESPONSE: NO HURT

## 2024-06-27 NOTE — PROGRESS NOTES
ARU/IPR REFERRAL CONTACT NOTE  [facility]      Thank you for the opportunity to review this patient's case for admission to [facility].    Referral received: 6/26/2024 time:5:00pm    Based on our pre-admission screening:                          [ x] Our Team/Medical Director is following this case.   Comments:Referral received from  via perfect serve. Will review with team      Again, Thank you for this referral. Should you have any questions please do not hesitate to call.     Sincerely,  Luisa Leung    Clinical Liaison  [facility]  [phone]

## 2024-06-27 NOTE — PROGRESS NOTES
ARU/IPR REFERRAL CONTACT NOTE  [facility]      Thank you for the opportunity to review this patient's case for admission to [facility].    Based on our pre-admission screening:                          [x ] Our Team/Medical Director is following this case.   Comments:Returned to patient room to discuss supports at discharge. Pt confirmed that Tayla Bruno can provide support at discharge. Called Ms Bruno who confirmed she can assist.    Initiated prior authorization for ARU.    Again, Thank you for this referral. Should you have any questions please do not hesitate to call.     Sincerely,  Luisa Leung    Clinical Liaison  [facility]  [phone]

## 2024-06-27 NOTE — PLAN OF CARE
Problem: Occupational Therapy - Adult  Goal: By Discharge: Performs self-care activities at highest level of function for planned discharge setting.  See evaluation for individualized goals.  Description: Occupational Therapy Goals:  Initiated 6/26/2024 to be met within 7-10 days.    1.  Patient will perform bilateral grooming task with supervision/set-up while standing at the sink for > 2 min with Good balance.   2.  Patient will perform bathing with supervision/set-up.  3.  Patient will perform lower body dressing with supervision/set-up for seated and standing aspects.  4.  Patient will perform toilet transfers with supervision/set-up.  5.  Patient will perform all aspects of toileting with supervision/set-up.  6.  Patient will participate in Left upper extremity therapeutic exercise/activities/NMRE with supervision/set-up for 8 minutes to improve functional use of LUE needed for ADLs    7.  Patient will utilize energy conservation techniques during functional activities with verbal cues.    PLOF: Pt lives with family in [place], independent w/o AD, works.  Outcome: Progressing   OCCUPATIONAL THERAPY TREATMENT    Patient: Little Siddiqui (49 y.o. female)  Date: 6/27/2024  Diagnosis: Left-sided weakness [R53.1] Left-sided weakness      Precautions: Fall Risk    Chart, occupational therapy assessment, plan of care, and goals were reviewed.  ASSESSMENT:  Pt is pleasant and cooperative. Seen for am ADL retraining (see functional levels below). Reviewed energy conservation/pacing techniques w/ADLs and benefits of shower chair. Pt c/o L shoulder pain, although pain not rated. Pt educated on compensatory strategy w/UE dressing ADL. Pt appears brighter at end of session.    Progression toward goals:  [x]          Improving appropriately and progressing toward goals  []          Improving slowly and progressing toward goals  []          Not making progress toward goals and plan of care will be adjusted     PLAN:  Patient  continues to benefit from skilled intervention to address the above impairments.  Continue treatment per established plan of care.    Further Equipment Recommendations for Discharge: shower chair    AMPAC: AM-PAC Inpatient Daily Activity Raw Score: 18    Current research shows that an AM-PAC score of 18 or greater is associated with a discharge to the patient's home setting.    This AMPAC score should be considered in conjunction with interdisciplinary team recommendations to determine the most appropriate discharge setting. Patient's social support, diagnosis, medical stability, and prior level of function should also be taken into consideration.     SUBJECTIVE:   Patient stated, \"I've been doing this for twenty seven years.\" Reference dealing w/MS    OBJECTIVE DATA SUMMARY:   Cognitive/Behavioral Status:  Orientation  Orientation Level: Oriented X4    Functional Mobility and Transfers for ADLs:   Bed Mobility:  Bed Mobility Training  Supine to Sit: Modified independent  Sit to Supine: Modified independent  Scooting: Modified independent     Transfers:  Transfer Training  Transfer Training: Yes  Sit to Stand: Stand-by assistance  Bed to Chair: Stand-by assistance    Balance:  Balance  Sitting: Intact  Standing: Impaired;With support  Standing - Static: Fair (fair plus)  Standing - Dynamic: Fair (fair plus)    ADL Intervention:  Grooming: Setup  Face/hand hygiene, hair care and oral care    UE Bathing: Setup    LE Bathing: Stand by assistance    UE Dressing: Contact guard assistance;Increased time to complete;Other (Comment) (rupesh-technique)  Donning/doffing pullover shirt    LE Dressing: Stand by assistance  Donning/doffing socks. Elastic waist pants and underwear     Functional Mobility: Stand by assistance w/RW    Pain:  Intensity Pre-treatment: 0/10   Intensity Post-treatment: 0/10    Activity Tolerance:    Good    After treatment:   []  Patient left in no apparent distress sitting up in chair  [x]  Patient left

## 2024-06-27 NOTE — PLAN OF CARE
Problem: Physical Therapy - Adult  Goal: By Discharge: Performs mobility at highest level of function for planned discharge setting.  See evaluation for individualized goals.  Description: Physical Therapy Goals:  Initiated 6/26/2024 to be met within 7-10 days.    1.  Patient will move from supine to sit and sit to supine  in bed with modified independence.    2.  Patient will transfer from bed to chair and chair to bed with modified independence using the least restrictive device.  3.  Patient will perform sit to stand with modified independence.  4.  Patient will ambulate with modified independence for 150 feet with the least restrictive device.   5.  Patient will ascend/descend 12 stairs with handrail(s) with modified independence.    PLOF: Patient was independent with ADLs and functional mobility. She lives alone in story home.       Outcome: Progressing   PHYSICAL THERAPY TREATMENT    Patient: Little Siddiqui (49 y.o. female)  Date: 6/27/2024  Diagnosis: Left-sided weakness [R53.1] Left-sided weakness  Precautions: Fall Risk,  ,    ASSESSMENT:  Patient received in bed and agreed to PT. Bed mobility SBA. Good seated balance at EOB. WFL ROM and strength RLE. LLE 2/5 strength knee extension/hip flexion, 0/5 dorsiflexion. Noted L foot extensor tone. LLE decreased sensation to light touch, absent light touch on DF/PF surfaces. RLE sensation intact. Sit<>stand SBA in RW. Ambulates 20ft in room with RW: slow speed, L decreased stance, L footdrop, compensates with L hip adduction and dragging foot through swing phase. L hip positioned in abduction. Pt fatigues quickly, returns to supine at end of session.   Educated on need for RN assistance with mobility; verbalized understanding. Call bell in reach.     Progression toward goals:   [x]      Improving appropriately and progressing toward goals  []      Improving slowly and progressing toward goals  []      Not making progress toward goals and plan of care will be adjusted  no apparent distress sitting up in chair  [x] Patient left in no apparent distress in bed  [x] Call bell left within reach  [x] Nursing notified  [] Caregiver present  [] Bed alarm activated  [] Chair alarm activated  [] SCDs applied      COMMUNICATION/EDUCATION:   Patient Education  Education Given To: Patient;Family  Education Provided: Role of Therapy;Plan of Care  Education Method: Demonstration;Verbal;Teach Back  Barriers to Learning: None  Education Outcome: Verbalized understanding;Demonstrated understanding;Continued education needed      TONY Parnell  Minutes: 12

## 2024-06-27 NOTE — CARE COORDINATION
06/27/24 1108   Service Assessment   Patient Orientation Alert and Oriented   Cognition Alert   History Provided By Patient   Primary Caregiver Self   Support Systems Family Members;Other (Comment)  (personal care aid)   Patient's Healthcare Decision Maker is: Legal Next of Kin   PCP Verified by CM Yes   Last Visit to PCP Within last 3 months   Prior Functional Level Assistance with the following:;Bathing;Dressing   Current Functional Level Assistance with the following:;Bathing;Dressing   Can patient return to prior living arrangement Yes   Ability to make needs known: Good   Family able to assist with home care needs: Yes   Would you like for me to discuss the discharge plan with any other family members/significant others, and if so, who? No   Financial Resources Medicaid   Social/Functional History   Lives With Alone   Type of Home House   Home Layout One level   Home Access   (no jake)   Bathroom Shower/Tub Tub/Shower unit   Bathroom Toilet Standard   Bathroom Equipment None   Bathroom Accessibility Accessible   Home Equipment None   Receives Help From Friend(s);Family;Personal care attendant  (has personal care aide 2100 to 0600.)   ADL Assistance Needs assistance   Bath Stand by assistance   Dressing Contact guard assistance   Grooming Independent   Feeding Independent   Toileting Independent   Homemaking Assistance Needs assistance   Homemaking Responsibilities Yes   Ambulation Assistance Independent   Transfer Assistance Independent   Active  Yes   Mode of Transportation Car   Occupation Full time employment;Student: College   Discharge Planning   Type of Residence Acute Rehab   Living Arrangements Alone   Current Services Prior To Admission None   Potential Assistance Needed N/A   DME Ordered? No   Potential Assistance Purchasing Medications No   Type of Home Care Services   (Personal care aid)   Patient expects to be discharged to: Acute rehab   One/Two Story Residence One story   History of  falls? 0   Services At/After Discharge   Transition of Care Consult (CM Consult) Acute Rehab;Home Health   Internal Home Health Yes   Services At/After Discharge None    Resource Information Provided? No   Mode of Transport at Discharge Other (see comment)  (family to transport)   Hospital Transport Time of Discharge   (TBD)   Condition of Participation: Discharge Planning   The Plan for Transition of Care is related to the following treatment goals: transition to ARU vs home with home health   The Patient and/or Patient Representative was provided with a Choice of Provider? Patient   The Patient and/Or Patient Representative agree with the Discharge Plan? Yes   Freedom of Choice list was provided with basic dialogue that supports the patient's individualized plan of care/goals, treatment preferences, and shares the quality data associated with the providers?  Yes   Documentation for Discharge Appeal   Discharge Appealed by   (n/a)

## 2024-06-27 NOTE — CARE COORDINATION
06/27/24 1127   /Social Work Whiteboard Notes   /Social Work Whiteboard RED: 6/27/24. NOt medically stable to transition to next level of care. Anticipate ARU vs HH. ARU following. Referral sent to [facility]. polina-cm

## 2024-06-27 NOTE — PROGRESS NOTES
[facility] Medicine  DAILY PROGRESS NOTE      Patient:    Little Siddiqui , 49 y.o. female   MRN:  [id number]  Room/Bed:  401/01  Admission Date:   6/26/2024  Code status:  Full Code    Reason for Admission: Left-sided weakness & paresthesias  Barriers to Discharge: Stroke rule-out, improvement in symptoms  Anticipated Date of Discharge: TBD - possibly 6/28      ASSESSMENT AND PLAN:     Left-sided weakness with slurred speech, new onset  Syncopal Episode   MS  Hx nonepileptiform seizures  Neurogenic bladder  Muscle spasms  Hx. Right foot drop  - Ddx includes TIA vs CVA vs MS flare related to non-enhancing MS lesion  -Last MS flare was in April/May 2023, per patient required steroid therapy  -Follows with neurologist Dr. Curtis outpatient but is interested in referral to a new neurologist located closer to her ([place])  -Home med: copaxone 40 mg/mL injection Monday/Wednesday/Friday, baclofen 10 mg as needed, Tylenol 1000 mg every 8 hours as needed, gabapentin 600 mg nightly, hyoscyamine 125 mg PRN   -CT Head: no acute intracranial processes  -CTA Head/Neck: latent intra- and extracranial brain arteries with no LVO, no significant stenosis of cervical brain vasculature  -Hypertensive in ED 140s-160s/90s-100s  -Reports history of rash with atorvastatin  -6/26/24 Lipid panel WNL, A1c  4.9  -6/26/24 MRI brain: small R frontal focus of signal abnormality - may represent a subacute infarct but could be a non-enhancing small demyelinating lesion; overall similar burden of moderate T2 FLAIR hyperintensities consistent w/ MS  Plan:  -Neurology consulted, appreciate recs  -F/u TTE   -Telemetry   -Consider EEG and/or orthostatics  -SLP consulted, completed MBS, appreciate recs - regular solid diet and thin liquids  -Start aspirin per neuro recs  -PT/OT/CM consulted  -Allow for permissive hypertension x 48 hours goal <or equal to 220/120      Hypertension  Type 2 diabetes  -Reports that since having her gastric sleeve

## 2024-06-27 NOTE — PROGRESS NOTES
Re:  Little Siddiqui,Follow up visit     6/27/2024 6:23 PM    SSN: [id number]    Subjective:   Little Siddiqui was seen on follow-up.  No acute changes overnight.  Claims the left-sided weakness is a little better.  Was walking; still drags the right lower extremity.  Normal speech.  Complains of difficulty swallowing.  Little tremulous.  Transthoracic echo unremarkable.    Medications:    Current Facility-Administered Medications   Medication Dose Route Frequency Provider Last Rate Last Admin    sodium chloride flush 0.9 % injection 5-40 mL  5-40 mL IntraVENous 2 times per day Yarelis Palacio MD   10 mL at 06/27/24 1200    sodium chloride flush 0.9 % injection 5-40 mL  5-40 mL IntraVENous PRN Yarelis Palacio MD        0.9 % sodium chloride infusion   IntraVENous PRN Yarelis Palacio MD        potassium chloride (KLOR-CON M) extended release tablet 40 mEq  40 mEq Oral PRN Yarelis Palacio MD        Or    potassium bicarb-citric acid (EFFER-K) effervescent tablet 40 mEq  40 mEq Oral PRN Yarelis Palacio MD        Or    potassium chloride 10 mEq/100 mL IVPB (Peripheral Line)  10 mEq IntraVENous PRN Yarelis Palacio MD        magnesium sulfate 2000 mg in 50 mL IVPB premix  2,000 mg IntraVENous PRN Yarelis Palacio MD        ondansetron (ZOFRAN-ODT) disintegrating tablet 4 mg  4 mg Oral Q8H PRN Yarelis Palacio MD        Or    ondansetron (ZOFRAN) injection 4 mg  4 mg IntraVENous Q6H PRN Yarelis Palacio MD        polyethylene glycol (GLYCOLAX) packet 17 g  17 g Oral Daily PRN Yarelis Palacio MD        vitamin B-12 (CYANOCOBALAMIN) tablet 1,000 mcg  1,000 mcg Oral Daily Yarelis Palacio MD   1,000 mcg at 06/27/24 1151    multivitamin 1 tablet  1 tablet Oral Daily Yarelis Palacio MD   1 tablet at 06/27/24 1152    thiamine mononitrate tablet 100 mg  100 mg Oral Daily Yarelis Palacio MD   100 mg at 06/27/24 1151    calcium carbonate (TUMS) chewable tablet 1,000 mg  1,000 mg Oral Daily Yarelis Palacio MD

## 2024-06-27 NOTE — PROGRESS NOTES
Bedside and Verbal shift change report given to MIKKI Parks (oncoming nurse) by MIKKI Hilario (offgoing nurse). Report included the following information Nurse Handoff Report, Index, ED Encounter Summary, ED SBAR, Adult Overview, Intake/Output, MAR, Recent Results, and Med Rec Status.

## 2024-06-27 NOTE — PLAN OF CARE
Problem: Discharge Planning  Goal: Discharge to home or other facility with appropriate resources  Outcome: Progressing     Problem: Pain  Goal: Verbalizes/displays adequate comfort level or baseline comfort level  Outcome: Progressing     Problem: Safety - Adult  Goal: Free from fall injury  Outcome: Progressing     Problem: Chronic Conditions and Co-morbidities  Goal: Patient's chronic conditions and co-morbidity symptoms are monitored and maintained or improved  Outcome: Progressing     Problem: ABCDS Injury Assessment  Goal: Absence of physical injury  Outcome: Progressing     Problem: SLP Adult - Impaired Swallowing  Goal: By Discharge: Advance to least restrictive diet without signs or symptoms of aspiration for planned discharge setting.  See evaluation for individualized goals.  Description:   Patient will:  1. Tolerate PO trials with 0 s/s overt distress in 4/5 trials  2. Utilize compensatory swallow strategies/maneuvers (decrease bite/sip, size/rate, alt. liq/sol) with min cues in 4/5 trials  3. Perform oral-motor/laryngeal exercises to increase oropharyngeal swallow function with min cues  4. Complete an objective swallow study (i.e., MBSS) to assess swallow integrity, r/o aspiration, and determine of safest LRD, min A as indicated/ordered by MD     Rec:     Easy to chew diet with thin liquids  Aspiration precautions  HOB >45 during po intake, remain >30 for 30-45 minutes after po   Small bites/sips; alternate liquid/solid with slow feeding rate   Oral care TID  Meds per pt preference  MBS to further assess oropharyngeal swallow fxn    6/26/2024 1317 by Rose Joe  Outcome: Progressing  6/26/2024 1314 by Rose Joe  Note:   Patient will:  1. Tolerate PO trials with 0 s/s overt distress in 4/5 trials  2. Utilize compensatory swallow strategies/maneuvers (decrease bite/sip, size/rate, alt. liq/sol) with min cues in 4/5 trials  3. Perform oral-motor/laryngeal exercises to increase oropharyngeal

## 2024-06-27 NOTE — PLAN OF CARE
Problem: Discharge Planning  Goal: Discharge to home or other facility with appropriate resources  6/27/2024 1329 by Kasey Gracia, RN  Outcome: Progressing  Flowsheets (Taken 6/27/2024 0800)  Discharge to home or other facility with appropriate resources: Identify barriers to discharge with patient and caregiver  6/26/2024 2339 by Yanelis Brito, RN  Outcome: Progressing     Problem: Pain  Goal: Verbalizes/displays adequate comfort level or baseline comfort level  6/27/2024 1329 by Kasey Gracia, RN  Outcome: Progressing  6/26/2024 2339 by Yanelis Brito, RN  Outcome: Progressing     Problem: Safety - Adult  Goal: Free from fall injury  6/27/2024 1329 by Kasey Gracia, RN  Outcome: Progressing  6/26/2024 2339 by Yanelis Brito, RN  Outcome: Progressing

## 2024-06-27 NOTE — PLAN OF CARE
Problem: SLP Adult - Impaired Swallowing  Goal: By Discharge: Advance to least restrictive diet without signs or symptoms of aspiration for planned discharge setting.  See evaluation for individualized goals.  Description:   Patient will:  1. Tolerate PO trials with 0 s/s overt distress in 4/5 trials  2. Utilize compensatory swallow strategies/maneuvers (decrease bite/sip, size/rate, alt. liq/sol) with min cues in 4/5 trials  3. Complete an objective swallow study (i.e., MBSS) to assess swallow integrity, r/o aspiration, and determine of safest LRD, min A as indicated/ordered by MD- met 6/27/24    Rec:     Easy to chew diet with thin liquids  Aspiration precautions  HOB >45 during po intake, remain >30 for 30-45 minutes after po   Small bites/sips; alternate liquid/solid with slow feeding rate   Oral care TID  Meds per pt preference  Outcome: Progressing   SPEECH PATHOLOGY MODIFIED BARIUM SWALLOW STUDY & TREATMENT    Patient: Little Siddiqui (49 y.o. female)  Date: 6/27/2024  Primary Diagnosis: Left-sided weakness [R53.1]       Precautions: Aspiration    ASSESSMENT :  Based on the objective data described below, the patient presents with oropharyngeal swallow fxn essentially WFL. Pt tolerated reg solid, thin liquids + straw, and 13 mm Ba pill with thin liquid wash without aspiration/penetration events. Bolus manipulation, tongue base retraction, laryngeal elevation/excursion, and pharyngeal motility/sensation were functional/timely throughout study with positive oropharyngeal clearance observed across all PO trials. Pt with multiple prominences in posterior pharyngeal wall at the level of C4-C7 consistent with osteophytes; warrant further examination and may be causing globus sensation with PO. Rec reg solid diet with thin liquids, aspiration precautions, oral care TID, and meds as tolerated.     TREATMENT :  Treatment provided post diagnostic testing including oropharyngeal anatomy/physiology, MBS results, diet

## 2024-06-27 NOTE — PROGRESS NOTES
Speech Pathology:     MBS completed with recs of reg solid diet and thin liquids, meds as tolerated. Full report to follow.     Thank you for this referral.    Ida Hanks M.S., CCC-SLP/L  Speech-Language Pathologist

## 2024-06-28 ENCOUNTER — HOME HEALTH ADMISSION (OUTPATIENT)
Age: 49
End: 2024-06-28
Payer: MEDICARE

## 2024-06-28 VITALS
RESPIRATION RATE: 18 BRPM | WEIGHT: 163 LBS | OXYGEN SATURATION: 100 % | TEMPERATURE: 97.9 F | SYSTOLIC BLOOD PRESSURE: 150 MMHG | BODY MASS INDEX: 28.88 KG/M2 | HEIGHT: 63 IN | DIASTOLIC BLOOD PRESSURE: 95 MMHG | HEART RATE: 115 BPM

## 2024-06-28 LAB
ANION GAP SERPL CALC-SCNC: 6 MMOL/L (ref 3–18)
BASOPHILS # BLD: 0 K/UL (ref 0–0.1)
BASOPHILS NFR BLD: 1 % (ref 0–2)
BUN SERPL-MCNC: 8 MG/DL (ref 7–18)
BUN/CREAT SERPL: 17 (ref 12–20)
CALCIUM SERPL-MCNC: 8.9 MG/DL (ref 8.5–10.1)
CHLORIDE SERPL-SCNC: 105 MMOL/L (ref 100–111)
CO2 SERPL-SCNC: 28 MMOL/L (ref 21–32)
CREAT SERPL-MCNC: 0.46 MG/DL (ref 0.6–1.3)
DIFFERENTIAL METHOD BLD: ABNORMAL
EOSINOPHIL # BLD: 0.1 K/UL (ref 0–0.4)
EOSINOPHIL NFR BLD: 2 % (ref 0–5)
ERYTHROCYTE [DISTWIDTH] IN BLOOD BY AUTOMATED COUNT: 13.2 % (ref 11.6–14.5)
GLUCOSE SERPL-MCNC: 77 MG/DL (ref 74–99)
HCT VFR BLD AUTO: 31.3 % (ref 35–45)
HGB BLD-MCNC: 11 G/DL (ref 12–16)
IMM GRANULOCYTES # BLD AUTO: 0 K/UL (ref 0–0.04)
IMM GRANULOCYTES NFR BLD AUTO: 0 % (ref 0–0.5)
LYMPHOCYTES # BLD: 3.2 K/UL (ref 0.9–3.6)
LYMPHOCYTES NFR BLD: 61 % (ref 21–52)
MAGNESIUM SERPL-MCNC: 2 MG/DL (ref 1.6–2.6)
MCH RBC QN AUTO: 29.1 PG (ref 24–34)
MCHC RBC AUTO-ENTMCNC: 35.1 G/DL (ref 31–37)
MCV RBC AUTO: 82.8 FL (ref 78–100)
MONOCYTES # BLD: 0.4 K/UL (ref 0.05–1.2)
MONOCYTES NFR BLD: 7 % (ref 3–10)
NEUTS SEG # BLD: 1.5 K/UL (ref 1.8–8)
NEUTS SEG NFR BLD: 29 % (ref 40–73)
NRBC # BLD: 0 K/UL (ref 0–0.01)
NRBC BLD-RTO: 0 PER 100 WBC
PLATELET # BLD AUTO: 369 K/UL (ref 135–420)
PMV BLD AUTO: 9.5 FL (ref 9.2–11.8)
POTASSIUM SERPL-SCNC: 3.4 MMOL/L (ref 3.5–5.5)
RBC # BLD AUTO: 3.78 M/UL (ref 4.2–5.3)
SODIUM SERPL-SCNC: 139 MMOL/L (ref 136–145)
WBC # BLD AUTO: 5.3 K/UL (ref 4.6–13.2)

## 2024-06-28 PROCEDURE — 36415 COLL VENOUS BLD VENIPUNCTURE: CPT

## 2024-06-28 PROCEDURE — 80048 BASIC METABOLIC PNL TOTAL CA: CPT

## 2024-06-28 PROCEDURE — 6370000000 HC RX 637 (ALT 250 FOR IP)

## 2024-06-28 PROCEDURE — 85025 COMPLETE CBC W/AUTO DIFF WBC: CPT

## 2024-06-28 PROCEDURE — 97110 THERAPEUTIC EXERCISES: CPT

## 2024-06-28 PROCEDURE — 2580000003 HC RX 258

## 2024-06-28 PROCEDURE — 6360000002 HC RX W HCPCS

## 2024-06-28 PROCEDURE — 83735 ASSAY OF MAGNESIUM: CPT

## 2024-06-28 RX ORDER — CYCLOBENZAPRINE HCL 10 MG
10 TABLET ORAL ONCE
Status: COMPLETED | OUTPATIENT
Start: 2024-06-28 | End: 2024-06-28

## 2024-06-28 RX ORDER — MELOXICAM 15 MG/1
15 TABLET ORAL DAILY
Qty: 10 TABLET | Refills: 0 | Status: SHIPPED | OUTPATIENT
Start: 2024-06-28

## 2024-06-28 RX ORDER — MECLIZINE HCL 25MG 25 MG/1
1 TABLET, CHEWABLE ORAL 3 TIMES DAILY PRN
COMMUNITY

## 2024-06-28 RX ORDER — METHOCARBAMOL 750 MG/1
750 TABLET, FILM COATED ORAL 4 TIMES DAILY PRN
Qty: 40 TABLET | Refills: 0 | Status: SHIPPED | OUTPATIENT
Start: 2024-06-28 | End: 2024-07-08

## 2024-06-28 RX ORDER — ASPIRIN 81 MG/1
81 TABLET, CHEWABLE ORAL DAILY
Qty: 30 TABLET | Refills: 3 | Status: SHIPPED | OUTPATIENT
Start: 2024-06-29

## 2024-06-28 RX ORDER — LIDOCAINE 4 G/G
1 PATCH TOPICAL DAILY
Qty: 30 PATCH | Refills: 2 | Status: SHIPPED | OUTPATIENT
Start: 2024-06-28

## 2024-06-28 RX ADMIN — THERA TABS 1 TABLET: TAB at 09:22

## 2024-06-28 RX ADMIN — Medication 100 MG: at 09:23

## 2024-06-28 RX ADMIN — SODIUM CHLORIDE, PRESERVATIVE FREE 10 ML: 5 INJECTION INTRAVENOUS at 09:34

## 2024-06-28 RX ADMIN — DULOXETINE HYDROCHLORIDE 120 MG: 60 CAPSULE, DELAYED RELEASE ORAL at 09:22

## 2024-06-28 RX ADMIN — ASPIRIN 81 MG CHEWABLE TABLET 81 MG: 81 TABLET CHEWABLE at 09:22

## 2024-06-28 RX ADMIN — PANTOPRAZOLE SODIUM 40 MG: 40 TABLET, DELAYED RELEASE ORAL at 06:20

## 2024-06-28 RX ADMIN — CALCIUM CARBONATE 1000 MG: 500 TABLET, CHEWABLE ORAL at 09:22

## 2024-06-28 RX ADMIN — FAMOTIDINE 20 MG: 20 TABLET ORAL at 09:23

## 2024-06-28 RX ADMIN — CYANOCOBALAMIN TAB 1000 MCG 1000 MCG: 1000 TAB at 09:23

## 2024-06-28 RX ADMIN — CYCLOBENZAPRINE 10 MG: 10 TABLET, FILM COATED ORAL at 11:54

## 2024-06-28 RX ADMIN — ENOXAPARIN SODIUM 40 MG: 40 INJECTION SUBCUTANEOUS at 09:23

## 2024-06-28 ASSESSMENT — PAIN DESCRIPTION - PAIN TYPE: TYPE: ACUTE PAIN

## 2024-06-28 ASSESSMENT — PAIN SCALES - GENERAL
PAINLEVEL_OUTOF10: 0
PAINLEVEL_OUTOF10: 0
PAINLEVEL_OUTOF10: 10
PAINLEVEL_OUTOF10: 6
PAINLEVEL_OUTOF10: 0
PAINLEVEL_OUTOF10: 0

## 2024-06-28 ASSESSMENT — PAIN DESCRIPTION - LOCATION
LOCATION: SHOULDER
LOCATION: SHOULDER

## 2024-06-28 ASSESSMENT — PAIN DESCRIPTION - ONSET: ONSET: ON-GOING

## 2024-06-28 ASSESSMENT — PAIN - FUNCTIONAL ASSESSMENT
PAIN_FUNCTIONAL_ASSESSMENT: PREVENTS OR INTERFERES SOME ACTIVE ACTIVITIES AND ADLS
PAIN_FUNCTIONAL_ASSESSMENT: PREVENTS OR INTERFERES SOME ACTIVE ACTIVITIES AND ADLS

## 2024-06-28 ASSESSMENT — PAIN DESCRIPTION - ORIENTATION
ORIENTATION: LEFT
ORIENTATION: LEFT

## 2024-06-28 ASSESSMENT — PAIN DESCRIPTION - DESCRIPTORS
DESCRIPTORS: ACHING;THROBBING
DESCRIPTORS: ACHING;THROBBING

## 2024-06-28 ASSESSMENT — PAIN SCALES - WONG BAKER
WONGBAKER_NUMERICALRESPONSE: NO HURT
WONGBAKER_NUMERICALRESPONSE: HURTS EVEN MORE
WONGBAKER_NUMERICALRESPONSE: NO HURT

## 2024-06-28 ASSESSMENT — PAIN DESCRIPTION - FREQUENCY: FREQUENCY: CONTINUOUS

## 2024-06-28 NOTE — HOME CARE
Received home health referral for HC for the following disciplines  (SN / PT /OT).  Discharge order noted for today.    Spoke with patient via phone;  patient identifiers verified.  Explained home health care services and routines.  Demographics verified including insurance, phone and address confirmed.  Answered all questions to the best of this writer's scope of practice and provided [facility] contact card.  Caregivers available has family and personal aide available to assist.   Patient expressed - PCA comes for 6 hours daily.   Orders noted and arranged and sent to central intake and scheduling.        -----    JACKSON Ortega Home Care Liaison

## 2024-06-28 NOTE — PLAN OF CARE
Problem: Discharge Planning  Goal: Discharge to home or other facility with appropriate resources  6/27/2024 1329 by Kasey Gracia RN  Outcome: Progressing  Flowsheets (Taken 6/27/2024 0800)  Discharge to home or other facility with appropriate resources: Identify barriers to discharge with patient and caregiver     Problem: Pain  Goal: Verbalizes/displays adequate comfort level or baseline comfort level  6/28/2024 0059 by Tammy Mansfield RN  Outcome: Progressing  6/27/2024 1329 by Kasey Gracia RN  Outcome: Progressing     Problem: Safety - Adult  Goal: Free from fall injury  6/28/2024 0059 by Tammy Mansfield RN  Outcome: Progressing  6/27/2024 1329 by Kasey Gracia RN  Outcome: Progressing     Problem: Chronic Conditions and Co-morbidities  Goal: Patient's chronic conditions and co-morbidity symptoms are monitored and maintained or improved  Outcome: Progressing     Problem: SLP Adult - Impaired Swallowing  Goal: By Discharge: Advance to least restrictive diet without signs or symptoms of aspiration for planned discharge setting.  See evaluation for individualized goals.  Description:   Patient will:  1. Tolerate PO trials with 0 s/s overt distress in 4/5 trials  2. Utilize compensatory swallow strategies/maneuvers (decrease bite/sip, size/rate, alt. liq/sol) with min cues in 4/5 trials  3. Complete an objective swallow study (i.e., MBSS) to assess swallow integrity, r/o aspiration, and determine of safest LRD, min A as indicated/ordered by MD- met 6/27/24    Rec:     Easy to chew diet with thin liquids  Aspiration precautions  HOB >45 during po intake, remain >30 for 30-45 minutes after po   Small bites/sips; alternate liquid/solid with slow feeding rate   Oral care TID  Meds per pt preference  6/27/2024 1423 by Ida Hanks, SLP  Outcome: Progressing     Problem: Physical Therapy - Adult  Goal: By Discharge: Performs mobility at highest level of function for planned discharge setting.  See  evaluation for individualized goals.  Description: Physical Therapy Goals:  Initiated 6/26/2024 to be met within 7-10 days.    1.  Patient will move from supine to sit and sit to supine  in bed with modified independence.    2.  Patient will transfer from bed to chair and chair to bed with modified independence using the least restrictive device.  3.  Patient will perform sit to stand with modified independence.  4.  Patient will ambulate with modified independence for 150 feet with the least restrictive device.   5.  Patient will ascend/descend 12 stairs with handrail(s) with modified independence.    PLOF: Patient was independent with ADLs and functional mobility. She lives alone in [place] home.       6/27/2024 1343 by Lucina Pena SPT  Outcome: Progressing     Problem: Occupational Therapy - Adult  Goal: By Discharge: Performs self-care activities at highest level of function for planned discharge setting.  See evaluation for individualized goals.  Description: Occupational Therapy Goals:  Initiated 6/26/2024 to be met within 7-10 days.    1.  Patient will perform bilateral grooming task with supervision/set-up while standing at the sink for > 2 min with Good balance.   2.  Patient will perform bathing with supervision/set-up.  3.  Patient will perform lower body dressing with supervision/set-up for seated and standing aspects.  4.  Patient will perform toilet transfers with supervision/set-up.  5.  Patient will perform all aspects of toileting with supervision/set-up.  6.  Patient will participate in Left upper extremity therapeutic exercise/activities/NMRE with supervision/set-up for 8 minutes to improve functional use of LUE needed for ADLs    7.  Patient will utilize energy conservation techniques during functional activities with verbal cues.    PLOF: Pt lives with family in [facility], independent w/o AD, works.  6/27/2024 1319 by Ellen Azevedo OTA  Outcome: Progressing

## 2024-06-28 NOTE — DISCHARGE SUMMARY
[facility]  DISCHARGE SUMMARY      Name:   Little Siddiqui 49 y.o. female  MRN:   [id number]  CSN:   [id number]  Admission Date:  6/26/2024  Discharge Date:  6/28/2024  Attending:             Jaime Medina MD   PCP:              Krishan Rios MD   ================================================================  Reason for Admission:  Left-sided weakness [R53.1], stroke rule-out    Discharge Diagnosis:    Left-sided weakness  Syncopal episode  Left shoulder and back muscular strain  Multiple sclerosis   Hypokalemia, mild  History of nonepileptic seizures  History of neurogenic bladder  History of muscle spasms  History of right foot drop    Other chronic medical conditions addressed during this admission:  Hypertension  T2DM  Depression  Bipolar affective disorder  History of gastric sleeve procedure 4/2023  History of hypokalemia  GERD  Chronic vertigo  Chronic nausea      Follow-up studies/evaluations for PCP/Important Notes to PCP:  Follow-up blood pressure; consider restarting antihypertensive medication  Follow-up musculoskeletal left-sided shoulder/back/side pain; could consider imaging if not improved or if worsening  Consider further workup of dysphagia reported by patient (?  Endoscopy)  Consider placing referral to neurologist located closer to [place]  Pending labs/investigations to follow up as below  Medication reconciliation:  Discontinued Medications: none  New Medications: aspirin 81 mg PO daily, meloxicam 15 mg PO daily (temporary), methocarbamol 750 mg QID PRN (temporary), lidocaine patches, voltaren gel    [facility] Follow Up Appointment:   Follow-up Information       Follow up With Specialties Details Why Contact Info    [facility]  Go on 6/28/2024 [place] [facility] [place] 2 at 2:40 PM with Dr. Pedroza [address] [address]  [phone]             Readmission prevention plan:   Close  isointensity at the right  posterior frontal lobe at approximately the hand motor cortex without associated  enhancement. Overall similar burden of white matter changes in the bilateral  frontal lobe and parietal white matter with small foci in the bilateral temporal  lobes.    Extra-axial spaces, ventricles, basal cisterns: No extra-axial fluid collections  are identified. The ventricles and sulci are within normal limits for patient's  age. Basal cisterns are patent.    Orbits and paranasal sinuses: The orbits are within normal limits for  nondedicated examination. The included paranasal sinuses demonstrates mucosal  thickening of the right ethmoid air cells.    Calvarium and skull base: The calvarium is within normal limits. The sella  turcica is within normal limits.    Visualized upper cervical spine: Craniocervical junction is within normal  limits.    Impression  Small right frontal focus of signal abnormality may represent a subacute  infarct. Alternatively nonenhancing small demyelinating lesion given reported  history.    Overall similar burden of moderate T2 FLAIR hyperintensities consistent with  reported history of multiple sclerosis.          Electronically signed by Declan Judd     CT Result (most recent):  CT HEAD WO CONTRAST 06/26/2024    Narrative  EXAM: CT HEAD WITHOUT CONTRAST    CLINICAL INDICATION/HISTORY: code S    COMPARISON: April 20, 2024    TECHNIQUE: Axial imaging of the head from the skull base to the vertex without  IV contrast and reconstructed into coronal and sagittal planes.    All CT scans at this facility are performed using dose optimization technique as  appropriate to a performed exam, to include automated exposure control,  adjustment of the MA and/or kV according to patient size (including appropriate  matching for site-specific examinations) or use of  iterative reconstruction  technique.    FINDINGS:    Brain: Advanced white matter hypodensities.    Extra-axial  spaces: Normal for age.    Ventricular system: Normal for age.    Intracranial hemorrhage: None.    Midline shift: None.    Sinuses/mastoid air cells: Clear.    Calvarium: Normal.    Impression  1.No acute intracranial abnormality. Advanced sequela of chronic small vessel  ischemic disease.    Discussed with BLAS Parekh at 0302.    Thank you for enabling us to participate in the care of this patient.    Electronically signed by Isiah Jacobs     Xray Result (most recent):  XR CHEST PORTABLE 06/26/2024    Narrative  EXAM: CHEST XR, SINGLE    CLINICAL INDICATION/HISTORY: fell, chest wall pain  > Additional: None.    COMPARISON: 4/20/2024    TECHNIQUE: A single view of the chest was obtained.    _______________    FINDINGS:    SUPPORT DEVICES: None.    HEART AND MEDIASTINUM: Unremarkable cardiac and mediastinal silhouette.    LUNGS AND PLEURAL SPACES: Lungs are well aerated with no confluent airspace  opacity. No pleural effusion or pneumothorax.    BONY THORAX AND SOFT TISSUES: No evidence of displaced fractures  _______________    Impression  1.  No radiographic evidence of acute cardiopulmonary process.  2.  No evidence of displaced rib fractures. If there is clinical concern for  nondisplaced/occult rib fractures would recommend CT exam.    Electronically signed by ELBA URENA    6/27/2024: FL MODIFIED BARIUM SWALLOW W VIDEO [[id number]]  No allyson penetration or aspiration with any tested consistencies.     Please see speech pathologist report for additional details and recommendations.        Cardiology Procedures/Testing:  MODALITY RESULTS   EKG Encounter Date: 06/26/24   EKG 12 Lead   Result Value    Ventricular Rate 82    Atrial Rate 82    P-R Interval 180    QRS Duration 74    Q-T Interval 382    QTc Calculation (Bazett) 446    P Axis 71    R Axis 26    T Axis 54    Diagnosis      Normal sinus rhythm  Septal infarct (cited on or before 01-JAN-2021)  Abnormal ECG  When compared with ECG of

## 2024-06-28 NOTE — PROGRESS NOTES
Went into pts room for shift assessment; pt was agitated with plan of care regarding her left sided back and side pain. Contacted the hospitalist in which they will contact pt to discuss plan of care that can be agreed upon. Attempted to calm pt down.

## 2024-06-28 NOTE — PLAN OF CARE
Problem: Pain  Goal: Verbalizes/displays adequate comfort level or baseline comfort level  6/28/2024 1106 by Ida Orellana  Outcome: Progressing  Problem: Safety - Adult  Goal: Free from fall injury  6/28/2024 1106 by Ida Orellana  Outcome: Progressing  6/28/2024 1106 by Ida Orellana  Outcome: Progressing  6/28/2024 0059 by Tammy Mansfield, RN  Outcome: Progressing     Problem: Chronic Conditions and Co-morbidities  Goal: Patient's chronic conditions and co-morbidity symptoms are monitored and maintained or improved  6/28/2024 1106 by Ida Orellana  Outcome: Progressing  6/28/2024 1106 by Ida Orellana  Outcome: Progressing  Flowsheets (Taken 6/28/2024 0800)  Care Plan - Patient's Chronic Conditions and Co-Morbidity Symptoms are Monitored and Maintained or Improved:   Monitor and assess patient's chronic conditions and comorbid symptoms for stability, deterioration, or improvement   Collaborate with multidisciplinary team to address chronic and comorbid conditions and prevent exacerbation or deterioration  6/28/2024 0059 by Tammy Mansfield, RN  Outcome: Progressing     6/28/2024 0059 by Tammy Mansfield, RN  Outcome: Progressing

## 2024-06-28 NOTE — PLAN OF CARE
Problem: Occupational Therapy - Adult  Goal: By Discharge: Performs self-care activities at highest level of function for planned discharge setting.  See evaluation for individualized goals.  Description: Occupational Therapy Goals:  Initiated 6/26/2024 to be met within 7-10 days.    1.  Patient will perform bilateral grooming task with supervision/set-up while standing at the sink for > 2 min with Good balance.   2.  Patient will perform bathing with supervision/set-up.  3.  Patient will perform lower body dressing with supervision/set-up for seated and standing aspects.  4.  Patient will perform toilet transfers with supervision/set-up.  5.  Patient will perform all aspects of toileting with supervision/set-up.  6.  Patient will participate in Left upper extremity therapeutic exercise/activities/NMRE with supervision/set-up for 8 minutes to improve functional use of LUE needed for ADLs    7.  Patient will utilize energy conservation techniques during functional activities with verbal cues.    PLOF: Pt lives with family in [place], independent w/o AD, works.  Outcome: Progressing  OCCUPATIONAL THERAPY TREATMENT    Patient: Little Siddiqui (49 y.o. female)  Date: 6/28/2024  Diagnosis: Left-sided weakness [R53.1] Left-sided weakness      Precautions: Fall Risk    Chart, occupational therapy assessment, plan of care, and goals were reviewed.  ASSESSMENT:  Pt c/o LUE and flank pain 10/10 this date. Agreeable to EOB activity. Pt educated on SROM Therex and gravity eliminated UE Therex to maintain/increase AROM for carryover w/ADLs. Pt defers OOB activity and returned to supine. Pt reports decrease in pain s/p UE Therex.     Progression toward goals:  [x]          Improving appropriately and progressing toward goals  []          Improving slowly and progressing toward goals  []          Not making progress toward goals and plan of care will be adjusted     PLAN:  Patient continues to benefit from skilled intervention to address  activated    COMMUNICATION/EDUCATION:   Patient Education  Education Given To: Patient  Education Provided: Plan of Care;Home Exercise Program  Education Method: Verbal  Barriers to Learning: None  Education Outcome: Demonstrated understanding;Verbalized understanding    Thank you for this referral.  FRED Sheldon  Minutes: 16

## 2024-06-28 NOTE — PROGRESS NOTES
Stroke education booklet given to patient with current lab valves, blood pressure entered and medications and tests highlighted

## 2024-06-28 NOTE — PROGRESS NOTES
ARU/IPR REFERRAL CONTACT NOTE  [facility]      Thank you for the opportunity to review this patient's case for admission to [facility].    Based on our pre-admission screening:                          [x ] This patient does not meet criteria for admission to [facility] due to:    [ x] Other: Insurance denied authorization for ARU. MD aware. Messaged  via perfect serve.      Again, Thank you for this referral. Should you have any questions please do not hesitate to call.     Sincerely,  Luisa Leung    Clinical Liaison  [facility]  [phone]

## 2024-06-28 NOTE — PROGRESS NOTES
Medication List        START taking these medications      aspirin 81 MG chewable tablet  Take 1 tablet by mouth daily  Start taking on: June 29, 2024     diclofenac sodium 1 % Gel  Commonly known as: VOLTAREN  Apply 2 g topically 4 times daily as needed for Pain     lidocaine 4 % external patch  Place 1 patch onto the skin daily     meloxicam 15 MG tablet  Commonly known as: MOBIC  Take 1 tablet by mouth daily Take with food     methocarbamol 750 MG tablet  Commonly known as: Robaxin-750  Take 1 tablet by mouth 4 times daily as needed (back/side/shoulder pain)            CHANGE how you take these medications      famotidine 20 MG tablet  Commonly known as: PEPCID  TAKE 1 TABLET BY MOUTH AT BEDTIME  What changed: Another medication with the same name was removed. Continue taking this medication, and follow the directions you see here.     omeprazole 40 MG delayed release capsule  Commonly known as: PRILOSEC  Take 1 capsule by mouth daily  What changed: Another medication with the same name was removed. Continue taking this medication, and follow the directions you see here.            CONTINUE taking these medications      baclofen 10 MG tablet  Commonly known as: LIORESAL     calcium citrate 950 (200 Ca) MG tablet  Commonly known as: CALCITRATE     D3-50 70508 UNIT Caps  Generic drug: vitamin D  TAKE 1 CAPSULE BY MOUTH EVERY WEEK     DULoxetine 30 MG extended release capsule  Commonly known as: CYMBALTA     flintstones complete 10 MG Chew tablet     gabapentin 300 MG capsule  Commonly known as: NEURONTIN     glatiramer 40 MG/ML injection  Commonly known as: Copaxone  Inject 1 mL into the skin three times a week Mon-wed-fri. Do not restart until cleared by bariatric surgeon at 2 week follow up     hydrOXYzine pamoate 50 MG capsule  Commonly known as: VISTARIL     magnesium hydroxide 400 MG/5ML suspension  Commonly known as: MILK OF MAGNESIA     meclizine 25 MG Chew  Commonly known as: ANTIVERT     QUEtiapine 300

## 2024-06-28 NOTE — CARE COORDINATION
06/28/24 1512   /Social Work Whiteboard Notes   /Social Work Whiteboard GREEN: 6/28/24. Transition to home with home health. [facility] following. dpj-cm

## 2024-06-28 NOTE — PLAN OF CARE
Problem: Discharge Planning  Goal: Discharge to home or other facility with appropriate resources  6/28/2024 1708 by Kasey Gracia RN  Outcome: Adequate for Discharge  6/28/2024 1106 by Ida Orellana  Outcome: Progressing  6/28/2024 1106 by Ida Orellana  Outcome: Progressing  Flowsheets (Taken 6/28/2024 0800)  Discharge to home or other facility with appropriate resources:   Identify barriers to discharge with patient and caregiver   Arrange for needed discharge resources and transportation as appropriate     Problem: Pain  Goal: Verbalizes/displays adequate comfort level or baseline comfort level  6/28/2024 1708 by Kasey Gracia RN  Outcome: Adequate for Discharge  6/28/2024 1106 by Ida Orellana  Outcome: Progressing  6/28/2024 1106 by Ida Orellana  Outcome: Not Progressing     Problem: Safety - Adult  Goal: Free from fall injury  6/28/2024 1708 by Kasey Gracia RN  Outcome: Adequate for Discharge  6/28/2024 1106 by Ida Orellana  Outcome: Progressing  6/28/2024 1106 by Ida Orellana  Outcome: Progressing     Problem: Chronic Conditions and Co-morbidities  Goal: Patient's chronic conditions and co-morbidity symptoms are monitored and maintained or improved  6/28/2024 1708 by Kasey Gracia RN  Outcome: Adequate for Discharge  6/28/2024 1106 by Ida Orellana  Outcome: Progressing  6/28/2024 1106 by Ida Orellana  Outcome: Progressing  Flowsheets (Taken 6/28/2024 0800)  Care Plan - Patient's Chronic Conditions and Co-Morbidity Symptoms are Monitored and Maintained or Improved:   Monitor and assess patient's chronic conditions and comorbid symptoms for stability, deterioration, or improvement   Collaborate with multidisciplinary team to address chronic and comorbid conditions and prevent exacerbation or deterioration     Problem: ABCDS Injury Assessment  Goal: Absence of physical injury  6/28/2024 1708 by Kasey Gracia, RN  Outcome: Adequate for

## 2024-06-30 ENCOUNTER — HOME CARE VISIT (OUTPATIENT)
Age: 49
End: 2024-06-30
Payer: MEDICARE

## 2024-06-30 PROCEDURE — G0299 HHS/HOSPICE OF RN EA 15 MIN: HCPCS

## 2024-07-01 VITALS
TEMPERATURE: 97.9 F | SYSTOLIC BLOOD PRESSURE: 112 MMHG | DIASTOLIC BLOOD PRESSURE: 74 MMHG | RESPIRATION RATE: 20 BRPM | HEART RATE: 74 BPM | OXYGEN SATURATION: 98 %

## 2024-07-01 ASSESSMENT — ENCOUNTER SYMPTOMS
STOOL DESCRIPTION: FORMED
DYSPNEA ACTIVITY LEVEL: AFTER AMBULATING 10 - 20 FT
HEMOPTYSIS: 0
PAIN LOCATION - PAIN QUALITY: ACHY

## 2024-07-01 NOTE — HOME HEALTH
planning.     Next MD appointment TBD, patient to call and make appointments.  Patient/caregiver encouraged/instructed to keep appointment as lack of follow through with physician appointment could result in discontinuation of home care services for non-compliance.

## 2024-07-03 ENCOUNTER — HOME CARE VISIT (OUTPATIENT)
Age: 49
End: 2024-07-03
Payer: MEDICARE

## 2024-07-03 VITALS
HEART RATE: 90 BPM | TEMPERATURE: 97.7 F | RESPIRATION RATE: 17 BRPM | SYSTOLIC BLOOD PRESSURE: 136 MMHG | OXYGEN SATURATION: 98 % | DIASTOLIC BLOOD PRESSURE: 78 MMHG

## 2024-07-03 VITALS
OXYGEN SATURATION: 98 % | SYSTOLIC BLOOD PRESSURE: 132 MMHG | TEMPERATURE: 98.9 F | RESPIRATION RATE: 17 BRPM | HEART RATE: 97 BPM | DIASTOLIC BLOOD PRESSURE: 86 MMHG

## 2024-07-03 PROCEDURE — G0151 HHCP-SERV OF PT,EA 15 MIN: HCPCS

## 2024-07-03 PROCEDURE — G0152 HHCP-SERV OF OT,EA 15 MIN: HCPCS

## 2024-07-03 ASSESSMENT — ENCOUNTER SYMPTOMS
PAIN LOCATION - PAIN QUALITY: ACHE
PAIN LOCATION - PAIN QUALITY: ACHING

## 2024-07-03 NOTE — HOME HEALTH
Pt is referred to home care for SN/PT/OT s/p CVA    PMHx:   Anticoagulant long-term use   Arthropathy, unspecified, site unspecified   Depression   Diabetes (HCC)   Hypercholesteremia   Hypertension   Insomnia   Migraine   MS (multiple sclerosis) (HCC)   Neurogenic bladder   Seizures (HCC)        SUBJECTIVE: \"My left side is weak\"  LIVING SITUATION/PLOF: Pt lives alone in a 2 level town home. Her bedroom/full bath are upstairs.  PTA she was I with all mobility and ADL's.  She works at [employer] and was drivng daily  CAREGIVER INVOLVEMENT/ ASSISTANCE NEEDED FOR: Transportation, IADL's, ADL's, meal prep, mobility  MEDICATIONS REVIEWED AND UPDATED: Meds reviewed with no changes  NEXT MD APPT: TBD    EQUIIPMENT: straight cane  ROM: B LE's WFL  STRENGTH: R hip flexion 3-/5, abd 3-/5; knee / 3+/5, knee flexion 3+/5; dorsiflexion 4/5.  L hip flexion 2+/5, abd 2+/5; knee / 3-/5, knee flexion 3-/5; dorsiflexion 2+/5  WOUNDS: No wounds  BED MOBILITY: I supine<>sit and rolling L/R  TRANSFERS: Sit<>stand from sofa and EOB with CG  GAIT: Pt amb with SPC and intermittent CG.  Gait characterized by short steps, slow gait, decreased foot clearance  STAIRS: MIN A amb up/down 1 flight of steps with rail.  Pt has been amb up the steps backward and sitting down to scoot down the steps.  BALANCE: Pt scored 11/28 on Tinetti Balance Assessment placing her at high risk for falls.     HEP consisting of:  1. Walking every hour during the day with straight cane  2. Seated: hip flexion, LAQ, ankle pumps  3. Deep breathing   Patient/caregiver verbalized understanding but will need reinforcement for consistency and progression.      PATIENT EDUCATION PROVIDED THIS VISIT: safety, HEP, walking, deep breathing    PATIENT RESPONSE TO EDUCATION PROVIDED: Pt/CG verbalizes understanding of all information and demo's back as appropriate   PATIENT RESPONSE TO EVALUATION/TREATMENT: Patient demonstrated a positive outcome post treatment and reported

## 2024-07-03 NOTE — HOME HEALTH
Caregiver involvement: Rogers Mccloud (private aide) comes 7 days a week from 3-6 pm for past 9 years.  She helps with IADL tasks.    Medications reviewed and all medications are available in the home this visit.    The following education was provided regarding medications, medication interactions, and look alike medications (specify): Continue as directed by MD.    Medications  are effective at this time.      Patient education provided this visit: see ADL note    Sharps education provided:  Pt takes COPAXONE weekly as an injectible for her MS.    Clinician instructed patient/CG on proper disposal of sharps: Containers should be made of hard plastic, be puncture-resistant and leakproof, such as a laundry detergent or bleach bottle.  When the container is ¾ full, it should be sealed with tape and labeled DO NOT RECYCLE prior to discarding in the regular trash.      Patient level of understanding of education provided: see ADL note    Skilled Care Performed this visit: Completed OT evaluation and assessment for safety with ADL and mobility.    Modified Barthel ADL Index  Bowels              ( ) 0 = Incontinent or needs enemas              ( ) 1 = Occasional Accident              (x ) 2 = Continent  Bladder               ( ) 0 = Incontinent              ( ) 1 = Occasional accident (1x/wk)              (x ) 2 = Continent  Grooming               (x ) 0 = Needs help with personal care              ( ) 1 = Independent (including face, hair, teeth, shaving)  Toilet Use               ( ) 0 = Dependent              ( x) 1 = Needs some help              ( ) 2 = Independent  Feeding              ( ) 0 = Unable              ( x) 1 = Needs help, e.g. cutting              ( ) 2 = Independent  Transfers   (bed to chair and back)              ( ) 0 = Unable, no sitting balance              ( ) 1 = Major help (1 or 2 people), can sit              (x ) 2 = Minor help (verbal or physical)              ( ) 3 =

## 2024-07-04 RX ORDER — SUCRALFATE 1 G/1
TABLET ORAL
Qty: 360 TABLET | Refills: 0 | Status: SHIPPED | OUTPATIENT
Start: 2024-07-04

## 2024-07-05 NOTE — CASE COMMUNICATION
Occupational Therapy Evaluation    1w1, 2w3, 1w1    Ms. Siddiqui is ambulating with a cane.  She reports having difficulty getting up and down a full flight of steps to access her bathroom on the second floor.  Pt reports that she goes up and down the flight of steps on her bottom when she needs to get up stairs.  She also reports reducing her fluid intake to reduce frequency of having to go up stairs to the bathroom because of its taxing e ffort.  Recommeneded a 3:1 to keep on the first floor of her home for safety and easy access.  Pt agreeable to recommendation.  Pt reports that she has been sponge bathing with assistance from her PCA due to genralized weakness on the L side from recent MS exacerbation.  Pt does not feel safety with transfers into the tub nor able to stand for prolonged periods of time.  Suggested a tub transfer bench to advance ADL back to the shower l evel and pt agreeable.  Order request sent to MD for the tub bench and 3:1.  Ms. Siddiqui reports having difficulty with FM tasks such as fastening her bra.  Pt requires min A for grooming, mod A for bathing, dressing and toileting.  She is agreeable for ongoing OT to maximize her safety with ADL through DME and energy conservation training.  Noted pt to have difficulty with word finding during the evaluation and pt c/o having memory defici ts since recent exacerbation.  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  Requested ST orders from MD.

## 2024-07-08 ENCOUNTER — HOME CARE VISIT (OUTPATIENT)
Age: 49
End: 2024-07-08
Payer: MEDICARE

## 2024-07-08 PROCEDURE — G0299 HHS/HOSPICE OF RN EA 15 MIN: HCPCS

## 2024-07-08 PROCEDURE — G0157 HHC PT ASSISTANT EA 15: HCPCS

## 2024-07-09 ENCOUNTER — HOME CARE VISIT (OUTPATIENT)
Age: 49
End: 2024-07-09
Payer: MEDICARE

## 2024-07-09 VITALS
HEART RATE: 78 BPM | DIASTOLIC BLOOD PRESSURE: 90 MMHG | RESPIRATION RATE: 17 BRPM | OXYGEN SATURATION: 99 % | TEMPERATURE: 99.1 F | SYSTOLIC BLOOD PRESSURE: 130 MMHG

## 2024-07-09 VITALS
TEMPERATURE: 98.5 F | SYSTOLIC BLOOD PRESSURE: 140 MMHG | OXYGEN SATURATION: 98 % | DIASTOLIC BLOOD PRESSURE: 90 MMHG | HEART RATE: 84 BPM

## 2024-07-09 PROCEDURE — G0158 HHC OT ASSISTANT EA 15: HCPCS

## 2024-07-09 NOTE — HOME HEALTH
SUBJECTIVE: \"I think I did too much on 4th of July. I dropped a plate and a whole cake on the floor. I don't have any pain yet.\"     CAREGIVER INVOLVEMENT/ASSISTANCE NEEDED FOR: Pt's has a PCA to from 2:30 - 6 pm to assists w/ meals, and evening ADLs.    MD appt: [facility] @ [facility]  .  OBJECTIVE:  See interventions.    PATIENT EDUCATION PROVIDED THIS VISIT: Take pain medication at least 1 hour prior to therapy visit to prevent increased/onset of pain. (get ahead of it before therapy visits). Energy conservation - slow down, pace activity, take rest breaks when needed. Recommended use of FWW when fatigued and LE's feeling weaker.     PATIENT RESPONSE TO EDUCATION PROVIDED: Pt verbalized understanding.     PATIENT RESPONSE TO TREATMENT:   Pt w/ increased LE fatigue verbalizing legs feeling unsteady when attempting to ascend stairs w/ step to step pattern and s/p standing dynamic balance step taps. Pt was unable to descend steps standing, and slid down without falls.   .  ASSESSMENT OF PROGRESS TOWARD GOALS:   Note decreased L hip concentric/eccentric control on steps, noting B knee hyperextension w/ functional transfers from couch today indicating reduced BLE strength. She requires use of an AD at all times to reduce her risk of fall, and will need reinforcement as she often ambs indoors w/o it.  Skilled HHPT is medically necessary to address her reduced LE strength, activity tolerance, dynamic balance, gait safety w/ LRAD, stair mobility/safety, all to reduce pt's risk for falls, injury, and re-hospitalization, and restore safety and (I) w/ ADLs/IADLs.   .  PLAN FOR NEXT VISIT: Continue standing dynamic balance, LE strengthening, and stair training.     THE FOLLOWING DISCHARGE PLANNING WAS DISCUSSED WITH THE PATIENT/CAREGIVER: HH PT frequency 1w1, 2w3, 1w1 w/ planned DC on 7/29 to self family, and under MD supervision when goals met or max benefits achieved.

## 2024-07-10 ENCOUNTER — HOME CARE VISIT (OUTPATIENT)
Age: 49
End: 2024-07-10
Payer: MEDICARE

## 2024-07-10 ENCOUNTER — APPOINTMENT (OUTPATIENT)
Facility: HOSPITAL | Age: 49
End: 2024-07-10
Payer: MEDICARE

## 2024-07-10 ENCOUNTER — HOSPITAL ENCOUNTER (EMERGENCY)
Facility: HOSPITAL | Age: 49
Discharge: HOME | End: 2024-07-10
Attending: STUDENT IN AN ORGANIZED HEALTH CARE EDUCATION/TRAINING PROGRAM
Payer: MEDICARE

## 2024-07-10 VITALS
DIASTOLIC BLOOD PRESSURE: 88 MMHG | OXYGEN SATURATION: 100 % | SYSTOLIC BLOOD PRESSURE: 130 MMHG | WEIGHT: 160 LBS | HEART RATE: 86 BPM | HEIGHT: 63 IN | BODY MASS INDEX: 28.35 KG/M2 | RESPIRATION RATE: 9 BRPM | TEMPERATURE: 98.1 F

## 2024-07-10 VITALS
OXYGEN SATURATION: 98 % | DIASTOLIC BLOOD PRESSURE: 92 MMHG | RESPIRATION RATE: 16 BRPM | TEMPERATURE: 97 F | HEART RATE: 92 BPM | SYSTOLIC BLOOD PRESSURE: 139 MMHG

## 2024-07-10 DIAGNOSIS — R55 SYNCOPE AND COLLAPSE: Primary | ICD-10-CM
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
## 2024-07-10 LAB
ALBUMIN SERPL-MCNC: 3.7 G/DL (ref 3.4–5)
ALBUMIN/GLOB SERPL: 1 (ref 0.8–1.7)
ALP SERPL-CCNC: 55 U/L (ref 45–117)
ALT SERPL-CCNC: 25 U/L (ref 13–56)
ANION GAP SERPL CALC-SCNC: 6 MMOL/L (ref 3–18)
AST SERPL-CCNC: 9 U/L (ref 10–38)
BASOPHILS # BLD: 0 K/UL (ref 0–0.1)
BASOPHILS NFR BLD: 0 % (ref 0–2)
BILIRUB SERPL-MCNC: 0.7 MG/DL (ref 0.2–1)
BUN SERPL-MCNC: 7 MG/DL (ref 7–18)
BUN/CREAT SERPL: 11 (ref 12–20)
CALCIUM SERPL-MCNC: 9 MG/DL (ref 8.5–10.1)
CHLORIDE SERPL-SCNC: 105 MMOL/L (ref 100–111)
CK SERPL-CCNC: 50 U/L (ref 26–192)
CO2 SERPL-SCNC: 27 MMOL/L (ref 21–32)
CREAT SERPL-MCNC: 0.61 MG/DL (ref 0.6–1.3)
DIFFERENTIAL METHOD BLD: ABNORMAL
EKG ATRIAL RATE: 93 BPM
EKG DIAGNOSIS: NORMAL
EKG P AXIS: 70 DEGREES
EKG P-R INTERVAL: 168 MS
EKG Q-T INTERVAL: 352 MS
EKG QRS DURATION: 70 MS
EKG QTC CALCULATION (BAZETT): 437 MS
EKG R AXIS: 60 DEGREES
EKG T AXIS: 60 DEGREES
EKG VENTRICULAR RATE: 93 BPM
EOSINOPHIL # BLD: 0.1 K/UL (ref 0–0.4)
EOSINOPHIL NFR BLD: 1 % (ref 0–5)
ERYTHROCYTE [DISTWIDTH] IN BLOOD BY AUTOMATED COUNT: 13.3 % (ref 11.6–14.5)
GLOBULIN SER CALC-MCNC: 3.6 G/DL (ref 2–4)
GLUCOSE SERPL-MCNC: 94 MG/DL (ref 74–99)
HCT VFR BLD AUTO: 31.8 % (ref 35–45)
HGB BLD-MCNC: 11.4 G/DL (ref 12–16)
IMM GRANULOCYTES # BLD AUTO: 0 K/UL (ref 0–0.04)
IMM GRANULOCYTES NFR BLD AUTO: 0 % (ref 0–0.5)
LYMPHOCYTES # BLD: 2.2 K/UL (ref 0.9–3.6)
LYMPHOCYTES NFR BLD: 43 % (ref 21–52)
MCH RBC QN AUTO: 29.1 PG (ref 24–34)
MCHC RBC AUTO-ENTMCNC: 35.8 G/DL (ref 31–37)
MCV RBC AUTO: 81.1 FL (ref 78–100)
MONOCYTES # BLD: 0.4 K/UL (ref 0.05–1.2)
MONOCYTES NFR BLD: 8 % (ref 3–10)
NEUTS SEG # BLD: 2.4 K/UL (ref 1.8–8)
NEUTS SEG NFR BLD: 48 % (ref 40–73)
NRBC # BLD: 0 K/UL (ref 0–0.01)
NRBC BLD-RTO: 0 PER 100 WBC
PLATELET # BLD AUTO: 425 K/UL (ref 135–420)
PMV BLD AUTO: 8.7 FL (ref 9.2–11.8)
POTASSIUM SERPL-SCNC: 3.3 MMOL/L (ref 3.5–5.5)
PROT SERPL-MCNC: 7.3 G/DL (ref 6.4–8.2)
RBC # BLD AUTO: 3.92 M/UL (ref 4.2–5.3)
SODIUM SERPL-SCNC: 138 MMOL/L (ref 136–145)
TROPONIN I SERPL HS-MCNC: 4 NG/L (ref 0–54)
WBC # BLD AUTO: 5 K/UL (ref 4.6–13.2)

## 2024-07-10 PROCEDURE — G0157 HHC PT ASSISTANT EA 15: HCPCS

## 2024-07-10 PROCEDURE — 84484 ASSAY OF TROPONIN QUANT: CPT

## 2024-07-10 PROCEDURE — 70450 CT HEAD/BRAIN W/O DYE: CPT

## 2024-07-10 PROCEDURE — 93010 ELECTROCARDIOGRAM REPORT: CPT | Performed by: INTERNAL MEDICINE

## 2024-07-10 PROCEDURE — 94762 N-INVAS EAR/PLS OXIMTRY CONT: CPT

## 2024-07-10 PROCEDURE — 99285 EMERGENCY DEPT VISIT HI MDM: CPT

## 2024-07-10 PROCEDURE — 93005 ELECTROCARDIOGRAM TRACING: CPT | Performed by: STUDENT IN AN ORGANIZED HEALTH CARE EDUCATION/TRAINING PROGRAM

## 2024-07-10 PROCEDURE — 2580000003 HC RX 258: Performed by: STUDENT IN AN ORGANIZED HEALTH CARE EDUCATION/TRAINING PROGRAM

## 2024-07-10 PROCEDURE — 71045 X-RAY EXAM CHEST 1 VIEW: CPT

## 2024-07-10 PROCEDURE — 96360 HYDRATION IV INFUSION INIT: CPT

## 2024-07-10 PROCEDURE — 80053 COMPREHEN METABOLIC PANEL: CPT

## 2024-07-10 PROCEDURE — 82550 ASSAY OF CK (CPK): CPT

## 2024-07-10 PROCEDURE — 72125 CT NECK SPINE W/O DYE: CPT

## 2024-07-10 PROCEDURE — 85025 COMPLETE CBC W/AUTO DIFF WBC: CPT

## 2024-07-10 RX ORDER — SODIUM CHLORIDE, SODIUM LACTATE, POTASSIUM CHLORIDE, AND CALCIUM CHLORIDE .6; .31; .03; .02 G/100ML; G/100ML; G/100ML; G/100ML
1000 INJECTION, SOLUTION INTRAVENOUS ONCE
Status: COMPLETED | OUTPATIENT
Start: 2024-07-10 | End: 2024-07-10

## 2024-07-10 RX ADMIN — SODIUM CHLORIDE, POTASSIUM CHLORIDE, SODIUM LACTATE AND CALCIUM CHLORIDE 1000 ML: 600; 310; 30; 20 INJECTION, SOLUTION INTRAVENOUS at 10:51

## 2024-07-10 ASSESSMENT — PAIN SCALES - GENERAL: PAINLEVEL_OUTOF10: 8

## 2024-07-10 ASSESSMENT — PAIN - FUNCTIONAL ASSESSMENT
PAIN_FUNCTIONAL_ASSESSMENT: ACTIVITIES ARE NOT PREVENTED
PAIN_FUNCTIONAL_ASSESSMENT: 0-10

## 2024-07-10 ASSESSMENT — PAIN DESCRIPTION - FREQUENCY: FREQUENCY: CONTINUOUS

## 2024-07-10 ASSESSMENT — PAIN DESCRIPTION - ONSET: ONSET: SUDDEN

## 2024-07-10 ASSESSMENT — PAIN DESCRIPTION - DESCRIPTORS: DESCRIPTORS: ACHING

## 2024-07-10 ASSESSMENT — PAIN DESCRIPTION - LOCATION: LOCATION: HEAD;NECK

## 2024-07-10 NOTE — ED NOTES
Discharge paperwork explained and given to pt. Pt verbalized understanding. IV removed and pressure dressing applied. Pt in stable condition.

## 2024-07-10 NOTE — HOME HEALTH
SUBJECTIVE: Pt answered door with no AD, reported she was very stiff today and had elevated BP this morning that had improved a little since taking her meds, pt has been having headaches lately so therapist notified MD of concern.  CAREGIVER ASSISTANCE NEEDED FOR: pt has PCA 2:30-6 daily and her goal is to return to work during the day when speech improves.  MEDICATIONS REVIEWED AND UPDATED: no medication changes reported  .  OBJECTIVE: see interventions  PATIENT RESPONSE TO TREATMENT: Pt demonstrated positive response to treatment with no increased pain reported and good participation  .  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  PATIENT LEVEL OF UNDERSTANDING OF EDUCATION PROVIDED: Pt demonstrated good teachback of FMC and reported she has continued crocheting as well to help with hand strength.  ASSESSMENT AND PROGRESS TOWARD GOALS: Pt demonstrated good progress with mobility, HEP and ADL training but will benefit from continued education and progression to stair training and bathroom access in home.  PLAN: next visit will be for I/ADL and endurance training.   DISCHARGE PLANNING DISCUSSED: Discharge to self and family under MD supervision once all goals have been met or patient has reached maximum potential. Frequency remaininw1, 2w2, 1w1 remaining.

## 2024-07-10 NOTE — ED TRIAGE NOTES
PATIENT TAKEN TO THE EMERGENCY DEPT BY EMS WITH COMPLAINT OF HAVING A FALL TODAY AT 3AM, PATIENT STATES THAT SHE WENT TO USE THE RESTROOM AND FELL BETWEEN THE TOILET AND SHOWER AND WAS FELT DIZZY BEFORE AND AFTER FALL.    PATIENT ADMITS TO HITTING THE BACK OF HEAD AND HAVING A HEADACHE, PATIENT DENIES TAKING BLOOD THINNER HOWEVER ON ASA 81MG     GLUCOSE 129    PATIENT ATTACHED TO MONITOR,        PATIENT ALERT AND ORIENTED X 4. BREATHING ON ROOM AIR.

## 2024-07-10 NOTE — DISCHARGE INSTRUCTIONS
You were evaluated for syncope .  Based on your work-up it was deemed that she was stable for discharge.    Please follow-up with your primary care physician if you have any further concerns and go over your work-up.  If you experience any chest pain, shortness of breath, worsening abdominal pain, vomiting blood, worsening headache, seizures, or any worsening of your symptoms please return to the emergency department immediately.  If you have any pending results or any further questions please contact the emergency department at [phone].

## 2024-07-10 NOTE — FLOWSHEET NOTE
07/10/24 1030   Vital Signs   Orthostatic B/P and Pulse? Yes   Blood Pressure Lying 128/77   Pulse Lying 88 PER MINUTE   Blood Pressure Sitting 128/81   Pulse Sitting 89 PER MINUTE   Blood Pressure Standing 112/66   Pulse Standing 101 PER MINUTE     Orthostatic vitals completed

## 2024-07-10 NOTE — HOME CARE
Notified [facility] Transfer Team.  Active patient has discharged from ER.    
Yes

## 2024-07-10 NOTE — ED PROVIDER NOTES
Behavior: Behavior normal.           Diagnostic Study Results     Labs -  Recent Results (from the past 12 hour(s))   EKG 12 Lead    Collection Time: 07/10/24 10:06 AM   Result Value Ref Range    Ventricular Rate 93 BPM    Atrial Rate 93 BPM    P-R Interval 168 ms    QRS Duration 70 ms    Q-T Interval 352 ms    QTc Calculation (Bazett) 437 ms    P Axis 70 degrees    R Axis 60 degrees    T Axis 60 degrees    Diagnosis       Normal sinus rhythm  Septal infarct (cited on or before 01-JAN-2021)  Abnormal ECG  When compared with ECG of 26-JUN-2024 02:05,  No significant change was found     CBC with Auto Differential    Collection Time: 07/10/24 10:49 AM   Result Value Ref Range    WBC 5.0 4.6 - 13.2 K/uL    RBC 3.92 (L) 4.20 - 5.30 M/uL    Hemoglobin 11.4 (L) 12.0 - 16.0 g/dL    Hematocrit 31.8 (L) 35.0 - 45.0 %    MCV 81.1 78.0 - 100.0 FL    MCH 29.1 24.0 - 34.0 PG    MCHC 35.8 31.0 - 37.0 g/dL    RDW 13.3 11.6 - 14.5 %    Platelets 425 (H) 135 - 420 K/uL    MPV 8.7 (L) 9.2 - 11.8 FL    Nucleated RBCs 0.0 0  WBC    nRBC 0.00 0.00 - 0.01 K/uL    Neutrophils % 48 40 - 73 %    Lymphocytes % 43 21 - 52 %    Monocytes % 8 3 - 10 %    Eosinophils % 1 0 - 5 %    Basophils % 0 0 - 2 %    Immature Granulocytes % 0 0.0 - 0.5 %    Neutrophils Absolute 2.4 1.8 - 8.0 K/UL    Lymphocytes Absolute 2.2 0.9 - 3.6 K/UL    Monocytes Absolute 0.4 0.05 - 1.2 K/UL    Eosinophils Absolute 0.1 0.0 - 0.4 K/UL    Basophils Absolute 0.0 0.0 - 0.1 K/UL    Immature Granulocytes Absolute 0.0 0.00 - 0.04 K/UL    Differential Type AUTOMATED     Comprehensive Metabolic Panel    Collection Time: 07/10/24 10:49 AM   Result Value Ref Range    Sodium 138 136 - 145 mmol/L    Potassium 3.3 (L) 3.5 - 5.5 mmol/L    Chloride 105 100 - 111 mmol/L    CO2 27 21 - 32 mmol/L    Anion Gap 6 3.0 - 18 mmol/L    Glucose 94 74 - 99 mg/dL    BUN 7 7.0 - 18 MG/DL    Creatinine 0.61 0.6 - 1.3 MG/DL    BUN/Creatinine Ratio 11 (L) 12 - 20      Est, Glom Filt Rate >90

## 2024-07-10 NOTE — HOME HEALTH
Skilled reason for visit: ASSESSMENT FOR CVA AND MS AND TEACHING ON DISEASE PROCESS.    Caregiver involvement:COOKS, CLEANS AND TAKES PATIENT TO MD APPT.    Medications reviewed and all medications are available in the home this visit.    The following education was provided regarding medications:  INSTRUCTED ON IMPORTANCE OF MEDICATION COMPLIANCY..    MD notified of any discrepancies/look a-like medications/medication interactions: NA  Medications are IN HOME AND EFFECTIVE  at this time.      Home health supplies by type and quantity ordered/delivered this visit include: NA    Patient education provided this visit: TAUGHT S/S OF MS, HTN AND CVA, SEE NSG INTERVENTIONS, INSTRUCTED ON WHEN TO NOTIFY MD OF MEDICAL CHANGES.  Sharps education provided: YES    Patient level of understanding of education provided:GOOD, VERBALIZED UNDERSTANDING.    Skilled Care Performed this visit:, SEE NSG INTERVENTIONS.TAUGHT DISEASE PROCESS FOR MS AND CVA, INSTRUCTED ON IMPORTANCE OF MEDICATION AND DIETARY COMPLIANCY. LT SIDED WEAKNESS FROM CVA. MEDS IN HOME, PT/OT IN HOME.    Patient response to procedure performed:  GOOD.      Patient's Progress towards personal goals: PROGRESSING.    Home exercise program: DEEP BREATHING EXERCISES TO HELP PREVENT PNEUMONIA.    Continued need for the following skills: Nursing.    Plan for next visit: ASSESSMENT AND TEACHING ON MS AND CVA.    Patient and/or caregiver notified and agrees to changes in the Plan of Care: Yes.     The following discharge planning was discussed with the pt/caregiver: PATIENT WILL BE DISCHARGED WHEN GOALS ARE MET AND PATIENT IS STABLE.

## 2024-07-11 VITALS
SYSTOLIC BLOOD PRESSURE: 130 MMHG | DIASTOLIC BLOOD PRESSURE: 80 MMHG | HEART RATE: 113 BPM | OXYGEN SATURATION: 97 % | RESPIRATION RATE: 17 BRPM | TEMPERATURE: 97.3 F

## 2024-07-11 NOTE — HOME HEALTH
SUBJECTIVE: I fell this morning in the bathroom. I had an accident. I fell around 3am, and didn't get up til about 7am. I don't know if I passed out, but I pooped on myself and had to clean it all up. I have a headache and a knot on my head.\"       CAREGIVER INVOLVEMENT/ASSISTANCE NEEDED FOR: Pt's has a PCA to from 2:30 - 6pm to assists w/ meals, and evening ADLs.     MD appt: IRMA @ [facility]    .   OBJECTIVE:  See interventions.     PATIENT EDUCATION PROVIDED THIS VISIT: Recommended going to ED to be evaluated due to being on Asprin and associated symptoms and minor head injury from fall. Fall prevention strategies (see Fall Tracking and Interventions). See Interventions.     PATIENT RESPONSE TO EDUCATION PROVIDED: Pt verbalized understanding and agreeable to EMS services.     PATIENT RESPONSE TO TREATMENT:   Pt presents today up cleaning up her bathroom from having a bowel movement accident post fall. She was alert and oriented x4, w/ slight slurred speech, w/ cc of HA and neck pain. Note small knot on back of patient's head from fall in bathroom.      ASSESSMENT OF PROGRESS TOWARD GOALS:   1st fall since SOC d/t pt's unknown reasoning when attempting to stand from toilet. Pt able to navigate stairs this visit w/ BUE support using wall and handrail this visit which is an improvement from pt sliding down the steps. She requires touch assistance on walls and nearby furniture for balance. Pt education provided on fall prevention strategies.   EMS services were called and pt transported to [facility]. PCP Krishan Hampton MD, left message w/ Miriam at  [phone]) to notify of fall and pt being transported to [facility].  medical team, managers, schedulers and intake notified as well. Pt will require PFA from supervising PT next visit.      PLAN FOR NEXT VISIT: Complete PFA next visit.     THE FOLLOWING DISCHARGE PLANNING WAS DISCUSSED WITH THE PATIENT/CAREGIVER:  PT frequency 1w1, 2w3, 1w1 w/ planned DC on

## 2024-07-12 ENCOUNTER — HOME CARE VISIT (OUTPATIENT)
Age: 49
End: 2024-07-12
Payer: MEDICARE

## 2024-07-12 VITALS
HEART RATE: 91 BPM | OXYGEN SATURATION: 98 % | RESPIRATION RATE: 18 BRPM | SYSTOLIC BLOOD PRESSURE: 140 MMHG | DIASTOLIC BLOOD PRESSURE: 88 MMHG | TEMPERATURE: 98.3 F

## 2024-07-12 PROCEDURE — G0158 HHC OT ASSISTANT EA 15: HCPCS

## 2024-07-13 NOTE — HOME HEALTH
SUBJECTIVE: Pt answered door with no AD, reported she was feeling ok today but was still foggy with headaches and had top range BP again even though meds were taken a fewhours ago. Pt sees MD today to address BP medications.  CAREGIVER ASSISTANCE NEEDED FOR: pt lives alone, has PCA in the afternoons daily  MEDICATIONS REVIEWED AND UPDATED: no medication changes reported   .  OBJECTIVE: see interventions  PATIENT RESPONSE TO TREATMENT: Pt demonstrated positive response to treatment with no increase pain and good participation.  .  PATIENT/CAREGIVER EDUCATION PROVIDED THIS VISIT:  Therapist educated pt on importance of repetition, creating routines, energy conservation with tasks and safety in kitchen.  PATIENT LEVEL OF UNDERSTANDING OF EDUCATION PROVIDED: Pt verbalized understanding with recommendations and safety techniques, pt will try to remove knobs from burners she does not use and find some meals she can prep in bulk ahead of time for her weaker days.  ASSESSMENT AND PROGRESS TOWARD GOALS: Pt demonstrated good progress with IADLS for meals and housekeeping with imprved endurance and safety.  PLAN: next visit will be for ADL and stair mobility training to address second floor of home.   DISCHARGE PLANNING DISCUSSED: Discharge to self and family under MD supervision once all goals have been met or patient has reached maximum potential. Frequency remaininw2, 1w1 remaining.

## 2024-07-14 NOTE — ED NOTES
I performed a brief evaluation, including history and physical, of the patient here in triage and I have determined that pt will need further treatment and evaluation from the main side ER physician. I have placed initial orders to help in expediting patients care. May 15, 2017 at 4:04 PM - Kasi Painter MD        Visit Vitals    BP (!) 142/113 (BP 1 Location: Right arm, BP Patient Position: At rest;Sitting)    Pulse (!) 116    Temp 98.1 °F (36.7 °C)    Resp 18    Ht 5' 3\" (1.6 m)    Wt 117.9 kg (260 lb)    SpO2 97%    BMI 46.06 kg/m2          Patient arrived with friend via car and we were called to come to lobby for patient having seizure. Patient was noted to be shaking however she immediately was able to track and look at us and then talk to the RN and myself. She had no post ictal period and was able to state her name and medications. She is on keppra for her seizures. She has history of MS  Patient with complaints of headache and leg cramping.  Stated \"I dont feel well\"  LL MD mild

## 2024-07-15 ENCOUNTER — HOME CARE VISIT (OUTPATIENT)
Age: 49
End: 2024-07-15
Payer: MEDICARE

## 2024-07-15 VITALS
SYSTOLIC BLOOD PRESSURE: 134 MMHG | DIASTOLIC BLOOD PRESSURE: 82 MMHG | TEMPERATURE: 97.9 F | OXYGEN SATURATION: 98 % | RESPIRATION RATE: 17 BRPM | HEART RATE: 93 BPM

## 2024-07-15 PROCEDURE — G0151 HHCP-SERV OF PT,EA 15 MIN: HCPCS

## 2024-07-15 ASSESSMENT — ENCOUNTER SYMPTOMS: PAIN LOCATION - PAIN QUALITY: ACHING

## 2024-07-15 NOTE — HOME HEALTH
POST FALL:   Date and Time of Fall: 7/10.  2-3 AM  SOC/KATE Date: 6/30/24  Fall observed by HH Staff? No  Describe Event and Document any re-training or treatment plan modifications indicated:  Pt states she fell while trying to get up from the toilet.  She reports she hit her head on the toilet seat. States she did not lose consciousness  Response to re-training or treatment plan modifications: PT advised moving slowly between positions, stay hydrated, use AD  Assisted Devices used by patient prior to fall: Cane  Was equipment in use at time of fall? No  Injury (Yes / No), If yes, describe:  Yes- bump on the head  Emergent Care Received: (Yes / No), if yes, describe: Yes- pt sent home after assessment  Was patient identified as High Risk for falls? Yes  List Tests Performed, Scores of Tests, and Patient Risk Factors: Tinetti = 20/28  MD Notified (name and time): Fer Segundos via Melissa. 7/15 3:16.  Pt sees MD tomorrow      SUBJECTIVE: \"I think I'm doing better\"  CAREGIVER INVOLVEMENT/ ASSISTANCE NEEDED FOR: Transportation  MEDICATIONS REVIEWED AND UPDATED: Amlodipine added.  Pt reports she was instructed to take 2 5mg tabs.     WOUNDS: No wounds .  Wounds at eval: No wounds  ROM: B LE's WFL.  ROM at eval: B LE's WFL  STRENGTH:  R LE grossly 3+/5.  L LE grossly 3+/5.  Strength at eval: R hip flexion 3-/5, abd 3-/5; knee / 3+/5, knee flexion 3+/5; dorsiflexion 4/5.  L hip flexion 2+/5, abd 2+/5; knee / 3-/5, knee flexion 3-/5; dorsiflexion 2+/5  BED MOBILITY: I supine<>sit and rolling L/R.  Bed Mobility at eval: I supine<>sit and rolling L/R  TRANSFERS: Sit<>stand from sofa and toilet with S.  Transfers at eval: Sit<>stand from sofa and EOB with CG  GAIT: Pt amb in the home and on sidewalk with S w/o AD.  Gait deficits noted: decreased L foot clearance.  Gait at eval: Pt amb with SPC and intermittent CG.  Gait characterized by short steps, slow gait, decreased foot clearance  STAIRS: Pt amb up/down one flight of steps

## 2024-07-16 ENCOUNTER — HOME CARE VISIT (OUTPATIENT)
Age: 49
End: 2024-07-16
Payer: MEDICARE

## 2024-07-16 VITALS
HEART RATE: 95 BPM | DIASTOLIC BLOOD PRESSURE: 82 MMHG | SYSTOLIC BLOOD PRESSURE: 150 MMHG | RESPIRATION RATE: 17 BRPM | OXYGEN SATURATION: 99 % | TEMPERATURE: 97.9 F

## 2024-07-16 VITALS
RESPIRATION RATE: 18 BRPM | OXYGEN SATURATION: 100 % | HEART RATE: 85 BPM | DIASTOLIC BLOOD PRESSURE: 88 MMHG | SYSTOLIC BLOOD PRESSURE: 142 MMHG | TEMPERATURE: 97.8 F

## 2024-07-16 PROCEDURE — G0158 HHC OT ASSISTANT EA 15: HCPCS

## 2024-07-16 PROCEDURE — G0153 HHCP-SVS OF S/L PATH,EA 15MN: HCPCS

## 2024-07-16 ASSESSMENT — ENCOUNTER SYMPTOMS: PAIN LOCATION - PAIN QUALITY: ACHING

## 2024-07-16 NOTE — HOME HEALTH
SUBJECTIVE: Pt in living room upon arrival, reported she saw MD yesterday morning, they increased her amlodipine. Pt had fall early this morning aorund 4am and went to ED, reported she had x-rays and her R wrist is fractured, she is now in a brace and has been referred to ortho. Following this fall pt agreeable to transfer and safety training upstairs in bathroom and reported MD signed order for 3:1 commode as requested by OT, pt needs to have one for safety with toileting as only bathroom is on second floor of home and where pt continues to have falls. Therapist also recommended quad base cane for increased stability and safety as pt reported SPC does not feel safe enough and is falling down with her.  CAREGIVER ASSISTANCE NEEDED FOR: pt lives alone and has CG in afternoons daily  MEDICATIONS REVIEWED AND UPDATED: Pts MD has now prescribed 10mg Amlodipine to take once daily to help with BP elevation, care team notified  .  OBJECTIVE: see interventions  PATIENT RESPONSE TO TREATMENT: Pt demonstrated positive response to treament with no increased pain and good participation with safety training in bathroom.  .  PATIENT/CAREGIVER EDUCATION PROVIDED THIS VISIT:  Therapist educated pt on how beneficial recommended equipment will be when received, safety with transfers and stair navigation and importance of keeping her phone with her for emergencies.  PATIENT LEVEL OF UNDERSTANDING OF EDUCATION PROVIDED: Pt verbalized good understanding and demonstrated teachback of safe transfer techniques.  ASSESSMENT AND PROGRESS TOWARD GOALS: Pt demonstrated good progress towards goals and will benefit from receiving and being trained with DME that has been requested and reinforcement of education.  PLAN: next visit will be for continued ADL and safety training.   DISCHARGE PLANNING DISCUSSED: Discharge to self and family under MD supervision once all goals have been met or patient has reached maximum potential. Frequency remaining:

## 2024-07-16 NOTE — HOME HEALTH
ST INITIAL EVALUATION:    RECENT HX OF CURRENT ILLNESS/REASON FOR REFERRAL: Patient is 50 yo female referred for skilled  speech therapy evaluation due to change in cognition and aphasia.    PLOF/DIET/COMMUNICATION: Regular solids, thin liquids, no prior cognitive communication     PAST MEDICAL HISTORY: per epic, \"Anticoagulant long-term use, Arthropathy, unspecified, site unspecified, Depression, Diabetes (HCC), Hypercholesteremia, Hypertension, History of HTN, Insomnia, Migraine, MS (multiple sclerosis) ([facility]), Neurogenic bladder, Seizures.\"    CURRENT RESIDENCE/LIVING SITUATION: Patient currently lives in apartment alone.    SUBJECTIVE (PT/FAMILY COMMENTS, THERAPIST OBSERVATIONS): \"I fractured my wrist this morning.\"    CAREGIVER INVOLVEMENT: medication management     ASSESSMENT / BARRIERS / PLAN: Patient presents with moderate-severe cognitive lingusitic deficits evidenced by SLUMS Assessment score of 10/30, which suggests dementia. Patient demonstrated difficulty with tasks requiring problem solving, thought organization, generative naming, visuospatial skills, and stm recall. Throughout assessment, patient exhibited difficulty with following 1-step directions with complete accuracy due to reduced thought organization and auditory comprehension. With repetition and rehearsal, patient was able to improve accuracy. Patient reported recent difficulties with daily cognitive tasks involving completing job, safety awareness in home involving stove, and problem solving/thought organization need for medication management. Patient exhibited aphasia throughout conversational tasks, which impacts patient's ability to effectively communicate for daily conversations. Patient requires skilled speech therapy services for 2wk3 to target cognitive lingusitic deficits and aphasia by implementing compensatory strategies, graded exercises, and education to improve safety awareness, participate in conversations, and independence

## 2024-07-17 ENCOUNTER — HOME CARE VISIT (OUTPATIENT)
Age: 49
End: 2024-07-17
Payer: MEDICARE

## 2024-07-17 PROCEDURE — G0157 HHC PT ASSISTANT EA 15: HCPCS

## 2024-07-17 NOTE — CASE COMMUNICATION
Patient presents with moderate-severe cognitive lingusitic deficits evidenced by SLUMS Assessment score of 10/30, which suggests dementia. Patient demonstrated difficulty with tasks requiring problem solving, thought organization, generative naming, visuospatial skills, and stm recall. Throughout assessment, patient exhibited difficulty with following 1-step directions with complete accuracy due to reduced thought organization and audit ory comprehension. With repetition and rehearsal, patient was able to improve accuracy. Patient reported recent difficulties with daily cognitive tasks involving completing job, safety awareness in home involving stove, and problem solving/thought organization need for medication management. Patient exhibited aphasia throughout conversational tasks, which impacts patient's ability to effectively communicate for daily conversations. Jory ent requires skilled speech therapy services for 2wk3 to target cognitive lingusitic deficits and aphasia by implementing compensatory strategies, graded exercises, and education to improve safety awareness, participate in conversations, and independence with cognitive communication tasks.

## 2024-07-18 VITALS
HEART RATE: 97 BPM | DIASTOLIC BLOOD PRESSURE: 80 MMHG | RESPIRATION RATE: 16 BRPM | TEMPERATURE: 99.1 F | OXYGEN SATURATION: 98 % | SYSTOLIC BLOOD PRESSURE: 136 MMHG

## 2024-07-18 NOTE — PROGRESS NOTES
Physician Progress Note      PATIENT:               ONEAL CAIN  CSN #:                  [id number]  :                       1975  ADMIT DATE:       2024 1:55 AM  DISCH DATE:        2024 6:25 PM  RESPONDING  PROVIDER #:        Jaime Apple MD          QUERY TEXT:    Dear  PF[facility]  physicians    Patient admitted with left  sided  weakness.   Documentation reflects  If   possible, please document in the progress notes and discharge summary if :    The medical record reflects the following:  Risk Factors: last  MS  flare 1  year  ago.  Clinical Indicators: MRI- Small right frontal focus of signal abnormality may   represent a subacute  infarct. Alternatively nonenhancing small demyelinating lesion given reported   history.  attending- MRI without evidence for MS flare but does show a small right   frontal focus of signal abnormality that may represent a subacute infarct. Per   neuro, this lesion is unlikely to be causing current symptoms.  neuro consult-   Reviewed her MRI of the brain which showed a small right   frontal possible subacute infarction; cannot rule out the possibility of   nonenhancing MS lesion.  Treatment: neuro consult;  continue with Copaxone  -Aspirin 81 mg p.o. daily  -Transthoracic echo  Thank you,    Sonam Aguilera RN   CCDS  Options provided:  -- MS  flare  confirmed  as  possible  after study  -- MS  flare   ruled out after study  -- Other - I will add my own diagnosis  -- Disagree - Not applicable / Not valid  -- Disagree - Clinically unable to determine / Unknown  -- Refer to Clinical Documentation Reviewer    PROVIDER RESPONSE TEXT:    Provider is clinically unable to determine a response to this query.    Query created by: Janee Aguilera on 2024 7:15 AM      Electronically signed by:  Jaime Apple MD 2024 9:32 AM

## 2024-07-18 NOTE — HOME HEALTH
SUBJECTIVE: \"They increased the blood pressure pills to 10 mg, and pushing for the equipment.\" Pt denies pain today. \"I go back to the primary doctor on July 31st. They put this case on me, and they will call me to talk about putting a hard case on me.\"       CAREGIVER INVOLVEMENT/ASSISTANCE NEEDED FOR: Pt's has a PCA to from 2:30 - 6pm to assists w/ meals, and evening ADLs.     MD appt: IRMA @ [facility]     OBJECTIVE:  See interventions.     PATIENT EDUCATION PROVIDED THIS VISIT: Recommended contacting insurance to see if Aide services can be extended for morning hours.      PATIENT RESPONSE TO EDUCATION PROVIDED: Pt verbalized understanding, stating she asked about it, and insurance will contact her if approved.     PATIENT RESPONSE TO TREATMENT:   Pt verbalized increased fatigue s/p standing balance there ex review today. No falls reported since last visit.     ASSESSMENT OF PROGRESS TOWARD GOALS:   Pt progressed well today towards standing balance interventions. She was able to complete dynamic balance and strengthening there ex progressing w/ squats and standing there ex, however requiring BUE support d/t LE weakness - note residual L LE weakness (previous CVA) and decreased hip control w/ standing hamstring curls, and hip abduction.  She reports navigating the steps normally w/ step to step and occasional reciprocal pattern in comparison to sliding on buttock on steps as demonstrated earlier last week. This indicates improved BLE strength, and mobility. Pt has concern of \"toe walking\" during ambulation, w/ recommendation to contact Neurology for a f/u appt. She is currently waiting on DME (Quad Cane and BSC) to help improver her transfer and gait safety, as she reports not feeling sturdy w/ SPC. Skilled HHPT is medically necessary to decrease pt's risk for falls, improve pt's LE strength, balance, and stair and gait safety w/ skilled interventions and training w/ progressive HEP, gait, balance, neuromuscular

## 2024-07-19 ENCOUNTER — HOME CARE VISIT (OUTPATIENT)
Age: 49
End: 2024-07-19
Payer: MEDICARE

## 2024-07-19 VITALS
SYSTOLIC BLOOD PRESSURE: 138 MMHG | RESPIRATION RATE: 18 BRPM | DIASTOLIC BLOOD PRESSURE: 86 MMHG | HEART RATE: 89 BPM | OXYGEN SATURATION: 99 % | TEMPERATURE: 97.3 F

## 2024-07-19 VITALS
DIASTOLIC BLOOD PRESSURE: 85 MMHG | HEART RATE: 93 BPM | OXYGEN SATURATION: 99 % | RESPIRATION RATE: 17 BRPM | TEMPERATURE: 97.3 F | SYSTOLIC BLOOD PRESSURE: 128 MMHG

## 2024-07-19 PROCEDURE — G0158 HHC OT ASSISTANT EA 15: HCPCS

## 2024-07-19 PROCEDURE — G0153 HHCP-SVS OF S/L PATH,EA 15MN: HCPCS

## 2024-07-19 NOTE — HOME HEALTH
SUBJECTIVE: Patient reported no changes since last session.     CAREGIVER INVOLVEMENT / ASSISTANCE NEEDED FOR: transportation, medication management    OBJECTIVE / PATIENT RESPONSE TO TREATMENT / PATIENT LEVEL OF UNDERSTANDING OF EDUCATION PROVIDED: With SLP education, patient was able to teach-back compensatory strategies for memory and word retrieval. SLP recommended use of daily calendar/notepad to improve recall of appointments, finances, and food intake to which patient verbalized understanding. With semantic feature analysis, patient was able to utilize with mod-max cues for word retrieval. Patient continues to progess towards  speech therapy goals.    ASSESSMENT OF PROGRESS TOWARD GOALS: Patient demonstrated good teach-back of memory and expressive language strategies.    CONTINUED NEED FOR THE FOLLOWING SKILLS: Patient continues with deficits in expressive language skills, cognition, and executive functioning, therefore patient requires skilled speech therapy to improve safety awareness, management of daily cognitive tasks, and participation in daily conversations.    PLAN FOR NEXT VISIT : Plans to continue implementing graded exercises/    HOME EXERCISE PROGRAM: implement memory strategies and word retrieval strategies.    THE FOLLOWING DISCHARGE PLANNING WAS DISCUSSED WITH THE PATIENT/CAREGIVER: ST to d/c in 4 more visits/wks or when goals met/max potential achieved, Pt/caregiver verbalized understanding.

## 2024-07-20 NOTE — HOME HEALTH
SUBJECTIVE: Pt answered door with no AD, therapist reiterated importance of using cane at all times due to pt recent falls. Pt has received BSC but not tub bench, therpi will reach out to MD about this.  CAREGIVER ASSISTANCE NEEDED FOR: pt lives alone and has PCA in afternoons  MEDICATIONS REVIEWED AND UPDATED: no medication changes reported  .  OBJECTIVE: see interventions  PATIENT RESPONSE TO TREATMENT: Pt demonstrated positive response to treatment with good participation and no report of increased pain.  .  PATIENT/CAREGIVER EDUCATION PROVIDED THIS VISIT:  Therapist educated pt on set-up and use of BSC in living room, hygiene tips and kitchen meal prep options to help with reducing time spent making individual meals.  PATIENT LEVEL OF UNDERSTANDING OF EDUCATION PROVIDED: Pt verbalized good understanding, demonstrated good carry over of meal prep tasks and has good understanding of BSC use.  ASSESSMENT AND PROGRESS TOWARD GOALS: Pt demonstrated good progress towards IADL, mobility and safety goals with decreased asisstance but still needs v/c reminders to use cane for safety.  PLAN: next visit will be for further ADL and safety training with AD in bathroom.   DISCHARGE PLANNING DISCUSSED: Discharge to self and family under MD supervision once all goals have been met or patient has reached maximum potential. Frequency remaininw1, 1w1 remaining.

## 2024-07-22 ENCOUNTER — HOME CARE VISIT (OUTPATIENT)
Age: 49
End: 2024-07-22
Payer: MEDICARE

## 2024-07-22 VITALS
DIASTOLIC BLOOD PRESSURE: 74 MMHG | TEMPERATURE: 97.3 F | OXYGEN SATURATION: 99 % | RESPIRATION RATE: 18 BRPM | HEART RATE: 96 BPM | SYSTOLIC BLOOD PRESSURE: 130 MMHG

## 2024-07-22 VITALS
OXYGEN SATURATION: 99 % | TEMPERATURE: 98.1 F | SYSTOLIC BLOOD PRESSURE: 112 MMHG | HEART RATE: 102 BPM | RESPIRATION RATE: 17 BRPM | DIASTOLIC BLOOD PRESSURE: 65 MMHG

## 2024-07-22 PROCEDURE — G0158 HHC OT ASSISTANT EA 15: HCPCS

## 2024-07-22 PROCEDURE — G0157 HHC PT ASSISTANT EA 15: HCPCS

## 2024-07-22 PROCEDURE — G0153 HHCP-SVS OF S/L PATH,EA 15MN: HCPCS

## 2024-07-22 ASSESSMENT — ENCOUNTER SYMPTOMS
PAIN LOCATION - PAIN QUALITY: ACHING, THROBBING
PAIN LOCATION - PAIN QUALITY: ACHING

## 2024-07-22 NOTE — HOME HEALTH
SUBJECTIVE: Pt answered door with quad cane upon arrival, she was able to obtain this and a tub bench through community resource. Therapist assisted with set-up and education on tub bench use and quad cane setting for height.  CAREGIVER ASSISTANCE NEEDED FOR: pt lives alone, has PCA in afternoons  MEDICATIONS REVIEWED AND UPDATED: no medication changes reported  .  OBJECTIVE: see interventions  PATIENT RESPONSE TO TREATMENT: Pt demonstrated positive response to treatment with no increase in pain and good participation.  .  PATIENT/CAREGIVER EDUCATION PROVIDED THIS VISIT:  Therapist educated pt on proper use of new DME, safety with ADLs at shower level, recommended night light placement upstairs for added safety.  PATIENT LEVEL OF UNDERSTANDING OF EDUCATION PROVIDED: Pt demonstrated good teachback of education, verbalized that she has a nightlight but the battery is dead so they have ordered better ones.  ASSESSMENT AND PROGRESS TOWARD GOALS: Pt demonstrated good progress with ADLs and transfer/stair safety with decreased assistance.  PLAN: next visit will be for final I/ADL assessment prior to DC planning.   DISCHARGE PLANNING DISCUSSED: Discharge to self and family under MD supervision once all goals have been met or patient has reached maximum potential. Frequency remaininw2 remaining with pt aware of OTDC next week.

## 2024-07-23 VITALS
DIASTOLIC BLOOD PRESSURE: 70 MMHG | OXYGEN SATURATION: 99 % | RESPIRATION RATE: 17 BRPM | TEMPERATURE: 99.3 F | HEART RATE: 112 BPM | SYSTOLIC BLOOD PRESSURE: 110 MMHG

## 2024-07-23 NOTE — HOME HEALTH
SUBJECTIVE: Pt reports no falls since last visit, and no pain today.     CAREGIVER INVOLVEMENT/ASSISTANCE NEEDED FOR: Pt's has a PCA to from 2:30 - 6pm to assists w/ meals, and evening ADLs.     MD appt: DIOGO 8/3/2024     OBJECTIVE:  See interventions.       PATIENT EDUCATION PROVIDED THIS VISIT: Fall Prevention. - see interventions.      PATIENT RESPONSE TO EDUCATION PROVIDED: Pt verbalized understanding.       PATIENT RESPONSE TO TREATMENT:   No increased pain or fatigue reported s/p gait and stair training today.     ASSESSMENT OF PROGRESS TOWARD GOALS:   Pt presents today completing household chores (cleaning out her pantry), and ambulating indoors w/o an AD w/o obvious signs of fatigue. She progressed well towards outdoor gait goals completing >200ft w/o loss of balance or falls. Pt was able to verbalize fall prevention measures in her home today indicating good carry over and awareness of fall prevention strategies. Pt is negotiating steps w/ reciprocal pattern, and use of handrails w/o falls. She verbalized she will begin to utilize her SBQC more consistently and felt comfortable using AD. Pt is on track for discharge from  PT in 2 visits. Skilled interventions are medically necessary to decrease pt's risk for falls, improve pt's LE strength, balance, and stair and gait safety w/ skilled interventions and training w/ progressive HEP, gait, balance, neuromuscular re-education.     PLAN FOR NEXT VISIT: Review progress towards Tinetti Balance/Gait Assessment, FTSTS, and gait training next visit.     THE FOLLOWING DISCHARGE PLANNING WAS DISCUSSED WITH THE PATIENT/CAREGIVER:  PT frequency 1w1, 2w3, 1w1 w/ planned DC on 7/29 to self, family, and under MD supervision.

## 2024-07-23 NOTE — HOME HEALTH
SUBJECTIVE: Patient reported no changes since last session.     CAREGIVER INVOLVEMENT / ASSISTANCE NEEDED FOR: transportation, medication management     OBJECTIVE / PATIENT RESPONSE TO TREATMENT / PATIENT LEVEL OF UNDERSTANDING OF EDUCATION PROVIDED: Patient was able to teach-back compensatory strategies for memory and word retrieval. With semantic feature analysis, patient was able to utilize with min-mod cues for word retrieval tasks. Patient was able to implement executive functioning strategies for increased accuracy with functional calculations and management of medication. Patient continues to progess towards  speech therapy goals.     ASSESSMENT OF PROGRESS TOWARD GOALS: Patient demonstrated great improvement with problem solving tasks involving functional calculations and medication management.    CONTINUED NEED FOR THE FOLLOWING SKILLS: Patient continues with deficits in expressive language skills, cognition, and executive functioning, therefore patient requires skilled speech therapy to improve safety awareness, management of daily cognitive tasks, and participation in daily conversations.     PLAN FOR NEXT VISIT : Plans to continue target expressive language deficits and cognitive communication deficits    HOME EXERCISE PROGRAM: implement memory strategies and word retrieval strategies.     THE FOLLOWING DISCHARGE PLANNING WAS DISCUSSED WITH THE PATIENT/CAREGIVER: ST to d/c in 3 more visits/wks or when goals met/max potential achieved, Pt/caregiver verbalized understanding.

## 2024-07-24 ENCOUNTER — HOME CARE VISIT (OUTPATIENT)
Age: 49
End: 2024-07-24
Payer: MEDICARE

## 2024-07-24 VITALS
HEART RATE: 104 BPM | SYSTOLIC BLOOD PRESSURE: 130 MMHG | RESPIRATION RATE: 17 BRPM | DIASTOLIC BLOOD PRESSURE: 80 MMHG | TEMPERATURE: 97.7 F | OXYGEN SATURATION: 99 %

## 2024-07-24 VITALS
TEMPERATURE: 98.9 F | RESPIRATION RATE: 18 BRPM | HEART RATE: 95 BPM | OXYGEN SATURATION: 99 % | DIASTOLIC BLOOD PRESSURE: 76 MMHG | SYSTOLIC BLOOD PRESSURE: 128 MMHG

## 2024-07-24 PROCEDURE — G0158 HHC OT ASSISTANT EA 15: HCPCS

## 2024-07-24 PROCEDURE — G0153 HHCP-SVS OF S/L PATH,EA 15MN: HCPCS

## 2024-07-24 NOTE — HOME HEALTH
SUBJECTIVE: Patient reported using semantic feature analysis at the grocery store because she was unable to think of the word \"flour.\"    CAREGIVER INVOLVEMENT / ASSISTANCE NEEDED FOR: transportation, medication management     OBJECTIVE / PATIENT RESPONSE TO TREATMENT / PATIENT LEVEL OF UNDERSTANDING OF EDUCATION PROVIDED: Patient has demonstrated signficant progress towards  speech therapy goals with implementation of compensatory memory strategies and speech strategies. Patient was able to complete word retreival tasks, language comprehension tasks, and problem solving tasks with increased accuracy. Patient demonstrated great carryover between sessions in order to manage daily cognitive communication tasks.    ASSESSMENT OF PROGRESS TOWARD GOALS: Patient demonstrated great improvement with word retrieval for daily conversations.    CONTINUED NEED FOR THE FOLLOWING SKILLS: Patient continues with deficits in expressive language skills, cognition, and executive functioning, therefore patient requires skilled speech therapy to implement home maintenance program.     PLAN FOR NEXT VISIT : Plans for discharge.    HOME EXERCISE PROGRAM: implement memory strategies and word retrieval strategies.     THE FOLLOWING DISCHARGE PLANNING WAS DISCUSSED WITH THE PATIENT/CAREGIVER: ST to d/c in 1 more visits/wks or when goals met/max potential achieved, Pt/caregiver verbalized understanding.

## 2024-07-25 NOTE — HOME HEALTH
SUBJECTIVE: Pt answered door with no AD, reported she had taken shower using tub bench with her fiance home just in case but did so with no assistance and it went well.  CAREGIVER ASSISTANCE NEEDED FOR: pt lives alone, has PCA in afternoons who has been assisting with larger meal prep  MEDICATIONS REVIEWED AND UPDATED: no medication changes reported  .  OBJECTIVE: see interventions  PATIENT RESPONSE TO TREATMENT: Pt demonstrated positive response to treatment with no increased pain and good participation.  .  PATIENT/CAREGIVER EDUCATION PROVIDED THIS VISIT:  Therapist educated pt on final safety with kitchen, set-up and stove use, visual aides to help with turning things off or setting timers.  PATIENT LEVEL OF UNDERSTANDING OF EDUCATION PROVIDED: Pt verbalized understanding of education and demonstrated teachback of kitchen safety when she is alone and use of alarm to remind her to stop/turn off things when done.  ASSESSMENT AND PROGRESS TOWARD GOALS: Pt demonstrated good progress and has met all goals, ready for DC next visit to transition to OP.  PLAN: next visit will be for OTDC as pt has met all goals at this time and has been educated on proper use of DME.   DISCHARGE PLANNING DISCUSSED: Discharge to self and family under MD supervision once all goals have been met or patient has reached maximum potential. Frequency remaininw1 remaining with pt aware of OTDC next week.

## 2024-07-25 NOTE — CASE COMMUNICATION
Thank you!    ----- Message -----  From: Tamara Ulloa OTA  Sent: 7/24/2024   8:18 PM EDT  To: Krishan Rios MD; *      Dr. Rios:  Home health therapies will all be discharging Mrs. Siddiqui next week and she would like to continue on to OP therapy for OT, PT and ST. [facility] all 3 disciplines to begin week of 8/5/24, Their fax number is [phone].    Thank you!

## 2024-07-29 ENCOUNTER — HOME CARE VISIT (OUTPATIENT)
Age: 49
End: 2024-07-29
Payer: MEDICARE

## 2024-07-29 VITALS
OXYGEN SATURATION: 99 % | SYSTOLIC BLOOD PRESSURE: 121 MMHG | TEMPERATURE: 97.3 F | DIASTOLIC BLOOD PRESSURE: 72 MMHG | RESPIRATION RATE: 17 BRPM | HEART RATE: 101 BPM

## 2024-07-29 VITALS
SYSTOLIC BLOOD PRESSURE: 122 MMHG | TEMPERATURE: 97 F | DIASTOLIC BLOOD PRESSURE: 78 MMHG | OXYGEN SATURATION: 98 % | HEART RATE: 93 BPM | RESPIRATION RATE: 17 BRPM

## 2024-07-29 PROCEDURE — G0152 HHCP-SERV OF OT,EA 15 MIN: HCPCS

## 2024-07-29 PROCEDURE — G0153 HHCP-SVS OF S/L PATH,EA 15MN: HCPCS

## 2024-07-29 PROCEDURE — G0151 HHCP-SERV OF PT,EA 15 MIN: HCPCS

## 2024-07-29 NOTE — HOME HEALTH
safety, HEP, walking, deep breathing           PATIENT LEVEL OF UNDERSTANDING OF EDUCATION PROVIDED Pt/CG verbalizes understanding of all information and demo's back as appropriate       Patient is s/p MS/CVA and has been treated for strengthening, gait training, stair training, HEP training, safety training, and balance training.  Pt has made progress and met PT goals.  She is I/MOD I in the home and community using a cane as needed.  Balance assessment shows low fall risk.  She did have 2 falls this admission which were both related to gettting up at night to use the bathroom and feeling dizzy.  PT has educated her on moving slowly between positions and taking her time.  She also now has a BSC to prevent the need to walk to the bathroom at night.  Pt has resumed driving and I community mobility and is no longer homebound.  FRED has contacted pt's doctor for an outpt referral..    ROM: B LE's WFL.  ROM at eval: B LE's WFL  STRENGTH: 5 STS = 12.6 sec. B hip flexion 3+/5, abd 3/5; R knee extension 3+/5, L knee extension 4/5; dorsiflexion 4/5.  Strength at eval: R hip flexion 3-/5, abd 3-/5; knee / 3+/5, knee flexion 3+/5; dorsiflexion 4/5.  L hip flexion 2+/5, abd 2+/5; knee / 3-/5, knee flexion 3-/5; dorsiflexion 2+/5  WOUNDS: No wounds.  Wound status at eval: No wounds  BED MOBILITY: I supine<>sit and rolling.  Bed mobility at eval: I supine<>sit and rolling L/R  TRANSFERS: I sit<>stand from varied surfaces, I car transfer.  Transfers at eval: Sit<>stand from sofa and EOB with CG  GAIT: Pt amb in the house I w/o AD.  Amb outside MOD I w/o device this visit.  She states she takes her cane when she goes out in the community.  Gait characterized by normal step length and samantha.  Gait at eval: Pt amb with SPC and intermittent CG.  Gait characterized by short steps, slow gait, decreased foot clearance  STAIRS: Pt amb up/down one flight of steps MOD I with one rail.  She uses reciprocal gait going up and step to gait

## 2024-07-30 VITALS
RESPIRATION RATE: 18 BRPM | TEMPERATURE: 98.1 F | SYSTOLIC BLOOD PRESSURE: 138 MMHG | OXYGEN SATURATION: 98 % | DIASTOLIC BLOOD PRESSURE: 78 MMHG | HEART RATE: 66 BPM

## 2024-07-30 ASSESSMENT — ENCOUNTER SYMPTOMS: PAIN LOCATION - PAIN QUALITY: ACHING, THROBBING

## 2024-07-30 NOTE — HOME HEALTH
SUBJECTIVE: Patient reported MD wants her to take breaks and not sleep in brace.    CAREGIVER INVOLVEMENT / ASSISTANCE NEEDED FOR: transportation, medication management     OBJECTIVE / PATIENT RESPONSE TO TREATMENT / PATIENT LEVEL OF UNDERSTANDING OF EDUCATION PROVIDED/ASSESSMENT OF PROGRESS TOWARD GOALS: Patient seen for [facility] speech therapy discharge today. With implementing graded exercises, compensatory strategies, and visual aids, patient demonstrated significant improvement in cognitive lingusitic skills evidenced by increased SLUMS Assessment score from 10/30 at initial evaluation to 20/30 at discharge. With speech therapy services, patient was able to demonstrate improvement with management of functional calculations involving finances, management of daily medications with checklist/medication log, verbal expression by implementing expressive language strategies, and graded cueing to enhance language comprehension. Upon discharge, patient was able to better articulate and express her thoughts more clearly as well as engage in executive functioning tasks with reduced cueing. Patient met all  speech therapy goals and is appropriate for discharge. Patient is ready for transition to outpatient therapy services at this time.

## 2024-07-30 NOTE — CASE COMMUNICATION
Patient seen for \[facility]\"" speech therapy discharge today. With implementing graded exercises, compensatory strategies, and visual aids, patient demonstrated significant improvement in cognitive lingusitic skills evidenced by increased SLUMS Assessment score from 10/30 at initial evaluation to 20/30 at discharge. With speech therapy services, patient was able to demonstrate improvement with management of functional calculations involving fi nances, management of daily medications with checklist/medication log, verbal expression by implementing expressive language strategies, and graded cueing to enhance language comprehension. Upon discharge, patient was able to better articulate and express her thoughts more clearly as well as engage in executive functioning tasks with reduced cueing. Patient met all  speech therapy goals and is appropriate for discharge. Patient is sarah dy for transition to outpatient therapy services at this time.

## 2024-07-30 NOTE — CASE COMMUNICATION
1 flight of steps with rail.  Pt has been amb up the steps backward and sitting down to scoot down the steps.  BALANCE: Pt scored 27/28 on Tinetti Balance Assessment placing her at low risk for falls.  Balance at eval: Pt scored 11/28 on Tinetti Balance A ssessment placing her at high risk for falls.     Discharge plan: Discharge from PT services as all goals have been met.  Dr Medina office has been notified of DC from PT with goals met.

## 2024-08-02 ENCOUNTER — HOME CARE VISIT (OUTPATIENT)
Age: 49
End: 2024-08-02
Payer: MEDICARE

## 2024-08-18 ENCOUNTER — HOSPITAL ENCOUNTER (EMERGENCY)
Facility: HOSPITAL | Age: 49
Discharge: HOME | End: 2024-08-18
Payer: MEDICARE

## 2024-08-18 VITALS
TEMPERATURE: 98.1 F | OXYGEN SATURATION: 98 % | RESPIRATION RATE: 16 BRPM | HEIGHT: 63 IN | DIASTOLIC BLOOD PRESSURE: 92 MMHG | WEIGHT: 160 LBS | HEART RATE: 98 BPM | BODY MASS INDEX: 28.35 KG/M2 | SYSTOLIC BLOOD PRESSURE: 127 MMHG

## 2024-08-18 DIAGNOSIS — G47.00 INSOMNIA, UNSPECIFIED TYPE: Primary | ICD-10-CM

## 2024-08-18 PROCEDURE — 99283 EMERGENCY DEPT VISIT LOW MDM: CPT

## 2024-08-18 RX ORDER — HYDROXYZINE PAMOATE 25 MG/1
50 CAPSULE ORAL NIGHTLY PRN
Qty: 14 CAPSULE | Refills: 0 | Status: SHIPPED | OUTPATIENT
Start: 2024-08-18

## 2024-08-18 ASSESSMENT — PAIN - FUNCTIONAL ASSESSMENT: PAIN_FUNCTIONAL_ASSESSMENT: NONE - DENIES PAIN

## 2024-08-18 NOTE — ED TRIAGE NOTES
Pt came via EMS from home c/o headache. Pt states she's having trouble sleeping and that's causing her headache. Per pt, she ran out of Seroquel for 4 days now.
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
## 2024-09-26 RX ORDER — OMEPRAZOLE 40 MG/1
40 CAPSULE, DELAYED RELEASE ORAL DAILY
Qty: 30 CAPSULE | Refills: 0 | Status: SHIPPED | OUTPATIENT
Start: 2024-09-26

## 2024-10-01 ENCOUNTER — HOSPITAL ENCOUNTER (EMERGENCY)
Facility: HOSPITAL | Age: 49
Discharge: HOME | End: 2024-10-01
Payer: MEDICARE

## 2024-10-01 ENCOUNTER — APPOINTMENT (OUTPATIENT)
Facility: HOSPITAL | Age: 49
End: 2024-10-01
Payer: MEDICARE

## 2024-10-01 VITALS
SYSTOLIC BLOOD PRESSURE: 107 MMHG | HEART RATE: 90 BPM | DIASTOLIC BLOOD PRESSURE: 77 MMHG | HEIGHT: 63 IN | BODY MASS INDEX: 28.35 KG/M2 | TEMPERATURE: 98.2 F | WEIGHT: 160 LBS | RESPIRATION RATE: 16 BRPM | OXYGEN SATURATION: 100 %

## 2024-10-01 DIAGNOSIS — S61.209A AVULSION OF SKIN OF FINGER, INITIAL ENCOUNTER: Primary | ICD-10-CM

## 2024-10-01 DIAGNOSIS — S60.032A CONTUSION OF LEFT MIDDLE FINGER WITHOUT DAMAGE TO NAIL, INITIAL ENCOUNTER: ICD-10-CM

## 2024-10-01 PROCEDURE — 94761 N-INVAS EAR/PLS OXIMETRY MLT: CPT

## 2024-10-01 PROCEDURE — 73140 X-RAY EXAM OF FINGER(S): CPT

## 2024-10-01 PROCEDURE — 99283 EMERGENCY DEPT VISIT LOW MDM: CPT

## 2024-10-01 ASSESSMENT — ENCOUNTER SYMPTOMS
SORE THROAT: 0
SHORTNESS OF BREATH: 0
ABDOMINAL PAIN: 0
DIARRHEA: 0
VOMITING: 0

## 2024-10-01 ASSESSMENT — PAIN DESCRIPTION - LOCATION: LOCATION: FINGER (COMMENT WHICH ONE)

## 2024-10-01 ASSESSMENT — PAIN - FUNCTIONAL ASSESSMENT: PAIN_FUNCTIONAL_ASSESSMENT: 0-10

## 2024-10-01 ASSESSMENT — PAIN SCALES - GENERAL: PAINLEVEL_OUTOF10: 8

## 2024-10-01 NOTE — ED PROVIDER NOTES
EMERGENCY DEPARTMENT HISTORY AND PHYSICAL EXAM      Date: 10/1/2024  Patient Name: Little Siddiqui    History of Presenting Illness     Chief Complaint   Patient presents with    Finger Laceration       History (Context): Little Siddiqui is a 49 y.o. female with significant past medical history of hypertension, seizures, MS, CVA, DM, migraines, neurogenic bladder presents ambulatory tot the ED today. Patient reports wound to left third finger after she accidentally shut the freezer door on her finger prior to arrival. Patient is anticoagulated due to previous CVA. She states bleeding is controlled at this time.  Up-to-date on tetanus.  Denies numbness, tingling, radiating pain, weakness      PCP: Krishan Rios MD    No current facility-administered medications for this encounter.     Current Outpatient Medications   Medication Sig Dispense Refill    omeprazole (PRILOSEC) 40 MG delayed release capsule Take 1 capsule by mouth once daily 30 capsule 0    hydrOXYzine pamoate (VISTARIL) 25 MG capsule Take 2 capsules by mouth nightly as needed for Itching 14 capsule 0    amLODIPine (NORVASC) 10 MG tablet Take 10 mg by mouth daily.      amLODIPine (NORVASC) 5 MG tablet Take 5 mg by mouth daily. Pt reports MD told her to take 10mg daily      sucralfate (CARAFATE) 1 GM tablet TAKE 1 TABLET BY MOUTH FOUR TIMES DAILY 360 tablet 0    meclizine (ANTIVERT) 25 MG CHEW Take 1 tablet by mouth 3 times daily as needed (dizziness)      aspirin 81 MG chewable tablet Take 1 tablet by mouth daily 30 tablet 3    diclofenac sodium (VOLTAREN) 1 % GEL Apply 2 g topically 4 times daily as needed for Pain 100 g 2    lidocaine 4 % external patch Place 1 patch onto the skin daily 30 patch 2    meloxicam (MOBIC) 15 MG tablet Take 1 tablet by mouth daily Take with food 10 tablet 0    famotidine (PEPCID) 20 MG tablet TAKE 1 TABLET BY MOUTH AT BEDTIME 30 tablet 2    gabapentin (NEURONTIN) 300 MG capsule Take 2 capsules by mouth at bedtime.

## 2024-10-02 ENCOUNTER — TELEPHONE (OUTPATIENT)
Age: 49
End: 2024-10-02

## 2024-10-02 NOTE — TELEPHONE ENCOUNTER
Patient sent an appointment request through [employer]. Called patient to schedule appointment. Left voicemail for her to contact the office to schedule appointment.

## 2024-10-05 ENCOUNTER — HOSPITAL ENCOUNTER (EMERGENCY)
Facility: HOSPITAL | Age: 49
Discharge: HOME | End: 2024-10-05
Payer: MEDICARE

## 2024-10-05 VITALS
TEMPERATURE: 98.4 F | HEIGHT: 63 IN | WEIGHT: 160 LBS | RESPIRATION RATE: 18 BRPM | DIASTOLIC BLOOD PRESSURE: 83 MMHG | HEART RATE: 98 BPM | BODY MASS INDEX: 28.35 KG/M2 | SYSTOLIC BLOOD PRESSURE: 140 MMHG | OXYGEN SATURATION: 100 %

## 2024-10-05 DIAGNOSIS — R52 BODY ACHES: ICD-10-CM

## 2024-10-05 DIAGNOSIS — G35 MULTIPLE SCLEROSIS (HCC): Primary | ICD-10-CM

## 2024-10-05 DIAGNOSIS — M62.838 SPASM OF MUSCLE: ICD-10-CM

## 2024-10-05 LAB
ALBUMIN SERPL-MCNC: 3.8 G/DL (ref 3.4–5)
ALBUMIN/GLOB SERPL: 1 (ref 0.8–1.7)
ALP SERPL-CCNC: 61 U/L (ref 45–117)
ALT SERPL-CCNC: 17 U/L (ref 13–56)
ANION GAP SERPL CALC-SCNC: 4 MMOL/L (ref 3–18)
APPEARANCE UR: ABNORMAL
AST SERPL-CCNC: 8 U/L (ref 10–38)
BASOPHILS # BLD: 0 K/UL (ref 0–0.1)
BASOPHILS NFR BLD: 1 % (ref 0–2)
BILIRUB SERPL-MCNC: 0.8 MG/DL (ref 0.2–1)
BILIRUB UR QL: NEGATIVE
BUN SERPL-MCNC: 8 MG/DL (ref 7–18)
BUN/CREAT SERPL: 13 (ref 12–20)
CALCIUM SERPL-MCNC: 8.6 MG/DL (ref 8.5–10.1)
CHLORIDE SERPL-SCNC: 107 MMOL/L (ref 100–111)
CO2 SERPL-SCNC: 29 MMOL/L (ref 21–32)
COLOR UR: YELLOW
CREAT SERPL-MCNC: 0.62 MG/DL (ref 0.6–1.3)
DIFFERENTIAL METHOD BLD: ABNORMAL
EOSINOPHIL # BLD: 0.1 K/UL (ref 0–0.4)
EOSINOPHIL NFR BLD: 1 % (ref 0–5)
ERYTHROCYTE [DISTWIDTH] IN BLOOD BY AUTOMATED COUNT: 14 % (ref 11.6–14.5)
GLOBULIN SER CALC-MCNC: 3.8 G/DL (ref 2–4)
GLUCOSE SERPL-MCNC: 91 MG/DL (ref 74–99)
GLUCOSE UR STRIP.AUTO-MCNC: NEGATIVE MG/DL
HCT VFR BLD AUTO: 32.7 % (ref 35–45)
HGB BLD-MCNC: 11.2 G/DL (ref 12–16)
HGB UR QL STRIP: NEGATIVE
IMM GRANULOCYTES # BLD AUTO: 0 K/UL (ref 0–0.04)
IMM GRANULOCYTES NFR BLD AUTO: 0 % (ref 0–0.5)
KETONES UR QL STRIP.AUTO: NEGATIVE MG/DL
LEUKOCYTE ESTERASE UR QL STRIP.AUTO: NEGATIVE
LYMPHOCYTES # BLD: 2.4 K/UL (ref 0.9–3.6)
LYMPHOCYTES NFR BLD: 55 % (ref 21–52)
MAGNESIUM SERPL-MCNC: 2 MG/DL (ref 1.6–2.6)
MCH RBC QN AUTO: 28.1 PG (ref 24–34)
MCHC RBC AUTO-ENTMCNC: 34.3 G/DL (ref 31–37)
MCV RBC AUTO: 82 FL (ref 78–100)
MONOCYTES # BLD: 0.4 K/UL (ref 0.05–1.2)
MONOCYTES NFR BLD: 9 % (ref 3–10)
NEUTS SEG # BLD: 1.5 K/UL (ref 1.8–8)
NEUTS SEG NFR BLD: 34 % (ref 40–73)
NITRITE UR QL STRIP.AUTO: NEGATIVE
NRBC # BLD: 0 K/UL (ref 0–0.01)
NRBC BLD-RTO: 0 PER 100 WBC
PH UR STRIP: 7.5 (ref 5–8)
PLATELET # BLD AUTO: 440 K/UL (ref 135–420)
PMV BLD AUTO: 8.7 FL (ref 9.2–11.8)
POTASSIUM SERPL-SCNC: 3.4 MMOL/L (ref 3.5–5.5)
PROT SERPL-MCNC: 7.6 G/DL (ref 6.4–8.2)
PROT UR STRIP-MCNC: NEGATIVE MG/DL
RBC # BLD AUTO: 3.99 M/UL (ref 4.2–5.3)
SODIUM SERPL-SCNC: 140 MMOL/L (ref 136–145)
SP GR UR REFRACTOMETRY: 1.02 (ref 1–1.03)
UROBILINOGEN UR QL STRIP.AUTO: 4 EU/DL (ref 0.2–1)
WBC # BLD AUTO: 4.3 K/UL (ref 4.6–13.2)

## 2024-10-05 PROCEDURE — 6370000000 HC RX 637 (ALT 250 FOR IP)

## 2024-10-05 PROCEDURE — 80053 COMPREHEN METABOLIC PANEL: CPT

## 2024-10-05 PROCEDURE — 83735 ASSAY OF MAGNESIUM: CPT

## 2024-10-05 PROCEDURE — 96375 TX/PRO/DX INJ NEW DRUG ADDON: CPT

## 2024-10-05 PROCEDURE — 81003 URINALYSIS AUTO W/O SCOPE: CPT

## 2024-10-05 PROCEDURE — 99284 EMERGENCY DEPT VISIT MOD MDM: CPT

## 2024-10-05 PROCEDURE — 2580000003 HC RX 258

## 2024-10-05 PROCEDURE — 85025 COMPLETE CBC W/AUTO DIFF WBC: CPT

## 2024-10-05 PROCEDURE — 6360000002 HC RX W HCPCS

## 2024-10-05 PROCEDURE — 96374 THER/PROPH/DIAG INJ IV PUSH: CPT

## 2024-10-05 RX ORDER — LORAZEPAM 1 MG/1
1 TABLET ORAL ONCE
Status: COMPLETED | OUTPATIENT
Start: 2024-10-05 | End: 2024-10-05

## 2024-10-05 RX ORDER — DIAZEPAM 10 MG/2ML
5 INJECTION, SOLUTION INTRAMUSCULAR; INTRAVENOUS ONCE
Status: COMPLETED | OUTPATIENT
Start: 2024-10-05 | End: 2024-10-05

## 2024-10-05 RX ORDER — 0.9 % SODIUM CHLORIDE 0.9 %
1000 INTRAVENOUS SOLUTION INTRAVENOUS ONCE
Status: COMPLETED | OUTPATIENT
Start: 2024-10-05 | End: 2024-10-05

## 2024-10-05 RX ORDER — PREDNISONE 10 MG/1
TABLET ORAL
Qty: 21 EACH | Refills: 0 | Status: SHIPPED | OUTPATIENT
Start: 2024-10-05

## 2024-10-05 RX ORDER — DIAZEPAM 5 MG
5 TABLET ORAL EVERY 8 HOURS PRN
Qty: 9 TABLET | Refills: 0 | Status: SHIPPED | OUTPATIENT
Start: 2024-10-05 | End: 2024-10-08

## 2024-10-05 RX ORDER — LANOLIN ALCOHOL/MO/W.PET/CERES
400 CREAM (GRAM) TOPICAL DAILY
Status: DISCONTINUED | OUTPATIENT
Start: 2024-10-05 | End: 2024-10-05 | Stop reason: HOSPADM

## 2024-10-05 RX ORDER — POTASSIUM CHLORIDE 750 MG/1
10 TABLET, EXTENDED RELEASE ORAL
Status: COMPLETED | OUTPATIENT
Start: 2024-10-05 | End: 2024-10-05

## 2024-10-05 RX ORDER — POTASSIUM CHLORIDE 1500 MG/1
20 TABLET, EXTENDED RELEASE ORAL
Status: COMPLETED | OUTPATIENT
Start: 2024-10-05 | End: 2024-10-05

## 2024-10-05 RX ADMIN — POTASSIUM CHLORIDE 10 MEQ: 750 TABLET, EXTENDED RELEASE ORAL at 16:53

## 2024-10-05 RX ADMIN — SODIUM CHLORIDE 1000 ML: 9 INJECTION, SOLUTION INTRAVENOUS at 13:24

## 2024-10-05 RX ADMIN — POTASSIUM CHLORIDE 20 MEQ: 1500 TABLET, EXTENDED RELEASE ORAL at 14:55

## 2024-10-05 RX ADMIN — Medication 400 MG: at 16:53

## 2024-10-05 RX ADMIN — LORAZEPAM 1 MG: 1 TABLET ORAL at 13:11

## 2024-10-05 RX ADMIN — DIAZEPAM 5 MG: 5 INJECTION, SOLUTION INTRAMUSCULAR; INTRAVENOUS at 16:53

## 2024-10-05 RX ADMIN — WATER 125 MG: 1 INJECTION INTRAMUSCULAR; INTRAVENOUS; SUBCUTANEOUS at 13:23

## 2024-10-05 ASSESSMENT — PAIN - FUNCTIONAL ASSESSMENT: PAIN_FUNCTIONAL_ASSESSMENT: 0-10

## 2024-10-05 ASSESSMENT — ENCOUNTER SYMPTOMS
BACK PAIN: 0
SHORTNESS OF BREATH: 0
COUGH: 0

## 2024-10-05 ASSESSMENT — PAIN SCALES - GENERAL: PAINLEVEL_OUTOF10: 10

## 2024-10-05 ASSESSMENT — PAIN DESCRIPTION - LOCATION: LOCATION: GENERALIZED

## 2024-10-05 NOTE — ED TRIAGE NOTES
Patient reports having bodyaches/muscle spasms that started this morning. Patient states that has hx of MS.

## 2024-10-05 NOTE — ED PROVIDER NOTES
[facility] EMERGENCY DEPT  EMERGENCY DEPARTMENT ENCOUNTER      Pt Name: Little Siddiqui  MRN: [id number]  Birthdate 1975  Date of evaluation: 10/5/2024  Provider: RENETTA Young  11:46 PM    CHIEF COMPLAINT       Chief Complaint   Patient presents with    Generalized Body Aches    Headache         HISTORY OF PRESENT ILLNESS    Little Siddiqui is a 49 y.o. female who presents to the emergency department body aches and muscle spasms.     48 y/o f presents to ED with body aches/muscle spasms that started this morning. Patient states that has hx of MS. She denies injury, chest pain, SOB or dyspnea. She has MS flares when the weather changes and report symptoms are consistent with same. She denies any wounds, swelling to lower extremities or urinary symptoms. She denies cough or sore throat.         Nursing Notes were reviewed.    REVIEW OF SYSTEMS       Review of Systems   Constitutional:  Negative for chills, fatigue and fever.   HENT:  Negative for congestion and ear pain.    Eyes:  Negative for visual disturbance.   Respiratory:  Negative for cough, chest tightness and shortness of breath.    Cardiovascular:  Negative for chest pain, palpitations and leg swelling.   Gastrointestinal:  Negative for abdominal pain, diarrhea, nausea and vomiting.   Genitourinary:  Negative for difficulty urinating, dysuria, flank pain and urgency.   Musculoskeletal:  Positive for myalgias. Negative for back pain, neck pain and neck stiffness.        Body aches and muscle spasms   Skin:  Negative for pallor and wound.   Neurological:  Positive for tremors. Negative for dizziness, seizures, syncope, speech difficulty, weakness, light-headedness, numbness and headaches.   Hematological: Negative.    Psychiatric/Behavioral: Negative.         Except as noted above the remainder of the review of systems was reviewed and negative.       PAST MEDICAL HISTORY     Past Medical History:   Diagnosis Date    Anticoagulant long-term use     Arthropathy,  ***    CONSULTS:  None    PROCEDURES:  Unless otherwise noted below, none     Procedures    No LOS Charge filed ***    FINAL IMPRESSION    No diagnosis found.      DISPOSITION/PLAN   DISPOSITION    Condition at Disposition: Data Unavailable      PATIENT REFERRED TO:  No follow-up provider specified.    DISCHARGE MEDICATIONS:  New Prescriptions    No medications on file     Controlled Substances Monitoring:          No data to display                (Please note that portions of this note were completed with a voice recognition program.  Efforts were made to edit the dictations but occasionally words are mis-transcribed.)    RENETTA Young (electronically signed)  Attending Emergency Physician          Monitoring:          No data to display                (Please note that portions of this note were completed with a voice recognition program.  Efforts were made to edit the dictations but occasionally words are mis-transcribed.)    RENETTA Young (electronically signed)              Kasey Andrade FNP  10/12/24 [id number]

## 2024-10-05 NOTE — DISCHARGE INSTRUCTIONS
Call your PCP for f/u in office this week.   Adequate hydration.   Take medication as prescribed.   Return to ED for new or worsening. Concerning symptoms.

## 2024-10-12 ASSESSMENT — ENCOUNTER SYMPTOMS
NAUSEA: 0
CHEST TIGHTNESS: 0
ABDOMINAL PAIN: 0
VOMITING: 0
DIARRHEA: 0

## 2024-12-17 ENCOUNTER — OFFICE VISIT (OUTPATIENT)
Age: 49
End: 2024-12-17
Payer: MEDICARE

## 2024-12-17 VITALS
DIASTOLIC BLOOD PRESSURE: 73 MMHG | RESPIRATION RATE: 18 BRPM | SYSTOLIC BLOOD PRESSURE: 111 MMHG | WEIGHT: 170 LBS | HEART RATE: 90 BPM | BODY MASS INDEX: 30.12 KG/M2 | HEIGHT: 63 IN | OXYGEN SATURATION: 98 % | TEMPERATURE: 97.6 F

## 2024-12-17 DIAGNOSIS — K31.2 HOURGLASS STRICTURE AND STENOSIS OF STOMACH: ICD-10-CM

## 2024-12-17 DIAGNOSIS — R11.11 VOMITING WITHOUT NAUSEA, UNSPECIFIED VOMITING TYPE: ICD-10-CM

## 2024-12-17 DIAGNOSIS — K21.9 GASTRO-ESOPHAGEAL REFLUX DISEASE WITHOUT ESOPHAGITIS: Primary | ICD-10-CM

## 2024-12-17 DIAGNOSIS — Z13.21 MALNUTRITION SCREEN: ICD-10-CM

## 2024-12-17 DIAGNOSIS — K44.9 PARAESOPHAGEAL HERNIA: ICD-10-CM

## 2024-12-17 DIAGNOSIS — Z98.84 BARIATRIC SURGERY STATUS: ICD-10-CM

## 2024-12-17 DIAGNOSIS — Z90.3 S/P GASTRIC SLEEVE PROCEDURE: ICD-10-CM

## 2024-12-17 PROCEDURE — G8427 DOCREV CUR MEDS BY ELIG CLIN: HCPCS | Performed by: SURGERY

## 2024-12-17 PROCEDURE — G8484 FLU IMMUNIZE NO ADMIN: HCPCS | Performed by: SURGERY

## 2024-12-17 PROCEDURE — 96372 THER/PROPH/DIAG INJ SC/IM: CPT | Performed by: SURGERY

## 2024-12-17 PROCEDURE — 3074F SYST BP LT 130 MM HG: CPT | Performed by: SURGERY

## 2024-12-17 PROCEDURE — 99214 OFFICE O/P EST MOD 30 MIN: CPT | Performed by: SURGERY

## 2024-12-17 PROCEDURE — G8417 CALC BMI ABV UP PARAM F/U: HCPCS | Performed by: SURGERY

## 2024-12-17 PROCEDURE — 3078F DIAST BP <80 MM HG: CPT | Performed by: SURGERY

## 2024-12-17 PROCEDURE — 1036F TOBACCO NON-USER: CPT | Performed by: SURGERY

## 2024-12-17 RX ORDER — FAMOTIDINE 20 MG/1
20 TABLET, FILM COATED ORAL NIGHTLY
Qty: 90 TABLET | Refills: 2 | Status: SHIPPED | OUTPATIENT
Start: 2024-12-17 | End: 2025-03-17

## 2024-12-17 RX ORDER — THIAMINE HYDROCHLORIDE 100 MG/ML
200 INJECTION, SOLUTION INTRAMUSCULAR; INTRAVENOUS ONCE
Status: COMPLETED | OUTPATIENT
Start: 2024-12-17 | End: 2024-12-17

## 2024-12-17 RX ORDER — OMEPRAZOLE 40 MG/1
40 CAPSULE, DELAYED RELEASE ORAL 2 TIMES DAILY
Qty: 90 CAPSULE | Refills: 2 | Status: SHIPPED | OUTPATIENT
Start: 2024-12-17 | End: 2025-03-17

## 2024-12-17 RX ADMIN — THIAMINE HYDROCHLORIDE 200 MG: 100 INJECTION, SOLUTION INTRAMUSCULAR; INTRAVENOUS at 11:16

## 2024-12-17 NOTE — PROGRESS NOTES
Bariatric Surgery Progress Note    CC: Preop appointment for surgery  Subjective:     Patient presents for a preop appointment.     REVIEW:    She underwent laparoscopic sleeve gastrectomy with me on 4/13/2023. Preoperatively she had no concerning reflux symptoms, and no hiatal hernia identified on UGI or intraoperatively. Her sleeve gastrectomy was uneventful.      She has achieved an 93lbs weight loss from her LSG. Unfortunately, however, she's also developed a new paraesophageal hernia and resulting severe GERD symptoms. She has been on various acid-suppression medications and carafate without effective relief of these symptoms. Vomiting about 3-4 times a day due to acid sensation, taste. Reports excessive spitting, denies abdominal pain and nausea. Has been doing Tums and omeprazole 40 mg BID open capsule, famotidine BID, carafate TID.    Vitamin regimen: B12, B1, Vitamin D, calcium, Flintstones BID, potassium supplement     Comorbidities:  Hypertension: resolved  Diabetes: resolved  Obstructive Sleep Apnea: not applicable  Hyperlipidemia: not applicable  Stress Urinary Incontinence: not applicable  Gastroesophageal Reflux: worsened  Weight related arthropathy:not applicable    Cardiac: risk stratification pending, including cardiologist impression and workup completed    Neurology recommendations pending, scheduled with Dr. Brooks     EGD / UGI reviewed / issues:     EGD on 1/15/24.   FINDINGS:   1. Irregular Z line, small GEJ ulcer/erosion, biopsied  2. Bile reflux noted to the level of GEJ, with gastritis, biopsied antrum  3. Relative stenosis at the incisura (patent and easily traversed without resistance, but angulation noted)  4. Hourglass stenosis of the stomach with mild to moderate proximal dilatation of the sleeve.   5. Paraesophageal hernia noted, with proximal sleeve conduit noted within hiatal hernia and diaphragmatic pinch noted around 3cm distal to Z line. Unable to adequately retroflex in sleeve

## 2024-12-24 ENCOUNTER — HOSPITAL ENCOUNTER (OUTPATIENT)
Facility: HOSPITAL | Age: 49
Discharge: HOME | End: 2024-12-27
Payer: MEDICARE

## 2024-12-24 ENCOUNTER — HOSPITAL ENCOUNTER (OUTPATIENT)
Facility: HOSPITAL | Age: 49
Setting detail: SPECIMEN
Discharge: HOME | End: 2024-12-27

## 2024-12-24 DIAGNOSIS — Z13.21 MALNUTRITION SCREEN: ICD-10-CM

## 2024-12-24 DIAGNOSIS — Z98.84 BARIATRIC SURGERY STATUS: ICD-10-CM

## 2024-12-24 DIAGNOSIS — K21.9 GASTRO-ESOPHAGEAL REFLUX DISEASE WITHOUT ESOPHAGITIS: ICD-10-CM

## 2024-12-24 DIAGNOSIS — R11.11 VOMITING WITHOUT NAUSEA, UNSPECIFIED VOMITING TYPE: ICD-10-CM

## 2024-12-24 DIAGNOSIS — Z90.3 S/P GASTRIC SLEEVE PROCEDURE: ICD-10-CM

## 2024-12-24 DIAGNOSIS — K44.9 PARAESOPHAGEAL HERNIA: ICD-10-CM

## 2024-12-24 DIAGNOSIS — K31.2 HOURGLASS STRICTURE AND STENOSIS OF STOMACH: ICD-10-CM

## 2024-12-24 PROCEDURE — 99001 SPECIMEN HANDLING PT-LAB: CPT

## 2024-12-24 PROCEDURE — 93005 ELECTROCARDIOGRAM TRACING: CPT

## 2024-12-25 LAB
25(OH)D3+25(OH)D2 SERPL-MCNC: 35.4 NG/ML (ref 30–100)
EKG ATRIAL RATE: 83 BPM
EKG DIAGNOSIS: NORMAL
EKG P AXIS: 69 DEGREES
EKG P-R INTERVAL: 168 MS
EKG Q-T INTERVAL: 368 MS
EKG QRS DURATION: 76 MS
EKG QTC CALCULATION (BAZETT): 432 MS
EKG R AXIS: 37 DEGREES
EKG T AXIS: 51 DEGREES
EKG VENTRICULAR RATE: 83 BPM
LABCORP SPECIMEN COLLECTION: NORMAL
SPECIMEN STATUS REPORT: NORMAL

## 2024-12-25 PROCEDURE — 93010 ELECTROCARDIOGRAM REPORT: CPT | Performed by: INTERNAL MEDICINE

## 2024-12-26 LAB
FOLATE SERPL-MCNC: >20 NG/ML
VIT B12 SERPL-MCNC: 758 PG/ML (ref 232–1245)

## 2024-12-29 LAB — VIT B1 BLD-SCNC: 120 NMOL/L (ref 66.5–200)

## 2024-12-30 ENCOUNTER — HOSPITAL ENCOUNTER (OUTPATIENT)
Facility: HOSPITAL | Age: 49
Discharge: HOME | End: 2025-01-02

## 2024-12-30 ENCOUNTER — CLINICAL DOCUMENTATION (OUTPATIENT)
Facility: HOSPITAL | Age: 49
End: 2024-12-30

## 2024-12-30 NOTE — PROGRESS NOTES
Nutrition Revision Assessment  Weight Loss Trial    Patient's Name: Little Siddiqui     Age: 49 y.o.  YOB: 1975     Sex: female  Visit #: 1 of 4    Starting Weight with surgeon: 170  Height: 5 f 3 Weight: 170 BMI:      Surgical Weight Loss History    Surgical Weight Loss History: April 13, 2024 with Dr. Coello    Beginning Weight with initial surgery: 293    Is patient having any complications from their initial surgery: Issues with acid reflux    Feedback and Behavior from previous surgery  Negative Feedback from original procedure (lack of appetite, dumping syndrome, etc.): States sometimes she does get dumping, but really isn't sure if it's reflux or dumping.    Frequency of sweets: None    Feeling of fullness (how long does patient stay full for, what types of foods do   they feel full from, etc): States she will eat 4 small meals a day and stay full for 2 hours.  She states sometimes she can tolerate solid food other times, she has to eat softer food that is easier to go down.    Tolerance of bread: Poor po tolerance to bread    Tolerance of protein (red meat, chicken): Can not tolerate tuna.   She states she can tolerate chicken meat that would be in a wrap.    Intake of carbohydrates (crackers, bread, wrap, etc)::  States she can not tolerate this    Portion sizes: Palm size    How long does it take to eat a meal?  30 minutes. She states she has to keep pausing her meal.    Does patient still feel restriction:  Yes    Other  Smoking Status:  None    Alcohol intake: None    Knowledge of Bariatric Diet Protocol:  Yes    Is patient compliant with her vitamins:     Dietary Instruction    Number of Meals Consumed per day: 4 small meals a day    Diet History: States she can eat grapes, yogurt, oatmeal.  She states she is doing the meat from chicken pot pies.  She states she can tolerate spinach, corn, or green peas.       Trigger Foods: She state foods like tuna, broccoli.      Timing of Snacks: She will

## 2024-12-31 ENCOUNTER — OFFICE VISIT (OUTPATIENT)
Age: 49
End: 2024-12-31
Payer: MEDICARE

## 2024-12-31 VITALS
HEIGHT: 63 IN | WEIGHT: 173 LBS | BODY MASS INDEX: 30.65 KG/M2 | OXYGEN SATURATION: 100 % | TEMPERATURE: 99 F | DIASTOLIC BLOOD PRESSURE: 77 MMHG | SYSTOLIC BLOOD PRESSURE: 115 MMHG | HEART RATE: 82 BPM

## 2024-12-31 DIAGNOSIS — G35 MULTIPLE SCLEROSIS (HCC): ICD-10-CM

## 2024-12-31 DIAGNOSIS — G31.84 MILD NEUROCOGNITIVE DISORDER: Primary | ICD-10-CM

## 2024-12-31 PROCEDURE — G8484 FLU IMMUNIZE NO ADMIN: HCPCS | Performed by: STUDENT IN AN ORGANIZED HEALTH CARE EDUCATION/TRAINING PROGRAM

## 2024-12-31 PROCEDURE — 99204 OFFICE O/P NEW MOD 45 MIN: CPT | Performed by: STUDENT IN AN ORGANIZED HEALTH CARE EDUCATION/TRAINING PROGRAM

## 2024-12-31 PROCEDURE — G8427 DOCREV CUR MEDS BY ELIG CLIN: HCPCS | Performed by: STUDENT IN AN ORGANIZED HEALTH CARE EDUCATION/TRAINING PROGRAM

## 2024-12-31 PROCEDURE — 1036F TOBACCO NON-USER: CPT | Performed by: STUDENT IN AN ORGANIZED HEALTH CARE EDUCATION/TRAINING PROGRAM

## 2024-12-31 PROCEDURE — G8417 CALC BMI ABV UP PARAM F/U: HCPCS | Performed by: STUDENT IN AN ORGANIZED HEALTH CARE EDUCATION/TRAINING PROGRAM

## 2024-12-31 PROCEDURE — 3078F DIAST BP <80 MM HG: CPT | Performed by: STUDENT IN AN ORGANIZED HEALTH CARE EDUCATION/TRAINING PROGRAM

## 2024-12-31 PROCEDURE — 3074F SYST BP LT 130 MM HG: CPT | Performed by: STUDENT IN AN ORGANIZED HEALTH CARE EDUCATION/TRAINING PROGRAM

## 2024-12-31 RX ORDER — CLONAZEPAM 1 MG/1
1 TABLET ORAL NIGHTLY
COMMUNITY
Start: 2024-11-30

## 2024-12-31 RX ORDER — GLATIRAMER 40 MG/ML
40 INJECTION, SOLUTION SUBCUTANEOUS
Qty: 12 ML | Refills: 4 | Status: SHIPPED | OUTPATIENT
Start: 2025-01-01 | End: 2025-01-31

## 2024-12-31 RX ORDER — ONDANSETRON 4 MG/1
TABLET, FILM COATED ORAL
COMMUNITY

## 2024-12-31 RX ORDER — HYDROXYZINE HYDROCHLORIDE 50 MG/1
TABLET, FILM COATED ORAL
COMMUNITY
Start: 2024-12-27

## 2024-12-31 ASSESSMENT — ENCOUNTER SYMPTOMS
NAUSEA: 0
COUGH: 0
VOMITING: 0
SHORTNESS OF BREATH: 0
BACK PAIN: 0

## 2024-12-31 NOTE — PROGRESS NOTES
Little Siddiqui is a 49 y.o. female .presents for New Patient (M.S.     and  Stroke in June)      A 49 years old female patient with medical history of  medical history of multiple sclerosis on Copaxone, diabetes, depression, hypertension, status post bariatric surgery referred here for establishing care for  multiple sclerosis.  I saw patient at [facility] in June 2024 when she was admitted for left-sided body weakness.  Reviewed her MRI of the brain which showed a small right frontal possible subacute infarction; possibility of nonenhancing MS lesion was also considered; moderate T2 FLAIR hyperintensities consistent with multiple sclerosis was seen.  CT angiography of the head and neck showed patent intra and extracranial arteries.  Possibility of functional weakness was considered.  She came today with her caretaker.  Patient used to follow at [facility] neurology for her multiple sclerosis.  Diagnosed in 2004.  Patient mentioned that symptoms started after delivery in 1996.  Mentioned weakness, difficulty walking, forgetfulness/brain fog.  She is currently on Copaxone 40 mg 3 times a week.  Previous medications mentioned include Betaseron, Lovenox, and Tysabri for a brief duration.  Currently, she complains of weakness over the left lower extremity; has foot drop; has a brace.  Has numbness over the soles of her feet.  No previous history of transient loss of vision.  No blurring of vision.  No diplopia.  Mentioned progressive worsening forgetfulness.  Has word finding difficulties.  Forgets conversations.  Also forgets events.  Repeats sometimes.  She drives; might get lost.  No traffic violations.  Might take wrong exits.  Takes her medications herself.  She mentioned that she might forget.  Needs some help in managing her finances; mentioned her caretaker.  Might forget names.        Review of Systems   Constitutional:  Negative for chills, fever and unexpected weight change.   HENT:  Negative for hearing loss and tinnitus.

## 2025-01-06 ENCOUNTER — TELEPHONE (OUTPATIENT)
Age: 50
End: 2025-01-06

## 2025-01-06 DIAGNOSIS — G35 MULTIPLE SCLEROSIS (HCC): ICD-10-CM

## 2025-01-06 RX ORDER — GLATIRAMER 40 MG/ML
40 INJECTION, SOLUTION SUBCUTANEOUS
Qty: 12 ML | Refills: 4 | Status: SHIPPED | OUTPATIENT
Start: 2025-01-06

## 2025-01-10 ENCOUNTER — APPOINTMENT (OUTPATIENT)
Facility: HOSPITAL | Age: 50
DRG: 060 | End: 2025-01-10
Payer: MEDICARE

## 2025-01-10 ENCOUNTER — HOSPITAL ENCOUNTER (INPATIENT)
Facility: HOSPITAL | Age: 50
LOS: 4 days | Discharge: HOME HEALTH | DRG: 060 | End: 2025-01-14
Attending: EMERGENCY MEDICINE | Admitting: FAMILY MEDICINE
Payer: MEDICARE

## 2025-01-10 DIAGNOSIS — I63.9 CEREBROVASCULAR ACCIDENT (CVA), UNSPECIFIED MECHANISM (HCC): Primary | ICD-10-CM

## 2025-01-10 LAB
ALBUMIN SERPL-MCNC: 3.6 G/DL (ref 3.4–5)
ALBUMIN/GLOB SERPL: 1.1 (ref 0.8–1.7)
ALP SERPL-CCNC: 71 U/L (ref 45–117)
ALT SERPL-CCNC: 19 U/L (ref 13–56)
ANION GAP SERPL CALC-SCNC: 4 MMOL/L (ref 3–18)
APTT PPP: 31.6 SEC (ref 23–36.4)
AST SERPL-CCNC: 11 U/L (ref 10–38)
BASOPHILS # BLD: 0.03 K/UL (ref 0–0.1)
BASOPHILS NFR BLD: 0.4 % (ref 0–2)
BILIRUB SERPL-MCNC: 0.3 MG/DL (ref 0.2–1)
BUN SERPL-MCNC: 9 MG/DL (ref 7–18)
BUN/CREAT SERPL: 14 (ref 12–20)
CALCIUM SERPL-MCNC: 9 MG/DL (ref 8.5–10.1)
CHLORIDE SERPL-SCNC: 109 MMOL/L (ref 100–111)
CHP ED QC CHECK: YES
CO2 SERPL-SCNC: 29 MMOL/L (ref 21–32)
CREAT SERPL-MCNC: 0.66 MG/DL (ref 0.6–1.3)
DIFFERENTIAL METHOD BLD: ABNORMAL
EKG ATRIAL RATE: 85 BPM
EKG DIAGNOSIS: NORMAL
EKG P AXIS: 66 DEGREES
EKG P-R INTERVAL: 168 MS
EKG Q-T INTERVAL: 372 MS
EKG QRS DURATION: 74 MS
EKG QTC CALCULATION (BAZETT): 442 MS
EKG R AXIS: 30 DEGREES
EKG T AXIS: 41 DEGREES
EKG VENTRICULAR RATE: 85 BPM
EOSINOPHIL # BLD: 0.11 K/UL (ref 0–0.4)
EOSINOPHIL NFR BLD: 1.6 % (ref 0–5)
ERYTHROCYTE [DISTWIDTH] IN BLOOD BY AUTOMATED COUNT: 13.2 % (ref 11.6–14.5)
GLOBULIN SER CALC-MCNC: 3.3 G/DL (ref 2–4)
GLUCOSE BLD STRIP.AUTO-MCNC: 91 MG/DL (ref 70–110)
GLUCOSE BLD-MCNC: 91 MG/DL
GLUCOSE SERPL-MCNC: 107 MG/DL (ref 74–99)
HCT VFR BLD AUTO: 32.4 % (ref 35–45)
HGB BLD-MCNC: 11 G/DL (ref 12–16)
IMM GRANULOCYTES # BLD AUTO: 0.01 K/UL (ref 0–0.04)
IMM GRANULOCYTES NFR BLD AUTO: 0.1 % (ref 0–0.5)
INR PPP: 1 (ref 0.9–1.1)
LYMPHOCYTES # BLD: 3.56 K/UL (ref 0.9–3.6)
LYMPHOCYTES NFR BLD: 51.1 % (ref 21–52)
MCH RBC QN AUTO: 28.3 PG (ref 24–34)
MCHC RBC AUTO-ENTMCNC: 34 G/DL (ref 31–37)
MCV RBC AUTO: 83.3 FL (ref 78–100)
MONOCYTES # BLD: 0.45 K/UL (ref 0.05–1.2)
MONOCYTES NFR BLD: 6.5 % (ref 3–10)
NEUTS SEG # BLD: 2.8 K/UL (ref 1.8–8)
NEUTS SEG NFR BLD: 40.3 % (ref 40–73)
NRBC # BLD: 0 K/UL (ref 0–0.01)
NRBC BLD-RTO: 0 PER 100 WBC
PLATELET # BLD AUTO: 379 K/UL (ref 135–420)
PMV BLD AUTO: 9.2 FL (ref 9.2–11.8)
POTASSIUM SERPL-SCNC: 3.6 MMOL/L (ref 3.5–5.5)
PROT SERPL-MCNC: 6.9 G/DL (ref 6.4–8.2)
PROTHROMBIN TIME: 13.1 SEC (ref 11.9–14.9)
RBC # BLD AUTO: 3.89 M/UL (ref 4.2–5.3)
SODIUM SERPL-SCNC: 142 MMOL/L (ref 136–145)
TROPONIN I SERPL HS-MCNC: <3 NG/L (ref 0–54)
WBC # BLD AUTO: 7 K/UL (ref 4.6–13.2)

## 2025-01-10 PROCEDURE — 93010 ELECTROCARDIOGRAM REPORT: CPT | Performed by: INTERNAL MEDICINE

## 2025-01-10 PROCEDURE — 70498 CT ANGIOGRAPHY NECK: CPT

## 2025-01-10 PROCEDURE — 94761 N-INVAS EAR/PLS OXIMETRY MLT: CPT

## 2025-01-10 PROCEDURE — 84484 ASSAY OF TROPONIN QUANT: CPT

## 2025-01-10 PROCEDURE — 6370000000 HC RX 637 (ALT 250 FOR IP): Performed by: FAMILY MEDICINE

## 2025-01-10 PROCEDURE — 93005 ELECTROCARDIOGRAM TRACING: CPT | Performed by: PHYSICIAN ASSISTANT

## 2025-01-10 PROCEDURE — 80053 COMPREHEN METABOLIC PANEL: CPT

## 2025-01-10 PROCEDURE — 82962 GLUCOSE BLOOD TEST: CPT

## 2025-01-10 PROCEDURE — 99285 EMERGENCY DEPT VISIT HI MDM: CPT

## 2025-01-10 PROCEDURE — 85025 COMPLETE CBC W/AUTO DIFF WBC: CPT

## 2025-01-10 PROCEDURE — 70450 CT HEAD/BRAIN W/O DYE: CPT

## 2025-01-10 PROCEDURE — 1100000003 HC PRIVATE W/ TELEMETRY

## 2025-01-10 PROCEDURE — 85730 THROMBOPLASTIN TIME PARTIAL: CPT

## 2025-01-10 PROCEDURE — 85610 PROTHROMBIN TIME: CPT

## 2025-01-10 PROCEDURE — 6360000004 HC RX CONTRAST MEDICATION: Performed by: PHYSICIAN ASSISTANT

## 2025-01-10 PROCEDURE — 6370000000 HC RX 637 (ALT 250 FOR IP): Performed by: PHYSICIAN ASSISTANT

## 2025-01-10 RX ORDER — SODIUM CHLORIDE 0.9 % (FLUSH) 0.9 %
5-40 SYRINGE (ML) INJECTION PRN
Status: DISCONTINUED | OUTPATIENT
Start: 2025-01-10 | End: 2025-01-14 | Stop reason: HOSPADM

## 2025-01-10 RX ORDER — CLONAZEPAM 0.5 MG/1
1 TABLET ORAL NIGHTLY
Status: DISCONTINUED | OUTPATIENT
Start: 2025-01-10 | End: 2025-01-14 | Stop reason: HOSPADM

## 2025-01-10 RX ORDER — ENOXAPARIN SODIUM 100 MG/ML
40 INJECTION SUBCUTANEOUS DAILY
Status: DISCONTINUED | OUTPATIENT
Start: 2025-01-11 | End: 2025-01-14 | Stop reason: HOSPADM

## 2025-01-10 RX ORDER — ONDANSETRON 4 MG/1
4 TABLET, ORALLY DISINTEGRATING ORAL EVERY 8 HOURS PRN
Status: DISCONTINUED | OUTPATIENT
Start: 2025-01-10 | End: 2025-01-14 | Stop reason: HOSPADM

## 2025-01-10 RX ORDER — BACLOFEN 10 MG/1
10 TABLET ORAL 3 TIMES DAILY PRN
Status: DISCONTINUED | OUTPATIENT
Start: 2025-01-10 | End: 2025-01-12

## 2025-01-10 RX ORDER — SODIUM CHLORIDE 9 MG/ML
INJECTION, SOLUTION INTRAVENOUS PRN
Status: DISCONTINUED | OUTPATIENT
Start: 2025-01-10 | End: 2025-01-14 | Stop reason: HOSPADM

## 2025-01-10 RX ORDER — PANTOPRAZOLE SODIUM 40 MG/1
40 TABLET, DELAYED RELEASE ORAL
Status: DISCONTINUED | OUTPATIENT
Start: 2025-01-11 | End: 2025-01-14 | Stop reason: HOSPADM

## 2025-01-10 RX ORDER — UBIDECARENONE 75 MG
50 CAPSULE ORAL DAILY
Status: DISCONTINUED | OUTPATIENT
Start: 2025-01-11 | End: 2025-01-14 | Stop reason: HOSPADM

## 2025-01-10 RX ORDER — SODIUM CHLORIDE 0.9 % (FLUSH) 0.9 %
5-40 SYRINGE (ML) INJECTION EVERY 12 HOURS SCHEDULED
Status: DISCONTINUED | OUTPATIENT
Start: 2025-01-10 | End: 2025-01-14 | Stop reason: HOSPADM

## 2025-01-10 RX ORDER — HYDROXYZINE PAMOATE 25 MG/1
50 CAPSULE ORAL 2 TIMES DAILY PRN
Status: DISCONTINUED | OUTPATIENT
Start: 2025-01-10 | End: 2025-01-14 | Stop reason: HOSPADM

## 2025-01-10 RX ORDER — GAUZE BANDAGE 2" X 2"
100 BANDAGE TOPICAL DAILY
Status: DISCONTINUED | OUTPATIENT
Start: 2025-01-11 | End: 2025-01-14 | Stop reason: HOSPADM

## 2025-01-10 RX ORDER — ASPIRIN 81 MG/1
81 TABLET, CHEWABLE ORAL DAILY
Status: DISCONTINUED | OUTPATIENT
Start: 2025-01-11 | End: 2025-01-14 | Stop reason: HOSPADM

## 2025-01-10 RX ORDER — POLYETHYLENE GLYCOL 3350 17 G/17G
17 POWDER, FOR SOLUTION ORAL DAILY PRN
Status: DISCONTINUED | OUTPATIENT
Start: 2025-01-10 | End: 2025-01-14 | Stop reason: HOSPADM

## 2025-01-10 RX ORDER — DULOXETIN HYDROCHLORIDE 60 MG/1
120 CAPSULE, DELAYED RELEASE ORAL DAILY
Status: DISCONTINUED | OUTPATIENT
Start: 2025-01-11 | End: 2025-01-14 | Stop reason: HOSPADM

## 2025-01-10 RX ORDER — GABAPENTIN 300 MG/1
600 CAPSULE ORAL NIGHTLY
Status: DISCONTINUED | OUTPATIENT
Start: 2025-01-10 | End: 2025-01-12

## 2025-01-10 RX ORDER — IOPAMIDOL 755 MG/ML
80 INJECTION, SOLUTION INTRAVASCULAR
Status: COMPLETED | OUTPATIENT
Start: 2025-01-10 | End: 2025-01-10

## 2025-01-10 RX ORDER — ACETAMINOPHEN 500 MG
1000 TABLET ORAL ONCE
Status: COMPLETED | OUTPATIENT
Start: 2025-01-10 | End: 2025-01-10

## 2025-01-10 RX ORDER — ONDANSETRON 2 MG/ML
4 INJECTION INTRAMUSCULAR; INTRAVENOUS EVERY 6 HOURS PRN
Status: DISCONTINUED | OUTPATIENT
Start: 2025-01-10 | End: 2025-01-14 | Stop reason: HOSPADM

## 2025-01-10 RX ADMIN — QUETIAPINE FUMARATE 300 MG: 200 TABLET ORAL at 22:18

## 2025-01-10 RX ADMIN — ACETAMINOPHEN 1000 MG: 500 TABLET ORAL at 19:06

## 2025-01-10 RX ADMIN — CLONAZEPAM 1 MG: 0.5 TABLET ORAL at 22:18

## 2025-01-10 RX ADMIN — HYDROXYZINE PAMOATE 50 MG: 25 CAPSULE ORAL at 22:18

## 2025-01-10 RX ADMIN — IOPAMIDOL 80 ML: 755 INJECTION, SOLUTION INTRAVENOUS at 16:35

## 2025-01-10 RX ADMIN — GABAPENTIN 600 MG: 300 CAPSULE ORAL at 22:18

## 2025-01-10 RX ADMIN — BACLOFEN 10 MG: 10 TABLET ORAL at 22:18

## 2025-01-10 ASSESSMENT — PAIN - FUNCTIONAL ASSESSMENT
PAIN_FUNCTIONAL_ASSESSMENT: ACTIVITIES ARE NOT PREVENTED
PAIN_FUNCTIONAL_ASSESSMENT: ACTIVITIES ARE NOT PREVENTED

## 2025-01-10 ASSESSMENT — PAIN SCALES - GENERAL
PAINLEVEL_OUTOF10: 10
PAINLEVEL_OUTOF10: 10

## 2025-01-10 ASSESSMENT — PAIN DESCRIPTION - PAIN TYPE: TYPE: ACUTE PAIN

## 2025-01-10 ASSESSMENT — PAIN DESCRIPTION - DESCRIPTORS
DESCRIPTORS: HEAVINESS
DESCRIPTORS: HEAVINESS

## 2025-01-10 ASSESSMENT — PAIN DESCRIPTION - LOCATION
LOCATION: HEAD
LOCATION: HEAD

## 2025-01-10 ASSESSMENT — PAIN DESCRIPTION - ORIENTATION
ORIENTATION: LEFT;POSTERIOR
ORIENTATION: LEFT;POSTERIOR

## 2025-01-10 NOTE — ED PROVIDER NOTES
Results Review:  Old medical records.  Nursing notes.  Ambulance run sheet.      Discussion of Mangement with other Physicians, Q or Appropriate Source:  Neurology, Hospitalist        Diagnosis and Disposition     CLINICAL IMPRESSION:  1. Cerebrovascular accident (CVA), unspecified mechanism (HCC)         Medication List        ASK your doctor about these medications      amLODIPine 10 MG tablet  Commonly known as: NORVASC     aspirin 81 MG chewable tablet  Take 1 tablet by mouth daily     baclofen 10 MG tablet  Commonly known as: LIORESAL     calcium citrate 950 (200 Ca) MG tablet  Commonly known as: CALCITRATE     clonazePAM 1 MG tablet  Commonly known as: KLONOPIN     D3-50 1.25 MG (41717 UT) Caps  Generic drug: vitamin D  TAKE 1 CAPSULE BY MOUTH EVERY WEEK     diclofenac sodium 1 % Gel  Commonly known as: VOLTAREN  Apply 2 g topically 4 times daily as needed for Pain     DULoxetine 30 MG extended release capsule  Commonly known as: CYMBALTA     * famotidine 20 MG tablet  Commonly known as: PEPCID  TAKE 1 TABLET BY MOUTH AT BEDTIME     * famotidine 20 MG tablet  Commonly known as: PEPCID  Take 1 tablet by mouth at bedtime     flintstones complete 10 MG Chew tablet     gabapentin 300 MG capsule  Commonly known as: NEURONTIN     glatiramer 40 MG/ML injection  Commonly known as: Copaxone  Inject 1 mL into the skin three times a week Mon-wed-fri. Do not restart until cleared by bariatric surgeon at 2 week follow up     hydrOXYzine HCl 50 MG tablet  Commonly known as: ATARAX     hydrOXYzine pamoate 50 MG capsule  Commonly known as: VISTARIL     lidocaine 4 % external patch  Place 1 patch onto the skin daily     magnesium hydroxide 400 MG/5ML suspension  Commonly known as: MILK OF MAGNESIA     meclizine 25 MG Chew  Commonly known as: ANTIVERT     * omeprazole 40 MG delayed release capsule  Commonly known as: PRILOSEC  Take 1 capsule by mouth once daily     * omeprazole 40 MG delayed release capsule  Commonly known

## 2025-01-10 NOTE — ED TRIAGE NOTES
Patient arrived to ED via EMS from Home with complaints of stroke symptoms.   LKW 1430 today.  Pt stated she had the worst headache of her life.  Then family noticed , L sided weakness, L sided numbness and tingling, L sided facial droop, vision deficit to the left eye.  Hx of stroke in June 2024, endorses taking coumadin.    Per EMS , manual /106.

## 2025-01-11 ENCOUNTER — APPOINTMENT (OUTPATIENT)
Facility: HOSPITAL | Age: 50
DRG: 060 | End: 2025-01-11
Attending: FAMILY MEDICINE
Payer: MEDICARE

## 2025-01-11 LAB
ANION GAP SERPL CALC-SCNC: 3 MMOL/L (ref 3–18)
BUN SERPL-MCNC: 6 MG/DL (ref 7–18)
BUN/CREAT SERPL: 12 (ref 12–20)
CALCIUM SERPL-MCNC: 8.9 MG/DL (ref 8.5–10.1)
CHLORIDE SERPL-SCNC: 110 MMOL/L (ref 100–111)
CHOLEST SERPL-MCNC: 188 MG/DL
CO2 SERPL-SCNC: 30 MMOL/L (ref 21–32)
CREAT SERPL-MCNC: 0.52 MG/DL (ref 0.6–1.3)
ERYTHROCYTE [DISTWIDTH] IN BLOOD BY AUTOMATED COUNT: 13.2 % (ref 11.6–14.5)
EST. AVERAGE GLUCOSE BLD GHB EST-MCNC: 88 MG/DL
GLUCOSE SERPL-MCNC: 86 MG/DL (ref 74–99)
HBA1C MFR BLD: 4.7 % (ref 4.2–5.6)
HCT VFR BLD AUTO: 32.2 % (ref 35–45)
HDLC SERPL-MCNC: 89 MG/DL (ref 40–60)
HDLC SERPL: 2.1 (ref 0–5)
HGB BLD-MCNC: 11.2 G/DL (ref 12–16)
LDLC SERPL CALC-MCNC: 89.6 MG/DL (ref 0–100)
LIPID PANEL: ABNORMAL
MCH RBC QN AUTO: 28.6 PG (ref 24–34)
MCHC RBC AUTO-ENTMCNC: 34.8 G/DL (ref 31–37)
MCV RBC AUTO: 82.4 FL (ref 78–100)
NRBC # BLD: 0 K/UL (ref 0–0.01)
NRBC BLD-RTO: 0 PER 100 WBC
PLATELET # BLD AUTO: 359 K/UL (ref 135–420)
PMV BLD AUTO: 9 FL (ref 9.2–11.8)
POTASSIUM SERPL-SCNC: 3.4 MMOL/L (ref 3.5–5.5)
RBC # BLD AUTO: 3.91 M/UL (ref 4.2–5.3)
SODIUM SERPL-SCNC: 143 MMOL/L (ref 136–145)
TRIGL SERPL-MCNC: 47 MG/DL
TSH SERPL DL<=0.05 MIU/L-ACNC: 0.54 UIU/ML (ref 0.36–3.74)
VLDLC SERPL CALC-MCNC: 9.4 MG/DL
WBC # BLD AUTO: 5.2 K/UL (ref 4.6–13.2)

## 2025-01-11 PROCEDURE — 85027 COMPLETE CBC AUTOMATED: CPT

## 2025-01-11 PROCEDURE — 1100000003 HC PRIVATE W/ TELEMETRY

## 2025-01-11 PROCEDURE — 94761 N-INVAS EAR/PLS OXIMETRY MLT: CPT

## 2025-01-11 PROCEDURE — 92507 TX SP LANG VOICE COMM INDIV: CPT

## 2025-01-11 PROCEDURE — 6360000002 HC RX W HCPCS: Performed by: FAMILY MEDICINE

## 2025-01-11 PROCEDURE — 92523 SPEECH SOUND LANG COMPREHEN: CPT

## 2025-01-11 PROCEDURE — 97162 PT EVAL MOD COMPLEX 30 MIN: CPT

## 2025-01-11 PROCEDURE — 70551 MRI BRAIN STEM W/O DYE: CPT

## 2025-01-11 PROCEDURE — 84443 ASSAY THYROID STIM HORMONE: CPT

## 2025-01-11 PROCEDURE — 97166 OT EVAL MOD COMPLEX 45 MIN: CPT

## 2025-01-11 PROCEDURE — 97530 THERAPEUTIC ACTIVITIES: CPT

## 2025-01-11 PROCEDURE — 83036 HEMOGLOBIN GLYCOSYLATED A1C: CPT

## 2025-01-11 PROCEDURE — 2500000003 HC RX 250 WO HCPCS: Performed by: FAMILY MEDICINE

## 2025-01-11 PROCEDURE — 6370000000 HC RX 637 (ALT 250 FOR IP): Performed by: FAMILY MEDICINE

## 2025-01-11 PROCEDURE — 80061 LIPID PANEL: CPT

## 2025-01-11 PROCEDURE — 36415 COLL VENOUS BLD VENIPUNCTURE: CPT

## 2025-01-11 PROCEDURE — 80048 BASIC METABOLIC PNL TOTAL CA: CPT

## 2025-01-11 RX ORDER — GLATIRAMER 40 MG/ML
40 INJECTION, SOLUTION SUBCUTANEOUS
Status: DISCONTINUED | OUTPATIENT
Start: 2025-01-13 | End: 2025-01-14 | Stop reason: HOSPADM

## 2025-01-11 RX ADMIN — POTASSIUM BICARBONATE 40 MEQ: 782 TABLET, EFFERVESCENT ORAL at 10:04

## 2025-01-11 RX ADMIN — BACLOFEN 10 MG: 10 TABLET ORAL at 10:03

## 2025-01-11 RX ADMIN — HYDROXYZINE PAMOATE 50 MG: 25 CAPSULE ORAL at 20:34

## 2025-01-11 RX ADMIN — ENOXAPARIN SODIUM 40 MG: 100 INJECTION SUBCUTANEOUS at 10:04

## 2025-01-11 RX ADMIN — DULOXETINE HYDROCHLORIDE 120 MG: 60 CAPSULE, DELAYED RELEASE ORAL at 09:53

## 2025-01-11 RX ADMIN — PANTOPRAZOLE SODIUM 40 MG: 40 TABLET, DELAYED RELEASE ORAL at 09:52

## 2025-01-11 RX ADMIN — Medication 100 MG: at 09:53

## 2025-01-11 RX ADMIN — CLONAZEPAM 1 MG: 0.5 TABLET ORAL at 20:34

## 2025-01-11 RX ADMIN — ASPIRIN 81 MG CHEWABLE TABLET 81 MG: 81 TABLET CHEWABLE at 09:52

## 2025-01-11 RX ADMIN — SODIUM CHLORIDE, PRESERVATIVE FREE 10 ML: 5 INJECTION INTRAVENOUS at 20:34

## 2025-01-11 RX ADMIN — GABAPENTIN 600 MG: 300 CAPSULE ORAL at 20:34

## 2025-01-11 RX ADMIN — BACLOFEN 10 MG: 10 TABLET ORAL at 20:34

## 2025-01-11 RX ADMIN — VITAM B12 50 MCG: 100 TAB at 09:53

## 2025-01-11 RX ADMIN — SODIUM CHLORIDE, PRESERVATIVE FREE 10 ML: 5 INJECTION INTRAVENOUS at 10:04

## 2025-01-11 RX ADMIN — QUETIAPINE FUMARATE 300 MG: 200 TABLET ORAL at 20:33

## 2025-01-11 ASSESSMENT — PAIN SCALES - GENERAL
PAINLEVEL_OUTOF10: 0

## 2025-01-11 NOTE — ED NOTES
TRANSFER - OUT REPORT:    Verbal report given to MIKKI Hightower on Little Siddiqui  being transferred to [address] [place] [address] for routine progression of patient care       Report consisted of patient's Situation, Background, Assessment and   Recommendations(SBAR).     Information from the following report(s) Nurse Handoff Report was reviewed with the receiving nurse.    [place] Fall Assessment:    Presents to emergency department  because of falls (Syncope, seizure, or loss of consciousness): No  Age > 70: No  Altered Mental Status, Intoxication with alcohol or substance confusion (Disorientation, impaired judgment, poor safety awaremess, or inability to follow instructions): No  Impaired Mobility: Ambulates or transfers with assistive devices or assistance; Unable to ambulate or transer.: Yes  Nursing Judgement: Yes          Lines:   Peripheral IV Right Antecubital (Active)          Melina Pang RN     Opportunity for questions and clarification was provided.      Patient transported with:  Registered Nurse

## 2025-01-11 NOTE — ED NOTES
Attempted to call for report, receiving RN not available.  Spoke to another RN, and will call back in 10 minutes to give report to MIKKI Keita.

## 2025-01-11 NOTE — H&P
AM   Result Value Ref Range    Sodium 143 136 - 145 mmol/L    Potassium 3.4 (L) 3.5 - 5.5 mmol/L    Chloride 110 100 - 111 mmol/L    CO2 30 21 - 32 mmol/L    Anion Gap 3 3.0 - 18 mmol/L    Glucose 86 74 - 99 mg/dL    BUN 6 (L) 7.0 - 18 MG/DL    Creatinine 0.52 (L) 0.6 - 1.3 MG/DL    BUN/Creatinine Ratio 12 12 - 20      Est, Glom Filt Rate >90 >60 ml/min/1.73m2    Calcium 8.9 8.5 - 10.1 MG/DL          RECENT IMAGING ON ADMISSION   CT HEAD WO CONTRAST    Result Date: 1/10/2025  1.   No acute intracranial process identified. 2.  Moderate chronic small vessel ischemic changes. Findings transmitted to the ED at 1645. Electronically signed by Abdirahman Shannon      MOST RECENT EKG ON ADMISSION  Results for orders placed or performed during the hospital encounter of 01/10/25   EKG 12 Lead - Recommend after Head CT performed    Collection Time: 01/10/25  6:13 PM   Result Value Ref Range    Ventricular Rate 85 BPM    Atrial Rate 85 BPM    P-R Interval 168 ms    QRS Duration 74 ms    Q-T Interval 372 ms    QTc Calculation (Bazett) 442 ms    P Axis 66 degrees    R Axis 30 degrees    T Axis 41 degrees    Diagnosis       Normal sinus rhythm  Septal infarct (cited on or before 01-JAN-2021)  Abnormal ECG  When compared with ECG of 24-DEC-2024 10:08,  Serial changes of Septal infarct present  Confirmed by Cyrus Adhikari MD ([id number]) on 1/10/2025 6:46:57 PM         I have discussed Ms. Little Siddiqui case with my attending who agrees with the plan of care.    Vincenzo Lewis DO , PGY-3   [facility]   January 11, 2025, 5:58 AM

## 2025-01-12 LAB
ANION GAP SERPL CALC-SCNC: 6 MMOL/L (ref 3–18)
BUN SERPL-MCNC: 10 MG/DL (ref 7–18)
BUN/CREAT SERPL: 20 (ref 12–20)
CALCIUM SERPL-MCNC: 8.9 MG/DL (ref 8.5–10.1)
CHLORIDE SERPL-SCNC: 109 MMOL/L (ref 100–111)
CO2 SERPL-SCNC: 28 MMOL/L (ref 21–32)
CREAT SERPL-MCNC: 0.5 MG/DL (ref 0.6–1.3)
ERYTHROCYTE [DISTWIDTH] IN BLOOD BY AUTOMATED COUNT: 13 % (ref 11.6–14.5)
FOLATE SERPL-MCNC: 19 NG/ML (ref 3.1–17.5)
GLUCOSE SERPL-MCNC: 81 MG/DL (ref 74–99)
HCT VFR BLD AUTO: 33.4 % (ref 35–45)
HGB BLD-MCNC: 11.5 G/DL (ref 12–16)
MCH RBC QN AUTO: 28.4 PG (ref 24–34)
MCHC RBC AUTO-ENTMCNC: 34.4 G/DL (ref 31–37)
MCV RBC AUTO: 82.5 FL (ref 78–100)
NRBC # BLD: 0 K/UL (ref 0–0.01)
NRBC BLD-RTO: 0 PER 100 WBC
PLATELET # BLD AUTO: 380 K/UL (ref 135–420)
PMV BLD AUTO: 9.3 FL (ref 9.2–11.8)
POTASSIUM SERPL-SCNC: 3.5 MMOL/L (ref 3.5–5.5)
RBC # BLD AUTO: 4.05 M/UL (ref 4.2–5.3)
SODIUM SERPL-SCNC: 143 MMOL/L (ref 136–145)
VIT B12 SERPL-MCNC: 556 PG/ML (ref 211–911)
WBC # BLD AUTO: 5.1 K/UL (ref 4.6–13.2)

## 2025-01-12 PROCEDURE — 82746 ASSAY OF FOLIC ACID SERUM: CPT

## 2025-01-12 PROCEDURE — 1100000003 HC PRIVATE W/ TELEMETRY

## 2025-01-12 PROCEDURE — 6370000000 HC RX 637 (ALT 250 FOR IP): Performed by: FAMILY MEDICINE

## 2025-01-12 PROCEDURE — 6360000002 HC RX W HCPCS

## 2025-01-12 PROCEDURE — 2500000003 HC RX 250 WO HCPCS: Performed by: FAMILY MEDICINE

## 2025-01-12 PROCEDURE — 94761 N-INVAS EAR/PLS OXIMETRY MLT: CPT

## 2025-01-12 PROCEDURE — 6360000002 HC RX W HCPCS: Performed by: FAMILY MEDICINE

## 2025-01-12 PROCEDURE — 85027 COMPLETE CBC AUTOMATED: CPT

## 2025-01-12 PROCEDURE — 83921 ORGANIC ACID SINGLE QUANT: CPT

## 2025-01-12 PROCEDURE — 82607 VITAMIN B-12: CPT

## 2025-01-12 PROCEDURE — 80048 BASIC METABOLIC PNL TOTAL CA: CPT

## 2025-01-12 PROCEDURE — 36415 COLL VENOUS BLD VENIPUNCTURE: CPT

## 2025-01-12 PROCEDURE — 6370000000 HC RX 637 (ALT 250 FOR IP)

## 2025-01-12 PROCEDURE — 84425 ASSAY OF VITAMIN B-1: CPT

## 2025-01-12 PROCEDURE — 6370000000 HC RX 637 (ALT 250 FOR IP): Performed by: PSYCHIATRY & NEUROLOGY

## 2025-01-12 RX ORDER — BACLOFEN 10 MG/1
15 TABLET ORAL EVERY 8 HOURS
Status: DISCONTINUED | OUTPATIENT
Start: 2025-01-12 | End: 2025-01-12

## 2025-01-12 RX ORDER — GABAPENTIN 300 MG/1
600 CAPSULE ORAL 2 TIMES DAILY
Status: DISCONTINUED | OUTPATIENT
Start: 2025-01-12 | End: 2025-01-14 | Stop reason: HOSPADM

## 2025-01-12 RX ORDER — LIDOCAINE 4 G/G
1 PATCH TOPICAL DAILY
Status: DISCONTINUED | OUTPATIENT
Start: 2025-01-12 | End: 2025-01-14 | Stop reason: HOSPADM

## 2025-01-12 RX ORDER — PROCHLORPERAZINE EDISYLATE 5 MG/ML
10 INJECTION INTRAMUSCULAR; INTRAVENOUS ONCE
Status: COMPLETED | OUTPATIENT
Start: 2025-01-12 | End: 2025-01-12

## 2025-01-12 RX ORDER — THIAMINE HYDROCHLORIDE 100 MG/ML
400 INJECTION, SOLUTION INTRAMUSCULAR; INTRAVENOUS DAILY
Status: DISCONTINUED | OUTPATIENT
Start: 2025-01-12 | End: 2025-01-14 | Stop reason: HOSPADM

## 2025-01-12 RX ORDER — KETOROLAC TROMETHAMINE 15 MG/ML
15 INJECTION, SOLUTION INTRAMUSCULAR; INTRAVENOUS ONCE
Status: COMPLETED | OUTPATIENT
Start: 2025-01-12 | End: 2025-01-12

## 2025-01-12 RX ORDER — ACETAMINOPHEN 500 MG
1000 TABLET ORAL EVERY 6 HOURS
Status: DISCONTINUED | OUTPATIENT
Start: 2025-01-12 | End: 2025-01-12

## 2025-01-12 RX ORDER — ACETAMINOPHEN 325 MG/1
650 TABLET ORAL EVERY 4 HOURS PRN
Status: DISCONTINUED | OUTPATIENT
Start: 2025-01-12 | End: 2025-01-12

## 2025-01-12 RX ORDER — BACLOFEN 10 MG/1
15 TABLET ORAL EVERY 8 HOURS
Status: DISCONTINUED | OUTPATIENT
Start: 2025-01-12 | End: 2025-01-14 | Stop reason: HOSPADM

## 2025-01-12 RX ORDER — BACLOFEN 10 MG/1
10 TABLET ORAL ONCE
Status: DISCONTINUED | OUTPATIENT
Start: 2025-01-12 | End: 2025-01-12

## 2025-01-12 RX ORDER — THIAMINE HYDROCHLORIDE 100 MG/ML
200 INJECTION, SOLUTION INTRAMUSCULAR; INTRAVENOUS DAILY
Status: DISCONTINUED | OUTPATIENT
Start: 2025-01-17 | End: 2025-01-14 | Stop reason: HOSPADM

## 2025-01-12 RX ORDER — ACETAMINOPHEN 500 MG
1000 TABLET ORAL EVERY 8 HOURS
Status: DISCONTINUED | OUTPATIENT
Start: 2025-01-12 | End: 2025-01-14 | Stop reason: HOSPADM

## 2025-01-12 RX ADMIN — DULOXETINE HYDROCHLORIDE 120 MG: 60 CAPSULE, DELAYED RELEASE ORAL at 07:52

## 2025-01-12 RX ADMIN — ENOXAPARIN SODIUM 40 MG: 100 INJECTION SUBCUTANEOUS at 07:52

## 2025-01-12 RX ADMIN — THIAMINE HYDROCHLORIDE 400 MG: 100 INJECTION, SOLUTION INTRAMUSCULAR; INTRAVENOUS at 12:33

## 2025-01-12 RX ADMIN — GABAPENTIN 600 MG: 300 CAPSULE ORAL at 13:49

## 2025-01-12 RX ADMIN — CLONAZEPAM 1 MG: 0.5 TABLET ORAL at 21:15

## 2025-01-12 RX ADMIN — ACETAMINOPHEN 1000 MG: 500 TABLET ORAL at 13:14

## 2025-01-12 RX ADMIN — ASPIRIN 81 MG CHEWABLE TABLET 81 MG: 81 TABLET CHEWABLE at 07:52

## 2025-01-12 RX ADMIN — BACLOFEN 10 MG: 10 TABLET ORAL at 10:37

## 2025-01-12 RX ADMIN — BACLOFEN 10 MG: 10 TABLET ORAL at 03:14

## 2025-01-12 RX ADMIN — ACETAMINOPHEN 650 MG: 325 TABLET ORAL at 03:14

## 2025-01-12 RX ADMIN — SODIUM CHLORIDE, PRESERVATIVE FREE 10 ML: 5 INJECTION INTRAVENOUS at 12:34

## 2025-01-12 RX ADMIN — PROCHLORPERAZINE EDISYLATE 10 MG: 5 INJECTION INTRAMUSCULAR; INTRAVENOUS at 21:15

## 2025-01-12 RX ADMIN — SODIUM CHLORIDE, PRESERVATIVE FREE 10 ML: 5 INJECTION INTRAVENOUS at 21:16

## 2025-01-12 RX ADMIN — POTASSIUM BICARBONATE 40 MEQ: 782 TABLET, EFFERVESCENT ORAL at 10:37

## 2025-01-12 RX ADMIN — Medication 100 MG: at 07:52

## 2025-01-12 RX ADMIN — VITAM B12 50 MCG: 100 TAB at 07:52

## 2025-01-12 RX ADMIN — BACLOFEN 15 MG: 10 TABLET ORAL at 16:34

## 2025-01-12 RX ADMIN — QUETIAPINE FUMARATE 300 MG: 200 TABLET ORAL at 21:14

## 2025-01-12 RX ADMIN — PANTOPRAZOLE SODIUM 40 MG: 40 TABLET, DELAYED RELEASE ORAL at 07:52

## 2025-01-12 RX ADMIN — ACETAMINOPHEN 650 MG: 325 TABLET ORAL at 07:51

## 2025-01-12 RX ADMIN — SODIUM CHLORIDE, PRESERVATIVE FREE 10 ML: 5 INJECTION INTRAVENOUS at 21:00

## 2025-01-12 RX ADMIN — KETOROLAC TROMETHAMINE 15 MG: 15 INJECTION, SOLUTION INTRAMUSCULAR; INTRAVENOUS at 21:15

## 2025-01-12 RX ADMIN — GABAPENTIN 600 MG: 300 CAPSULE ORAL at 21:15

## 2025-01-12 ASSESSMENT — PAIN DESCRIPTION - FREQUENCY
FREQUENCY: CONTINUOUS
FREQUENCY: INTERMITTENT
FREQUENCY: CONTINUOUS
FREQUENCY: CONTINUOUS

## 2025-01-12 ASSESSMENT — PAIN DESCRIPTION - LOCATION
LOCATION: BACK
LOCATION: HEAD

## 2025-01-12 ASSESSMENT — PAIN SCALES - GENERAL
PAINLEVEL_OUTOF10: 2
PAINLEVEL_OUTOF10: 10
PAINLEVEL_OUTOF10: 8
PAINLEVEL_OUTOF10: 0
PAINLEVEL_OUTOF10: 10
PAINLEVEL_OUTOF10: 0
PAINLEVEL_OUTOF10: 5
PAINLEVEL_OUTOF10: 5
PAINLEVEL_OUTOF10: 10
PAINLEVEL_OUTOF10: 5
PAINLEVEL_OUTOF10: 0

## 2025-01-12 ASSESSMENT — PAIN DESCRIPTION - ORIENTATION
ORIENTATION: RIGHT
ORIENTATION: LOWER

## 2025-01-12 ASSESSMENT — PAIN DESCRIPTION - DESCRIPTORS
DESCRIPTORS: ACHING
DESCRIPTORS: ACHING;DISCOMFORT
DESCRIPTORS: ACHING
DESCRIPTORS: ACHING
DESCRIPTORS: ACHING;DISCOMFORT

## 2025-01-12 ASSESSMENT — PAIN DESCRIPTION - ONSET
ONSET: ON-GOING

## 2025-01-12 ASSESSMENT — PAIN DESCRIPTION - PAIN TYPE
TYPE: ACUTE PAIN

## 2025-01-12 ASSESSMENT — PAIN - FUNCTIONAL ASSESSMENT
PAIN_FUNCTIONAL_ASSESSMENT: PREVENTS OR INTERFERES SOME ACTIVE ACTIVITIES AND ADLS
PAIN_FUNCTIONAL_ASSESSMENT: ACTIVITIES ARE NOT PREVENTED

## 2025-01-13 ENCOUNTER — APPOINTMENT (OUTPATIENT)
Facility: HOSPITAL | Age: 50
DRG: 060 | End: 2025-01-13
Payer: MEDICARE

## 2025-01-13 LAB
25(OH)D3 SERPL-MCNC: 28.4 NG/ML (ref 30–100)
ANION GAP SERPL CALC-SCNC: 5 MMOL/L (ref 3–18)
BUN SERPL-MCNC: 13 MG/DL (ref 7–18)
BUN/CREAT SERPL: 20 (ref 12–20)
CALCIUM SERPL-MCNC: 8.6 MG/DL (ref 8.5–10.1)
CHLORIDE SERPL-SCNC: 107 MMOL/L (ref 100–111)
CO2 SERPL-SCNC: 27 MMOL/L (ref 21–32)
CREAT SERPL-MCNC: 0.64 MG/DL (ref 0.6–1.3)
ERYTHROCYTE [DISTWIDTH] IN BLOOD BY AUTOMATED COUNT: 13 % (ref 11.6–14.5)
FERRITIN SERPL-MCNC: 87 NG/ML (ref 8–388)
GLUCOSE SERPL-MCNC: 91 MG/DL (ref 74–99)
HCT VFR BLD AUTO: 33.4 % (ref 35–45)
HGB BLD-MCNC: 11.3 G/DL (ref 12–16)
IRON SATN MFR SERPL: 22 % (ref 20–50)
IRON SERPL-MCNC: 52 UG/DL (ref 50–175)
MCH RBC QN AUTO: 28 PG (ref 24–34)
MCHC RBC AUTO-ENTMCNC: 33.8 G/DL (ref 31–37)
MCV RBC AUTO: 82.7 FL (ref 78–100)
NRBC # BLD: 0 K/UL (ref 0–0.01)
NRBC BLD-RTO: 0 PER 100 WBC
PLATELET # BLD AUTO: 386 K/UL (ref 135–420)
PMV BLD AUTO: 9.2 FL (ref 9.2–11.8)
POTASSIUM SERPL-SCNC: 3.5 MMOL/L (ref 3.5–5.5)
RBC # BLD AUTO: 4.04 M/UL (ref 4.2–5.3)
SODIUM SERPL-SCNC: 139 MMOL/L (ref 136–145)
TIBC SERPL-MCNC: 235 UG/DL (ref 250–450)
WBC # BLD AUTO: 4.8 K/UL (ref 4.6–13.2)

## 2025-01-13 PROCEDURE — 6360000002 HC RX W HCPCS

## 2025-01-13 PROCEDURE — 83540 ASSAY OF IRON: CPT

## 2025-01-13 PROCEDURE — 83550 IRON BINDING TEST: CPT

## 2025-01-13 PROCEDURE — 6370000000 HC RX 637 (ALT 250 FOR IP)

## 2025-01-13 PROCEDURE — 6360000002 HC RX W HCPCS: Performed by: FAMILY MEDICINE

## 2025-01-13 PROCEDURE — 1100000003 HC PRIVATE W/ TELEMETRY

## 2025-01-13 PROCEDURE — 82306 VITAMIN D 25 HYDROXY: CPT

## 2025-01-13 PROCEDURE — 80048 BASIC METABOLIC PNL TOTAL CA: CPT

## 2025-01-13 PROCEDURE — 99222 1ST HOSP IP/OBS MODERATE 55: CPT | Performed by: PSYCHIATRY & NEUROLOGY

## 2025-01-13 PROCEDURE — 85027 COMPLETE CBC AUTOMATED: CPT

## 2025-01-13 PROCEDURE — 2500000003 HC RX 250 WO HCPCS: Performed by: FAMILY MEDICINE

## 2025-01-13 PROCEDURE — 72141 MRI NECK SPINE W/O DYE: CPT

## 2025-01-13 PROCEDURE — 82728 ASSAY OF FERRITIN: CPT

## 2025-01-13 PROCEDURE — 6370000000 HC RX 637 (ALT 250 FOR IP): Performed by: PSYCHIATRY & NEUROLOGY

## 2025-01-13 PROCEDURE — 6370000000 HC RX 637 (ALT 250 FOR IP): Performed by: FAMILY MEDICINE

## 2025-01-13 PROCEDURE — 84425 ASSAY OF VITAMIN B-1: CPT

## 2025-01-13 PROCEDURE — 94761 N-INVAS EAR/PLS OXIMETRY MLT: CPT

## 2025-01-13 PROCEDURE — 99232 SBSQ HOSP IP/OBS MODERATE 35: CPT | Performed by: PSYCHIATRY & NEUROLOGY

## 2025-01-13 PROCEDURE — 36415 COLL VENOUS BLD VENIPUNCTURE: CPT

## 2025-01-13 RX ORDER — POTASSIUM CHLORIDE 1500 MG/1
20 TABLET, EXTENDED RELEASE ORAL
Status: COMPLETED | OUTPATIENT
Start: 2025-01-13 | End: 2025-01-13

## 2025-01-13 RX ADMIN — HYDROXYZINE PAMOATE 50 MG: 25 CAPSULE ORAL at 11:48

## 2025-01-13 RX ADMIN — PANTOPRAZOLE SODIUM 40 MG: 40 TABLET, DELAYED RELEASE ORAL at 06:26

## 2025-01-13 RX ADMIN — POTASSIUM CHLORIDE 20 MEQ: 1500 TABLET, EXTENDED RELEASE ORAL at 09:02

## 2025-01-13 RX ADMIN — BACLOFEN 15 MG: 10 TABLET ORAL at 09:02

## 2025-01-13 RX ADMIN — BACLOFEN 15 MG: 10 TABLET ORAL at 17:35

## 2025-01-13 RX ADMIN — BACLOFEN 15 MG: 10 TABLET ORAL at 02:00

## 2025-01-13 RX ADMIN — POLYETHYLENE GLYCOL 3350 17 G: 17 POWDER, FOR SOLUTION ORAL at 14:16

## 2025-01-13 RX ADMIN — GABAPENTIN 600 MG: 300 CAPSULE ORAL at 21:24

## 2025-01-13 RX ADMIN — SODIUM CHLORIDE, PRESERVATIVE FREE 10 ML: 5 INJECTION INTRAVENOUS at 09:11

## 2025-01-13 RX ADMIN — POTASSIUM CHLORIDE 20 MEQ: 1500 TABLET, EXTENDED RELEASE ORAL at 14:05

## 2025-01-13 RX ADMIN — VITAM B12 50 MCG: 100 TAB at 09:01

## 2025-01-13 RX ADMIN — CLONAZEPAM 1 MG: 0.5 TABLET ORAL at 21:24

## 2025-01-13 RX ADMIN — QUETIAPINE FUMARATE 300 MG: 200 TABLET ORAL at 21:24

## 2025-01-13 RX ADMIN — ENOXAPARIN SODIUM 40 MG: 100 INJECTION SUBCUTANEOUS at 09:02

## 2025-01-13 RX ADMIN — THIAMINE HYDROCHLORIDE 400 MG: 100 INJECTION, SOLUTION INTRAMUSCULAR; INTRAVENOUS at 09:02

## 2025-01-13 RX ADMIN — ASPIRIN 81 MG CHEWABLE TABLET 81 MG: 81 TABLET CHEWABLE at 09:02

## 2025-01-13 RX ADMIN — DULOXETINE HYDROCHLORIDE 120 MG: 60 CAPSULE, DELAYED RELEASE ORAL at 09:01

## 2025-01-13 RX ADMIN — POTASSIUM CHLORIDE 20 MEQ: 1500 TABLET, EXTENDED RELEASE ORAL at 11:48

## 2025-01-13 RX ADMIN — SODIUM CHLORIDE, PRESERVATIVE FREE 10 ML: 5 INJECTION INTRAVENOUS at 21:00

## 2025-01-13 RX ADMIN — GABAPENTIN 600 MG: 300 CAPSULE ORAL at 09:01

## 2025-01-13 ASSESSMENT — PAIN SCALES - GENERAL
PAINLEVEL_OUTOF10: 0

## 2025-01-13 NOTE — CARE COORDINATION
Met with Patient at bedside. CM introduces self and explains role. Patient is alert and oriented x 4. HIPAA verified. Patients' demographics verified and updated accordingly. Patient agrees to complete assessment.    Patient is not medically ready for discharge. PT/OT rec ARU, who declines patient after case review. Patient is declining SNF. She would like [facility] along with resumption of her PCA services with [facility] 7 days a weeks [id number] . REC DME is a RW. 3:1 BSC and a 18\"MWC.     CM sent referral to [facility], pending case review.     DCP: Home with [facility] ( [facility] reviewing ) REC DME of a RW. 3:1 BSC and a 18\"MWC, resumption of her PCA services with [facility] 7 days a weeks [id number] . Family will transport her home via POV.     01/13/25 1038   Service Assessment   Patient Orientation Alert and Oriented;Person;Place;Situation;Self   Cognition Alert   History Provided By Patient   Primary Caregiver Self   Accompanied By/Relationship SELF   Support Systems Children;/   Patient's Healthcare Decision Maker is: Named in Scanned ACP Document   PCP Verified by CM Yes   Last Visit to PCP Within last 3 months   Prior Functional Level Independent in ADLs/IADLs   Current Functional Level Independent in ADLs/IADLs   Can patient return to prior living arrangement Yes   Ability to make needs known: Good   Family able to assist with home care needs: Other (comment)  (Family checks in on her prn.)   Would you like for me to discuss the discharge plan with any other family members/significant others, and if so, who? No   Financial Resources Medicare   Community Resources None   CM/SW Referral DME   Social/Functional History   Lives With Alone   Type of Home Apartment   Home Layout One level   Home Access Level entry   Bathroom Shower/Tub Tub/Shower unit;Shower chair with back   Bathroom Toilet Standard   Bathroom Equipment Tub transfer bench;Shower chair   Bathroom Accessibility Accessible   Home

## 2025-01-13 NOTE — CONSULTS
Comprehensive Nutrition Assessment    Type and Reason for Visit:  Initial, Consult    Nutrition Recommendations/Plan:   Continue current diet as tolerated.  Order Ensure Plus High Protein (each provides 350 kcal, 20g protein) TID  Recommend  bariatric vitamins in house: MVI, Vit B12, Vit D, iron, calcium.  Continue to monitor tolerance of PO, compliance of oral supplements, weight, labs, and plan of care during admission.  Daily weights.     Malnutrition Assessment:  Malnutrition Status:  At risk for malnutrition (01/12/25 1533)    Context:  Acute Illness     Findings of the 6 clinical characteristics of malnutrition:  Energy Intake:  50% or less of estimated energy requirements for 5 or more days  Weight Loss:  No weight loss     Body Fat Loss:  No body fat loss     Muscle Mass Loss:  No muscle mass loss    Fluid Accumulation:  No fluid accumulation     Strength:  Not Performed    Nutrition History and Allergies:      Past Medical History:   Diagnosis Date    Anticoagulant long-term use     Arthropathy, unspecified, site unspecified     Depression     Diabetes (HCC)     Hypercholesteremia     Hypertension     history of htn    Insomnia     Migraine     MS (multiple sclerosis) ([facility])     Neurogenic bladder     Seizures ([facility])     6/2013     Met with pt at bedside who reported appetite PTA x1-2 weeks. Reported having a gastric sleeve in 4/2024. Due to this, pt experiences acid reflux to the point of vomiting. Tolerates soft chicken (chicken pot pie) with added protein powder. Reported consuming ~1 meal per day, 1/2 protein drink and drinking water throughout the day. C/o headaches daily.    Weight Hx: Pt reported 20 lb wt loss x 1 week (170 --> 150 lbs). Question accuracy. Continue to monitor weight while in house. Per EMR, weight stable 150-170 x9 months.   Wt Readings from Last 10 Encounters:   01/10/25 82.1 kg (181 lb 1.6 oz)   12/31/24 78.5 kg (173 lb)   12/17/24 77.1 kg (170 lb)   10/05/24 72.6 kg (160 lb) 
HISTORY AND EXAM  49 y.o. female with PMHx of MS, nonepileptiform seizures,  neurogenic bladder, HTN, type II diabetes, depression, muscle spasms, right foot drop, GERD, and gastric sleeve procedure, embolic stroke who presents to the ED as an acute stroke alert. In close contact noted that the patient had some slurred speech and reported left arm and leg weakness.  Patient reports that she is on Coumadin with last levels checked last week.  Denies any falls or head injury.  Denied any seizure like activity but does have known history of seizures.  Blood glucose noted to be 136.     Patient reported woke up and had headache, speech changes with left side getting heavier, admit stroke last year with some weakness, memory issues as well,  her symptoms progressively got worse, left eye blurry vision and left side paresthesias  Patient reported on aspirin and taking meds regular, live alone but nurse come to help her with activities of daily living, have some heart issues also and seen cardiology, his tory of depression and treated for that as well, hx of seizure unable to give details of that, patient also report multiple sclerosis hx dx 2004 was on avonex switch to copaxone for some years as well as some infusions in the past not helped, had neurologist moved to another area,     Social History     Socioeconomic History    Marital status:      Spouse name: Not on file    Number of children: Not on file    Years of education: Not on file    Highest education level: Not on file   Occupational History    Not on file   Tobacco Use    Smoking status: Never    Smokeless tobacco: Never   Vaping Use    Vaping status: Never Used   Substance and Sexual Activity    Alcohol use: Never    Drug use: Never    Sexual activity: Yes     Partners: Male     Comment: Hysterectomy   Other Topics Concern    Not on file   Social History Narrative    Not on file     Social Determinants of Health     Financial Resource Strain: Not on 
deprived.   Sensorium and Cognition  AOx3, attention intact, memory intact, language use appropriate however there is some evidence of dysarthric difficulties, and fund of knowledge age appropriate   Insight  Fair   Judgment Fair     Assessment and Plan     Psychiatric Diagnoses: Axis I: Major depressive disorder recurrent without psychotic symptoms.  Rule out bipolar 1 disorder most recent episode depressed with psychotic symptoms.  Generalized anxiety disorder.  Axis II: Noncontributory.  Axis III: Multiple sclerosis.  Left lower extremity weakness by history.History of none epileptiform seizures.  Neurogenic bladder by history.  Muscle spasms by history.  History of a right foot drop..     The consultation is appreciated.  This is the patient with a history of a mood disorder, questions being raised about her suffering with a recurrent major depressive disorder currently without psychotic symptoms, versus bipolar 1 disorder most recent episode depressed with psychotic symptoms, remote.  The patient has been prescribed with multiple medications throughout the years, however describes herself as being currently stable with a combination of duloxetine 120 mg daily, quetiapine 300 mg at bedtime, and hydroxyzine pamoate as needed for anxiety and or insomnia.  So from our point of view, I will suggest for the patient to continue the same combination of medications, and to be referred to be seen by her outpatient psychiatrist, Dr. Hassan in the [place] area.  It is to be mentioned that from the psychiatric point of view, the patient can be discharged to outpatient care.    Thank you for the consultation. Psychiatry signing off.        SHERICE TRINIDAD MD. [facility]  Psychiatrist  [facility]  60 minutes session including 40 minutes with the patient face-to-face, 10 minutes reviewing the patient's medical record and 5 minutes dictation of this note.

## 2025-01-14 VITALS
WEIGHT: 181.1 LBS | RESPIRATION RATE: 17 BRPM | DIASTOLIC BLOOD PRESSURE: 72 MMHG | BODY MASS INDEX: 32.09 KG/M2 | HEIGHT: 63 IN | HEART RATE: 82 BPM | TEMPERATURE: 97.3 F | SYSTOLIC BLOOD PRESSURE: 118 MMHG | OXYGEN SATURATION: 100 %

## 2025-01-14 PROBLEM — M25.529 PAIN, ELBOW: Status: RESOLVED | Noted: 2021-05-25 | Resolved: 2025-01-14

## 2025-01-14 PROBLEM — B37.2 CANDIDAL INTERTRIGO: Status: RESOLVED | Noted: 2021-05-25 | Resolved: 2025-01-14
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
## 2025-01-14 PROBLEM — R25.3 FASCICULATIONS: Status: RESOLVED | Noted: 2024-04-20 | Resolved: 2025-01-14

## 2025-01-14 PROBLEM — E87.6 HYPOKALEMIA: Status: RESOLVED | Noted: 2021-05-25 | Resolved: 2025-01-14

## 2025-01-14 PROBLEM — S62.309A CLOSED FRACTURE OF METACARPAL BONE: Status: RESOLVED | Noted: 2021-05-25 | Resolved: 2025-01-14
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
## 2025-01-14 PROBLEM — B34.9 VIRAL INFECTION: Status: RESOLVED | Noted: 2021-05-25 | Resolved: 2025-01-14

## 2025-01-14 PROBLEM — T50.901A OVERDOSE: Status: RESOLVED | Noted: 2021-05-25 | Resolved: 2025-01-14

## 2025-01-14 PROBLEM — R11.2 NAUSEA WITH VOMITING: Status: RESOLVED | Noted: 2021-05-25 | Resolved: 2025-01-14

## 2025-01-14 LAB
ANION GAP SERPL CALC-SCNC: 6 MMOL/L (ref 3–18)
BUN SERPL-MCNC: 15 MG/DL (ref 7–18)
BUN/CREAT SERPL: 28 (ref 12–20)
CALCIUM SERPL-MCNC: 8.9 MG/DL (ref 8.5–10.1)
CHLORIDE SERPL-SCNC: 108 MMOL/L (ref 100–111)
CO2 SERPL-SCNC: 27 MMOL/L (ref 21–32)
CREAT SERPL-MCNC: 0.54 MG/DL (ref 0.6–1.3)
ERYTHROCYTE [DISTWIDTH] IN BLOOD BY AUTOMATED COUNT: 13.2 % (ref 11.6–14.5)
GLUCOSE SERPL-MCNC: 76 MG/DL (ref 74–99)
HCT VFR BLD AUTO: 34 % (ref 35–45)
HGB BLD-MCNC: 11.3 G/DL (ref 12–16)
MCH RBC QN AUTO: 27.7 PG (ref 24–34)
MCHC RBC AUTO-ENTMCNC: 33.2 G/DL (ref 31–37)
MCV RBC AUTO: 83.3 FL (ref 78–100)
NRBC # BLD: 0 K/UL (ref 0–0.01)
NRBC BLD-RTO: 0 PER 100 WBC
PLATELET # BLD AUTO: 390 K/UL (ref 135–420)
PMV BLD AUTO: 9.2 FL (ref 9.2–11.8)
POTASSIUM SERPL-SCNC: 4.1 MMOL/L (ref 3.5–5.5)
RBC # BLD AUTO: 4.08 M/UL (ref 4.2–5.3)
SODIUM SERPL-SCNC: 141 MMOL/L (ref 136–145)
WBC # BLD AUTO: 5 K/UL (ref 4.6–13.2)

## 2025-01-14 PROCEDURE — 85027 COMPLETE CBC AUTOMATED: CPT

## 2025-01-14 PROCEDURE — 6370000000 HC RX 637 (ALT 250 FOR IP)

## 2025-01-14 PROCEDURE — 80048 BASIC METABOLIC PNL TOTAL CA: CPT

## 2025-01-14 PROCEDURE — 6370000000 HC RX 637 (ALT 250 FOR IP): Performed by: PSYCHIATRY & NEUROLOGY

## 2025-01-14 PROCEDURE — 6370000000 HC RX 637 (ALT 250 FOR IP): Performed by: FAMILY MEDICINE

## 2025-01-14 PROCEDURE — 6360000002 HC RX W HCPCS: Performed by: FAMILY MEDICINE

## 2025-01-14 PROCEDURE — 97110 THERAPEUTIC EXERCISES: CPT

## 2025-01-14 PROCEDURE — 6360000002 HC RX W HCPCS

## 2025-01-14 PROCEDURE — 36415 COLL VENOUS BLD VENIPUNCTURE: CPT

## 2025-01-14 PROCEDURE — 94761 N-INVAS EAR/PLS OXIMETRY MLT: CPT

## 2025-01-14 PROCEDURE — 97116 GAIT TRAINING THERAPY: CPT

## 2025-01-14 PROCEDURE — 2500000003 HC RX 250 WO HCPCS: Performed by: FAMILY MEDICINE

## 2025-01-14 RX ORDER — UBIDECARENONE 75 MG
50 CAPSULE ORAL DAILY
Qty: 30 TABLET | Refills: 3 | Status: SHIPPED | OUTPATIENT
Start: 2025-01-14

## 2025-01-14 RX ORDER — KETOROLAC TROMETHAMINE 15 MG/ML
15 INJECTION, SOLUTION INTRAMUSCULAR; INTRAVENOUS ONCE
Status: COMPLETED | OUTPATIENT
Start: 2025-01-14 | End: 2025-01-14

## 2025-01-14 RX ORDER — THIAMINE MONONITRATE (VIT B1) 100 MG
100 TABLET ORAL DAILY
Qty: 30 TABLET | Refills: 3 | Status: SHIPPED | OUTPATIENT
Start: 2025-01-14

## 2025-01-14 RX ORDER — PROCHLORPERAZINE EDISYLATE 5 MG/ML
10 INJECTION INTRAMUSCULAR; INTRAVENOUS ONCE
Status: COMPLETED | OUTPATIENT
Start: 2025-01-14 | End: 2025-01-14

## 2025-01-14 RX ADMIN — GABAPENTIN 600 MG: 300 CAPSULE ORAL at 08:31

## 2025-01-14 RX ADMIN — SODIUM CHLORIDE, PRESERVATIVE FREE 10 ML: 5 INJECTION INTRAVENOUS at 09:51

## 2025-01-14 RX ADMIN — PANTOPRAZOLE SODIUM 40 MG: 40 TABLET, DELAYED RELEASE ORAL at 06:53

## 2025-01-14 RX ADMIN — DULOXETINE HYDROCHLORIDE 120 MG: 60 CAPSULE, DELAYED RELEASE ORAL at 08:31

## 2025-01-14 RX ADMIN — ENOXAPARIN SODIUM 40 MG: 100 INJECTION SUBCUTANEOUS at 08:32

## 2025-01-14 RX ADMIN — PROCHLORPERAZINE EDISYLATE 10 MG: 5 INJECTION INTRAMUSCULAR; INTRAVENOUS at 09:46

## 2025-01-14 RX ADMIN — KETOROLAC TROMETHAMINE 15 MG: 15 INJECTION, SOLUTION INTRAMUSCULAR; INTRAVENOUS at 09:46

## 2025-01-14 RX ADMIN — BACLOFEN 15 MG: 10 TABLET ORAL at 08:31

## 2025-01-14 RX ADMIN — THIAMINE HYDROCHLORIDE 400 MG: 100 INJECTION, SOLUTION INTRAMUSCULAR; INTRAVENOUS at 08:31

## 2025-01-14 RX ADMIN — ASPIRIN 81 MG CHEWABLE TABLET 81 MG: 81 TABLET CHEWABLE at 08:31

## 2025-01-14 RX ADMIN — VITAM B12 50 MCG: 100 TAB at 08:31

## 2025-01-14 RX ADMIN — BACLOFEN 15 MG: 10 TABLET ORAL at 01:37

## 2025-01-14 ASSESSMENT — PAIN DESCRIPTION - LOCATION
LOCATION: HEAD
LOCATION: HEAD

## 2025-01-14 ASSESSMENT — PAIN SCALES - GENERAL
PAINLEVEL_OUTOF10: 8
PAINLEVEL_OUTOF10: 8
PAINLEVEL_OUTOF10: 0

## 2025-01-14 ASSESSMENT — PAIN - FUNCTIONAL ASSESSMENT: PAIN_FUNCTIONAL_ASSESSMENT: PREVENTS OR INTERFERES SOME ACTIVE ACTIVITIES AND ADLS

## 2025-01-14 ASSESSMENT — PAIN DESCRIPTION - DESCRIPTORS: DESCRIPTORS: ACHING

## 2025-01-14 NOTE — PROGRESS NOTES
Follow up visit     1/12/2025 1:31 PM      Subjective:     Complain headache and back pain, left side lower extremity not moving    Medications:    Current Facility-Administered Medications   Medication Dose Route Frequency Provider Last Rate Last Admin    lidocaine 4 % external patch 1 patch  1 patch TransDERmal Daily Sophia Kay MD   1 patch at 01/12/25 0757    thiamine (B-1) injection 400 mg  400 mg IntraVENous Daily Sophia Kay MD   400 mg at 01/12/25 1233    Followed by    [START ON 1/17/2025] thiamine (B-1) injection 200 mg  200 mg IntraVENous Daily Sophia Kay MD        baclofen (LIORESAL) tablet 15 mg  15 mg Oral q8h Sophia Kay MD        acetaminophen (TYLENOL) tablet 1,000 mg  1,000 mg Oral q8h Subha Woods MD   1,000 mg at 01/12/25 1314    [Held by provider] glatiramer (COPAXONE) injection 40 mg (Patient Supplied)  40 mg SubCUTAneous Q MWF Toy De La Torre MD        aspirin chewable tablet 81 mg  81 mg Oral Daily Jorje Rosales MD   81 mg at 01/12/25 0752    clonazePAM (KLONOPIN) tablet 1 mg  1 mg Oral Nightly Jorje Rosales MD   1 mg at 01/11/25 2034    DULoxetine (CYMBALTA) extended release capsule 120 mg  120 mg Oral Daily Jorje Rosales MD   120 mg at 01/12/25 0752    gabapentin (NEURONTIN) capsule 600 mg  600 mg Oral Nightly Jorje Rosales MD   600 mg at 01/11/25 2034    hydrOXYzine pamoate (VISTARIL) capsule 50 mg  50 mg Oral BID PRN Jorje Rosales MD   50 mg at 01/11/25 2034    pantoprazole (PROTONIX) tablet 40 mg  40 mg Oral QAM AC Jorje Rosales MD   40 mg at 01/12/25 0752    QUEtiapine (SEROQUEL) tablet 300 mg  300 mg Oral Nightly Jorje Rosales MD   300 mg at 01/11/25 2033    [Held by provider] thiamine mononitrate tablet 100 mg  100 mg Oral Daily Jorje Rosales MD   100 mg at 01/12/25 0752    vitamin B-12 (CYANOCOBALAMIN) tablet 50 mcg  50 mcg Oral Daily Jorje Rosales MD   50 mcg at 01/12/25 0752    sodium chloride flush 
  [facility]  POST-ROUNDING NOTE      -Patient seen at bedside with my attending. Demonstrated positive Mckee test. Pt amenable to psych consultation as she feels her psych meds have not been optimized since her significant weight loss s/p gastric sleeve 4/2024. She follows closely with her surgeon. Takes thiamine IM and B12  -Check B12, folate, MMA, thiamine  -Start high dose thiamine (400mg IV for 5 days, then 250mg IV for 3 days, then maintenance dosing)  -Oral nutritional supplement ordered with meals as she drinks protein shakes at home  -Nutrition consulted, appreciate recs  -Consulted psych for suspected FND and med management. They will see her tomorrow  -For back pain/muscle spasms, will increase home baclofen to 15mg every 8 hours and scheduled this medication. Also scheduled tylenol    The above patient and plan were discussed with my supervising physician.     See daily progress note for full assessment/plan.      Sophia Kay MD, PGY-2  [facility]  1/12/2025, 12:00 PM       
  [facility] Medicine  DAILY PROGRESS NOTE      Patient:    Little Siddiqui , 49 y.o. female   MRN:  [id number]  Room/Bed:  407/01  Admission Date:   1/10/2025  Code status:  Full Code    Reason for Admission: Left-sided weakness with dysarthria  Barriers to Discharge: stroke workup  Anticipated Date of Discharge: 1/14/25      ASSESSMENT AND PLAN:   Little Siddiqui is a 49 y.o. year old female with PMH of MS, nonepileptiform seizures,  neurogenic bladder, HTN, type II diabetes, depression, muscle spasms, right foot drop, GERD, gastric sleeve procedure/2023 admitted for stroke rule out in setting of left-sided weakness and slurred speech.      Left-sided weakness with slurred speech, new onset  MS  Hx nonepileptiform seizures  Neurogenic bladder  Muscle spasms  Hx. Right foot drop  -DDx: TIA vs CVA vs seizure vs arrhythmia vs orthostatic hypotension vs MS flare vs anxiety/mental health related  -Dysarthria has resolved and LUE almost back to baseline but still with significant LLE weakness and ?paresthesia  -Pt amenable to psych consult, reports increased stressors and poor mental health over the past 5 months and feels medications not optimized, sees psychiatrist Dr. Hassan outpatient  -CT head: No acute intracranial process identified. Moderate chronic small vessel ischemic changes.  -CTA head and neck: No large vessel occlusion appreciated per prelim report  -Neurology on board  -Reports history of rash with atorvastatin  -Home med: copaxone 40 mg/mL injection Monday/Wednesday/Friday, baclofen 10 mg as needed, Tylenol 1000 mg every 8 hours as needed, gabapentin 600 mg nightly, hyoscyamine .125 mg PRN   Plan:  -MRI C spine ordered by neuro  -Psych consult per neuro recs  -Consider EEG  -Daily CBC, CMP  -Continue aspirin  -Continue copaxone for MS - patient to bring this med from home (MWF dosing)  -Consider psych consult for med management and possible somatization disorder  -PT/OT/CM/SLP consulted  -Neurochecks every 4 
  [facility]  POST-ROUNDING NOTE    Assessment & Plan:  - Neurology to see this afternoon. MRI negative for acute changes, new lesions, but deficits persist.   -  Has not returned to baseline. [facility] 13. Patient will require placement if no improvement    The above patient and plan were discussed with my supervising physician.   See daily progress note for full assessment/plan.      Denys Good MD, PGY-1  [facility] Medicine  1/11/2025, 3:32 PM      
  [facility] Medicine  DAILY PROGRESS NOTE      Patient:    Little Siddiqui , 49 y.o. female   MRN:  [id number]  Room/Bed:  407/01  Admission Date:   1/10/2025  Code status:  Full Code    Reason for Admission: Left-sided weakness with dysarthria  Barriers to Discharge: stroke workup  Anticipated Date of Discharge: 1/14/25      ASSESSMENT AND PLAN:   Little Siddiqui is a 49 y.o. year old female with PMH of MS, nonepileptiform seizures,  neurogenic bladder, HTN, type II diabetes, depression, muscle spasms, right foot drop, GERD, gastric sleeve procedure/2023 admitted for stroke rule out in setting of left-sided weakness and slurred speech. Most likely suffering from functional neurological disorder versus MS relapse      Left-sided weakness with slurred speech, new onset  MS  Hx nonepileptiform seizures  Neurogenic bladder  Muscle spasms  Hx. Right foot drop  -DDx: TIA vs CVA vs seizure vs arrhythmia vs orthostatic hypotension vs MS flare vs functional neurological disorder  -Dysarthria has resolved and LUE almost back to baseline but still with significant LLE weakness and  -Pt amenable to psych consult, reports increased stressors and poor mental health over the past 5 months and feels medications not optimized, sees psychiatrist Dr. Hassan outpatient  -CT head: No acute intracranial process identified. Moderate chronic small vessel ischemic changes.  -CTA head and neck: No large vessel occlusion appreciated per prelim report  -Neurology on board  -Reports history of rash with atorvastatin  -Home med: copaxone 40 mg/mL injection Monday/Wednesday/Friday, baclofen 10 mg as needed, Tylenol 1000 mg every 8 hours as needed, gabapentin 600 mg nightly, hyoscyamine .125 mg PRN   Plan:  -MRI C spine ordered by neuro  -Psych consulted, appreciate reccs, concern for functional neurological disorder  -Consider EEG  -Daily CBC, CMP  -Continue aspirin  -Continue copaxone for MS - patient to bring this med from home (MWF 
4 Eyes Skin Assessment     NAME:  Little Siddiqui  YOB: 1975  MEDICAL RECORD NUMBER:  [id number]    The patient is being assessed for  {Reason for Assessment:58087}    I agree that at least one RN has performed a thorough Head to Toe Skin Assessment on the patient. ALL assessment sites listed below have been assessed.      Areas assessed by both nurses:    Head, Face, Ears, Shoulders, Back, Chest, Arms, Elbows, Hands, Sacrum. Buttock, Coccyx, Ischium, and Legs. Feet and Heels        Does the Patient have a Wound? No noted wound(s)       Bird Prevention initiated by RN: Yes  Wound Care Orders initiated by RN: No    Pressure Injury (Stage 3,4, Unstageable, DTI, NWPT, and Complex wounds) if present, place Wound referral order by RN under : No    New Ostomies, if present place, Ostomy referral order under : No     Nurse 1 eSignature: Electronically signed by Moira Vasques RN on 1/12/25 at 6:55 PM EST    **SHARE this note so that the co-signing nurse can place an eSignature**    Nurse 2 eSignature: {Esignature:[id number]}   
4 Eyes Skin Assessment     NAME:  Little Siddiqui  YOB: 1975  MEDICAL RECORD NUMBER:  [id number]    The patient is being assessed for  Shift Handoff    I agree that at least one RN has performed a thorough Head to Toe Skin Assessment on the patient. ALL assessment sites listed below have been assessed.      Areas assessed by both nurses:    Head, Face, Ears, Shoulders, Back, Chest, Arms, Elbows, Hands, Sacrum. Buttock, Coccyx, Ischium, and Legs. Feet and Heels        Does the Patient have a Wound? No noted wound(s)       Bird Prevention initiated by RN: No  Wound Care Orders initiated by RN: No    Pressure Injury (Stage 3,4, Unstageable, DTI, NWPT, and Complex wounds) if present, place Wound referral order by RN under : No    New Ostomies, if present place, Ostomy referral order under : No     Nurse 1 eSignature: Electronically signed by Tracie Farrell RN on 1/14/25 at 7:57 AM EST    **SHARE this note so that the co-signing nurse can place an eSignature**    Nurse 2 eSignature: {Esignature:[id number]}    
ARU/IPR REFERRAL CONTACT NOTE  [facility]      Thank you for the opportunity to review this patient's case for admission to [facility].    Referral received: 1/13/2025 time:9:25am    Based on our pre-admission screening:                          [x ] Our Team/Medical Director is following this case.   Comments:Referral received from care manager. Will review with team.      Again, Thank you for this referral. Should you have any questions please do not hesitate to call.     Sincerely,  FLOR Howard, CTRS      Clinical Liaison  [facility]  [phone]         
ARU/IPR REFERRAL CONTACT NOTE  [facility]      Thank you for the opportunity to review this patient's case for admission to [facility].    Referral received: 1/13/2025 time:925am    Based on our pre-admission screening:                          [x ] This patient does not meet criteria for admission to [facility] due to:    [x ] Other:Reviewed chart with team. Pt does not meet criteria. MRI negative for CVA - pt does not have qualifying rehab diagnosis.      Again, Thank you for this referral. Should you have any questions please do not hesitate to call.     Sincerely,  FLOR Howard, CTRS      Clinical Liaison  [facility]  [phone]         
Advance Care Planning   Healthcare Decision Maker:    Primary Decision Maker: Luis Miguel Siddiqui - Vikas - [phone]    Secondary Decision Maker: Jaqui Luo - Other - [phone]    Click here to complete Healthcare Decision Makers including selection of the Healthcare Decision Maker Relationship (ie \"Primary\").    Spiritual Health History and Assessment/Progress Note  [facility]    Initial Encounter,  ,  ,      Name: Little Siddiqui MRN: [id number]    Age: 49 y.o.     Sex: female   Language: English   Evangelical: Oriental orthodox   Acute CVA (cerebrovascular accident) (HCC)     Date: 1/11/2025            Total Time Calculated: 8 min              Spiritual Assessment began in [facility] MEDICAL        Referral/Consult From: Rounding   Encounter Overview/Reason: Initial Encounter  Service Provided For: Patient    Codi, Belief, Meaning:   Patient identifies as spiritual and has beliefs or practices that help with coping during difficult times  Family/Friends No family/friends present      Importance and Influence:  Patient has spiritual/personal beliefs that influence decisions regarding their health  Family/Friends No family/friends present    Community:  Patient feels well-supported. Support system includes: Children and Extended family  Family/Friends No family/friends present    Assessment and Plan of Care:     Patient Interventions include: Facilitated expression of thoughts and feelings, Affirmed coping skills/support systems, and Provided sacramental/Scientologist ritual  Family/Friends Interventions include: No family/friends present    Patient Plan of Care: Spiritual Care available upon further referral  Family/Friends Plan of Care: No family/friends present    Electronically signed by Chaplain Srinath on 1/11/2025 at 11:43 AM          
Called to admit patient late yesterday evening.  Orders placed but, in further review of alexandrea in preparation to enter documentation for visit, realized that patient appears to belong to [facility].  Case d/w on-call resident by phone and this has been confirmed.  FM service to assume care going forward.  
Discharge instructions reviewed with pt. Understanding verbalized.     Pt discharged in stable condition to be transported by family  
MRI screening form needs to be filled out and faxed to  [phone] BEFORE MRI can be scheduled.  If unable to obtain information from patient , MPOA needs to be contacted .   If patient is claustrophobic or will needs pain meds, please have ordered in advance in order to facilitate exam.      If POA is unavailable or unsure of patient history, screening X-rays will need to be ordered.   
MRI screening form needs to be filled out and faxed to [phone] BEFORE MRI can be scheduled. If unable to obtain information from the patient, MPOA needs to be contacted. If patient is claustrophobic or will need pain meds, please have ordered in advance in order to facilitate exam.  
MRI screening form needs to be filled out and faxed to [phone] BEFORE MRI can be scheduled.  If unable to obtain information from patient , MPOA needs to be contacted . If patient is claustrophobic or will needs pain meds, please have ordered in advance in order to facilitate exam.   
Progress Note  Date:2025       Room:[facility]  Patient Name:Little Siddiqui     YOB: 1975     Age:49 y.o.        Subjective    Subjective no headache, left arm weakness much better, left leg weakness improving, has not ambulated  Review of Systems  Objective         Vitals Last 24 Hours:  TEMPERATURE:  Temp  Av.8 °F (36.6 °C)  Min: 97.5 °F (36.4 °C)  Max: 98 °F (36.7 °C)  RESPIRATIONS RANGE: Resp  Av.8  Min: 16  Max: 18  PULSE OXIMETRY RANGE: SpO2  Av.2 %  Min: 97 %  Max: 100 %  PULSE RANGE: Pulse  Av  Min: 74  Max: 89  BLOOD PRESSURE RANGE: Systolic (24hrs), Av , Min:104 , Max:135   ; Diastolic (24hrs), Av, Min:71, Max:92    I/O (24Hr):    Intake/Output Summary (Last 24 hours) at 2025 1528  Last data filed at 2025 0500  Gross per 24 hour   Intake 480 ml   Output 400 ml   Net 80 ml     Objective  Exam  General: Awake, alert nad  Neuro: MS Aox3 language fluent, no neglect  CN: EOM intact Facial movements symmetric Hearing intact to voice Speech clear Tongue midline  Motor: left leg strength 4/5    Labs:  CBC:  Recent Labs     25  0410 25  0244 25  0116   WBC 5.2 5.1 4.8   RBC 3.91* 4.05* 4.04*   HGB 11.2* 11.5* 11.3*   HCT 32.2* 33.4* 33.4*   MCV 82.4 82.5 82.7   RDW 13.2 13.0 13.0    380 386     CHEMISTRIES:  Recent Labs     25  0410 25  0244 25  0116    143 139   K 3.4* 3.5 3.5    109 107   CO2 30 28 27   BUN 6* 10 13   CREATININE 0.52* 0.50* 0.64   GLUCOSE 86 81 91     PT/INR:  Recent Labs     01/10/25  1630   PROTIME 13.1   INR 1.0     APTT:  Recent Labs     01/10/25  1630   APTT 31.6     LIVER PROFILE:  Recent Labs     01/10/25  1630   AST 11   ALT 19   BILITOT 0.3   ALKPHOS 71       Imaging Last 24 Hours:  No results found.  Assessment//Plan           Hospital Problems             Last Modified POA    * (Principal) Acute CVA (cerebrovascular accident) (HCC) 1/10/2025 Yes     Assessment & Plan  Patient with hx 
Therapeutic Substitution per the P&T Committee approved Therapeutic Interchanges Policy     Nonformulary Medication Formulary Interchange   Thiamine >100mg-300mg IV  Thiamine 200mg IV        
of right thigh. Positive sequeira test  Psych: appropriate, alert, oriented x3    LABWORK (LAST 24 HOURS)  Recent Results (from the past 24 hour(s))   CBC    Collection Time: 01/14/25  4:31 AM   Result Value Ref Range    WBC 5.0 4.6 - 13.2 K/uL    RBC 4.08 (L) 4.20 - 5.30 M/uL    Hemoglobin 11.3 (L) 12.0 - 16.0 g/dL    Hematocrit 34.0 (L) 35.0 - 45.0 %    MCV 83.3 78.0 - 100.0 FL    MCH 27.7 24.0 - 34.0 PG    MCHC 33.2 31.0 - 37.0 g/dL    RDW 13.2 11.6 - 14.5 %    Platelets 390 135 - 420 K/uL    MPV 9.2 9.2 - 11.8 FL    Nucleated RBCs 0.0 0  WBC    nRBC 0.00 0.00 - 0.01 K/uL   Basic Metabolic Panel    Collection Time: 01/14/25  4:31 AM   Result Value Ref Range    Sodium 141 136 - 145 mmol/L    Potassium 4.1 3.5 - 5.5 mmol/L    Chloride 108 100 - 111 mmol/L    CO2 27 21 - 32 mmol/L    Anion Gap 6 3.0 - 18 mmol/L    Glucose 76 74 - 99 mg/dL    BUN 15 7.0 - 18 MG/DL    Creatinine 0.54 (L) 0.6 - 1.3 MG/DL    BUN/Creatinine Ratio 28 (H) 12 - 20      Est, Glom Filt Rate >90 >60 ml/min/1.73m2    Calcium 8.9 8.5 - 10.1 MG/DL       IMAGING AND PROCEDURES (LAST 24 HOURS)  MRI brain without contrast    Result Date: 1/11/2025  1.  No acute infarct or other acute intracranial findings. 2.  Stable cerebral white matter disease which could reflect the reported history of multiple sclerosis and/or chronic microangiopathic disease. Electronically signed by Nadja Moss    CTA HEAD NECK W CONTRAST    Result Date: 1/11/2025  Patent intra- and extracranial cerebral arteries. No large vessel occlusion, stenosis or other acute arterial pathology. A preliminary report of no LVO was given at 1656 hours 1/10/2025. Electronically signed by Nadja Moss    CT HEAD WO CONTRAST    Result Date: 1/10/2025  1.   No acute intracranial process identified. 2.  Moderate chronic small vessel ischemic changes. Findings transmitted to the ED at 1645. Electronically signed by Abdirahman Shannon

## 2025-01-14 NOTE — PLAN OF CARE
Problem: Chronic Conditions and Co-morbidities  Goal: Patient's chronic conditions and co-morbidity symptoms are monitored and maintained or improved  1/12/2025 2222 by Tammy Mansfield RN  Outcome: Progressing  Flowsheets (Taken 1/12/2025 2222)  Care Plan - Patient's Chronic Conditions and Co-Morbidity Symptoms are Monitored and Maintained or Improved: Monitor and assess patient's chronic conditions and comorbid symptoms for stability, deterioration, or improvement  1/12/2025 1852 by Moira Vasques RN  Outcome: Progressing  Flowsheets (Taken 1/12/2025 1852)  Care Plan - Patient's Chronic Conditions and Co-Morbidity Symptoms are Monitored and Maintained or Improved:   Monitor and assess patient's chronic conditions and comorbid symptoms for stability, deterioration, or improvement   Collaborate with multidisciplinary team to address chronic and comorbid conditions and prevent exacerbation or deterioration   Update acute care plan with appropriate goals if chronic or comorbid symptoms are exacerbated and prevent overall improvement and discharge     Problem: Discharge Planning  Goal: Discharge to home or other facility with appropriate resources  1/12/2025 1852 by Moira Vasques RN  Outcome: Progressing  Flowsheets (Taken 1/12/2025 1852)  Discharge to home or other facility with appropriate resources:   Identify barriers to discharge with patient and caregiver   Arrange for needed discharge resources and transportation as appropriate   Identify discharge learning needs (meds, wound care, etc)     Problem: Pain  Goal: Verbalizes/displays adequate comfort level or baseline comfort level  1/12/2025 2222 by Tammy Mansfield RN  Outcome: Progressing  Flowsheets (Taken 1/12/2025 2222)  Verbalizes/displays adequate comfort level or baseline comfort level:   Encourage patient to monitor pain and request assistance   Assess pain using appropriate pain scale   Administer analgesics based on type and severity of pain and 
  Problem: Chronic Conditions and Co-morbidities  Goal: Patient's chronic conditions and co-morbidity symptoms are monitored and maintained or improved  1/14/2025 1115 by Moira Vasques, RN  Outcome: Progressing  Flowsheets (Taken 1/14/2025 0838)  Care Plan - Patient's Chronic Conditions and Co-Morbidity Symptoms are Monitored and Maintained or Improved:   Monitor and assess patient's chronic conditions and comorbid symptoms for stability, deterioration, or improvement   Collaborate with multidisciplinary team to address chronic and comorbid conditions and prevent exacerbation or deterioration   Update acute care plan with appropriate goals if chronic or comorbid symptoms are exacerbated and prevent overall improvement and discharge     Problem: Discharge Planning  Goal: Discharge to home or other facility with appropriate resources  1/14/2025 1115 by Moira Vasques, RN  Outcome: Progressing  Flowsheets (Taken 1/14/2025 0838)  Discharge to home or other facility with appropriate resources:   Identify barriers to discharge with patient and caregiver   Arrange for needed discharge resources and transportation as appropriate   Identify discharge learning needs (meds, wound care, etc)  Note: Educate pt on discharge plan and identify potential needs     Problem: Pain  Goal: Verbalizes/displays adequate comfort level or baseline comfort level  1/14/2025 1115 by Moira Vasques, RN  Outcome: Progressing  Flowsheets (Taken 1/14/2025 0830)  Verbalizes/displays adequate comfort level or baseline comfort level:   Encourage patient to monitor pain and request assistance   Assess pain using appropriate pain scale   Administer analgesics based on type and severity of pain and evaluate response  Note: Pt states pain improving     Problem: Skin/Tissue Integrity  Goal: Absence of new skin breakdown  1/14/2025 1115 by Moira Vasques, RN  Outcome: Progressing  Note: Encourage pt to turn to prevent pressure injury development     Problem: Safety - 
  Problem: Chronic Conditions and Co-morbidities  Goal: Patient's chronic conditions and co-morbidity symptoms are monitored and maintained or improved  Outcome: Progressing     Problem: Discharge Planning  Goal: Discharge to home or other facility with appropriate resources  Outcome: Progressing     Problem: Pain  Goal: Verbalizes/displays adequate comfort level or baseline comfort level  Outcome: Progressing     Problem: Skin/Tissue Integrity  Goal: Absence of new skin breakdown  Description: 1.  Monitor for areas of redness and/or skin breakdown  2.  Assess vascular access sites hourly  3.  Every 4-6 hours minimum:  Change oxygen saturation probe site  4.  Every 4-6 hours:  If on nasal continuous positive airway pressure, respiratory therapy assess nares and determine need for appliance change or resting period.  Outcome: Progressing     Problem: Safety - Adult  Goal: Free from fall injury  Outcome: Progressing     Problem: ABCDS Injury Assessment  Goal: Absence of physical injury  Outcome: Progressing     
  Problem: Chronic Conditions and Co-morbidities  Goal: Patient's chronic conditions and co-morbidity symptoms are monitored and maintained or improved  Outcome: Progressing  Flowsheets (Taken 1/12/2025 1852)  Care Plan - Patient's Chronic Conditions and Co-Morbidity Symptoms are Monitored and Maintained or Improved:   Monitor and assess patient's chronic conditions and comorbid symptoms for stability, deterioration, or improvement   Collaborate with multidisciplinary team to address chronic and comorbid conditions and prevent exacerbation or deterioration   Update acute care plan with appropriate goals if chronic or comorbid symptoms are exacerbated and prevent overall improvement and discharge     Problem: Discharge Planning  Goal: Discharge to home or other facility with appropriate resources  Outcome: Progressing  Flowsheets (Taken 1/12/2025 1852)  Discharge to home or other facility with appropriate resources:   Identify barriers to discharge with patient and caregiver   Arrange for needed discharge resources and transportation as appropriate   Identify discharge learning needs (meds, wound care, etc)     Problem: Pain  Goal: Verbalizes/displays adequate comfort level or baseline comfort level  Outcome: Progressing  Flowsheets (Taken 1/12/2025 4201)  Verbalizes/displays adequate comfort level or baseline comfort level:   Encourage patient to monitor pain and request assistance   Assess pain using appropriate pain scale   Administer analgesics based on type and severity of pain and evaluate response  Note: Pt's reported previous ordered tylenol not sufficient for pain MD notified and tylenol increased and scheduled     Problem: Skin/Tissue Integrity  Goal: Absence of new skin breakdown  Outcome: Progressing  Note: Monitor skin for breakdown, monitor areas for redness     Problem: Safety - Adult  Goal: Free from fall injury  Outcome: Progressing     Problem: ABCDS Injury Assessment  Goal: Absence of physical 
  Problem: Chronic Conditions and Co-morbidities  Goal: Patient's chronic conditions and co-morbidity symptoms are monitored and maintained or improved  Outcome: Progressing  Flowsheets (Taken 1/13/2025 0900)  Care Plan - Patient's Chronic Conditions and Co-Morbidity Symptoms are Monitored and Maintained or Improved:   Monitor and assess patient's chronic conditions and comorbid symptoms for stability, deterioration, or improvement   Collaborate with multidisciplinary team to address chronic and comorbid conditions and prevent exacerbation or deterioration   Update acute care plan with appropriate goals if chronic or comorbid symptoms are exacerbated and prevent overall improvement and discharge     Problem: Discharge Planning  Goal: Discharge to home or other facility with appropriate resources  Outcome: Progressing     Problem: Pain  Goal: Verbalizes/displays adequate comfort level or baseline comfort level  Outcome: Progressing  Flowsheets (Taken 1/13/2025 0900)  Verbalizes/displays adequate comfort level or baseline comfort level:   Encourage patient to monitor pain and request assistance   Assess pain using appropriate pain scale   Administer analgesics based on type and severity of pain and evaluate response  Note: Pt stated relief from pain after receiving \"migraine cocktail\" previous night     Problem: Skin/Tissue Integrity  Goal: Absence of new skin breakdown  Outcome: Progressing  Note: Monitor pt for skin break down and remind pt to reposition self     Problem: Safety - Adult  Goal: Free from fall injury  Outcome: Progressing     Problem: ABCDS Injury Assessment  Goal: Absence of physical injury  Outcome: Progressing     Problem: Neurosensory - Adult  Goal: Achieves stable or improved neurological status  Outcome: Progressing  Goal: Achieves maximal functionality and self care  Outcome: Progressing     Problem: Musculoskeletal - Adult  Goal: Return mobility to safest level of function  Outcome: 
  Problem: Pain  Goal: Verbalizes/displays adequate comfort level or baseline comfort level  Outcome: Progressing     Problem: Skin/Tissue Integrity  Goal: Absence of new skin breakdown  Description: 1.  Monitor for areas of redness and/or skin breakdown  2.  Assess vascular access sites hourly  3.  Every 4-6 hours minimum:  Change oxygen saturation probe site  4.  Every 4-6 hours:  If on nasal continuous positive airway pressure, respiratory therapy assess nares and determine need for appliance change or resting period.  Outcome: Progressing     Problem: Safety - Adult  Goal: Free from fall injury  Outcome: Progressing     
  Problem: Physical Therapy - Adult  Goal: By Discharge: Performs mobility at highest level of function for planned discharge setting.  See evaluation for individualized goals.  Description: 1.  Patient will move from supine to sit and sit to supine  in bed with supervision/set-up.    2.  Patient will transfer from bed to chair and chair to bed with minimal assistance/contact guard assist using the least restrictive device.  3.  Patient will perform sit to stand with supervision/set-up.  4.  Patient will ambulate with minimal assistance/contact guard assist for 20 feet with the least restrictive device.      PLOF I ADLs and no AD use, works at [employer]   Outcome: Progressing   PHYSICAL THERAPY EVALUATION    Patient: Little Siddiqui (49 y.o. female)  Date: 1/11/2025  Primary Diagnosis: Acute CVA (cerebrovascular accident) (HCC) [I63.9]  Cerebrovascular accident (CVA), unspecified mechanism (HCC) [I63.9]       Precautions:  Fall Risk,  ,  ,  ,  ,  ,  ,      ASSESSMENT :Patient cleared by nursing and agreeable for consult. She notes HX of MS and has left back pain. She was supine in bed. She demonstrates min A for supine to sit on EOB and was shown hooking of L LE with R LE for sit to supine with Min A. She was sit to stand with min Mod A with RW. Note weakness in L LE >L UE. She was able to maintain  and WB with L UE on AD. Attempts to side step to the left, head of bed were more of a challenge due to weakness L LE and inability for her to advance L LE I. She required PT manual assist to advance L LE for side step to head of bed. PT also with need to advance RW to the left as well. She was left supine in bed with call bell in reach. She notes prior HHPT however agreeable for rehab if necessary.         DEFICITS/IMPAIRMENTS:    , Body Structures, Functions, Activity Limitations Requiring Skilled Therapeutic Intervention: Decreased functional mobility ;Decreased ROM;Decreased strength;Decreased 
  Problem: SLP Adult - Impaired Communication  Goal: By Discharge: Demonstrates communication skills at highest level of function for planned discharge setting.  See evaluation for individualized goals.  Outcome: Progressing  Note: Patient will:  1. Answer open ended questions re: time and situation with 100% acc with use of visual aides.  2. Utilize compensatory memory strategies to increase STM with min cues with new information with 90% acc.   3. Complete word retrieval exercises to increase language and thought org for communication      Rec:     Reg/thin diet  Cognitive and language strategies  Ongoing tx for aphasia/cognition     Speech LAnguage Pathology evaluation    Patient: Little Siddiqui (49 y.o. female)  Date: 1/11/2025  Primary Diagnosis: Acute CVA (cerebrovascular accident) (HCC) [I63.9]  Cerebrovascular accident (CVA), unspecified mechanism (HCC) [I63.9]       Precautions: Standard  PLOF: As per H&P    ASSESSMENT :  Based on the objective data described below, the patient presents with mild-moderate cogntive communication deficits and expressive language impairment. Pt confused and oriented to self and place only. Pt with impaired recall at phrase level informally assessed x 60% acc with new information. Pt with c/o all deficits therefore pt aware of deficits. Discussed results with RN. Continue POC as indicated.     Patient will benefit from skilled intervention to address the above impairments.  Patient's rehabilitation potential/ .  Factors which may influence rehabilitation potential include:   []              None noted  []              Mental ability/status  [x]              Medical condition  []              Home/family situation and support systems  []              Safety awareness  []              Pain tolerance/management  []              Other:      PLAN :  Recommendations and Planned Interventions: speech tx     Frequency/Duration: Patient will be followed by speech-language pathology 1-2 
understanding, returned to bed at the end of the session RN notified.     DEFICITS/IMPAIRMENTS:  Performance deficits / Impairments: Decreased functional mobility ;Decreased ADL status;Decreased strength;Decreased endurance;Decreased balance;Decreased coordination    Patient will benefit from skilled intervention to address the above impairments.  Patient's rehabilitation potential/Prognosis: Fair.  Factors which may influence rehabilitation potential include:   []             None noted  []             Mental ability/status  [x]             Medical condition  []             Home/family situation and support systems  []             Safety awareness  []             Pain tolerance/management  []             Other:      PLAN :  Recommendations and Planned Interventions:   [x]               Self Care Training                  [x]      Therapeutic Activities  [x]               Functional Mobility Training   []      Cognitive Retraining  [x]               Therapeutic Exercises           [x]      Endurance Activities  [x]               Balance Training                    [x]      Neuromuscular Re-Education  []               Visual/Perceptual Training     [x]      Home Safety Training  [x]               Patient Education                   [x]      Family Training/Education  []               Other (comment):    Frequency/Duration: Patient will be followed by occupational therapy to address goals, 1-2 times per day/3-5 days per week to address goals.    Further Equipment Recommendations for Discharge: bedside commode and rolling walker    AMPAC: AM-PAC Inpatient Daily Activity Raw Score: 14    Current research shows that an AM-PAC score of 17 or less is not associated with a discharge to the patient's home setting and the patient demonstrates the potential endurance and/or tolerance for 3 hours of therapy each day.    This AMPAC score should be considered in conjunction with interdisciplinary team recommendations to determine 
Strategies  Education Method: Demonstration;Verbal;Teach Back  Barriers to Learning: None  Education Outcome: Verbalized understanding;Demonstrated understanding;Continued education needed      Susan Rios, PT  Minutes: 23    
MIKKI Hodges  Outcome: Progressing  Flowsheets (Taken 1/14/2025 0206)  Return Mobility to Safest Level of Function:   Assist with transfers and ambulation using safe patient handling equipment as needed   Obtain physical therapy/occupational therapy consults as needed   Instruct patient/family in ordered activity level  1/13/2025 2015 by Moira Vasques RN  Outcome: Progressing     Problem: Musculoskeletal - Adult  Goal: Return ADL status to a safe level of function  1/14/2025 0206 by Tracie Farrell RN  Outcome: Progressing  Flowsheets (Taken 1/14/2025 0206)  Return ADL Status to a Safe Level of Function:   Assess activities of daily living deficits and provide assistive devices as needed   Assist and instruct patient to increase activity and self care as tolerated  1/13/2025 2015 by Moira Vasques RN  Outcome: Progressing  Flowsheets (Taken 1/13/2025 0900)  Return ADL Status to a Safe Level of Function: Assess activities of daily living deficits and provide assistive devices as needed

## 2025-01-14 NOTE — CARE COORDINATION
Met with Patient at bedside. CM re- introduces self. Patient is alert and oriented x 4. [employer] verified.     Advised [facility] has accepted her, inquiring if she would like this CM to order the recommended DME by  PT/OT of : 3:1 BSC, RW, and 18\"MWC. She states she has a 3:1 BSC, Agrees to the RW and 18\" MWC, advised her insurance is likely to cover only one, the RW vrs 18\"MWC, she agrees for this CM to order both via [employer], she states her phone number on file is a cell phone.      01/14/25 0952   IMM Letter   IMM Letter given to Patient/Family/Significant other/Guardian/POA/by: Danny Choi RN CM, Copy to Patient, Original on Hard Chart.   IMM Letter date given: 01/14/25   IMM Letter time given: 0952     White board in room updated. Patient denies any immediate needs or concerns. Left in bed, lowest locked position, call bell in reach.

## 2025-01-14 NOTE — CARE COORDINATION
Met with Patient at bedside. CM re- introduces self. Patient is alert and oriented x 4. [employer] verified. Advised that she is medically cleared and has a discharge order.    Rolling walker delivered to patients' bedside. Assembled to match the walker in room that PT has been using. Educated patient how to adjust for comfort if needed. Patient verbalizes understanding and signs proof of delivery that is sent to the Main CM office to be uploaded to [employer].    She denies any immediate needs or concerns. Left in recliner, locked position, call bell in reach.    DCP: Home with [facility], REC DME of a RW, delivered to bedside, 18\"MWC will be delivered to her residence via [employer], resumption of her PCA services with [facility] 7 days a weeks [id number] . Family will transport her home via POV.     No further CM needs identified. CM will remain available should further needs arise.

## 2025-01-14 NOTE — DISCHARGE INSTRUCTIONS
DISCHARGE SUMMARY from Nurse    PATIENT INSTRUCTIONS:    What to do at Home:  Recommended activity: activity as tolerated    If you experience any of the following symptoms slurred speech, sudden changes to vision, sudden changes to balance, worsening numbness/tingling, please follow up with emergency department or primary care provider.    *  Please give a list of your current medications to your Primary Care Provider.    *  Please update this list whenever your medications are discontinued, doses are      changed, or new medications (including over-the-counter products) are added.    *  Please carry medication information at all times in case of emergency situations.    These are general instructions for a healthy lifestyle:    No smoking/ No tobacco products/ Avoid exposure to second hand smoke  Surgeon General's Warning:  Quitting smoking now greatly reduces serious risk to your health.    Obesity, smoking, and sedentary lifestyle greatly increases your risk for illness    A healthy diet, regular physical exercise & weight monitoring are important for maintaining a healthy lifestyle    You may be retaining fluid if you have a history of heart failure or if you experience any of the following symptoms:  Weight gain of 3 pounds or more overnight or 5 pounds in a week, increased swelling in our hands or feet or shortness of breath while lying flat in bed.  Please call your doctor as soon as you notice any of these symptoms; do not wait until your next office visit.        The discharge information has been reviewed with the patient.  The patient verbalized understanding.  Discharge medications reviewed with the patient and appropriate educational materials and side effects teaching were provided.  ___________________________________________________________________________________________________________________________________

## 2025-01-14 NOTE — CARE COORDINATION
Patient has been accepted by the following HHS Agency:    [facility]    Basic Information    Address:  [address] [place] [address]  Phone:  [phone]  Fax:  [phone]    Riley Worley  [facility] [employer] [facility] - 12:00 PM  --Anticipated Start of Care 01-

## 2025-01-14 NOTE — DISCHARGE SUMMARY
Rate 85    P-R Interval 168    QRS Duration 74    Q-T Interval 372    QTc Calculation (Bazett) 442    P Axis 66    R Axis 30    T Axis 41    Diagnosis      Normal sinus rhythm  Septal infarct (cited on or before 01-JAN-2021)  Abnormal ECG  When compared with ECG of 24-DEC-2024 10:08,  Serial changes of Septal infarct present  Confirmed by Cyrus Adhikari MD ([id number]) on 1/10/2025 6:46:57 PM        ECHO 06/26/24    ECHO (TTE) COMPLETE (PRN CONTRAST/BUBBLE/STRAIN/3D) 06/27/2024  4:20 PM (Final)    Interpretation Summary    Left Ventricle: Hyperdynamic left ventricular systolic function with a visually estimated EF of 65 - 70%. Left ventricle size is normal. Normal wall thickness. Normal wall motion.    Aortic Valve: Not well visualized. Trileaflet valve. Thickened cusps.    Interatrial Septum: No interatrial shunt visualized with color Doppler. Agitated saline study was negative with and without provocation.    Signed by: Cecilio Johnston MD on 6/27/2024  4:20 PM     CATH No results found for this or any previous visit.        Special Testing/Procedures:  MODALITY RESULTS   MICRO Results       ** No results found for the last 336 hours. **               UA Lab Results   Component Value Date/Time    APPEARANCE CLOUDY 10/05/2024 02:50 PM    COLORU YELLOW 10/05/2024 02:50 PM    NITRU Negative 10/05/2024 02:50 PM    GLUCOSEU Negative 10/05/2024 02:50 PM    GLUCOSEU Negative 11/08/2022 11:42 AM    KETUA Negative 10/05/2024 02:50 PM    UROBILINOGEN 4.0 10/05/2024 02:50 PM    BILIRUBINUR Negative 10/05/2024 02:50 PM         Laboratory Results:  LABORATORY RESULTS   HEMATOLOGY Lab Results   Component Value Date    WBC 5.0 01/14/2025    HGB 11.3 (L) 01/14/2025    HCT 34.0 (L) 01/14/2025    MCV 83.3 01/14/2025     01/14/2025       CHEMISTRIES Lab Results   Component Value Date/Time     01/14/2025 04:31 AM    K 4.1 01/14/2025 04:31 AM     01/14/2025 04:31 AM    CO2 27 01/14/2025 04:31 AM    BUN 15 01/14/2025 04:31

## 2025-01-16 LAB
VIT B1 BLD-SCNC: 129.8 NMOL/L (ref 66.5–200)
VIT B1 BLD-SCNC: 292 NMOL/L (ref 66.5–200)

## 2025-01-17 LAB — METHYLMALONATE SERPL-SCNC: 119 NMOL/L (ref 0–378)

## 2025-01-23 ENCOUNTER — HOSPITAL ENCOUNTER (OUTPATIENT)
Facility: HOSPITAL | Age: 50
Discharge: HOME | End: 2025-01-26

## 2025-01-23 ENCOUNTER — CLINICAL DOCUMENTATION (OUTPATIENT)
Facility: HOSPITAL | Age: 50
End: 2025-01-23

## 2025-01-23 NOTE — PROGRESS NOTES
[facility]  [address], [address]    Patient's Name: Little Siddiqui     Age: 49 y.o.  YOB: 1975     Sex: female           Session 2 of 4   Surgeon:  Dr. Coello    Height: 5 f 3   Current Weight:    170      Lbs.     BMI:    Pounds Lost since last month: 0                 Pounds Gained since last month: 0    Starting Weight: 170     Previous Month’s Weight: 170  Overall Pounds Lost: 0   Overall Pounds Gained: 0    Office Use only: v  Class Guidelines  Guidelines are reviewed with patient at the start of every class.  1. Patient understands that weight loss trial classes must be consecutive.  Patient understands if they miss a class, it is their responsibility to contact me to reschedule class.  I will reach out to patient after their first no show.  2.  Patient understands the expectations that weight maintenance/weight loss is expected during the classes.  Failure to demonstrate changes may result in one extra month of weight loss trial, followed by going back to see the surgeon.    3. Patient is also instructed to be doing their labs, blood work, psych visit, support group and any other test that the surgeon has used while they are working on their weight loss trial.    Other Pertinent Information:     Eating Habits and Behaviors    Today we reviewed key diet principles.   We talked about snack ideas that would focus more on protein.  We also talked about the benefits of filling up on protein first and keeping the daily carbohydrate intake to less than 75 grams per day.  Patient was instructed to increase fluid intake to 64 ounces per day and stop all carbonation, caffeine, and sugary drinks.  During class, we talked about the importance of getting on a routine of eating 3 meals a day, eating within one hour of waking up, and not going longer than 4 hours without fueling the body again.    I also talked with patient about meal

## 2025-01-27 ENCOUNTER — OFFICE VISIT (OUTPATIENT)
Age: 50
End: 2025-01-27
Payer: MEDICARE

## 2025-01-27 VITALS
WEIGHT: 176.8 LBS | DIASTOLIC BLOOD PRESSURE: 88 MMHG | BODY MASS INDEX: 31.32 KG/M2 | HEART RATE: 88 BPM | OXYGEN SATURATION: 98 % | SYSTOLIC BLOOD PRESSURE: 130 MMHG

## 2025-01-27 DIAGNOSIS — R00.2 PALPITATIONS: ICD-10-CM

## 2025-01-27 DIAGNOSIS — I48.91 ATRIAL FIBRILLATION, UNSPECIFIED TYPE (HCC): Primary | ICD-10-CM

## 2025-01-27 PROCEDURE — 3075F SYST BP GE 130 - 139MM HG: CPT | Performed by: INTERNAL MEDICINE

## 2025-01-27 PROCEDURE — 99214 OFFICE O/P EST MOD 30 MIN: CPT | Performed by: INTERNAL MEDICINE

## 2025-01-27 PROCEDURE — 1036F TOBACCO NON-USER: CPT | Performed by: INTERNAL MEDICINE

## 2025-01-27 PROCEDURE — G8417 CALC BMI ABV UP PARAM F/U: HCPCS | Performed by: INTERNAL MEDICINE

## 2025-01-27 PROCEDURE — 3079F DIAST BP 80-89 MM HG: CPT | Performed by: INTERNAL MEDICINE

## 2025-01-27 PROCEDURE — 1111F DSCHRG MED/CURRENT MED MERGE: CPT | Performed by: INTERNAL MEDICINE

## 2025-01-27 PROCEDURE — G8428 CUR MEDS NOT DOCUMENT: HCPCS | Performed by: INTERNAL MEDICINE

## 2025-01-27 RX ORDER — CLOPIDOGREL BISULFATE 75 MG/1
75 TABLET ORAL DAILY
Qty: 30 TABLET | Refills: 3 | Status: SHIPPED | OUTPATIENT
Start: 2025-01-27

## 2025-01-27 NOTE — PROGRESS NOTES
[facility]    Little Siddiqui is 49 y.o. female     Patient is here today for cardiac evaluation  Denies prior history of MI or CHF  Unfortunately patient had a CVA in 06/2024.  From cardiac standpoint, she is doing very well.  No chest pain or chest tightness.  No significant shortness of breath.  No edema.  No orthopnea or PND  Since stroke in 06/2024, patient has been having frequent episode of palpitation lasting for few minutes.  Happens 2 or 3 times a week without presyncope or syncope.  She has been anxious and nervous since then  Denies any nausea, vomiting, abdominal pain, fever, chills, sputum production. No hematuria or other bleeding complaints    Past Medical History:   Diagnosis Date    Anticoagulant long-term use     Arthropathy, unspecified, site unspecified     Depression     Diabetes (HCC)     Hypercholesteremia     Hypertension     history of htn    Insomnia     Migraine     MS (multiple sclerosis) ([facility])     Neurogenic bladder     Seizures ([facility])     6/2013       Review of Systems:  Cardiac symptoms as noted above in HPI. All others negative.    Current Outpatient Medications   Medication Sig    vitamin B-1 (THIAMINE) 100 MG tablet Take 1 tablet by mouth daily Crush and mix with water.    vitamin B-12 (CYANOCOBALAMIN) 100 MCG tablet Take 0.5 tablets by mouth daily Crush and mix with water    glatiramer (COPAXONE) 40 MG/ML injection Inject 1 mL into the skin three times a week Mon-wed-fri. Do not restart until cleared by bariatric surgeon at 2 week follow up    clonazePAM (KLONOPIN) 1 MG tablet Take 1 tablet by mouth nightly. at bedtime.    hydrOXYzine HCl (ATARAX) 50 MG tablet     ondansetron (ZOFRAN) 4 MG tablet Take by mouth    omeprazole (PRILOSEC) 40 MG delayed release capsule Take 1 capsule by mouth in the morning and at bedtime Open capsule and take contents twice daily    famotidine (PEPCID) 20 MG tablet Take 1 tablet by mouth

## 2025-01-28 ENCOUNTER — TELEPHONE (OUTPATIENT)
Age: 50
End: 2025-01-28

## 2025-02-03 ENCOUNTER — HOSPITAL ENCOUNTER (EMERGENCY)
Facility: HOSPITAL | Age: 50
Discharge: LEFT BEFORE DISCHARGE | End: 2025-02-04

## 2025-02-03 VITALS
TEMPERATURE: 98.1 F | DIASTOLIC BLOOD PRESSURE: 74 MMHG | HEART RATE: 110 BPM | WEIGHT: 170 LBS | BODY MASS INDEX: 30.12 KG/M2 | OXYGEN SATURATION: 96 % | HEIGHT: 63 IN | SYSTOLIC BLOOD PRESSURE: 121 MMHG | RESPIRATION RATE: 18 BRPM

## 2025-02-03 PROBLEM — R32 URINARY INCONTINENCE: Status: ACTIVE | Noted: 2025-02-03

## 2025-02-03 PROBLEM — N39.0 RECURRENT UTI (URINARY TRACT INFECTION): Status: ACTIVE | Noted: 2021-05-25

## 2025-02-03 PROBLEM — G43.909 MIGRAINE: Status: ACTIVE | Noted: 2025-02-03

## 2025-02-03 PROBLEM — E11.9 DIABETES MELLITUS TYPE 2, UNCOMPLICATED (HCC): Status: ACTIVE | Noted: 2018-03-24

## 2025-02-03 PROBLEM — R33.9 RETENTION OF URINE: Status: ACTIVE | Noted: 2025-02-03

## 2025-02-03 PROBLEM — R56.9 SEIZURE (HCC): Status: ACTIVE | Noted: 2022-04-15

## 2025-02-03 PROBLEM — B02.30 HERPES ZOSTER OPHTHALMICUS OF LEFT EYE: Status: ACTIVE | Noted: 2025-02-03

## 2025-02-03 PROBLEM — M62.838 MUSCLE SPASTICITY: Status: ACTIVE | Noted: 2025-02-03

## 2025-02-03 PROBLEM — D75.839 THROMBOCYTOSIS: Status: ACTIVE | Noted: 2025-02-03

## 2025-02-03 PROBLEM — R41.82 ALTERED MENTAL STATUS: Status: ACTIVE | Noted: 2025-02-03

## 2025-02-03 PROBLEM — I10 HTN (HYPERTENSION): Status: ACTIVE | Noted: 2025-02-03

## 2025-02-03 ASSESSMENT — PAIN - FUNCTIONAL ASSESSMENT: PAIN_FUNCTIONAL_ASSESSMENT: NONE - DENIES PAIN

## 2025-02-04 NOTE — ED TRIAGE NOTES
It feels like I am having an anxiety attack,  pt took her hydroxine right before called medics.  Pt states she is starting a new job. Pt states she is starting to feel better, her medicine is kicking in

## 2025-02-11 ENCOUNTER — HOSPITAL ENCOUNTER (EMERGENCY)
Facility: HOSPITAL | Age: 50
Discharge: PSYCHIATRIC OR SUBSTANCE USE FACILITY | End: 2025-02-12
Attending: EMERGENCY MEDICINE
Payer: MEDICARE

## 2025-02-11 DIAGNOSIS — R45.851 SUICIDAL IDEATION: Primary | ICD-10-CM

## 2025-02-11 DIAGNOSIS — F23 ACUTE PSYCHOSIS (HCC): ICD-10-CM

## 2025-02-11 LAB
AMPHET UR QL SCN: NEGATIVE
ANION GAP SERPL CALC-SCNC: 6 MMOL/L (ref 3–18)
APPEARANCE UR: CLEAR
BARBITURATES UR QL SCN: NEGATIVE
BASOPHILS # BLD: 0.02 K/UL (ref 0–0.1)
BASOPHILS NFR BLD: 0.3 % (ref 0–2)
BENZODIAZ UR QL: NEGATIVE
BILIRUB UR QL: NEGATIVE
BUN SERPL-MCNC: 10 MG/DL (ref 7–18)
BUN/CREAT SERPL: 19 (ref 12–20)
CALCIUM SERPL-MCNC: 8.2 MG/DL (ref 8.5–10.1)
CANNABINOIDS UR QL SCN: NEGATIVE
CHLORIDE SERPL-SCNC: 108 MMOL/L (ref 100–111)
CO2 SERPL-SCNC: 27 MMOL/L (ref 21–32)
COCAINE UR QL SCN: NEGATIVE
COLOR UR: YELLOW
CREAT SERPL-MCNC: 0.54 MG/DL (ref 0.6–1.3)
DIFFERENTIAL METHOD BLD: ABNORMAL
EOSINOPHIL # BLD: 0.11 K/UL (ref 0–0.4)
EOSINOPHIL NFR BLD: 1.8 % (ref 0–5)
ERYTHROCYTE [DISTWIDTH] IN BLOOD BY AUTOMATED COUNT: 13.7 % (ref 11.6–14.5)
ETHANOL SERPL-MCNC: <3 MG/DL (ref 0–3)
GLUCOSE SERPL-MCNC: 87 MG/DL (ref 74–99)
GLUCOSE UR STRIP.AUTO-MCNC: NEGATIVE MG/DL
HCG SERPL QL: NEGATIVE
HCT VFR BLD AUTO: 31.6 % (ref 35–45)
HGB BLD-MCNC: 11 G/DL (ref 12–16)
HGB UR QL STRIP: NEGATIVE
IMM GRANULOCYTES # BLD AUTO: 0.01 K/UL (ref 0–0.04)
IMM GRANULOCYTES NFR BLD AUTO: 0.2 % (ref 0–0.5)
KETONES UR QL STRIP.AUTO: NEGATIVE MG/DL
LEUKOCYTE ESTERASE UR QL STRIP.AUTO: NEGATIVE
LYMPHOCYTES # BLD: 3.2 K/UL (ref 0.9–3.6)
LYMPHOCYTES NFR BLD: 51.5 % (ref 21–52)
Lab: NORMAL
MCH RBC QN AUTO: 28.8 PG (ref 24–34)
MCHC RBC AUTO-ENTMCNC: 34.8 G/DL (ref 31–37)
MCV RBC AUTO: 82.7 FL (ref 78–100)
METHADONE UR QL: NEGATIVE
MONOCYTES # BLD: 0.45 K/UL (ref 0.05–1.2)
MONOCYTES NFR BLD: 7.2 % (ref 3–10)
NEUTS SEG # BLD: 2.42 K/UL (ref 1.8–8)
NEUTS SEG NFR BLD: 39 % (ref 40–73)
NITRITE UR QL STRIP.AUTO: NEGATIVE
NRBC # BLD: 0 K/UL (ref 0–0.01)
NRBC BLD-RTO: 0 PER 100 WBC
OPIATES UR QL: NEGATIVE
PCP UR QL: NEGATIVE
PH UR STRIP: 8.5 (ref 5–8)
PLATELET # BLD AUTO: 375 K/UL (ref 135–420)
PMV BLD AUTO: 9.1 FL (ref 9.2–11.8)
POTASSIUM SERPL-SCNC: 3.4 MMOL/L (ref 3.5–5.5)
PROT UR STRIP-MCNC: NEGATIVE MG/DL
RBC # BLD AUTO: 3.82 M/UL (ref 4.2–5.3)
SODIUM SERPL-SCNC: 141 MMOL/L (ref 136–145)
SP GR UR REFRACTOMETRY: 1.01 (ref 1–1.03)
UROBILINOGEN UR QL STRIP.AUTO: 1 EU/DL (ref 0.2–1)
WBC # BLD AUTO: 6.2 K/UL (ref 4.6–13.2)

## 2025-02-11 PROCEDURE — 82077 ASSAY SPEC XCP UR&BREATH IA: CPT

## 2025-02-11 PROCEDURE — 81003 URINALYSIS AUTO W/O SCOPE: CPT

## 2025-02-11 PROCEDURE — 80048 BASIC METABOLIC PNL TOTAL CA: CPT

## 2025-02-11 PROCEDURE — 80307 DRUG TEST PRSMV CHEM ANLYZR: CPT

## 2025-02-11 PROCEDURE — 85025 COMPLETE CBC W/AUTO DIFF WBC: CPT

## 2025-02-11 PROCEDURE — 84703 CHORIONIC GONADOTROPIN ASSAY: CPT

## 2025-02-11 PROCEDURE — 99285 EMERGENCY DEPT VISIT HI MDM: CPT

## 2025-02-11 ASSESSMENT — LIFESTYLE VARIABLES
HOW OFTEN DO YOU HAVE A DRINK CONTAINING ALCOHOL: NEVER
HOW MANY STANDARD DRINKS CONTAINING ALCOHOL DO YOU HAVE ON A TYPICAL DAY: PATIENT DOES NOT DRINK

## 2025-02-11 ASSESSMENT — ENCOUNTER SYMPTOMS
VOMITING: 0
ABDOMINAL PAIN: 0
NAUSEA: 0
SHORTNESS OF BREATH: 0
COUGH: 0

## 2025-02-11 ASSESSMENT — PAIN - FUNCTIONAL ASSESSMENT: PAIN_FUNCTIONAL_ASSESSMENT: NONE - DENIES PAIN

## 2025-02-11 NOTE — ED TRIAGE NOTES
Pt came ambulatory to triage c/o having SI and visual and auditory hallucinations. Pt states, \"The voices are telling me to take more pills.\"    Denies HI.     Denies alcohol or drug use.

## 2025-02-12 VITALS
DIASTOLIC BLOOD PRESSURE: 75 MMHG | BODY MASS INDEX: 30.12 KG/M2 | SYSTOLIC BLOOD PRESSURE: 120 MMHG | TEMPERATURE: 98 F | OXYGEN SATURATION: 98 % | RESPIRATION RATE: 16 BRPM | WEIGHT: 170 LBS | HEART RATE: 62 BPM | HEIGHT: 63 IN

## 2025-02-12 PROCEDURE — 6370000000 HC RX 637 (ALT 250 FOR IP): Performed by: STUDENT IN AN ORGANIZED HEALTH CARE EDUCATION/TRAINING PROGRAM

## 2025-02-12 PROCEDURE — 6370000000 HC RX 637 (ALT 250 FOR IP): Performed by: EMERGENCY MEDICINE

## 2025-02-12 RX ORDER — DULOXETIN HYDROCHLORIDE 60 MG/1
60 CAPSULE, DELAYED RELEASE ORAL 2 TIMES DAILY
Status: DISCONTINUED | OUTPATIENT
Start: 2025-02-12 | End: 2025-02-12 | Stop reason: HOSPADM

## 2025-02-12 RX ORDER — SUCRALFATE 1 G/1
1 TABLET ORAL 4 TIMES DAILY
Status: DISCONTINUED | OUTPATIENT
Start: 2025-02-12 | End: 2025-02-12 | Stop reason: HOSPADM

## 2025-02-12 RX ORDER — ACETAMINOPHEN 500 MG
1000 TABLET ORAL
Status: COMPLETED | OUTPATIENT
Start: 2025-02-12 | End: 2025-02-12

## 2025-02-12 RX ORDER — HYDROXYZINE HYDROCHLORIDE 25 MG/1
50 TABLET, FILM COATED ORAL PRN
Status: COMPLETED | OUTPATIENT
Start: 2025-02-12 | End: 2025-02-12

## 2025-02-12 RX ORDER — QUETIAPINE FUMARATE 300 MG/1
300 TABLET, FILM COATED ORAL NIGHTLY
Status: DISCONTINUED | OUTPATIENT
Start: 2025-02-12 | End: 2025-02-12 | Stop reason: HOSPADM

## 2025-02-12 RX ORDER — DULOXETIN HYDROCHLORIDE 60 MG/1
60 CAPSULE, DELAYED RELEASE ORAL EVERY MORNING
Status: DISCONTINUED | OUTPATIENT
Start: 2025-02-12 | End: 2025-02-12 | Stop reason: DRUGHIGH

## 2025-02-12 RX ORDER — CLOPIDOGREL BISULFATE 75 MG/1
75 TABLET ORAL EVERY MORNING
Status: DISCONTINUED | OUTPATIENT
Start: 2025-02-12 | End: 2025-02-12 | Stop reason: HOSPADM

## 2025-02-12 RX ORDER — PANTOPRAZOLE SODIUM 40 MG/1
40 TABLET, DELAYED RELEASE ORAL
Status: DISCONTINUED | OUTPATIENT
Start: 2025-02-13 | End: 2025-02-12 | Stop reason: HOSPADM

## 2025-02-12 RX ORDER — AMLODIPINE BESYLATE 10 MG/1
10 TABLET ORAL EVERY MORNING
Status: DISCONTINUED | OUTPATIENT
Start: 2025-02-12 | End: 2025-02-12 | Stop reason: HOSPADM

## 2025-02-12 RX ADMIN — ACETAMINOPHEN 1000 MG: 500 TABLET ORAL at 18:50

## 2025-02-12 RX ADMIN — QUETIAPINE FUMARATE 300 MG: 300 TABLET ORAL at 01:30

## 2025-02-12 RX ADMIN — DULOXETINE HYDROCHLORIDE 60 MG: 60 CAPSULE, DELAYED RELEASE ORAL at 14:55

## 2025-02-12 RX ADMIN — AMLODIPINE BESYLATE 10 MG: 10 TABLET ORAL at 14:55

## 2025-02-12 RX ADMIN — CLOPIDOGREL BISULFATE 75 MG: 75 TABLET ORAL at 14:55

## 2025-02-12 RX ADMIN — SUCRALFATE 1 G: 1 TABLET ORAL at 14:55

## 2025-02-12 RX ADMIN — HYDROXYZINE HYDROCHLORIDE 50 MG: 25 TABLET ORAL at 01:30

## 2025-02-12 ASSESSMENT — PAIN DESCRIPTION - LOCATION: LOCATION: HEAD

## 2025-02-12 ASSESSMENT — PAIN SCALES - GENERAL: PAINLEVEL_OUTOF10: 7

## 2025-02-12 NOTE — ED PROVIDER NOTES
ED Course as of 02/12/25 0600   Tue Feb 11, 2025 2134 Patient is currently talking to telepsych. [NF]   2157 Spoke to tele psych recommending admission [NF]   2241 Patient is medically clear. [NF]   2305 Nf to ks 49 yo female pmh of dm2, htn, dm2, cc- auditory hallucinations/ si / take pills / workup- medically cleared/ plan- pending placement.  [KS]   Wed Feb 12, 2025   0600 Patient has been accepted to [facility]. By dr kip lemus  [KS]      ED Course User Index  [KS] Lupillo Wing MD  [NF] Ila Bentley MD Sarpong, Keneth, MD  02/13/25 1202    
Immature Granulocytes Absolute 0.01 0.00 - 0.04 K/UL    Differential Type AUTOMATED     Basic Metabolic Panel    Collection Time: 02/11/25  7:45 PM   Result Value Ref Range    Sodium 141 136 - 145 mmol/L    Potassium 3.4 (L) 3.5 - 5.5 mmol/L    Chloride 108 100 - 111 mmol/L    CO2 27 21 - 32 mmol/L    Anion Gap 6 3.0 - 18 mmol/L    Glucose 87 74 - 99 mg/dL    BUN 10 7.0 - 18 MG/DL    Creatinine 0.54 (L) 0.6 - 1.3 MG/DL    BUN/Creatinine Ratio 19 12 - 20      Est, Glom Filt Rate >90 >60 ml/min/1.73m2    Calcium 8.2 (L) 8.5 - 10.1 MG/DL   Ethanol    Collection Time: 02/11/25  7:45 PM   Result Value Ref Range    Ethanol Lvl <3 0 - 3 MG/DL   HCG Qualitative, Serum “IF” female of childbearing age. (10 - 55 years of age)    Collection Time: 02/11/25  7:45 PM   Result Value Ref Range    Preg, Serum Negative NEG         Radiologic Studies -   No orders to display         Medical Decision Making   I am the first provider for this patient.    I reviewed the vital signs, available nursing notes, past medical history, past surgical history, family history and social history.    Vital Signs-Reviewed the patient's vital signs.      EKG: n/a      ED Course: Progress Notes, Reevaluation, and Consults:    Provider Notes (Medical Decision Making):     MDM  Number of Diagnoses or Management Options  Diagnosis management comments: SI, AH  Will have patient evaluated by psychiatry once patient is medically cleared.        ED Course as of 02/11/25 2241 Tue Feb 11, 2025 2134 Patient is currently talking to telepsych. [NF]   2157 Spoke to tele psych recommending admission [NF]   2241 Patient is medically clear. [NF]      ED Course User Index  [NF] Ila Bentley MD       Patient was given the following medications:  Medications - No data to display    CONSULTS: (Who and What was discussed)  IP CONSULT TO TELE-PSYCH (SOCIAL WORK ONLY)    Chronic Conditions: As above    Social Determinants affecting Dx or Tx: None    Records Reviewed

## 2025-02-12 NOTE — BSMART NOTE
Crisis Note: Placed a paged for [facility] to call back regarding TDO decision. While awaiting call back, spoke with Terrell Collier, [phone], who has informed that the patient was accepted to [facility]; however, [facility] told her that patient was considered for a TDO, but [facility] not [facility] Spoke with Alejandra, [facility], [phone], informed that Camilla completed the assessment and informed that she was going to support the TDO. However, [facility] has not received any paperwork (presmalu, TDO). Spoke with Caesar, [facility], [phone], gave updating information and suggested that she gives [facility] a [facility].

## 2025-02-12 NOTE — VIRTUAL HEALTH
Little Siddiqui, was evaluated through a synchronous (real-time) audio-video encounter. The patient (and/or guardian if applicable) is aware that this is a billable service, which includes applicable co-pays. This virtual visit was conducted with patient's (and/or legal guardian's) consent. Patient identification was verified, and a caregiver was present when appropriate.  The patient was located at Facility (Appt Department): [facility] EMERGENCY DEPARTMENT  [address] [address]  Loc: [phone]  The provider was located at Home (City/State): [place]  Confirm you are appropriately licensed, registered, or certified to deliver care in the state where the patient is located as indicated above. If you are not or unsure, please re-schedule the visit: Yes, I confirm.   [place] Consult to Tele-Psych  Consult performed by: Niyah Gutierrez APRN - CNP  Consult ordered by: Christian Rivera DO  Reason for consult: Other: pt wanting to leave      Reason for Cancel: Other    Reviewed chart. Initial Telepsych consult completed by provider, ROEL Dinero CNP on 2/11/2025 at 8:53 PM. Patient presents to ED with SI and command AH. Patient voluntary for inpatient psychiatric admission, however, was refusing to go [facility] and wanting treatment at [facility] instead. Spoke to ED provider, Dr. Rivera, advised that due to active psychosis and risk of harm to self/others if patient changes their mind about voluntary admission, they DO meet criteria for an involuntary hold, and should not be allowed to sign out AMA. Recommend initiate process for involuntary hold at this time.       Total time spent on this encounter: Not billed by time    --ROEL Shannon CNP on 2/12/2025 at 9:46 AM    An electronic signature was used to authenticate this note.     Patient returned call

## 2025-02-12 NOTE — BSMART NOTE
Crisis Note: Per chart review from Telepsych recommended initiating process for involuntary hold at this time. Crisis spoke with DANNA Younger, [phone], informing her of the recommendations. Clinicals and TDO petition has been faxed. DANNA clinican will be conducting an evaluation shortly. ED charge nurse and provider made aware.

## 2025-02-12 NOTE — VIRTUAL HEALTH
Little Siddiqui  [id number]  1975     EMERGENCY DEPARTMENT TELEPSYCHIATRY EVALUATION    02/11/25    Chief Complaint:  “really antsy and panicky and I can not keep from moving, racing thoughts, I hear people talking about me even though I know they are not”  HPI: Patient is a 50 y.o.  female who presents for psychiatric evaluation. Patient presented to the ED on 02/11/25 from from home.  Per ED \"Pt came ambulatory to triage c/o having SI and visual and auditory hallucinations. Pt states, \"The voices are telling me to take more pills.\"    Patient presents to the emergency department after having worsening intrusive thoughts, auditory hallucinations and depression.  Patient states she was on her way to check herself into a mental hospital in [place] but then started having what she described as voices tell her \"bad things\".  Patient decided that it was best to follow her psychiatrist advice and admit herself into the [facility] hospital.  Patient states she sleeps an average of 4 hours at night of not good sleep.  Patient states that she enjoys watching TV.  She denies feelings of guilt but endorses occasional hopelessness.  Patient states that she is anxious and fidgety all the time and is unable to control her anxiety.  At the time the assessment patient did not appear to have any difficulty concentrating.  Patient denies have any any difficulty with appetite.  Patient currently is denying any suicidal ideations or homicidal ideations but states \"I do not know what to do, I do not want to kill myself but I do not feel super helpful\".  Patient is currently denying any visual hallucinations but endorses intrusive thoughts and auditory hallucinations that are telling her \"you are not good enough and all these bad things about me\".  Patient did not display any evidence of delusions.  Patient denies ideas of reference.  At the time of the assessment patient is not noted to experience any thought

## 2025-02-12 NOTE — BSMART NOTE
Crisis Note: Spoke with Caesar [facility], [phone], informed that the patient has been accepted to [facility] for psychiatric inpatient treatment by Dr. Omer Hassan. Patient will be admitted to the CIE Unit, room 223-B. N2N: [phone]. [facility] is aware that patient is on a TDO.

## 2025-02-13 NOTE — ED NOTES
6:40 AM: Pt care assumed from Dr. Wing , ED provider. Pt complaint(s), current treatment plan, progression and available diagnostic results have been discussed thoroughly. The patient was seen and evaluated on my shift.   Rounding occurred: yes  Intended Disposition: Admission  Pending diagnostic reports and/or labs (please list): None      Patient was medically cleared prior to this writer assuming care of the patient.      9 AM patient is requesting to be allowed to leave and she no longer wants to be admitted to the hospital.  She was refusing admission to [facility] and wanted treatment at [facility] instead.    Patient later changed her mind and stated that she did not want to be admitted to the hospital she wanted to leave.    Telepsych specialist was reconsulted to reevaluate the patient.    The patient was reevaluated and recommendation by the telepsychiatrist was for emergency custody order to be pursued.      The patient was told of this decision and attempted to leave the ED.  She went out into the lobby and began screaming excessive profanities.  She was eventually escorted back into the ED by .      6:30 PM patient was accepted to [facility]      Diagnosis:   1. Suicidal ideation    2. Acute psychosis (HCC)          Disposition:    DISPOSITION Decision To Transfer 02/12/2025 05:59:12 AM   DISPOSITION CONDITION Stable         LWBS after RN Triage     [unfilled]       Christian Rivera DO  02/12/25 1825    
Bedside shift change report given to Rosa (oncoming nurse) by summer (offgoing nurse). Report included the following information ED Encounter Summary.    
Informed pt that she was going to [facility].  Pt states I cannot go there, I have to go to work on Thursday.  Pt then stated I am supposed to go to [facility].  Explained that she was not accepted at that facility.  Pt stated I have not even seen a md.  Explained that she had,    Dr Wing informed.    
Medicated per MAR for headache.   
PT walked out of ED   
Patient escorted back to room by security.  
Patient walked out to lobby again. Security called. Charge RN aware.  
Pt alert and orientated,   pt states she needs to have her heart monitor charged  
Pt laying with eyes closed, respirations even and unlabored  
Pt laying with eyes closed.  Respirations even and unlaboed  
Pt provided with lunch tray. Requesting to speak with provider, provider made aware. No other complaints at this time.  
Pt refusing vital signs   
Pt requesting charging cord to her phone,  states it is connected to her heart monitor.  Pt showing me heart monitor on phone and on left chest.  Pts phone plugged in to be charged at head of bed  
Pt requesting to speak with provider. Provider aware.  
Regina from [employer] called, and asked   
Spoke to Vivian in Psych concerning MS Muñoz concerns about going to [place].  Vivian states she would be down after she talks to her supervisor about concerns  
Spoke with pt,  pt states she is not going to [place].   
Standing at door requesting to leave   
TDO arrived   
Telepsych removed from room  
Transport on site, pt continues to refuse to go to [facility]. Telepsych machine at bedside.  
(multiple sclerosis) (HCC)     Neurogenic bladder     Seizures (HCC)     6/2013       Hold (TDO/Involuntary Hold): No    The patient is currently receiving care for the above psychiatric illness.     Home Medications  Does Patient Have Medications to Bring to the Facility: No  Medications Prior to Admission:   Prior to Admission Medications   Prescriptions Last Dose Informant Patient Reported? Taking?   D3-50 1.25 MG (57197 UT) CAPS   No No   Sig: TAKE 1 CAPSULE BY MOUTH EVERY WEEK   DULoxetine (CYMBALTA) 30 MG extended release capsule   Yes No   Sig: Take 4 capsules by mouth daily   Pediatric Multivitamins-Iron (FLINTSTONES COMPLETE) 10 MG CHEW tablet   Yes No   Sig: Take 1 tablet by mouth daily   QUEtiapine (SEROQUEL) 300 MG tablet   Yes No   Sig: Take 1 tablet by mouth at bedtime   amLODIPine (NORVASC) 10 MG tablet   Yes No   Sig: Take 1 tablet by mouth daily   baclofen (LIORESAL) 10 MG tablet   Yes No   Sig: Take 1 tablet by mouth 3 times daily as needed (muscles)   calcium citrate (CALCITRATE) 950 (200 Ca) MG tablet   Yes No   Sig: Take 1 tablet by mouth 3 times daily   clonazePAM (KLONOPIN) 1 MG tablet   Yes No   Sig: Take 1 tablet by mouth nightly. at bedtime.   clopidogrel (PLAVIX) 75 MG tablet   No No   Sig: Take 1 tablet by mouth daily   diclofenac sodium (VOLTAREN) 1 % GEL   No No   Sig: Apply 2 g topically 4 times daily as needed for Pain   famotidine (PEPCID) 20 MG tablet   No No   Sig: Take 1 tablet by mouth at bedtime   gabapentin (NEURONTIN) 300 MG capsule   Yes No   Sig: Take 2 capsules by mouth at bedtime.   glatiramer (COPAXONE) 40 MG/ML injection   No No   Sig: Inject 1 mL into the skin three times a week Mon-wed-fri. Do not restart until cleared by bariatric surgeon at 2 week follow up   hydrOXYzine HCl (ATARAX) 50 MG tablet   Yes No   lidocaine 4 % external patch   No No   Sig: Place 1 patch onto the skin daily   magnesium hydroxide (MILK OF MAGNESIA) 400 MG/5ML suspension   Yes No   Sig: Take 
12/01/2017, 10/15/2018, 11/04/2019, 10/15/2020    Influenza Virus Vaccine 11/10/2010, 09/26/2012, 01/08/2016, 10/07/2020, 03/23/2022    Influenza, FLUCELVAX, (age 6 mo+), MDCK, Quadv PF, 0.5mL 11/18/2022    PPD Test 08/26/2005    Pneumococcal Vaccine 09/01/2013    Pneumococcal, PPSV23, PNEUMOVAX 23, (age 2y+), SC/IM, 0.5mL 10/09/2015

## 2025-02-20 ENCOUNTER — CLINICAL DOCUMENTATION (OUTPATIENT)
Facility: HOSPITAL | Age: 50
End: 2025-02-20

## 2025-02-20 NOTE — PROGRESS NOTES
2/20/25:  Patient did not show for her nutrition visit.  She did not complete the requirement that was sent on 3 separate occasions.  Patient was contacted to reschedule.    Soni Quinteros MS RD

## 2025-03-21 ENCOUNTER — HOSPITAL ENCOUNTER (OUTPATIENT)
Facility: HOSPITAL | Age: 50
Discharge: HOME | End: 2025-03-24

## 2025-03-21 ENCOUNTER — CLINICAL DOCUMENTATION (OUTPATIENT)
Facility: HOSPITAL | Age: 50
End: 2025-03-21

## 2025-03-21 NOTE — PROGRESS NOTES
and crackers daily.      Sweet intake is candy once in a while, but not often.     Patient is drinking  water and some ginger ale        Physical Activity/Exercise    Comments:    We talked about exercise.  Patient was given reasons of why exercise is so important and how that can help with their long-term success.  I have encouraged patient to get a support system to help with the activity.    Patient is currently  doing walking for activity.    Goals have been set to increase activity.      Behavior Modification       Comments:  During today's lesson, I also spent some time talking about behavior changes.  I have encouraged patient to food journal and track what they are eating.  Patient is also encouraged to practice eating slowly.  Patient is currently taking 15 minutes to eat a meal.      Patient's Goals for Next month:    Behavior Goal:  She would like to keep a food journal with her eating.    2. Exercise Goal: Increase her walking.    3 Food-Related Goal:  Stop all snacking.    Soni Quinteros, RD    3/21/2025

## 2025-05-07 ENCOUNTER — OFFICE VISIT (OUTPATIENT)
Age: 50
End: 2025-05-07
Payer: MEDICARE

## 2025-05-07 VITALS
SYSTOLIC BLOOD PRESSURE: 128 MMHG | HEART RATE: 91 BPM | DIASTOLIC BLOOD PRESSURE: 84 MMHG | OXYGEN SATURATION: 99 % | RESPIRATION RATE: 16 BRPM | HEIGHT: 63 IN | BODY MASS INDEX: 33.1 KG/M2 | TEMPERATURE: 97.5 F | WEIGHT: 186.8 LBS

## 2025-05-07 DIAGNOSIS — I63.9 CEREBROVASCULAR ACCIDENT (CVA), UNSPECIFIED MECHANISM (HCC): ICD-10-CM

## 2025-05-07 DIAGNOSIS — K21.9 GASTRO-ESOPHAGEAL REFLUX DISEASE WITHOUT ESOPHAGITIS: Primary | ICD-10-CM

## 2025-05-07 DIAGNOSIS — K44.9 PARAESOPHAGEAL HERNIA: ICD-10-CM

## 2025-05-07 DIAGNOSIS — Z98.84 BARIATRIC SURGERY STATUS: ICD-10-CM

## 2025-05-07 DIAGNOSIS — K21.00 BILE REFLUX ESOPHAGITIS: ICD-10-CM

## 2025-05-07 DIAGNOSIS — G35 MULTIPLE SCLEROSIS (HCC): ICD-10-CM

## 2025-05-07 DIAGNOSIS — K31.2 HOURGLASS STRICTURE AND STENOSIS OF STOMACH: ICD-10-CM

## 2025-05-07 DIAGNOSIS — R11.11 VOMITING WITHOUT NAUSEA, UNSPECIFIED VOMITING TYPE: ICD-10-CM

## 2025-05-07 DIAGNOSIS — Z90.3 S/P GASTRIC SLEEVE PROCEDURE: ICD-10-CM

## 2025-05-07 PROCEDURE — G8417 CALC BMI ABV UP PARAM F/U: HCPCS | Performed by: SURGERY

## 2025-05-07 PROCEDURE — 3079F DIAST BP 80-89 MM HG: CPT | Performed by: SURGERY

## 2025-05-07 PROCEDURE — 99214 OFFICE O/P EST MOD 30 MIN: CPT | Performed by: SURGERY

## 2025-05-07 PROCEDURE — 3017F COLORECTAL CA SCREEN DOC REV: CPT | Performed by: SURGERY

## 2025-05-07 PROCEDURE — G2211 COMPLEX E/M VISIT ADD ON: HCPCS | Performed by: SURGERY

## 2025-05-07 PROCEDURE — 3074F SYST BP LT 130 MM HG: CPT | Performed by: SURGERY

## 2025-05-07 PROCEDURE — G8427 DOCREV CUR MEDS BY ELIG CLIN: HCPCS | Performed by: SURGERY

## 2025-05-07 PROCEDURE — 1036F TOBACCO NON-USER: CPT | Performed by: SURGERY

## 2025-05-07 RX ORDER — SUCRALFATE 1 G/1
1 TABLET ORAL 4 TIMES DAILY
Qty: 120 TABLET | Refills: 0 | Status: SHIPPED | OUTPATIENT
Start: 2025-05-07 | End: 2025-06-06

## 2025-05-07 RX ORDER — CETIRIZINE HYDROCHLORIDE 10 MG/1
10 TABLET ORAL DAILY
COMMUNITY
Start: 2025-04-10

## 2025-05-07 RX ORDER — FAMOTIDINE 20 MG/1
20 TABLET, FILM COATED ORAL NIGHTLY
Qty: 90 TABLET | Refills: 2 | Status: SHIPPED | OUTPATIENT
Start: 2025-05-07 | End: 2026-02-01

## 2025-05-07 RX ORDER — DULOXETIN HYDROCHLORIDE 60 MG/1
CAPSULE, DELAYED RELEASE ORAL
COMMUNITY
Start: 2025-04-28

## 2025-05-07 RX ORDER — FAMOTIDINE 40 MG/1
TABLET, FILM COATED ORAL
COMMUNITY
Start: 2025-04-28

## 2025-05-07 RX ORDER — OMEPRAZOLE 40 MG/1
40 CAPSULE, DELAYED RELEASE ORAL 2 TIMES DAILY
Qty: 180 CAPSULE | Refills: 0 | Status: SHIPPED | OUTPATIENT
Start: 2025-05-07

## 2025-05-07 RX ORDER — LIDOCAINE 50 MG/G
PATCH TOPICAL
COMMUNITY
Start: 2025-04-28

## 2025-05-07 RX ORDER — FLUTICASONE PROPIONATE 50 MCG
SPRAY, SUSPENSION (ML) NASAL
COMMUNITY
Start: 2025-04-10

## 2025-05-07 NOTE — PROGRESS NOTES
Chief Complaint   Patient presents with    Follow-up     Post Testing/Clearances-- Hernia Repair, Sleeve to Bypass      1. Have you been to the ER, urgent care clinic since your last visit?  Hospitalized since your last visit?No    2. Have you seen or consulted any other health care providers outside of the [facility] since your last visit?  Include any pap smears or colon screening. No     Bariatric Postoperative Nurse Note      Little Siddiqui is a 50 y.o. female status post laparoscopic sleeve gastrectomy performed on 4.13.2023.    All Post-Ops (including two weeks)  -# of grams of protein daily? 60-80  -sources of protein? Shakes, chicken, fish, eggs   -# of oz of sugar free fluids from all sources daily?  64  -Nausea? Yes  -Vomiting? No  -Difficulty swallow/food sticking? No  -Heartburn/regurgitation? No  -Character of bowel movements (diarrhea/constipation/bloody stools?) regular  -Which multivitamin product are you taking? Flintstones   -What dose and how frequently are you taking calcium citrate? 3 times a day  - from any iron-containing multivitamin by 2 hours? Yes  -Ulcer risk exposures:   NSAID No  Tobacco No  Alcohol No  Steroids No  -Minutes of physical activity and what type? walking         
understanding and had no further questions.    The patient understands this is a life altering decision and will require compliance to the program for the remainder of their life in order to be monitored to avoid complication and ensure successful, sustained weight control. They will be placed on a lifelong low carbohydrate and low sugar diet, exercise, and vitamin regimen and will require frequent blood draws and office visits to ensure adequate nutrition and program compliance. Visits and follow up will be in compliance with the guidelines set forth by MBSAQIP. I have specifically mentioned the need to avoid all personal and second-hand tobacco exposure, systemic steroids, and NSAIDS after any gastric surgery to help avoid the above listed complications. The patient has expressed understanding of the above and would like to proceed with surgery.      Signed By: Alvarado Coello MD [facility]  Bariatric and General Surgeon  [facility]    May 7, 2025

## 2025-07-02 ENCOUNTER — OFFICE VISIT (OUTPATIENT)
Age: 50
End: 2025-07-02
Payer: MEDICARE

## 2025-07-02 VITALS
WEIGHT: 194 LBS | DIASTOLIC BLOOD PRESSURE: 88 MMHG | BODY MASS INDEX: 34.38 KG/M2 | HEIGHT: 63 IN | HEART RATE: 86 BPM | TEMPERATURE: 98.5 F | SYSTOLIC BLOOD PRESSURE: 136 MMHG | OXYGEN SATURATION: 100 %

## 2025-07-02 DIAGNOSIS — G35 MULTIPLE SCLEROSIS (HCC): Primary | ICD-10-CM

## 2025-07-02 DIAGNOSIS — G31.84 MILD NEUROCOGNITIVE DISORDER: ICD-10-CM

## 2025-07-02 PROCEDURE — 3079F DIAST BP 80-89 MM HG: CPT | Performed by: STUDENT IN AN ORGANIZED HEALTH CARE EDUCATION/TRAINING PROGRAM

## 2025-07-02 PROCEDURE — G8428 CUR MEDS NOT DOCUMENT: HCPCS | Performed by: STUDENT IN AN ORGANIZED HEALTH CARE EDUCATION/TRAINING PROGRAM

## 2025-07-02 PROCEDURE — 1036F TOBACCO NON-USER: CPT | Performed by: STUDENT IN AN ORGANIZED HEALTH CARE EDUCATION/TRAINING PROGRAM

## 2025-07-02 PROCEDURE — 3075F SYST BP GE 130 - 139MM HG: CPT | Performed by: STUDENT IN AN ORGANIZED HEALTH CARE EDUCATION/TRAINING PROGRAM

## 2025-07-02 PROCEDURE — G8417 CALC BMI ABV UP PARAM F/U: HCPCS | Performed by: STUDENT IN AN ORGANIZED HEALTH CARE EDUCATION/TRAINING PROGRAM

## 2025-07-02 PROCEDURE — 3017F COLORECTAL CA SCREEN DOC REV: CPT | Performed by: STUDENT IN AN ORGANIZED HEALTH CARE EDUCATION/TRAINING PROGRAM

## 2025-07-02 PROCEDURE — 99215 OFFICE O/P EST HI 40 MIN: CPT | Performed by: STUDENT IN AN ORGANIZED HEALTH CARE EDUCATION/TRAINING PROGRAM

## 2025-07-02 ASSESSMENT — ENCOUNTER SYMPTOMS
NAUSEA: 0
VOMITING: 0
COUGH: 0
BACK PAIN: 0
SHORTNESS OF BREATH: 0

## 2025-07-02 NOTE — PROGRESS NOTES
02/11/2025 Negative  NEG   Final    Methadone, Urine 02/11/2025 Negative  NEG   Final    Comments: 02/11/2025 (NOTE)   Final    Comment: Specimen analysis was performed without chain of custody handling.    These results should be used for medical purposes only and not for   legal or employment purposes.  Unconfirmed screening results must not   be used for non-medical purposes.    The cut-off concentration for positive results are as follows:    AMPH     1000 ng/mL  ATTILA      200 ng/mL  TARYN      200 ng/mL  LONDON       300 ng/mL  METH      300 ng/mL  OPI       300 ng/mL  PCP        25 ng/mL  THC        50 ng/mL        Color, UA 02/11/2025 YELLOW    Final    Appearance 02/11/2025 CLEAR    Final    Specific Gravity, UA 02/11/2025 1.009  1.005 - 1.030   Final    pH, Urine 02/11/2025 8.5 (H)  5.0 - 8.0   Final    Protein, UA 02/11/2025 Negative  NEG mg/dL Final    Glucose, Ur 02/11/2025 Negative  NEG mg/dL Final    Ketones, Urine 02/11/2025 Negative  NEG mg/dL Final    Bilirubin, Urine 02/11/2025 Negative  NEG   Final    Blood, Urine 02/11/2025 Negative  NEG   Final    Urobilinogen, Urine 02/11/2025 1.0  0.2 - 1.0 EU/dL Final    Nitrite, Urine 02/11/2025 Negative  NEG   Final    Leukocyte Esterase, Urine 02/11/2025 Negative  NEG   Final    Preg, Serum 02/11/2025 Negative  NEG   Final    Test results should be confirmed using serum quantitative hCG when detection of pregnancy is critical and before performing any critical medical procedure.         Assessment:  1. Multiple sclerosis (HCC)  - MRI BRAIN W WO CONTRAST; Future  - MRI CERVICAL SPINE W WO CONTRAST; Future  - MRI THORACIC SPINE W WO CONTRAST; Future  Patient was admitted to [facility] in January for worsening left-sided weakness.  MRI of the brain negative for an acute stroke; was done to rule out stroke without contrast.  C-spine MRI without contrast showed no acute lesions.  Patient's symptoms are about the same.  No obvious relapses.  Will get MRI of the brain,

## 2025-07-05 DIAGNOSIS — G35 MULTIPLE SCLEROSIS (HCC): ICD-10-CM

## 2025-07-07 RX ORDER — CLOPIDOGREL BISULFATE 75 MG/1
75 TABLET ORAL DAILY
Qty: 30 TABLET | Refills: 5 | Status: SHIPPED | OUTPATIENT
Start: 2025-07-07

## 2025-07-11 RX ORDER — GLATIRAMER ACETATE 40 MG/ML
INJECTION, SOLUTION SUBCUTANEOUS
Qty: 12 ML | Refills: 0 | Status: SHIPPED | OUTPATIENT
Start: 2025-07-11

## 2025-07-17 DIAGNOSIS — G35 MULTIPLE SCLEROSIS (HCC): ICD-10-CM

## 2025-07-23 ENCOUNTER — TELEPHONE (OUTPATIENT)
Age: 50
End: 2025-07-23

## 2025-07-28 DIAGNOSIS — G35 MULTIPLE SCLEROSIS (HCC): ICD-10-CM

## 2025-07-28 RX ORDER — GLATIRAMER 40 MG/ML
40 INJECTION, SOLUTION SUBCUTANEOUS
Qty: 12 ML | Refills: 2 | Status: SHIPPED | OUTPATIENT
Start: 2025-07-28

## 2025-08-11 ENCOUNTER — CLINICAL DOCUMENTATION (OUTPATIENT)
Age: 50
End: 2025-08-11

## 2025-08-22 RX ORDER — GLATIRAMER ACETATE 40 MG/ML
INJECTION, SOLUTION SUBCUTANEOUS
Qty: 12 ML | Refills: 0 | Status: SHIPPED | OUTPATIENT
Start: 2025-08-22

## 2025-09-05 ENCOUNTER — HOSPITAL ENCOUNTER (OUTPATIENT)
Age: 50
End: 2025-09-05
Payer: MEDICARE

## 2025-09-05 ENCOUNTER — HOSPITAL ENCOUNTER (OUTPATIENT)
Age: 50
Discharge: HOME | End: 2025-09-05
Payer: MEDICARE

## 2025-09-05 DIAGNOSIS — G35 MULTIPLE SCLEROSIS (HCC): ICD-10-CM

## 2025-09-05 PROCEDURE — 72157 MRI CHEST SPINE W/O & W/DYE: CPT

## 2025-09-05 PROCEDURE — A9577 INJ MULTIHANCE: HCPCS | Performed by: STUDENT IN AN ORGANIZED HEALTH CARE EDUCATION/TRAINING PROGRAM

## 2025-09-05 PROCEDURE — 70553 MRI BRAIN STEM W/O & W/DYE: CPT

## 2025-09-05 PROCEDURE — 72156 MRI NECK SPINE W/O & W/DYE: CPT

## 2025-09-05 PROCEDURE — 6360000004 HC RX CONTRAST MEDICATION: Performed by: STUDENT IN AN ORGANIZED HEALTH CARE EDUCATION/TRAINING PROGRAM

## 2025-09-05 RX ADMIN — GADOBENATE DIMEGLUMINE 18 ML: 529 INJECTION, SOLUTION INTRAVENOUS at 14:33

## 2025-09-05 RX ADMIN — GADOBENATE DIMEGLUMINE 20 ML: 529 INJECTION, SOLUTION INTRAVENOUS at 15:43

## (undated) DEVICE — BLANKET WRM W29.9XL79.1IN UP BODY FORC AIR MISTRAL-AIR

## (undated) DEVICE — SOLUTION IRRIG 1000ML H2O STRL BLT

## (undated) DEVICE — BITE BLOCK ENDOSCP UNIV AD 6 TO 9.4 MM

## (undated) DEVICE — DRAPE TOWEL: Brand: CONVERTORS

## (undated) DEVICE — NEEDLE SPNL 20GA L3.5IN YEL HUB S STL REG WALL FIT STYL W/

## (undated) DEVICE — VISIGI 3D®  CALIBRATION SYSTEM  SIZE 36FR STD W/ BULB: Brand: BOEHRINGER® VISIGI 3D™ SLEEVE GASTRECTOMY CALIBRATION SYSTEM, SIZE 36FR W/BULB

## (undated) DEVICE — UNDERPAD INCONT W23XL36IN STD BLU POLYPR BK FLUF SFT

## (undated) DEVICE — 4-PORT MANIFOLD: Brand: NEPTUNE 2

## (undated) DEVICE — ENDOSCOPY PUMP TUBING/ CAP SET: Brand: ERBE

## (undated) DEVICE — SYRINGE, LUER LOCK, 10ML: Brand: MEDLINE

## (undated) DEVICE — TAPE ADH W3INXL10YD PLAS TRNSPAR H2O RESIST HYPOALRG CURAD

## (undated) DEVICE — TISSUE RETRIEVAL SYSTEM: Brand: INZII RETRIEVAL SYSTEM

## (undated) DEVICE — LINER SUCT CANSTR 3000CC PLAS SFT PRE ASSEMB W/OUT TBNG W/

## (undated) DEVICE — VISIGI 3D®  CALIBRATION SYSTEM  SIZE 40FR STD W/ BULB: Brand: BOEHRINGER® VISIGI 3D™ SLEEVE GASTRECTOMY CALIBRATION SYSTEM, SIZE 40FR W/BULB

## (undated) DEVICE — Device

## (undated) DEVICE — GLOVE ORTHO 7 1/2   MSG9475

## (undated) DEVICE — YANKAUER,SMOOTH HANDLE,HIGH CAPACITY: Brand: MEDLINE INDUSTRIES, INC.

## (undated) DEVICE — STAPLER SKIN L440MM 32MM LNG 12 FIRING B FRM PWR + GRIPPING

## (undated) DEVICE — DECANTER FLD 9IN ST BG FOR ASEP TRNSF OF FLD

## (undated) DEVICE — SCISSORS ENDOSCP DIA5MM CRV MPLR CAUT W/ RATCH HNDL

## (undated) DEVICE — SUTURE MCRYL SZ 4-0 L27IN ABSRB UD L24MM PS-1 3/8 CIR PRIM Y935H

## (undated) DEVICE — MEDI-VAC NON-CONDUCTIVE SUCTION TUBING: Brand: CARDINAL HEALTH

## (undated) DEVICE — ELECTRODE PT RET AD L9FT HI MOIST COND ADH HYDRGEL CORDED

## (undated) DEVICE — CANNULA NSL AD TBNG L14FT STD PVC O2 CRV CONN NONFLARED NSL

## (undated) DEVICE — SYRINGE MED 25GA 3ML L5/8IN SUBQ PLAS W/ DETACH NDL SFTY

## (undated) DEVICE — TUBING INSUFFLATOR HEAT HUMIDIFIED SMK EVAC SET PNEUMOCLEAR

## (undated) DEVICE — SYRINGE MED 30ML STD CLR PLAS LUERLOCK TIP N CTRL DISP

## (undated) DEVICE — ADHESIVE SKIN CLSR 0.7ML TOP DERMBND ADV

## (undated) DEVICE — RELOAD STPL L60MM H1-2.6MM MESENTERY THN TISS WHT 6 ROW

## (undated) DEVICE — BASIN EMSIS 16OZ GRAPHITE PLAS KID SHP MOLD GRAD FOR ORAL

## (undated) DEVICE — GAUZE,SPONGE,4"X4",16PLY,STRL,LF,10/TRAY: Brand: MEDLINE

## (undated) DEVICE — STERILE POLYISOPRENE POWDER-FREE SURGICAL GLOVES: Brand: PROTEXIS

## (undated) DEVICE — TROCAR: Brand: KII® SLEEVE

## (undated) DEVICE — SEALANT TISS 10 CC FIBRIN VISTASEAL

## (undated) DEVICE — APPLICATOR LAP 35 CM 2 RIGID VISTASEAL

## (undated) DEVICE — SYRINGE MED 50ML LUERSLIP TIP

## (undated) DEVICE — FORCEPS BX L240CM JAW DIA2.4MM ORNG L CAP W/ NDL DISP RAD

## (undated) DEVICE — TROCAR: Brand: KII® OPTICAL ACCESS SYSTEM

## (undated) DEVICE — RELOAD STPL L60MM H1.5-3.6MM REG TISS BLU GRIPPING SURF B

## (undated) DEVICE — BANDAGE,GAUZE,BULKEE II,4.5"X4.1YD,STRL: Brand: MEDLINE

## (undated) DEVICE — SOLUTION ANTIFOG VIS SYS CLEARIFY LAPSCP

## (undated) DEVICE — SUTURE PERMAHAND SZ 3-0 L30IN NONABSORBABLE BLK SH L26MM K832H

## (undated) DEVICE — SHEARS LAP L45CM DIA5MM ULTRASONIC CRV TIP ADV HEMSTAS HARM

## (undated) DEVICE — CATHETER SUCT TR FL TIP 14FR W/ O CTRL

## (undated) DEVICE — TROCARS: Brand: KII® OPTICAL ACCESS SYSTEM

## (undated) DEVICE — SOLUTION IV 1000ML 0.9% SOD CHL

## (undated) DEVICE — INTENDED FOR TISSUE SEPARATION, AND OTHER PROCEDURES THAT REQUIRE A SHARP SURGICAL BLADE TO PUNCTURE OR CUT.: Brand: BARD-PARKER ® STAINLESS STEEL BLADES